# Patient Record
Sex: FEMALE | Race: WHITE | NOT HISPANIC OR LATINO | Employment: OTHER | URBAN - METROPOLITAN AREA
[De-identification: names, ages, dates, MRNs, and addresses within clinical notes are randomized per-mention and may not be internally consistent; named-entity substitution may affect disease eponyms.]

---

## 2021-02-12 ENCOUNTER — TRANSFERRED RECORDS (OUTPATIENT)
Dept: HEALTH INFORMATION MANAGEMENT | Facility: CLINIC | Age: 71
End: 2021-02-12

## 2022-02-07 ENCOUNTER — TRANSFERRED RECORDS (OUTPATIENT)
Dept: HEALTH INFORMATION MANAGEMENT | Facility: CLINIC | Age: 72
End: 2022-02-07

## 2022-02-07 LAB — EJECTION FRACTION: NORMAL %

## 2022-05-27 ENCOUNTER — OFFICE VISIT (OUTPATIENT)
Dept: INTERNAL MEDICINE | Facility: CLINIC | Age: 72
End: 2022-05-27
Payer: MEDICARE

## 2022-05-27 VITALS
RESPIRATION RATE: 16 BRPM | TEMPERATURE: 97.4 F | WEIGHT: 167.9 LBS | HEART RATE: 82 BPM | SYSTOLIC BLOOD PRESSURE: 119 MMHG | DIASTOLIC BLOOD PRESSURE: 66 MMHG | BODY MASS INDEX: 26.98 KG/M2 | HEIGHT: 66 IN | OXYGEN SATURATION: 96 %

## 2022-05-27 DIAGNOSIS — K31.84 GASTROPARESIS: ICD-10-CM

## 2022-05-27 DIAGNOSIS — K57.92 ACUTE DIVERTICULITIS: ICD-10-CM

## 2022-05-27 DIAGNOSIS — M35.00 SICCA SYNDROME (H): ICD-10-CM

## 2022-05-27 DIAGNOSIS — G89.4 PAIN SYNDROME, CHRONIC: ICD-10-CM

## 2022-05-27 DIAGNOSIS — K21.9 GASTROESOPHAGEAL REFLUX DISEASE WITHOUT ESOPHAGITIS: ICD-10-CM

## 2022-05-27 DIAGNOSIS — I10 BENIGN ESSENTIAL HYPERTENSION: Primary | ICD-10-CM

## 2022-05-27 DIAGNOSIS — Z12.31 ENCOUNTER FOR SCREENING MAMMOGRAM FOR BREAST CANCER: ICD-10-CM

## 2022-05-27 DIAGNOSIS — I47.29 NSVT (NONSUSTAINED VENTRICULAR TACHYCARDIA) (H): ICD-10-CM

## 2022-05-27 DIAGNOSIS — I71.20 THORACIC AORTIC ANEURYSM WITHOUT RUPTURE (H): ICD-10-CM

## 2022-05-27 PROCEDURE — 99204 OFFICE O/P NEW MOD 45 MIN: CPT | Performed by: INTERNAL MEDICINE

## 2022-05-27 RX ORDER — LACTOBACILLUS RHAMNOSUS GG 10B CELL
1 CAPSULE ORAL 2 TIMES DAILY
COMMUNITY
End: 2022-08-19

## 2022-05-27 RX ORDER — ROSUVASTATIN CALCIUM 10 MG/1
10 TABLET, COATED ORAL DAILY
COMMUNITY
Start: 2021-06-08 | End: 2022-11-17

## 2022-05-27 RX ORDER — HYDROCHLOROTHIAZIDE 25 MG/1
25 TABLET ORAL DAILY
COMMUNITY
Start: 2022-02-09 | End: 2022-11-17

## 2022-05-27 RX ORDER — LOSARTAN POTASSIUM 50 MG/1
50 TABLET ORAL 2 TIMES DAILY
COMMUNITY
Start: 2021-08-25 | End: 2022-11-17

## 2022-05-27 RX ORDER — GABAPENTIN 100 MG/1
200 CAPSULE ORAL EVERY MORNING
COMMUNITY
Start: 2021-08-02 | End: 2023-05-17

## 2022-05-27 RX ORDER — GABAPENTIN 300 MG/1
600 CAPSULE ORAL AT BEDTIME
COMMUNITY
Start: 2022-01-21 | End: 2023-05-17

## 2022-05-27 RX ORDER — MULTIPLE VITAMINS W/ MINERALS TAB 9MG-400MCG
1 TAB ORAL DAILY
COMMUNITY

## 2022-05-27 RX ORDER — VITAMIN B COMPLEX
1 TABLET ORAL DAILY
COMMUNITY

## 2022-05-27 NOTE — NURSING NOTE
"/66 (BP Location: Right arm, Patient Position: Sitting, Cuff Size: Adult Regular)   Pulse 82   Temp 97.4  F (36.3  C) (Oral)   Resp 16   Ht 1.676 m (5' 6\")   Wt 76.2 kg (167 lb 14.4 oz)   SpO2 96%   BMI 27.10 kg/m      "

## 2022-05-27 NOTE — PROGRESS NOTES
Assessment & Plan     Benign essential hypertension  Well-controlled, continue medication, she can notify me when any medication is due    Thoracic aortic aneurysm without rupture (H)  Stable over at least 5 years, there should not be any contraindication to doing her hip surgery, no need for any exam at this time    NSVT (nonsustained ventricular tachycardia) (H)  No episodes    Sicca syndrome (H)  Stable    Gastroparesis  Stable    Gastroesophageal reflux disease without esophagitis  Stable, continue medication    Pain syndrome, chronic  Stable, work with orthopedics, continue medication    Acute diverticulitis  Provided Augmentin for her to take as needed for episodes of acute diverticulitis since she had been told never to take Cipro due to a aneurysm.  - amoxicillin-clavulanate (AUGMENTIN) 875-125 MG tablet; Take 1 tablet by mouth 2 times daily for 10 days    Encounter for screening mammogram for breast cancer    - *MA Screening Digital Bilateral; Future          Return in about 8 weeks (around 7/21/2022) for preop.    Delfina Negrete MD  [facility]    Subjective   Alexandra is a 71 year old who presents for the following health issues     Subjective:   Alexandra Thomas is a 71 year old female who presents as a new patient to this clinic.     Previous care: [facility] in [facility]    Problems:  1.  Hypertension: This has been well controlled.  Her systolics are generally 120, diastolics 60s.  2.  Thoracic aortic aneurysm: Last echocardiogram was 2/22, showed aorta 4.2 cm, relatively stable.  No treatment was recommended at that time.  Echo prior to that had been done in 2020.  She had an MR chest angiogram in 10/2017.  3.  Gastroesophageal reflux: Controlled on her medication, had a GI bleed 2 years ago felt due to gastroenteritis, work-up was otherwise negative.  4.  Gastroparesis: This was noted on the endoscopy and confirmed with a motility study.  Her  last endoscopy did not show retained food, she has minimal symptoms related.  5.  History of paroxysmal atrial fibrillation, had an ablation in 2011 without recurrence  6.  History of nonsustained V. tach: No episodes  7.  History of sicca syndrome: Autoimmune work-up was negative  8.  History of recurrent acute diverticulitis: She had a partial colectomy but still will have some episodes, previously she had been provided antibiotics to treat when her symptoms flared.  9.  Chronic pain: She has chronic degenerative disc disease of cervical, thoracic and lumbar spine.  She has chronic osteoarthritis of the left shoulder, hip.  She has chronic foot pain.  She is on gabapentin to manage this which helps.  She is going to have a left total hip arthroplasty in July of this year.  She has had both knees replaced, right hip replaced and right shoulder replaced.    Healthcare maintenance:  She will be due for mammogram in August.  Her last colonoscopy was in 2021 and was negative.  She had a bone density test in 2018 which showed normal bone, T score at the spine was 2.8 and T score at the left hip was -0.9 on lunar machine, had lost 5% in 5 years.        Current concerns:   She has no acute concerns today.    Patient Active Problem List   Diagnosis     Benign essential hypertension     Gastroparesis     Thoracic aortic aneurysm without rupture (H)     Gastroesophageal reflux disease without esophagitis     Pain syndrome, chronic     Sicca syndrome (H)     NSVT (nonsustained ventricular tachycardia) (H)     TATI (obstructive sleep apnea)         Current Outpatient Medications   Medication Sig Dispense Refill     amoxicillin-clavulanate (AUGMENTIN) 875-125 MG tablet Take 1 tablet by mouth 2 times daily for 10 days 20 tablet 0     gabapentin (NEURONTIN) 100 MG capsule Take 100 mg by mouth every morning       gabapentin (NEURONTIN) 300 MG capsule Take 600 mg by mouth At Bedtime       hydrochlorothiazide (HYDRODIURIL) 25 MG  "tablet Take 25 mg by mouth daily       lactobacillus rhamnosus, GG, (CULTURELL) capsule Take 1 capsule by mouth 2 times daily       losartan (COZAAR) 50 MG tablet Take 50 mg by mouth 2 times daily       multivitamin w/minerals (MULTI-VITAMIN) tablet Take 1 tablet by mouth daily       rosuvastatin (CRESTOR) 10 MG tablet Take 10 mg by mouth daily       Vitamin D3 (CHOLECALCIFEROL) 25 mcg (1000 units) tablet Take by mouth daily         Past Surgical History:   Procedure Laterality Date     COLECTOMY PARTIAL      Due to recurrent diverticulitis     TOTAL HIP ARTHROPLASTY Right 06/07/2009     TOTAL KNEE ARTHROPLASTY Right 07/12/2006     TOTAL KNEE ARTHROPLASTY Left 02/18/2009     TOTAL VAGINAL HYSTERECTOMY         Family History   Problem Relation Age of Onset     Chronic Obstructive Pulmonary Disease Mother      Dementia Father      Chronic Obstructive Pulmonary Disease Father        Social History     Tobacco Use     Smoking status: Never Smoker     Smokeless tobacco: Never Used   Vaping Use     Vaping Use: Never used   Substance Use Topics     Drug use: Never               Review of Systems   No fever, chills, no dyspnea on exertion, no chest pain, palpitations, PND, orthopnea, edema, syncope, headache, abdominal pain       Objective    /66 (BP Location: Right arm, Patient Position: Sitting, Cuff Size: Adult Regular)   Pulse 82   Temp 97.4  F (36.3  C) (Oral)   Resp 16   Ht 1.676 m (5' 6\")   Wt 76.2 kg (167 lb 14.4 oz)   SpO2 96%   BMI 27.10 kg/m    Body mass index is 27.1 kg/m .  Physical Exam     Lungs: Clear  CV: normal S1, S2 without murmur, S3 or S4.   Abdomen: Soft, nondistended, nontender, no hepatosplenomegaly  Extremities: No edema              "

## 2022-05-29 ENCOUNTER — HEALTH MAINTENANCE LETTER (OUTPATIENT)
Age: 72
End: 2022-05-29

## 2022-05-29 PROBLEM — K21.9 GASTROESOPHAGEAL REFLUX DISEASE WITHOUT ESOPHAGITIS: Status: ACTIVE | Noted: 2022-05-29

## 2022-05-29 PROBLEM — M35.00 SICCA SYNDROME (H): Status: ACTIVE | Noted: 2022-05-29

## 2022-05-29 PROBLEM — G89.4 PAIN SYNDROME, CHRONIC: Status: ACTIVE | Noted: 2022-05-29

## 2022-05-29 PROBLEM — G47.33 OSA (OBSTRUCTIVE SLEEP APNEA): Status: ACTIVE | Noted: 2022-05-29

## 2022-05-29 PROBLEM — I47.29 NSVT (NONSUSTAINED VENTRICULAR TACHYCARDIA) (H): Status: ACTIVE | Noted: 2022-05-29

## 2022-05-29 PROBLEM — K31.84 GASTROPARESIS: Status: ACTIVE | Noted: 2022-05-29

## 2022-05-29 PROBLEM — I71.20 THORACIC AORTIC ANEURYSM WITHOUT RUPTURE (H): Status: ACTIVE | Noted: 2022-05-29

## 2022-05-29 PROBLEM — I10 BENIGN ESSENTIAL HYPERTENSION: Status: ACTIVE | Noted: 2022-05-29

## 2022-06-28 ENCOUNTER — OFFICE VISIT (OUTPATIENT)
Dept: INTERNAL MEDICINE | Facility: CLINIC | Age: 72
End: 2022-06-28
Payer: MEDICARE

## 2022-06-28 VITALS
HEIGHT: 66 IN | DIASTOLIC BLOOD PRESSURE: 75 MMHG | HEART RATE: 80 BPM | WEIGHT: 166.1 LBS | TEMPERATURE: 97.5 F | SYSTOLIC BLOOD PRESSURE: 110 MMHG | BODY MASS INDEX: 26.69 KG/M2 | RESPIRATION RATE: 18 BRPM | OXYGEN SATURATION: 98 %

## 2022-06-28 DIAGNOSIS — Z01.818 PRE-OP EXAM: Primary | ICD-10-CM

## 2022-06-28 DIAGNOSIS — M16.12 PRIMARY OSTEOARTHRITIS OF LEFT HIP: ICD-10-CM

## 2022-06-28 LAB
ERYTHROCYTE [DISTWIDTH] IN BLOOD BY AUTOMATED COUNT: 11.8 % (ref 10–15)
HCT VFR BLD AUTO: 40.6 % (ref 35–47)
HGB BLD-MCNC: 13.2 G/DL (ref 11.7–15.7)
MCH RBC QN AUTO: 31.9 PG (ref 26.5–33)
MCHC RBC AUTO-ENTMCNC: 32.5 G/DL (ref 31.5–36.5)
MCV RBC AUTO: 98 FL (ref 78–100)
PLATELET # BLD AUTO: 198 10E3/UL (ref 150–450)
RBC # BLD AUTO: 4.14 10E6/UL (ref 3.8–5.2)
WBC # BLD AUTO: 6.4 10E3/UL (ref 4–11)

## 2022-06-28 PROCEDURE — 87640 STAPH A DNA AMP PROBE: CPT | Mod: 59 | Performed by: INTERNAL MEDICINE

## 2022-06-28 PROCEDURE — 85027 COMPLETE CBC AUTOMATED: CPT | Performed by: INTERNAL MEDICINE

## 2022-06-28 PROCEDURE — 99214 OFFICE O/P EST MOD 30 MIN: CPT | Performed by: INTERNAL MEDICINE

## 2022-06-28 PROCEDURE — 93000 ELECTROCARDIOGRAM COMPLETE: CPT | Performed by: INTERNAL MEDICINE

## 2022-06-28 PROCEDURE — 36415 COLL VENOUS BLD VENIPUNCTURE: CPT | Performed by: INTERNAL MEDICINE

## 2022-06-28 PROCEDURE — 80048 BASIC METABOLIC PNL TOTAL CA: CPT | Performed by: INTERNAL MEDICINE

## 2022-06-28 PROCEDURE — 87641 MR-STAPH DNA AMP PROBE: CPT | Performed by: INTERNAL MEDICINE

## 2022-06-28 RX ORDER — ONDANSETRON 4 MG/1
4 TABLET, ORALLY DISINTEGRATING ORAL EVERY 8 HOURS PRN
Qty: 12 TABLET | Refills: 0 | Status: SHIPPED | OUTPATIENT
Start: 2022-06-28 | End: 2024-03-13

## 2022-06-28 NOTE — NURSING NOTE
"/75 (BP Location: Left arm, Patient Position: Sitting, Cuff Size: Adult Regular)   Pulse 80   Temp 97.5  F (36.4  C) (Oral)   Resp 18   Ht 1.676 m (5' 6\")   Wt 75.3 kg (166 lb 1.6 oz)   SpO2 98%   BMI 26.81 kg/m      "

## 2022-06-28 NOTE — PROGRESS NOTES
[place] [address] [facility] [address]  Phone: [phone]  Primary Provider: Arianne Negrete  Pre-op Performing Provider: ARIANNE NEGRETE      PREOPERATIVE EVALUATION:  Today's date: 6/28/2022    Alexandra Thomas is a 71 year old female who presents for a preoperative evaluation.    Surgical Information:  Surgery/Procedure: Left total hip arthroplasty   Surgery Location: [facility]   Surgeon: Dr. Berry Mcdonnell   Surgery Date: 7/13/22 or  07/27/2022  Time of Surgery: 10:00 AM  Where patient plans to recover: At home with family  Fax number for surgical facility: Note does not need to be faxed, will be available electronically in Epic.    Type of Anesthesia Anticipated: to be determined    Assessment & Plan     The proposed surgical procedure is considered INTERMEDIATE risk.    Pre-op exam    - EKG 12-lead complete w/read - Clinics  - Methicillin Resistant Staph Aureus PCR  - CBC with platelets  - Basic metabolic panel  (Ca, Cl, CO2, Creat, Gluc, K, Na, BUN)    Primary osteoarthritis of left hip             Risks and Recommendations:  The patient has the following additional risks and recommendations for perioperative complications:   - No identified additional risk factors other than previously addressed    Medication Instructions:  She will take her usual blood pressure medications and omeprazole the day of surgery.    RECOMMENDATION:  APPROVAL GIVEN to proceed with proposed procedure, without further diagnostic evaluation.    [id number]}    Subjective     HPI related to upcoming procedure: She has osteoarthritis of the hip, not managed by conservative treatment.    Preop Questions 6/25/2022   1. Have you ever had a heart attack or stroke? No   2. Have you ever had surgery on your heart or blood vessels, such as a stent placement, a coronary artery bypass, or surgery on an artery in your head, neck, heart, or legs? No   3. Do you have chest pain with  activity? No   4. Do you have a history of  heart failure? No   5. Do you currently have a cold, bronchitis or symptoms of other infection? No   6. Do you have a cough, shortness of breath, or wheezing? No   7. Do you or anyone in your family have previous history of blood clots? No   8. Do you or does anyone in your family have a serious bleeding problem such as prolonged bleeding following surgeries or cuts? No   9. Have you ever had problems with anemia or been told to take iron pills? No   10. Have you had any abnormal blood loss such as black, tarry or bloody stools, or abnormal vaginal bleeding? No   11. Have you ever had a blood transfusion? No   12. Are you willing to have a blood transfusion if it is medically needed before, during, or after your surgery? Yes   13. Have you or any of your relatives ever had problems with anesthesia? No   14. Do you have sleep apnea, excessive snoring or daytime drowsiness? No   15. Do you have any artifical heart valves or other implanted medical devices like a pacemaker, defibrillator, or continuous glucose monitor? No   16. Do you have artificial joints? YES - see PSH   17. Are you allergic to latex? No       Health Care Directive:  Patient does not have a Health Care Directive or Living Will:     Preoperative Review of :   reviewed - no record of controlled substances prescribed.      Status of Chronic Conditions:  HYPERTENSION - Patient has longstanding history of HTN , currently denies any symptoms referable to elevated blood pressure. Specifically denies chest pain, palpitations, dyspnea, orthopnea, PND or peripheral edema. Blood pressure readings have been in normal range. Current medication regimen is as listed below. Patient denies any side effects of medication.       Review of Systems  GENERAL: negative for, fever, chills, weight loss, weight gain  EYES: negative  ENT: negative  RESPIRATORY: No dyspnea on exertion and No cough  CARDIOVASCULAR:, positive for  occasionally PVC, Negative for palpitations, tachycardia and chest pain  GI: negative for, nausea, vomiting, abdominal pain, melena and hematochezia  : negative for, dysuria and hematuria  MUSCULOSKELETAL: hip pain  NEUROLOGIC: negative for, headaches, seizures, local weakness, numbness or tingling of hands and numbness or tingling of feet  SKIN: negative  ENDOCRINE: negative       Patient Active Problem List    Diagnosis Date Noted     Benign essential hypertension 05/29/2022     Priority: Medium     Gastroparesis 05/29/2022     Priority: Medium     Thoracic aortic aneurysm without rupture (H) 05/29/2022     Priority: Medium     Gastroesophageal reflux disease without esophagitis 05/29/2022     Priority: Medium     Pain syndrome, chronic 05/29/2022     Priority: Medium     Orthopedic pains: Hip pain, foot pain, shoulder pain, on gabapentin       Sicca syndrome (H) 05/29/2022     Priority: Medium     NSVT (nonsustained ventricular tachycardia) (H) 05/29/2022     Priority: Medium     TATI (obstructive sleep apnea) 05/29/2022     Priority: Medium      No past medical history on file.  Past Surgical History:   Procedure Laterality Date     COLECTOMY PARTIAL      Due to recurrent diverticulitis     TOTAL HIP ARTHROPLASTY Right 06/07/2009     TOTAL KNEE ARTHROPLASTY Right 07/12/2006     TOTAL KNEE ARTHROPLASTY Left 02/18/2009     TOTAL VAGINAL HYSTERECTOMY       Current Outpatient Medications   Medication Sig Dispense Refill     gabapentin (NEURONTIN) 100 MG capsule Take 100 mg by mouth every morning       gabapentin (NEURONTIN) 300 MG capsule Take 600 mg by mouth At Bedtime       hydrochlorothiazide (HYDRODIURIL) 25 MG tablet Take 25 mg by mouth daily       lactobacillus rhamnosus (GG) (CULTURELL) capsule Take 1 capsule by mouth 2 times daily       losartan (COZAAR) 50 MG tablet Take 50 mg by mouth 2 times daily       multivitamin w/minerals (THERA-VIT-M) tablet Take 1 tablet by mouth daily       omeprazole (PRILOSEC)  "20 MG DR capsule Take 2 capsules (40 mg) by mouth daily 90 capsule 3     rosuvastatin (CRESTOR) 10 MG tablet Take 10 mg by mouth daily       Vitamin D3 (CHOLECALCIFEROL) 25 mcg (1000 units) tablet Take by mouth daily         Allergies   Allergen Reactions     Venlafaxine Hcl Diarrhea, Shortness Of Breath and Nausea and Vomiting     Adhesive Tape      Other reaction(s): Other (Specify with Comments)  blisters     Ciprofloxacin Other (See Comments)     Other reaction(s): Other (Specify with Comments)  Pt has aortic aneurysm.  Increased risk of rupture or dissection with use of fluoroquinolones.  Pt has aortic aneurysm.  Increased risk of rupture or dissection with use of fluoroquinolones.          Social History     Tobacco Use     Smoking status: Never Smoker     Smokeless tobacco: Never Used   Substance Use Topics     Alcohol use: Not on file       History   Drug Use Unknown         Objective       Physical Exam    Patient is alert, oriented, cooperative in no acute distress.  /75 (BP Location: Left arm, Patient Position: Sitting, Cuff Size: Adult Regular)   Pulse 80   Temp 97.5  F (36.4  C) (Oral)   Resp 18   Ht 1.676 m (5' 6\")   Wt 75.3 kg (166 lb 1.6 oz)   SpO2 98%   BMI 26.81 kg/m      HEENT: PERRL, EOMI, TM's are normal.   NECK: No lymphadenopathy or thyromegaly. Carotid pulses full without bruits.  LUNGS: clear  CV: normal S1, S2 without murmur, S3 or S4 present. Pulses are 2/2 throughout. No JVD.  ABDOMEN: Nondistended, soft, nontender, no hepatosplenomegaly, no masses  EXTREMITIES: no edema present, unremarkable joints  NEUROLOGIC: Cranial nerves II-XII intact, reflexes 2/4 throughout, strength 5/5, sensation grossly intact, gait normal.  SKIN: without rashes or significant lesions       Diagnostics:  Recent Results (from the past 168 hour(s))   Methicillin Resistant Staph Aureus PCR    Collection Time: 06/28/22  1:10 PM    Specimen: Nose; Swab   Result Value Ref Range    MRSA Target DNA " Negative Negative    SA Target DNA Negative    CBC with platelets    Collection Time: 06/28/22  1:34 PM   Result Value Ref Range    WBC Count 6.4 4.0 - 11.0 10e3/uL    RBC Count 4.14 3.80 - 5.20 10e6/uL    Hemoglobin 13.2 11.7 - 15.7 g/dL    Hematocrit 40.6 35.0 - 47.0 %    MCV 98 78 - 100 fL    MCH 31.9 26.5 - 33.0 pg    MCHC 32.5 31.5 - 36.5 g/dL    RDW 11.8 10.0 - 15.0 %    Platelet Count 198 150 - 450 10e3/uL   Basic metabolic panel  (Ca, Cl, CO2, Creat, Gluc, K, Na, BUN)    Collection Time: 06/28/22  1:34 PM   Result Value Ref Range    Sodium 140 133 - 144 mmol/L    Potassium 4.0 3.4 - 5.3 mmol/L    Chloride 105 94 - 109 mmol/L    Carbon Dioxide (CO2) 30 20 - 32 mmol/L    Anion Gap 5 3 - 14 mmol/L    Urea Nitrogen 20 7 - 30 mg/dL    Creatinine 1.01 0.52 - 1.04 mg/dL    Calcium 9.0 8.5 - 10.1 mg/dL    Glucose 85 70 - 99 mg/dL    GFR Estimate 58 (L) >60 mL/min/1.73m2      EKG: appears normal, NSR, normal axis, normal intervals, no acute ST/T changes c/w ischemia, no LVH by voltage criteria, unchanged from previous tracings    Revised Cardiac Risk Index (RCRI):  The patient has the following serious cardiovascular risks for perioperative complications:   - No serious cardiac risks = 0 points     RCRI Interpretation: 0 points: Class I (very low risk - 0.4% complication rate)           Signed Electronically by: Delfina Negrete MD  Copy of this evaluation report is provided to requesting physician.

## 2022-06-29 LAB
ANION GAP SERPL CALCULATED.3IONS-SCNC: 5 MMOL/L (ref 3–14)
BUN SERPL-MCNC: 20 MG/DL (ref 7–30)
CALCIUM SERPL-MCNC: 9 MG/DL (ref 8.5–10.1)
CHLORIDE BLD-SCNC: 105 MMOL/L (ref 94–109)
CO2 SERPL-SCNC: 30 MMOL/L (ref 20–32)
CREAT SERPL-MCNC: 1.01 MG/DL (ref 0.52–1.04)
GFR SERPL CREATININE-BSD FRML MDRD: 58 ML/MIN/1.73M2
GLUCOSE BLD-MCNC: 85 MG/DL (ref 70–99)
MRSA DNA SPEC QL NAA+PROBE: NEGATIVE
POTASSIUM BLD-SCNC: 4 MMOL/L (ref 3.4–5.3)
SA TARGET DNA: NEGATIVE
SODIUM SERPL-SCNC: 140 MMOL/L (ref 133–144)

## 2022-07-11 ENCOUNTER — OFFICE VISIT (OUTPATIENT)
Dept: URGENT CARE | Facility: URGENT CARE | Age: 72
End: 2022-07-11
Payer: MEDICARE

## 2022-07-11 ENCOUNTER — NURSE TRIAGE (OUTPATIENT)
Dept: NURSING | Facility: CLINIC | Age: 72
End: 2022-07-11

## 2022-07-11 VITALS
DIASTOLIC BLOOD PRESSURE: 90 MMHG | BODY MASS INDEX: 26.7 KG/M2 | WEIGHT: 165.4 LBS | RESPIRATION RATE: 16 BRPM | OXYGEN SATURATION: 99 % | HEART RATE: 70 BPM | SYSTOLIC BLOOD PRESSURE: 140 MMHG

## 2022-07-11 DIAGNOSIS — K22.4 ESOPHAGEAL SPASM: Primary | ICD-10-CM

## 2022-07-11 PROCEDURE — 99214 OFFICE O/P EST MOD 30 MIN: CPT | Performed by: FAMILY MEDICINE

## 2022-07-11 RX ORDER — SUCRALFATE ORAL 1 G/10ML
1 SUSPENSION ORAL 2 TIMES DAILY PRN
Qty: 414 ML | Refills: 0 | Status: SHIPPED | OUTPATIENT
Start: 2022-07-11 | End: 2022-07-14

## 2022-07-11 NOTE — TELEPHONE ENCOUNTER
Chest pain since Friday night.  She thinks it was her esophagus spasm.    Similar happened in March and GI cocktail helped.    No pain since Saturday morning.    Has GERD. Taking tums and hoarse voice.    She is concerned this will cancel her surgery on her hip 7/27/22.    She currently has no pain x 2 days.  She is asking if this will delay her surgery. She wants to be seen.    Warm transferred to [facility]    Pema Brock RN  [facility] Nurse Advisor    Reason for Disposition    Chest pain(s) lasting a few seconds    Additional Information    Negative: Severe difficulty breathing (e.g., struggling for each breath, speaks in single words)    Negative: Passed out (i.e., fainted, collapsed and was not responding)    Negative: Difficult to awaken or acting confused (e.g., disoriented, slurred speech)    Negative: Shock suspected (e.g., cold/pale/clammy skin, too weak to stand, low BP, rapid pulse)    Negative: Chest pain lasting longer than 5 minutes and ANY of the following:* Over 45 years old* Over 30 years old and at least one cardiac risk factor (e.g., diabetes, high blood pressure, high cholesterol, smoker, or strong family history of heart disease)* History of heart disease (i.e., angina, heart attack, heart failure, bypass surgery, takes nitroglycerin)* Pain is crushing, pressure-like, or heavy    Negative: Heart beating < 50 beats per minute OR > 140 beats per minute    Negative: Visible sweat on face or sweat dripping down face    Negative: Sounds like a life-threatening emergency to the triager    Negative: Followed an injury to chest    Negative: SEVERE chest pain    Negative: Pain also in shoulder(s) or arm(s) or jaw    Negative: Difficulty breathing    Negative: Cocaine use within last 3 days    Negative: Major surgery in the past month    Negative: Hip or leg fracture (broken bone) in past month (or had cast on leg or ankle in past month)    Negative: Illness requiring prolonged bedrest in  past month (e.g., immobilization, long hospital stay)    Negative: Long-distance travel in past month (e.g., car, bus, train, plane; with trip lasting 6 or more hours)    Negative: History of prior 'blood clot' in leg or lungs (i.e., deep vein thrombosis, pulmonary embolism)    Negative: History of inherited increased risk of blood clots (e.g., Factor 5 Leiden, Anti-thrombin 3, Protein C or Protein S deficiency, Prothrombin mutation)    Negative: Heart beating irregularly or very rapidly    Negative: Chest pain lasting longer than 5 minutes and occurred in last 3 days (72 hours) (Exception: feels exactly the same as previously diagnosed heartburn and has accompanying sour taste in mouth)    Negative: Chest pain or 'angina' comes and goes and is happening more often (increasing in frequency) or getting worse (increasing in severity) (Exception: chest pains that last only a few seconds)    Negative: Dizziness or lightheadedness    Negative: Coughing up blood    Negative: Patient sounds very sick or weak to the triager    Negative: Cancer treatment in the past two months (or has cancer now)    Negative: Patient says chest pain feels exactly the same as previously diagnosed 'heartburn'  and  describes burning in chest and accompanying sour taste in mouth    Negative: Fever > 100.4 F (38.0 C)    Negative: Chest pain(s) lasting a few seconds persists > 3 days    Negative: Rash in same area as pain (may be described as 'small blisters')    Negative: All other patients with chest pain (Exception: fleeting chest pain lasting a few seconds)    Negative: Patient wants to be seen    Negative: Chest pain(s) lasting a few seconds from coughing    Protocols used: CHEST PAIN-A-OH

## 2022-07-11 NOTE — PROGRESS NOTES
SUBJECTIVE:  Patient presents with esophageal spasms some evenings.  Long hx of Gerd and had cardiac ablation in past with apparent esophageal burn.  On adequate doses of omeprazole and takes tums and occasional zofran.    No past medical history on file.  Current Outpatient Medications   Medication Sig Dispense Refill     sucralfate (CARAFATE) 1 GM/10ML suspension Take 10 mLs (1 g) by mouth 2 times daily as needed (esophageal pain) 414 mL 0     amoxicillin-clavulanate (AUGMENTIN) 875-125 MG tablet Take 1 tablet by mouth 2 times daily 20 tablet 0     gabapentin (NEURONTIN) 100 MG capsule Take 100 mg by mouth every morning       gabapentin (NEURONTIN) 300 MG capsule Take 600 mg by mouth At Bedtime       hydrochlorothiazide (HYDRODIURIL) 25 MG tablet Take 25 mg by mouth daily       lactobacillus rhamnosus (GG) (CULTURELL) capsule Take 1 capsule by mouth 2 times daily       losartan (COZAAR) 50 MG tablet Take 50 mg by mouth 2 times daily       multivitamin w/minerals (THERA-VIT-M) tablet Take 1 tablet by mouth daily       omeprazole (PRILOSEC) 20 MG DR capsule Take 2 capsules (40 mg) by mouth daily 90 capsule 3     ondansetron (ZOFRAN ODT) 4 MG ODT tab Take 1 tablet (4 mg) by mouth every 8 hours as needed for nausea 12 tablet 0     rosuvastatin (CRESTOR) 10 MG tablet Take 10 mg by mouth daily       Vitamin D3 (CHOLECALCIFEROL) 25 mcg (1000 units) tablet Take by mouth daily       History   Smoking Status     Never Smoker   Smokeless Tobacco     Never Used         OBJECITVE;  BP (!) 140/90 (BP Location: Right arm, Patient Position: Chair, Cuff Size: Adult Regular)   Pulse 70   Resp 16   Wt 75 kg (165 lb 6.4 oz)   SpO2 99%   Breastfeeding No   BMI 26.70 kg/m      Alert oriented  Heart, reg without mu  abd soft nontender  ASSESSMENT:  GERD  Esophageal spasm( has had multiple evaluations for chest pain/cardiac all negative    PLAN:  Symptomatic therapy suggested: carafate suspension after supper..    Follow up with  primary care provider if no improvement.

## 2022-07-14 RX ORDER — MAGNESIUM HYDROXIDE/ALUMINUM HYDROXICE/SIMETHICONE 120; 1200; 1200 MG/30ML; MG/30ML; MG/30ML
30 SUSPENSION ORAL EVERY 4 HOURS PRN
COMMUNITY
End: 2022-10-06

## 2022-07-25 ENCOUNTER — LAB (OUTPATIENT)
Dept: LAB | Facility: CLINIC | Age: 72
End: 2022-07-25
Payer: MEDICARE

## 2022-07-25 DIAGNOSIS — Z20.822 ENCOUNTER FOR LABORATORY TESTING FOR COVID-19 VIRUS: ICD-10-CM

## 2022-07-25 PROCEDURE — U0003 INFECTIOUS AGENT DETECTION BY NUCLEIC ACID (DNA OR RNA); SEVERE ACUTE RESPIRATORY SYNDROME CORONAVIRUS 2 (SARS-COV-2) (CORONAVIRUS DISEASE [COVID-19]), AMPLIFIED PROBE TECHNIQUE, MAKING USE OF HIGH THROUGHPUT TECHNOLOGIES AS DESCRIBED BY CMS-2020-01-R: HCPCS

## 2022-07-25 PROCEDURE — U0005 INFEC AGEN DETEC AMPLI PROBE: HCPCS

## 2022-07-26 LAB — SARS-COV-2 RNA RESP QL NAA+PROBE: NEGATIVE

## 2022-07-26 NOTE — PHARMACY-ADMISSION MEDICATION HISTORY
Pharmacy reviewed prior to admission med list from pre-admitting rn, PALLAVI Doshi.        Prior to Admission medications    Medication Sig Last Dose Taking? Auth Provider Long Term End Date   alum & mag hydroxide-simethicone (MAALOX) 200-200-20 MG/5ML SUSP suspension Take 30 mLs by mouth every 4 hours as needed for indigestion  Yes Reported, Patient     amoxicillin-clavulanate (AUGMENTIN) 875-125 MG tablet Take 1 tablet by mouth 2 times daily  Patient taking differently: Take 1 tablet by mouth 2 times daily as needed  Yes Delfina Negrete MD     gabapentin (NEURONTIN) 100 MG capsule Take 200 mg by mouth every morning  Yes Reported, Patient Yes    gabapentin (NEURONTIN) 300 MG capsule Take 600 mg by mouth At Bedtime  Yes Reported, Patient Yes    hydrochlorothiazide (HYDRODIURIL) 25 MG tablet Take 25 mg by mouth daily  Yes Reported, Patient Yes    lactobacillus rhamnosus (GG) (CULTURELL) capsule Take 1 capsule by mouth 2 times daily  Yes Reported, Patient     losartan (COZAAR) 50 MG tablet Take 50 mg by mouth 2 times daily  Yes Reported, Patient Yes    melatonin 5 MG tablet Take 5 mg by mouth nightly as needed for sleep  Yes Reported, Patient     multivitamin w/minerals (THERA-VIT-M) tablet Take 1 tablet by mouth daily  Yes Reported, Patient     omeprazole (PRILOSEC) 20 MG DR capsule Take 40 mg by mouth 2 times daily  Yes Delfina Negrete MD     ondansetron (ZOFRAN ODT) 4 MG ODT tab Take 1 tablet (4 mg) by mouth every 8 hours as needed for nausea  Yes Delfina Negrete MD     rosuvastatin (CRESTOR) 10 MG tablet Take 10 mg by mouth daily  Yes Reported, Patient Yes    Vitamin D3 (CHOLECALCIFEROL) 25 mcg (1000 units) tablet Take 1 tablet by mouth daily  Yes Reported, Patient     methylcellulose (CITRUCEL) 500 MG TABS tablet Take 1 tablet by mouth daily   Reported, Patient

## 2022-07-27 ENCOUNTER — ANESTHESIA (OUTPATIENT)
Dept: SURGERY | Facility: CLINIC | Age: 72
End: 2022-07-27
Payer: MEDICARE

## 2022-07-27 ENCOUNTER — APPOINTMENT (OUTPATIENT)
Dept: PHYSICAL THERAPY | Facility: CLINIC | Age: 72
End: 2022-07-27
Attending: PHYSICIAN ASSISTANT
Payer: MEDICARE

## 2022-07-27 ENCOUNTER — HOSPITAL ENCOUNTER (OUTPATIENT)
Facility: CLINIC | Age: 72
Discharge: HOME | End: 2022-07-28
Attending: ORTHOPAEDIC SURGERY | Admitting: ORTHOPAEDIC SURGERY
Payer: MEDICARE

## 2022-07-27 ENCOUNTER — ANESTHESIA EVENT (OUTPATIENT)
Dept: SURGERY | Facility: CLINIC | Age: 72
End: 2022-07-27
Payer: MEDICARE

## 2022-07-27 ENCOUNTER — APPOINTMENT (OUTPATIENT)
Dept: GENERAL RADIOLOGY | Facility: CLINIC | Age: 72
End: 2022-07-27
Attending: PHYSICIAN ASSISTANT
Payer: MEDICARE

## 2022-07-27 DIAGNOSIS — Z96.642 STATUS POST TOTAL REPLACEMENT OF LEFT HIP: Primary | ICD-10-CM

## 2022-07-27 PROBLEM — Z96.649 S/P TOTAL HIP ARTHROPLASTY: Status: ACTIVE | Noted: 2022-07-27

## 2022-07-27 PROCEDURE — 97161 PT EVAL LOW COMPLEX 20 MIN: CPT | Mod: GP | Performed by: PHYSICAL THERAPIST

## 2022-07-27 PROCEDURE — 97530 THERAPEUTIC ACTIVITIES: CPT | Mod: GP | Performed by: PHYSICAL THERAPIST

## 2022-07-27 PROCEDURE — 258N000003 HC RX IP 258 OP 636: Performed by: PHYSICIAN ASSISTANT

## 2022-07-27 PROCEDURE — 250N000009 HC RX 250: Performed by: NURSE ANESTHETIST, CERTIFIED REGISTERED

## 2022-07-27 PROCEDURE — 258N000003 HC RX IP 258 OP 636: Performed by: NURSE ANESTHETIST, CERTIFIED REGISTERED

## 2022-07-27 PROCEDURE — 999N000141 HC STATISTIC PRE-PROCEDURE NURSING ASSESSMENT: Performed by: ORTHOPAEDIC SURGERY

## 2022-07-27 PROCEDURE — 272N000001 HC OR GENERAL SUPPLY STERILE: Performed by: ORTHOPAEDIC SURGERY

## 2022-07-27 PROCEDURE — 710N000009 HC RECOVERY PHASE 1, LEVEL 1, PER MIN: Performed by: ORTHOPAEDIC SURGERY

## 2022-07-27 PROCEDURE — 250N000009 HC RX 250: Performed by: ORTHOPAEDIC SURGERY

## 2022-07-27 PROCEDURE — 258N000003 HC RX IP 258 OP 636: Performed by: ANESTHESIOLOGY

## 2022-07-27 PROCEDURE — 250N000011 HC RX IP 250 OP 636: Performed by: ANESTHESIOLOGY

## 2022-07-27 PROCEDURE — C1776 JOINT DEVICE (IMPLANTABLE): HCPCS | Performed by: ORTHOPAEDIC SURGERY

## 2022-07-27 PROCEDURE — 250N000013 HC RX MED GY IP 250 OP 250 PS 637: Performed by: PHYSICIAN ASSISTANT

## 2022-07-27 PROCEDURE — 258N000001 HC RX 258: Performed by: ORTHOPAEDIC SURGERY

## 2022-07-27 PROCEDURE — 360N000077 HC SURGERY LEVEL 4, PER MIN: Performed by: ORTHOPAEDIC SURGERY

## 2022-07-27 PROCEDURE — 370N000017 HC ANESTHESIA TECHNICAL FEE, PER MIN: Performed by: ORTHOPAEDIC SURGERY

## 2022-07-27 PROCEDURE — 250N000011 HC RX IP 250 OP 636: Performed by: PHYSICIAN ASSISTANT

## 2022-07-27 PROCEDURE — 97116 GAIT TRAINING THERAPY: CPT | Mod: GP | Performed by: PHYSICAL THERAPIST

## 2022-07-27 PROCEDURE — 999N000065 XR PELVIS AND HIP PORTABLE LEFT 1 VIEW

## 2022-07-27 PROCEDURE — 250N000011 HC RX IP 250 OP 636: Performed by: NURSE ANESTHETIST, CERTIFIED REGISTERED

## 2022-07-27 PROCEDURE — 250N000013 HC RX MED GY IP 250 OP 250 PS 637: Performed by: ANESTHESIOLOGY

## 2022-07-27 DEVICE — TRIDENT X3 0 DEGREE POLYETHYLENE INSERT
Type: IMPLANTABLE DEVICE | Site: HIP | Status: FUNCTIONAL
Brand: TRIDENT X3 INSERT

## 2022-07-27 DEVICE — 132 DEGREE NECK ANGLE HIP STEM
Type: IMPLANTABLE DEVICE | Site: HIP | Status: FUNCTIONAL
Brand: ACCOLADE

## 2022-07-27 DEVICE — TRIDENT II TRITANIUM CLUSTER 54E
Type: IMPLANTABLE DEVICE | Site: HIP | Status: FUNCTIONAL
Brand: TRIDENT II

## 2022-07-27 DEVICE — CERAMIC V40 FEMORAL HEAD
Type: IMPLANTABLE DEVICE | Site: HIP | Status: FUNCTIONAL
Brand: BIOLOX

## 2022-07-27 RX ORDER — ACETAMINOPHEN 325 MG/1
650 TABLET ORAL EVERY 4 HOURS PRN
Qty: 100 TABLET | Refills: 0 | Status: ON HOLD | OUTPATIENT
Start: 2022-07-27 | End: 2024-04-03

## 2022-07-27 RX ORDER — POLYETHYLENE GLYCOL 3350 17 G/17G
17 POWDER, FOR SOLUTION ORAL DAILY
Status: DISCONTINUED | OUTPATIENT
Start: 2022-07-28 | End: 2022-07-28 | Stop reason: HOSPADM

## 2022-07-27 RX ORDER — OXYCODONE HYDROCHLORIDE 5 MG/1
5 TABLET ORAL EVERY 4 HOURS PRN
Status: DISCONTINUED | OUTPATIENT
Start: 2022-07-27 | End: 2022-07-27 | Stop reason: HOSPADM

## 2022-07-27 RX ORDER — AMOXICILLIN 250 MG
1 CAPSULE ORAL 2 TIMES DAILY
Status: DISCONTINUED | OUTPATIENT
Start: 2022-07-27 | End: 2022-07-28 | Stop reason: HOSPADM

## 2022-07-27 RX ORDER — ONDANSETRON 4 MG/1
4 TABLET, ORALLY DISINTEGRATING ORAL EVERY 30 MIN PRN
Status: DISCONTINUED | OUTPATIENT
Start: 2022-07-27 | End: 2022-07-27 | Stop reason: HOSPADM

## 2022-07-27 RX ORDER — FENTANYL CITRATE 50 UG/ML
INJECTION, SOLUTION INTRAMUSCULAR; INTRAVENOUS PRN
Status: DISCONTINUED | OUTPATIENT
Start: 2022-07-27 | End: 2022-07-27

## 2022-07-27 RX ORDER — MAGNESIUM HYDROXIDE/ALUMINUM HYDROXICE/SIMETHICONE 120; 1200; 1200 MG/30ML; MG/30ML; MG/30ML
30 SUSPENSION ORAL EVERY 4 HOURS PRN
Status: DISCONTINUED | OUTPATIENT
Start: 2022-07-27 | End: 2022-07-28 | Stop reason: HOSPADM

## 2022-07-27 RX ORDER — ONDANSETRON 2 MG/ML
4 INJECTION INTRAMUSCULAR; INTRAVENOUS EVERY 6 HOURS PRN
Status: DISCONTINUED | OUTPATIENT
Start: 2022-07-27 | End: 2022-07-28 | Stop reason: HOSPADM

## 2022-07-27 RX ORDER — HYDROMORPHONE HCL IN WATER/PF 6 MG/30 ML
0.4 PATIENT CONTROLLED ANALGESIA SYRINGE INTRAVENOUS
Status: DISCONTINUED | OUTPATIENT
Start: 2022-07-27 | End: 2022-07-28 | Stop reason: HOSPADM

## 2022-07-27 RX ORDER — OXYCODONE HYDROCHLORIDE 5 MG/1
5 TABLET ORAL EVERY 4 HOURS PRN
Status: DISCONTINUED | OUTPATIENT
Start: 2022-07-27 | End: 2022-07-28 | Stop reason: HOSPADM

## 2022-07-27 RX ORDER — LIDOCAINE 40 MG/G
CREAM TOPICAL
Status: DISCONTINUED | OUTPATIENT
Start: 2022-07-27 | End: 2022-07-28 | Stop reason: HOSPADM

## 2022-07-27 RX ORDER — NALOXONE HYDROCHLORIDE 0.4 MG/ML
0.2 INJECTION, SOLUTION INTRAMUSCULAR; INTRAVENOUS; SUBCUTANEOUS
Status: DISCONTINUED | OUTPATIENT
Start: 2022-07-27 | End: 2022-07-28 | Stop reason: HOSPADM

## 2022-07-27 RX ORDER — AMOXICILLIN 250 MG
1-2 CAPSULE ORAL 2 TIMES DAILY
Qty: 30 TABLET | Refills: 0 | Status: SHIPPED | OUTPATIENT
Start: 2022-07-27 | End: 2022-08-19

## 2022-07-27 RX ORDER — ONDANSETRON 2 MG/ML
INJECTION INTRAMUSCULAR; INTRAVENOUS PRN
Status: DISCONTINUED | OUTPATIENT
Start: 2022-07-27 | End: 2022-07-27

## 2022-07-27 RX ORDER — FENTANYL CITRATE 50 UG/ML
25 INJECTION, SOLUTION INTRAMUSCULAR; INTRAVENOUS EVERY 5 MIN PRN
Status: DISCONTINUED | OUTPATIENT
Start: 2022-07-27 | End: 2022-07-27 | Stop reason: HOSPADM

## 2022-07-27 RX ORDER — SODIUM CHLORIDE, SODIUM LACTATE, POTASSIUM CHLORIDE, CALCIUM CHLORIDE 600; 310; 30; 20 MG/100ML; MG/100ML; MG/100ML; MG/100ML
INJECTION, SOLUTION INTRAVENOUS CONTINUOUS
Status: DISCONTINUED | OUTPATIENT
Start: 2022-07-27 | End: 2022-07-27 | Stop reason: HOSPADM

## 2022-07-27 RX ORDER — ONDANSETRON 2 MG/ML
4 INJECTION INTRAMUSCULAR; INTRAVENOUS EVERY 30 MIN PRN
Status: DISCONTINUED | OUTPATIENT
Start: 2022-07-27 | End: 2022-07-27 | Stop reason: HOSPADM

## 2022-07-27 RX ORDER — NALOXONE HYDROCHLORIDE 0.4 MG/ML
0.4 INJECTION, SOLUTION INTRAMUSCULAR; INTRAVENOUS; SUBCUTANEOUS
Status: DISCONTINUED | OUTPATIENT
Start: 2022-07-27 | End: 2022-07-28 | Stop reason: HOSPADM

## 2022-07-27 RX ORDER — PHENYLEPHRINE HYDROCHLORIDE 10 MG/ML
INJECTION INTRAVENOUS PRN
Status: DISCONTINUED | OUTPATIENT
Start: 2022-07-27 | End: 2022-07-27

## 2022-07-27 RX ORDER — PROCHLORPERAZINE MALEATE 5 MG
5 TABLET ORAL EVERY 6 HOURS PRN
Status: DISCONTINUED | OUTPATIENT
Start: 2022-07-27 | End: 2022-07-28 | Stop reason: HOSPADM

## 2022-07-27 RX ORDER — NEOSTIGMINE METHYLSULFATE 1 MG/ML
VIAL (ML) INJECTION PRN
Status: DISCONTINUED | OUTPATIENT
Start: 2022-07-27 | End: 2022-07-27

## 2022-07-27 RX ORDER — CEFAZOLIN SODIUM 1 G/3ML
1 INJECTION, POWDER, FOR SOLUTION INTRAMUSCULAR; INTRAVENOUS EVERY 8 HOURS
Status: COMPLETED | OUTPATIENT
Start: 2022-07-27 | End: 2022-07-28

## 2022-07-27 RX ORDER — ACETAMINOPHEN 325 MG/1
650 TABLET ORAL EVERY 4 HOURS PRN
Status: DISCONTINUED | OUTPATIENT
Start: 2022-07-30 | End: 2022-07-28 | Stop reason: HOSPADM

## 2022-07-27 RX ORDER — ACETAMINOPHEN 325 MG/1
975 TABLET ORAL EVERY 8 HOURS
Status: DISCONTINUED | OUTPATIENT
Start: 2022-07-27 | End: 2022-07-28 | Stop reason: HOSPADM

## 2022-07-27 RX ORDER — SODIUM CHLORIDE, SODIUM LACTATE, POTASSIUM CHLORIDE, CALCIUM CHLORIDE 600; 310; 30; 20 MG/100ML; MG/100ML; MG/100ML; MG/100ML
INJECTION, SOLUTION INTRAVENOUS CONTINUOUS
Status: DISCONTINUED | OUTPATIENT
Start: 2022-07-27 | End: 2022-07-28 | Stop reason: HOSPADM

## 2022-07-27 RX ORDER — LIDOCAINE 40 MG/G
CREAM TOPICAL
Status: DISCONTINUED | OUTPATIENT
Start: 2022-07-27 | End: 2022-07-27 | Stop reason: HOSPADM

## 2022-07-27 RX ORDER — OXYCODONE HYDROCHLORIDE 10 MG/1
10 TABLET ORAL EVERY 4 HOURS PRN
Status: DISCONTINUED | OUTPATIENT
Start: 2022-07-27 | End: 2022-07-28 | Stop reason: HOSPADM

## 2022-07-27 RX ORDER — ASPIRIN 81 MG/1
81 TABLET ORAL 2 TIMES DAILY
Status: DISCONTINUED | OUTPATIENT
Start: 2022-07-27 | End: 2022-07-28 | Stop reason: HOSPADM

## 2022-07-27 RX ORDER — LACTOBACILLUS RHAMNOSUS GG 10B CELL
1 CAPSULE ORAL 2 TIMES DAILY
Status: DISCONTINUED | OUTPATIENT
Start: 2022-07-27 | End: 2022-07-28 | Stop reason: HOSPADM

## 2022-07-27 RX ORDER — DEXAMETHASONE SODIUM PHOSPHATE 4 MG/ML
INJECTION, SOLUTION INTRA-ARTICULAR; INTRALESIONAL; INTRAMUSCULAR; INTRAVENOUS; SOFT TISSUE PRN
Status: DISCONTINUED | OUTPATIENT
Start: 2022-07-27 | End: 2022-07-27

## 2022-07-27 RX ORDER — GABAPENTIN 300 MG/1
600 CAPSULE ORAL AT BEDTIME
Status: DISCONTINUED | OUTPATIENT
Start: 2022-07-27 | End: 2022-07-28 | Stop reason: HOSPADM

## 2022-07-27 RX ORDER — ASPIRIN 81 MG/1
81 TABLET ORAL 2 TIMES DAILY
Qty: 60 TABLET | Refills: 0 | Status: SHIPPED | OUTPATIENT
Start: 2022-07-27 | End: 2022-08-19

## 2022-07-27 RX ORDER — GABAPENTIN 100 MG/1
200 CAPSULE ORAL EVERY MORNING
Status: DISCONTINUED | OUTPATIENT
Start: 2022-07-28 | End: 2022-07-28 | Stop reason: HOSPADM

## 2022-07-27 RX ORDER — CEFAZOLIN SODIUM/WATER 2 G/20 ML
2 SYRINGE (ML) INTRAVENOUS
Status: COMPLETED | OUTPATIENT
Start: 2022-07-27 | End: 2022-07-27

## 2022-07-27 RX ORDER — GLYCOPYRROLATE 0.2 MG/ML
INJECTION, SOLUTION INTRAMUSCULAR; INTRAVENOUS PRN
Status: DISCONTINUED | OUTPATIENT
Start: 2022-07-27 | End: 2022-07-27

## 2022-07-27 RX ORDER — ONDANSETRON 4 MG/1
4 TABLET, ORALLY DISINTEGRATING ORAL EVERY 6 HOURS PRN
Status: DISCONTINUED | OUTPATIENT
Start: 2022-07-27 | End: 2022-07-28 | Stop reason: HOSPADM

## 2022-07-27 RX ORDER — HYDROMORPHONE HCL IN WATER/PF 6 MG/30 ML
0.2 PATIENT CONTROLLED ANALGESIA SYRINGE INTRAVENOUS EVERY 5 MIN PRN
Status: DISCONTINUED | OUTPATIENT
Start: 2022-07-27 | End: 2022-07-27 | Stop reason: HOSPADM

## 2022-07-27 RX ORDER — HYDROCHLOROTHIAZIDE 25 MG/1
25 TABLET ORAL DAILY
Status: DISCONTINUED | OUTPATIENT
Start: 2022-07-28 | End: 2022-07-28 | Stop reason: HOSPADM

## 2022-07-27 RX ORDER — BISACODYL 10 MG
10 SUPPOSITORY, RECTAL RECTAL DAILY PRN
Status: DISCONTINUED | OUTPATIENT
Start: 2022-07-27 | End: 2022-07-28 | Stop reason: HOSPADM

## 2022-07-27 RX ORDER — LIDOCAINE HYDROCHLORIDE 10 MG/ML
INJECTION, SOLUTION INFILTRATION; PERINEURAL PRN
Status: DISCONTINUED | OUTPATIENT
Start: 2022-07-27 | End: 2022-07-27

## 2022-07-27 RX ORDER — LOSARTAN POTASSIUM 50 MG/1
50 TABLET ORAL 2 TIMES DAILY
Status: DISCONTINUED | OUTPATIENT
Start: 2022-07-28 | End: 2022-07-28 | Stop reason: HOSPADM

## 2022-07-27 RX ORDER — ROSUVASTATIN CALCIUM 5 MG/1
10 TABLET, COATED ORAL EVERY EVENING
Status: DISCONTINUED | OUTPATIENT
Start: 2022-07-27 | End: 2022-07-28 | Stop reason: HOSPADM

## 2022-07-27 RX ORDER — OXYCODONE HYDROCHLORIDE 5 MG/1
5-10 TABLET ORAL EVERY 4 HOURS PRN
Qty: 26 TABLET | Refills: 0 | Status: SHIPPED | OUTPATIENT
Start: 2022-07-27 | End: 2022-10-06

## 2022-07-27 RX ORDER — HYDROMORPHONE HCL IN WATER/PF 6 MG/30 ML
0.2 PATIENT CONTROLLED ANALGESIA SYRINGE INTRAVENOUS
Status: DISCONTINUED | OUTPATIENT
Start: 2022-07-27 | End: 2022-07-28 | Stop reason: HOSPADM

## 2022-07-27 RX ORDER — TRANEXAMIC ACID 650 MG/1
1950 TABLET ORAL ONCE
Status: COMPLETED | OUTPATIENT
Start: 2022-07-27 | End: 2022-07-27

## 2022-07-27 RX ORDER — SODIUM CHLORIDE, SODIUM LACTATE, POTASSIUM CHLORIDE, CALCIUM CHLORIDE 600; 310; 30; 20 MG/100ML; MG/100ML; MG/100ML; MG/100ML
INJECTION, SOLUTION INTRAVENOUS CONTINUOUS PRN
Status: DISCONTINUED | OUTPATIENT
Start: 2022-07-27 | End: 2022-07-27

## 2022-07-27 RX ORDER — PANTOPRAZOLE SODIUM 40 MG/1
40 TABLET, DELAYED RELEASE ORAL 2 TIMES DAILY
Status: DISCONTINUED | OUTPATIENT
Start: 2022-07-27 | End: 2022-07-28 | Stop reason: HOSPADM

## 2022-07-27 RX ORDER — CEFAZOLIN SODIUM/WATER 2 G/20 ML
2 SYRINGE (ML) INTRAVENOUS SEE ADMIN INSTRUCTIONS
Status: DISCONTINUED | OUTPATIENT
Start: 2022-07-27 | End: 2022-07-27 | Stop reason: HOSPADM

## 2022-07-27 RX ORDER — PROPOFOL 10 MG/ML
INJECTION, EMULSION INTRAVENOUS PRN
Status: DISCONTINUED | OUTPATIENT
Start: 2022-07-27 | End: 2022-07-27

## 2022-07-27 RX ADMIN — OXYCODONE HYDROCHLORIDE 5 MG: 5 TABLET ORAL at 11:33

## 2022-07-27 RX ADMIN — FENTANYL CITRATE 25 MCG: 0.05 INJECTION, SOLUTION INTRAMUSCULAR; INTRAVENOUS at 11:22

## 2022-07-27 RX ADMIN — FENTANYL CITRATE 25 MCG: 0.05 INJECTION, SOLUTION INTRAMUSCULAR; INTRAVENOUS at 11:02

## 2022-07-27 RX ADMIN — HYDROMORPHONE HYDROCHLORIDE 0.5 MG: 1 INJECTION, SOLUTION INTRAMUSCULAR; INTRAVENOUS; SUBCUTANEOUS at 09:45

## 2022-07-27 RX ADMIN — PANTOPRAZOLE SODIUM 40 MG: 40 TABLET, DELAYED RELEASE ORAL at 20:28

## 2022-07-27 RX ADMIN — NEOSTIGMINE METHYLSULFATE 3.5 MG: 1 INJECTION, SOLUTION INTRAVENOUS at 10:30

## 2022-07-27 RX ADMIN — GLYCOPYRROLATE 0.4 MG: 0.2 INJECTION, SOLUTION INTRAMUSCULAR; INTRAVENOUS at 10:29

## 2022-07-27 RX ADMIN — HYDROMORPHONE HYDROCHLORIDE 0.5 MG: 1 INJECTION, SOLUTION INTRAMUSCULAR; INTRAVENOUS; SUBCUTANEOUS at 09:35

## 2022-07-27 RX ADMIN — Medication 2 G: at 09:03

## 2022-07-27 RX ADMIN — PHENYLEPHRINE HYDROCHLORIDE 50 MCG: 10 INJECTION INTRAVENOUS at 10:02

## 2022-07-27 RX ADMIN — ASPIRIN 81 MG: 81 TABLET, COATED ORAL at 20:28

## 2022-07-27 RX ADMIN — GABAPENTIN 600 MG: 300 CAPSULE ORAL at 22:09

## 2022-07-27 RX ADMIN — SODIUM CHLORIDE, POTASSIUM CHLORIDE, SODIUM LACTATE AND CALCIUM CHLORIDE: 600; 310; 30; 20 INJECTION, SOLUTION INTRAVENOUS at 23:26

## 2022-07-27 RX ADMIN — PHENYLEPHRINE HYDROCHLORIDE 50 MCG: 10 INJECTION INTRAVENOUS at 09:35

## 2022-07-27 RX ADMIN — PROPOFOL 50 MCG/KG/MIN: 10 INJECTION, EMULSION INTRAVENOUS at 09:19

## 2022-07-27 RX ADMIN — ACETAMINOPHEN 975 MG: 325 TABLET, FILM COATED ORAL at 11:34

## 2022-07-27 RX ADMIN — PHENYLEPHRINE HYDROCHLORIDE 150 MCG: 10 INJECTION INTRAVENOUS at 10:14

## 2022-07-27 RX ADMIN — OXYCODONE HYDROCHLORIDE 5 MG: 5 TABLET ORAL at 16:38

## 2022-07-27 RX ADMIN — SENNOSIDES AND DOCUSATE SODIUM 1 TABLET: 50; 8.6 TABLET ORAL at 20:28

## 2022-07-27 RX ADMIN — SODIUM CHLORIDE, POTASSIUM CHLORIDE, SODIUM LACTATE AND CALCIUM CHLORIDE: 600; 310; 30; 20 INJECTION, SOLUTION INTRAVENOUS at 07:56

## 2022-07-27 RX ADMIN — LIDOCAINE HYDROCHLORIDE 20 MG: 10 INJECTION, SOLUTION INFILTRATION; PERINEURAL at 09:16

## 2022-07-27 RX ADMIN — Medication 1 CAPSULE: at 20:28

## 2022-07-27 RX ADMIN — ONDANSETRON 4 MG: 2 INJECTION INTRAMUSCULAR; INTRAVENOUS at 16:33

## 2022-07-27 RX ADMIN — FENTANYL CITRATE 25 MCG: 0.05 INJECTION, SOLUTION INTRAMUSCULAR; INTRAVENOUS at 11:09

## 2022-07-27 RX ADMIN — ACETAMINOPHEN 975 MG: 325 TABLET, FILM COATED ORAL at 19:01

## 2022-07-27 RX ADMIN — OXYCODONE HYDROCHLORIDE 5 MG: 5 TABLET ORAL at 23:42

## 2022-07-27 RX ADMIN — CEFAZOLIN 1 G: 1 INJECTION, POWDER, FOR SOLUTION INTRAMUSCULAR; INTRAVENOUS at 16:35

## 2022-07-27 RX ADMIN — SODIUM CHLORIDE, POTASSIUM CHLORIDE, SODIUM LACTATE AND CALCIUM CHLORIDE: 600; 310; 30; 20 INJECTION, SOLUTION INTRAVENOUS at 08:30

## 2022-07-27 RX ADMIN — ROCURONIUM BROMIDE 50 MG: 50 INJECTION, SOLUTION INTRAVENOUS at 09:16

## 2022-07-27 RX ADMIN — TRANEXAMIC ACID 1950 MG: 650 TABLET ORAL at 07:38

## 2022-07-27 RX ADMIN — FENTANYL CITRATE 50 MCG: 50 INJECTION, SOLUTION INTRAMUSCULAR; INTRAVENOUS at 09:12

## 2022-07-27 RX ADMIN — SODIUM CHLORIDE, POTASSIUM CHLORIDE, SODIUM LACTATE AND CALCIUM CHLORIDE: 600; 310; 30; 20 INJECTION, SOLUTION INTRAVENOUS at 09:58

## 2022-07-27 RX ADMIN — PHENYLEPHRINE HYDROCHLORIDE 50 MCG: 10 INJECTION INTRAVENOUS at 09:24

## 2022-07-27 RX ADMIN — GLYCOPYRROLATE 0.2 MG: 0.2 INJECTION, SOLUTION INTRAMUSCULAR; INTRAVENOUS at 09:16

## 2022-07-27 RX ADMIN — FENTANYL CITRATE 50 MCG: 50 INJECTION, SOLUTION INTRAMUSCULAR; INTRAVENOUS at 09:06

## 2022-07-27 RX ADMIN — FENTANYL CITRATE 25 MCG: 0.05 INJECTION, SOLUTION INTRAMUSCULAR; INTRAVENOUS at 11:15

## 2022-07-27 RX ADMIN — DEXAMETHASONE SODIUM PHOSPHATE 8 MG: 4 INJECTION, SOLUTION INTRA-ARTICULAR; INTRALESIONAL; INTRAMUSCULAR; INTRAVENOUS; SOFT TISSUE at 09:16

## 2022-07-27 RX ADMIN — PHENYLEPHRINE HYDROCHLORIDE 75 MCG: 10 INJECTION INTRAVENOUS at 10:26

## 2022-07-27 RX ADMIN — ONDANSETRON HYDROCHLORIDE 4 MG: 2 INJECTION, SOLUTION INTRAVENOUS at 10:16

## 2022-07-27 RX ADMIN — ROSUVASTATIN CALCIUM 10 MG: 5 TABLET, FILM COATED ORAL at 20:28

## 2022-07-27 RX ADMIN — PROPOFOL 150 MG: 10 INJECTION, EMULSION INTRAVENOUS at 09:16

## 2022-07-27 NOTE — PLAN OF CARE
Goal Outcome Evaluation:    Plan of Care Reviewed With: patient     Overall Patient Progress: improving     Patient vital signs are at baseline: No,  Reason:  capnography on. Desat when sleeping. On oxygen 2 L per NC.  Patient able to ambulate as they were prior to admission or with assist devices provided by therapies during their stay:  No,  Reason:  1 assist, GB, walker to bedside commode.   Patient MUST void prior to discharge:  Yes  Patient able to tolerate oral intake:  Yes  Pain has adequate pain control using Oral analgesics:  Yes  Does patient have an identified :  Yes  Has goal D/C date and time been discussed with patient:  Yes. Daughter Blanca.   Oriented x4. Pale in color. Dressing clean and dry. Unable to lift left leg off bed at this time per self. Pain controlled. Voided. Pleasant. Plan is home tomorrow with daughter pending passing therapies and discharge criteria.

## 2022-07-27 NOTE — ANESTHESIA PROCEDURE NOTES
Airway       Patient location during procedure: OR       Procedure Start/Stop Times: 7/27/2022 9:18 AM  Staff -        CRNA: Masha Chavez APRN CRNA       Performed By: CRNA  Consent for Airway        Urgency: elective  Indications and Patient Condition       Indications for airway management: soo-procedural       Induction type:intravenous       Mask difficulty assessment: 1 - vent by mask    Final Airway Details       Final airway type: endotracheal airway       Successful airway: ETT - single and Oral  Endotracheal Airway Details        ETT size (mm): 7.0       Cuffed: yes       Successful intubation technique: direct laryngoscopy and video laryngoscopy       DL Blade Type: Cortez 2       VL Blade Size: Glidescope 3       Grade View of Cords: 1 (DL G3V TMD<2 anterior)       Adjucts: stylet       Position: Right       Measured from: gums/teeth       Secured at (cm): 22       Bite block used: Soft    Post intubation assessment        Placement verified by: capnometry, equal breath sounds and chest rise        Number of attempts at approach: 2       Number of other approaches attempted: 0       Secured with: plastic tape       Ease of procedure: easy       Dentition: Intact and Unchanged    Medication(s) Administered   Medication Administration Time: 7/27/2022 9:18 AM

## 2022-07-27 NOTE — ANESTHESIA CARE TRANSFER NOTE
Patient: Alexandra Thomas    Procedure: Procedure(s):  Left total hip arthroplasty       Diagnosis: Osteoarthritis [M19.90]  Diagnosis Additional Information: No value filed.    Anesthesia Type:   General     Note:    Oropharynx: oropharynx clear of all foreign objects  Level of Consciousness: drowsy  Oxygen Supplementation: face mask  Level of Supplemental Oxygen (L/min / FiO2): 5  Independent Airway: airway patency satisfactory and stable  Dentition: dentition unchanged  Vital Signs Stable: post-procedure vital signs reviewed and stable  Report to RN Given: handoff report given  Patient transferred to: PACU    Handoff Report: Identifed the Patient, Identified the Reponsible Provider, Reviewed the pertinent medical history, Discussed the surgical course, Reviewed Intra-OP anesthesia mangement and issues during anesthesia, Set expectations for post-procedure period and Allowed opportunity for questions and acknowledgement of understanding      Vitals:  Vitals Value Taken Time   /59 07/27/22 1041   Temp     Pulse 72 07/27/22 1044   Resp 16 07/27/22 1044   SpO2 100 % 07/27/22 1044   Vitals shown include unvalidated device data.    Electronically Signed By: JESUS Colmenares CRNA  July 27, 2022  10:45 AM

## 2022-07-27 NOTE — ANESTHESIA POSTPROCEDURE EVALUATION
Patient: Alexandra Thomas    Procedure: Procedure(s):  Left total hip arthroplasty       Anesthesia Type:  General    Note:  Disposition: Admission   Postop Pain Control: Uneventful            Sign Out: Well controlled pain   PONV: No   Neuro/Psych: Uneventful            Sign Out: Acceptable/Baseline neuro status   Airway/Respiratory: Uneventful            Sign Out: Acceptable/Baseline resp. status   CV/Hemodynamics: Uneventful            Sign Out: Acceptable CV status; No obvious hypovolemia; No obvious fluid overload   Other NRE: NONE   DID A NON-ROUTINE EVENT OCCUR? No           Last vitals:  Vitals Value Taken Time   /61 07/27/22 1145   Temp 98  F (36.7  C) 07/27/22 1045   Pulse 59 07/27/22 1149   Resp 16 07/27/22 1149   SpO2 96 % 07/27/22 1149   Vitals shown include unvalidated device data.    Electronically Signed By: John Groves MD  July 27, 2022  1:01 PM

## 2022-07-27 NOTE — OP NOTE
CO in progress. Ambulated to bathroom with staff assist. Patient urinated on floor. Very anxious. Awaiting am neuro eval. returned to bed. Pt resting comfortably at this time. No s/s of pain noted. Procedure Date: 7/27/2022     PREOPERATIVE DIAGNOSIS:  Left hip degenerative joint disease.     POSTOPERATIVE DIAGNOSIS:  Left hip degenerative joint disease.     PROCEDURE:  Left total hip arthroplasty.     SURGEON:  Berry Mcdonnell MD     FIRST ASSISTANT:  AYESHA Cosme     A skilled first assistant was necessary for patient positioning, prepping and draping, help with soft tissue retraction, help with leg positioning, reduction maneuvers, closing, and patient transfer.     ANESTHESIA:  General and local     COMPLICATIONS:  None apparent.     ESTIMATED BLOOD LOSS:  150    IMPLANTS:  Implant Name Type Inv. Item Serial No.  Lot No. LRB No. Used Action   TRIDENT II TRITANIUM CLUSTERHOLE 54E - [id number] Total Joint Component/Insert TRIDENT II TRITANIUM CLUSTERHOLE 54E  [employer] [id number] Left 1 Implanted   INSERT ACE 36MM 0DEG X3 723-00-36E - [id number] Total Joint Component/Insert INSERT ACE 36MM 0DEG X3 723-00-36E  [employer] [id number] Left 1 Implanted   IMP STEM FEMORAL HIP STRK ACCOLADE II 132DEG SZ 5 4897-8940 - [id number] Total Joint Component/Insert IMP STEM FEMORAL HIP STRK ACCOLADE II 132DEG SZ 5 9542-6473  [employer] [id number] Left 1 Implanted   IMP HEAD FEMORAL STRK BIOLOX DELTA CERAMIC 36MM +2.5MM - [id number] Total Joint Component/Insert IMP HEAD FEMORAL STRK BIOLOX DELTA CERAMIC 36MM +2.5MM  [employer] [id number] Left 1 Implanted        INDICATIONS FOR PROCEDURE:  The patient is a 71 year old female who has been complaining of progressively worsening left hip pain over multiple years and months.  Patient has failed conservative measures and after discussion of treatment options with the patient, we decided the best way to move forward would be a left total hip arthroplasty.  Patient agreed to proceed.     DESCRIPTION OF PROCEDURE:  On the day of the procedure, the patient was met in the preoperative area by the Surgery and Anesthesia team.  The left hip  was marked by the operative surgeon.  Informed consent was obtained.  Risks and benefits of the surgery were discussed with the patient, including risk of bleeding, infection, damage to neurovascular structures, stiffness, continued pain, leg length discrepancy, dislocation, blood clots, fracture and fracture propagation, and need for future revision surgery, as well as risk of anesthesia.  Patient understood and agreed to proceed.     Our Anesthesia colleagues then brought the patient to the operating room and a general anesthetic was administered.  Patient  was then placed in the lateral decubitus position and the left hip was prepped and draped in the usual sterile fashion.  Preoperative antibiotics given.  A preoperative timeout was performed.  We began the procedure by making a standard incision centered over the greater trochanter.  Sharp dissection was carried down through the skin and subcutaneous tissue.  The IT band and tensor fascia dylan were split along its decussating fibers and a Charnley retractor was placed.  The bursa over the greater trochanter was excised.  The gluteus medius was visualized and protected, and we then performed a piriformis-sparing posterolateral approach.  The piriformis was mobilized inferiorly and superiorly.  The gluteus minimus was mobilized as well, and the capsule was well visualized.  A T-shaped capsulotomy was then made, taking the short external rotators along with the capsule in one fell swoop.  The capsular leaflets were tagged using Ethibond.  A leg length stitch was then placed for a double check and the hip was then dislocated.  A femoral neck cut was made about 1 cm proximal to the lesser trochanter based on preoperative planning, and the femoral head was removed and sized.  Next, we turned our attention to the acetabulum.  Appropriate retractors were placed and the pulvinar was removed.  The labrum was circumferentially removed.  We started reaming progressively up  to the appropriate size, which is a 54  in this case.  We then placed a trial implant in, which had excellent stability and fit.  Therefore, the true implant was opened and impacted into position in 40 degrees of abduction and 20 degrees of version.   It was excellent in terms of stability, therefore no screws were needed.  We then opened up the true liner and impacted that into position.  We removed osteophytes as necessary anteriorly and superiorly.  We then turned our attention to the proximal femur.  We used a box osteotome in order to open up the proximal femur and then used a canal finder followed by flor and worked our way up to a size 5  stem in the patient's native version.  We trialed a low-offset neck with a +2.5 head, which had great leg length, as well as stability, and we were quite pleased with it.  We were able to flex to 90 degrees and internally rotate to 80 prior to any subluxation.  The trial implants were then removed and a true  stem was impacted into position.  We trialed for leg length once again and noted that the hip had great stability and leg length.  The true head was then impacted into position and the hip was reduced, taken through a full range of motion.  There were no signs of impingement.  The leg length was appropriate.  The wound was then copiously irrigated.  We used Brown wash irrigation as well.  The capsule was repaired with side-to-side sutures using Ethibond.  A local anesthetic was injected into the periarticular soft tissues.  The IT band and the tensor fascia dylan were closed in the usual fashion and skin was closed in a layered fashion.  Sterile dressings were applied.  The patient was then awakened from anesthesia and brought to the PACU for recovery.      Postoperatively, patient will be weightbearing as tolerated, and will have posterior hip precautions for 6 weeks.  Plan is to discharge on postop day #1.     Berry Mcdonnell MD

## 2022-07-27 NOTE — ANESTHESIA PREPROCEDURE EVALUATION
Anesthesia Pre-Procedure Evaluation    Patient: Alexandra Thomas   MRN: [id number] : 1950        Procedure : Procedure(s):  Left total hip arthroplasty          Past Medical History:   Diagnosis Date     Abdominal aortic aneurysm (AAA) without rupture (H)     4.2 cm   Ascending     Arthritis     hips     Gastroesophageal reflux disease     Esophageal spasms.     Gastroparesis      Heart murmur      High cholesterol      Hypertension       Past Surgical History:   Procedure Laterality Date     CHOLECYSTECTOMY       COLECTOMY PARTIAL      Due to recurrent diverticulitis     ENT SURGERY      T & A     HYSTERECTOMY TOTAL ABDOMINAL       left midfoot fusion Left     2nd toe osteotomy     shouder replacement Right      TOTAL HIP ARTHROPLASTY Right 2009     TOTAL KNEE ARTHROPLASTY Right 2006     TOTAL KNEE ARTHROPLASTY Left 2009      Allergies   Allergen Reactions     Food Anaphylaxis     EGGPLANT-->anaphylaxis  Walnuts - lip swelling     Venlafaxine Hcl Diarrhea, Shortness Of Breath and Nausea and Vomiting     Meperidine Other (See Comments)     Other reaction(s): Rash, non-urticarial  itchiness  Other reaction(s): rash       Adhesive Tape      Other reaction(s): Other (Specify with Comments)  blisters     Ciprofloxacin Other (See Comments)     Other reaction(s): Other (Specify with Comments)  Pt has aortic aneurysm.  Increased risk of rupture or dissection with use of fluoroquinolones.  Pt has aortic aneurysm.  Increased risk of rupture or dissection with use of fluoroquinolones.       Morphine Hives and Itching     Histamine reaction      Social History     Tobacco Use     Smoking status: Never Smoker     Smokeless tobacco: Never Used   Substance Use Topics     Alcohol use: Never      Wt Readings from Last 1 Encounters:   22 76.1 kg (167 lb 12.8 oz)        Anesthesia Evaluation   Pt has had prior anesthetic. Type: General.    No history of anesthetic complications       ROS/MED  HX  ENT/Pulmonary:     (+) sleep apnea, uses CPAP,     Neurologic:  - neg neurologic ROS     Cardiovascular: Comment: Stable 4.2 cm aortic aneurysm    (+) hypertension-----valvular problems/murmurs     METS/Exercise Tolerance:     Hematologic:  - neg hematologic  ROS     Musculoskeletal:   (+) arthritis,     GI/Hepatic:     (+) GERD, Asymptomatic on medication, esophageal disease,     Renal/Genitourinary:  - neg Renal ROS     Endo:  - neg endo ROS     Psychiatric/Substance Use:  - neg psychiatric ROS     Infectious Disease:  - neg infectious disease ROS     Malignancy:       Other:            Physical Exam    Airway        Mallampati: II   TM distance: > 3 FB   Neck ROM: full   Mouth opening: > 3 cm    Respiratory Devices and Support         Dental           Cardiovascular   cardiovascular exam normal          Pulmonary   pulmonary exam normal                OUTSIDE LABS:  CBC:   Lab Results   Component Value Date    WBC 6.4 06/28/2022    HGB 13.2 06/28/2022    HCT 40.6 06/28/2022     06/28/2022     BMP:   Lab Results   Component Value Date     06/28/2022    POTASSIUM 4.0 06/28/2022    CHLORIDE 105 06/28/2022    CO2 30 06/28/2022    BUN 20 06/28/2022    CR 1.01 06/28/2022    GLC 85 06/28/2022     COAGS: No results found for: PTT, INR, FIBR  POC: No results found for: BGM, HCG, HCGS  HEPATIC: No results found for: ALBUMIN, PROTTOTAL, ALT, AST, GGT, ALKPHOS, BILITOTAL, BILIDIRECT, REY  OTHER:   Lab Results   Component Value Date    CHESTER 9.0 06/28/2022       Anesthesia Plan    ASA Status:  3      Anesthesia Type: General.     - Airway: ETT   Induction: Intravenous.   Maintenance: Balanced.        Consents    Anesthesia Plan(s) and associated risks, benefits, and realistic alternatives discussed. Questions answered and patient/representative(s) expressed understanding.    - Discussed:     - Discussed with:  Patient      - Extended Intubation/Ventilatory Support Discussed: No.      - Patient is DNR/DNI  Status: No    Use of blood products discussed: No .     Postoperative Care    Pain management: IV analgesics, Oral pain medications, Multi-modal analgesia.   PONV prophylaxis: Ondansetron (or other 5HT-3), Dexamethasone or Solumedrol     Comments:                John Groves MD

## 2022-07-27 NOTE — PROGRESS NOTES
07/27/22 1510   Quick Adds   Type of Visit Initial PT Evaluation   Living Environment   Living Environment Comments Pt lives at home with adult son who will be able to assist with set up, but not provide physical assistance. Dgt will be stopping by to assist at times. Pt with no stairs to manage, use of SEC at time prior to surgery.   Self-Care   Usual Activity Tolerance good   Current Activity Tolerance moderate   Equipment Currently Used at Home cane, straight;raised toilet seat;shower chair;grab bar, tub/shower;grab bar, toilet;walker, standard   Fall history within last six months no   General Information   Onset of Illness/Injury or Date of Surgery 07/27/22   Referring Physician Tiffany Lundy PA-C   Patient/Family Therapy Goals Statement (PT) To improve pain, mobility to return home   Pertinent History of Current Problem (include personal factors and/or comorbidities that impact the POC) Pt is a 71 year old female POD0 s/o L CHANDU.   Existing Precautions/Restrictions no hip IR;no hip ADD past midline;90 degree hip flexion;weight bearing   Weight-Bearing Status - LLE weight-bearing as tolerated   Cognition   Affect/Mental Status (Cognition) WFL   Orientation Status (Cognition) oriented x 4   Follows Commands (Cognition) WFL   Pain Assessment   Patient Currently in Pain Yes, see Vital Sign flowsheet  (4/10 L hip)   Range of Motion (ROM)   Range of Motion ROM deficits secondary to surgical procedure   ROM Comment L hip decreased, otherwise B LE WFL   Strength (Manual Muscle Testing)   Strength Comments able to complete B SLR   Bed Mobility   Comment, (Bed Mobility) SBA supine <> sit with handrail   Transfers   Comment, (Transfers) CGA sit <> stand with FWW   Gait/Stairs (Locomotion)   Pattern (Gait) step-to   Deviations/Abnormal Patterns (Gait) antalgic;gait speed decreased;weight shifting decreased   Comment, (Gait/Stairs) CGA with FWW 6ft   Balance   Balance Comments good sitting; fair+ standing with  FWW   Clinical Impression   Criteria for Skilled Therapeutic Intervention Yes, treatment indicated   PT Diagnosis (PT) impaired functional mobility   Influenced by the following impairments impaired L hip ROM, decreased balance   Functional limitations due to impairments impaired bed mobility, transfers, ambulation   Clinical Presentation (PT Evaluation Complexity) Stable/Uncomplicated   Clinical Presentation Rationale Functional status, living environment   Clinical Decision Making (Complexity) low complexity   Planned Therapy Interventions (PT) balance training;bed mobility training;gait training;home exercise program;patient/family education;ROM (range of motion);strengthening;stair training;transfer training   Anticipated Equipment Needs at Discharge (PT) walker, rolling   Risk & Benefits of therapy have been explained evaluation/treatment results reviewed;care plan/treatment goals reviewed;risks/benefits reviewed;current/potential barriers reviewed;participants voiced agreement with care plan;participants included;patient   PT Discharge Planning   PT Rationale for DC Rec Defer to ortho- anticipate that patient will progress mobility to SBA/mod ind with continued IP PT   Plan of Care Review   Plan of Care Reviewed With patient   Total Evaluation Time   Total Evaluation Time (Minutes) 7   Physical Therapy Goals   PT Frequency Daily   PT Goals Bed Mobility;Transfers;Gait   PT: Bed Mobility Modified independent;Supine to/from sit;Within precautions   PT: Transfers Modified independent;Sit to/from stand;Assistive device;Within precautions   PT: Gait Modified independent;100 feet;Assistive device

## 2022-07-27 NOTE — BRIEF OP NOTE
[facility]    Brief Operative Note    Pre-operative diagnosis: Osteoarthritis [M19.90]  Post-operative diagnosis Same as pre-operative diagnosis    Procedure: Procedure(s):  Left total hip arthroplasty  Surgeon: Surgeon(s) and Role:     * Berry Mcdonnell MD - Primary     * Tiffany Lundy PA-C - Assisting  Anesthesia: General   Drains: None  Specimens: * No specimens in log *  Findings:   None.  Complications: None.  Implants:   Implant Name Type Inv. Item Serial No.  Lot No. LRB No. Used Action   TRIDENT II TRITANIUM CLUSTERHOLE 54E - [id number] Total Joint Component/Insert TRIDENT II TRITANIUM CLUSTERHOLE 54E  [employer] [id number] Left 1 Implanted   INSERT ACE 36MM 0DEG X3 723-00-36E - [id number] Total Joint Component/Insert INSERT ACE 36MM 0DEG X3 723-00-36E  [employer] [id number] Left 1 Implanted   IMP STEM FEMORAL HIP STRK ACCOLADE II 132DEG SZ 5 6025-5648 - [id number] Total Joint Component/Insert IMP STEM FEMORAL HIP STRK ACCOLADE II 132DEG SZ 5 3178-1457  [employer] [id number] Left 1 Implanted   IMP HEAD FEMORAL STRK BIOLOX DELTA CERAMIC 36MM +2.5MM - [id number] Total Joint Component/Insert IMP HEAD FEMORAL STRK BIOLOX DELTA CERAMIC 36MM +2.5MM  [employer] [id number] Left 1 Implanted     Plan:  WBAT  Posterior hip precautions  DVT prophylaxis - SCDs &  ASA EC 81 mg PO BID x 4 weeks   Ancef x 24 hours  PACU XRs  PT  Keep dressing C/D/I for 1 week; okay to shower    Follow up with Kelli Lundy PA-C in clinic in 2 weeks.

## 2022-07-28 ENCOUNTER — APPOINTMENT (OUTPATIENT)
Dept: OCCUPATIONAL THERAPY | Facility: CLINIC | Age: 72
End: 2022-07-28
Attending: ORTHOPAEDIC SURGERY
Payer: MEDICARE

## 2022-07-28 ENCOUNTER — APPOINTMENT (OUTPATIENT)
Dept: PHYSICAL THERAPY | Facility: CLINIC | Age: 72
End: 2022-07-28
Attending: ORTHOPAEDIC SURGERY
Payer: MEDICARE

## 2022-07-28 VITALS
RESPIRATION RATE: 14 BRPM | DIASTOLIC BLOOD PRESSURE: 61 MMHG | TEMPERATURE: 99.3 F | BODY MASS INDEX: 26.97 KG/M2 | HEART RATE: 71 BPM | SYSTOLIC BLOOD PRESSURE: 109 MMHG | HEIGHT: 66 IN | WEIGHT: 167.8 LBS | OXYGEN SATURATION: 100 %

## 2022-07-28 LAB
FASTING STATUS PATIENT QL REPORTED: YES
GLUCOSE SERPL-MCNC: 105 MG/DL (ref 70–99)
HGB BLD-MCNC: 11.5 G/DL (ref 11.7–15.7)

## 2022-07-28 PROCEDURE — 250N000011 HC RX IP 250 OP 636: Performed by: PHYSICIAN ASSISTANT

## 2022-07-28 PROCEDURE — 97165 OT EVAL LOW COMPLEX 30 MIN: CPT | Mod: GO | Performed by: OCCUPATIONAL THERAPIST

## 2022-07-28 PROCEDURE — 85018 HEMOGLOBIN: CPT | Performed by: PHYSICIAN ASSISTANT

## 2022-07-28 PROCEDURE — 82947 ASSAY GLUCOSE BLOOD QUANT: CPT | Performed by: ORTHOPAEDIC SURGERY

## 2022-07-28 PROCEDURE — 97116 GAIT TRAINING THERAPY: CPT | Mod: GP | Performed by: PHYSICAL THERAPIST

## 2022-07-28 PROCEDURE — 250N000013 HC RX MED GY IP 250 OP 250 PS 637: Performed by: PHYSICIAN ASSISTANT

## 2022-07-28 PROCEDURE — 97535 SELF CARE MNGMENT TRAINING: CPT | Mod: GO | Performed by: OCCUPATIONAL THERAPIST

## 2022-07-28 PROCEDURE — 250N000013 HC RX MED GY IP 250 OP 250 PS 637: Performed by: INTERNAL MEDICINE

## 2022-07-28 PROCEDURE — 36415 COLL VENOUS BLD VENIPUNCTURE: CPT | Performed by: PHYSICIAN ASSISTANT

## 2022-07-28 PROCEDURE — 97530 THERAPEUTIC ACTIVITIES: CPT | Mod: GP | Performed by: PHYSICAL THERAPIST

## 2022-07-28 RX ADMIN — OXYCODONE HYDROCHLORIDE 5 MG: 5 TABLET ORAL at 07:32

## 2022-07-28 RX ADMIN — PANTOPRAZOLE SODIUM 40 MG: 40 TABLET, DELAYED RELEASE ORAL at 07:32

## 2022-07-28 RX ADMIN — POLYETHYLENE GLYCOL 3350 17 G: 17 POWDER, FOR SOLUTION ORAL at 07:36

## 2022-07-28 RX ADMIN — SENNOSIDES AND DOCUSATE SODIUM 1 TABLET: 50; 8.6 TABLET ORAL at 07:36

## 2022-07-28 RX ADMIN — ONDANSETRON 4 MG: 2 INJECTION INTRAMUSCULAR; INTRAVENOUS at 00:25

## 2022-07-28 RX ADMIN — CEFAZOLIN 1 G: 1 INJECTION, POWDER, FOR SOLUTION INTRAMUSCULAR; INTRAVENOUS at 00:28

## 2022-07-28 RX ADMIN — BENZOCAINE 6 MG-MENTHOL 10 MG LOZENGES 1 LOZENGE: at 03:53

## 2022-07-28 RX ADMIN — Medication 1 CAPSULE: at 07:32

## 2022-07-28 RX ADMIN — ACETAMINOPHEN 975 MG: 325 TABLET, FILM COATED ORAL at 03:07

## 2022-07-28 RX ADMIN — ASPIRIN 81 MG: 81 TABLET, COATED ORAL at 07:31

## 2022-07-28 RX ADMIN — GABAPENTIN 200 MG: 100 CAPSULE ORAL at 07:31

## 2022-07-28 NOTE — PLAN OF CARE

## 2022-07-28 NOTE — PLAN OF CARE
Physical Therapy Discharge Summary    Reason for therapy discharge:    Discharged to home.    Progress towards therapy goal(s). See goals on Care Plan in [facility] electronic health record for goal details.  Goals partially met.  Barriers to achieving goals:   discharge from facility.    Therapy recommendation(s):    Continue home exercise program.  Assist from family as needed

## 2022-07-28 NOTE — PROGRESS NOTES
07/28/22 0911   Quick Adds   Type of Visit Initial Occupational Therapy Evaluation   Living Environment   People in Home child(margaret), dependent   Current Living Arrangements house   Home Accessibility no concerns   Number of Stairs, Within Home, Primary none   Stair Railings, Within Home, Primary none   Transportation Anticipated family or friend will provide   Living Environment Comments pt lives with adult son who can provide some help but limited in lifting.  Pt has daughter who is also available and can help.   Self-Care   Usual Activity Tolerance good   Current Activity Tolerance moderate   Regular Exercise No  (pt is active otherwise)   Equipment Currently Used at Home cane, straight;grab bar, tub/shower;raised toilet seat;shower chair;walker, standard;dressing device   Fall history within last six months no   Activity/Exercise/Self-Care Comment pt has had much experience with joint replaceent surgery, has AE from previous surgeries   General Information   Onset of Illness/Injury or Date of Surgery 07/27/22   Referring Physician Tiffany Lundy PA-C   Patient/Family Therapy Goal Statement (OT) return home and to her usual routine   Additional Occupational Profile Info/Pertinent History of Current Problem Pt is a 71 year old female admitted for a left CHANDU, posterior approach.  Has had both knees replaced, her right hip, and her right shoulder.  States she needs to have the left shoulder done as well.  Daughter present during session   Performance Patterns (Routines, Roles, Habits) Pt has been independent in basic cares.  Has been driving herselt   Existing Precautions/Restrictions fall;no active hip ABD;90 degree hip flexion;no hip ADD past midline;no pivoting or twisting   General Observations and Info pt up in a cheir, daugther present   Cognitive Status Examination   Orientation Status orientation to person, place and time   Affect/Mental Status (Cognitive) WNL   Visual Perception   Visual  Impairment/Limitations WNL   Pain Assessment   Patient Currently in Pain Yes, see Vital Sign flowsheet   Range of Motion Comprehensive   General Range of Motion no range of motion deficits identified   Comment, General Range of Motion B UEs   Strength Comprehensive (MMT)   General Manual Muscle Testing (MMT) Assessment no strength deficits identified   Comment, General Manual Muscle Testing (MMT) Assessment B UEs   Coordination   Upper Extremity Coordination No deficits were identified   Transfers   Transfers sit-stand transfer;toilet transfer   Sit-Stand Transfer   Sit-Stand [place] (Transfers) supervision;verbal cues;contact guard   Assistive Device (Sit-Stand Transfers) walker, front-wheeled   Toilet Transfer   Type (Toilet Transfer) sit-stand;stand-sit   [place] Level (Toilet Transfer) modified independence   Assistive Device (Toilet Transfer) raised toilet seat;grab bars/safety frame   Toilet Transfer Comments pt has RTS with handles, says she has a vanity on one side of ther toilet at home   Clinical Impression   Criteria for Skilled Therapeutic Interventions Met (OT) Yes, treatment indicated   OT Diagnosis decreased independence in ADLS   OT Problem List-Impairments impacting ADL activity tolerance impaired;range of motion (ROM);post-surgical precautions;pain   Assessment of Occupational Performance 1-3 Performance Deficits   Identified Performance Deficits decreased independence in dresssing, bathing, functional mobility   Planned Therapy Interventions (OT) ADL retraining;home program guidelines;progressive activity/exercise   Clinical Decision Making Complexity (OT) low complexity   Anticipated Equipment Needs Upon Discharge (OT) other (see comments)  (pt may need a toileting aide)   Risk & Benefits of therapy have been explained evaluation/treatment results reviewed;care plan/treatment goals reviewed;patient;daughter   Clinical Impression Comments pt doing well overall, has good support   OT  Discharge Planning   OT Discharge Recommendation (DC Rec) home with assist   OT Rationale for DC Rec Pt has good support at home and is experienced with this kind of surgery and recovery.  Also has much AE at home and is experienced in using it.  Moving with SBA/Mod I for basic ADLS.  Anticipate pt will continue to improve and be able to return home with support of family and AE   OT Brief overview of current status Mod I for toilet tranfer, LE dressing.  Some additional cues at times   Therapy Certification   Start of Care Date 07/28/22   Certification date from 07/28/22   Certification date to 07/28/22   Medical Diagnosis Left CHANDU   Total Evaluation Time (Minutes)   Total Evaluation Time (Minutes) 15   OT Goals   Therapy Frequency (OT) One time eval and treatment   OT Predicted Duration/Target Date for Goal Attainment 07/28/22   OT Goals Toilet Transfer/Toileting;OT Goal 1;Lower Body Dressing   OT: Lower Body Dressing Modified independent;using adaptive equipment;within precautions;including set-up/clothing retrieval   OT: Toilet Transfer/Toileting Modified independent;toilet transfer;cleaning and garment management;using adaptive equipment;within precautions   OT: Goal 1 pt will verbalize the appropriate method for getting in a car with her precautions

## 2022-07-28 NOTE — PROGRESS NOTES
Orthopedic Surgery  Alexandra Thomas  2022  Admit Date:  2022  POD: 1 Day Post-Op  Procedure(s):  Left total hip arthroplasty    Patient resting comfortably in bed.    Pain controlled this morning. Patient reports having pain throughout the night in her thigh and groin.   She also reports a headache but states this resolved with the Tylenol.  Tolerating oral intake.    Denies nausea or vomiting.  Denies chest pain or shortness of breath.  No events overnight.      Alert and orient to person, place, and time.  Vital Sign Ranges  Temperature Temp  Av.7  F (36.5  C)  Min: 97  F (36.1  C)  Max: 98.3  F (36.8  C)   Blood pressure Systolic (24hrs), Av , Min:107 , Max:150        Diastolic (24hrs), Av, Min:43, Max:78      Pulse Pulse  Av.2  Min: 60  Max: 79   Respirations Resp  Avg: 15.4  Min: 9  Max: 24   Pulse oximetry SpO2  Av.9 %  Min: 93 %  Max: 100 %     Dressing is clean, dry, and intact.   Minimal erythema of the surrounding skin.   Bilateral calves are soft, non-tender.   Bilateral lower extremity is NVI.  Sensation intact bilateral lower extremities.  5/5 motor with resisted dorsi and plantar flexion bilaterally.  +DP pulse.     Labs:  Recent Labs   Lab Test 22  1334   POTASSIUM 4.0     Recent Labs   Lab Test 22  1334   HGB 13.2     No results for input(s): INR in the last [id number] hours.  Recent Labs   Lab Test 22  1334          A/P  1. Plan   Continue SCDs & ASA for DVT prophylaxis.     Mobilize with PT/OT   WBAT w/ posterior hip precautions.     Continue current pain regiment.   Leave dressing intact   Follow up with Kelli Lundy PA-C in clinic in 2 weeks.      2. Disposition   Anticipate d/c to home today.      Tiffany Lundy PA-C

## 2022-07-28 NOTE — PLAN OF CARE
Occupational Therapy Discharge Summary    Reason for therapy discharge:    All goals and outcomes met, no further needs identified.    Progress towards therapy goal(s). See goals on Care Plan in Epic electronic health record for goal details.  Goals met    Therapy recommendation(s):    No further OT needed at this time.  Pt has good support from family and her AE.    Goal Outcome Evaluation:

## 2022-07-28 NOTE — PLAN OF CARE
Patient vital signs are at baseline: Yes  Patient able to ambulate as they were prior to admission or with assist devices provided by therapies during their stay:  Yes  Patient MUST void prior to discharge:  Yes  Patient able to tolerate oral intake:  Yes  Pain has adequate pain control using Oral analgesics:  Yes  Does patient have an identified :  Yes, Ivelisse Rios.   Has goal D/C date and time been discussed with patient:  Yes     Pt is A/O, up with assist of one walker and gait.tolerating regular diet, passing gas.Pain controled with porn Oxy/sche. Tylenol. Voiding. Dressing is clean dry and intact. Plan to discharge to home today with daughter.

## 2022-07-28 NOTE — PROGRESS NOTES
Patient vital signs are at baseline: Yes  Patient able to ambulate as they were prior to admission or with assist devices provided by therapies during their stay:  No,  Reason:  1 assist, walker, gb    Patient MUST void prior to discharge:  Yes  Patient able to tolerate oral intake:  Yes  Pain has adequate pain control using Oral analgesics:  Yes  Does patient have an identified :  Yes  Has goal D/C date and time been discussed with patient:  Yes  Following plan of care. Passed therapies  Ready for discharge home.   Patient and daughter verbalized understanding to all discharge instructions. They have a walker at home. Pleasant. Moving well. Pale in color. Vitals stable. Patient declined bp meds this AM and plans to take bp at home prior to taking.

## 2022-08-01 ENCOUNTER — DOCUMENTATION ONLY (OUTPATIENT)
Dept: OTHER | Facility: CLINIC | Age: 72
End: 2022-08-01

## 2022-08-08 ENCOUNTER — E-VISIT (OUTPATIENT)
Dept: URGENT CARE | Facility: URGENT CARE | Age: 72
End: 2022-08-08
Payer: MEDICARE

## 2022-08-08 DIAGNOSIS — K21.00 GASTROESOPHAGEAL REFLUX DISEASE WITH ESOPHAGITIS, UNSPECIFIED WHETHER HEMORRHAGE: Primary | ICD-10-CM

## 2022-08-09 NOTE — PATIENT INSTRUCTIONS
Thank you for choosing us for your care. Based on the information provided, I believe you need to be seen immediately.  Please go a clinic as soon as possible.     You will not be charged for this eVisit.

## 2022-08-19 ENCOUNTER — OFFICE VISIT (OUTPATIENT)
Dept: INTERNAL MEDICINE | Facility: CLINIC | Age: 72
End: 2022-08-19
Payer: MEDICARE

## 2022-08-19 VITALS
BODY MASS INDEX: 26.34 KG/M2 | WEIGHT: 163.9 LBS | OXYGEN SATURATION: 98 % | HEART RATE: 81 BPM | HEIGHT: 66 IN | RESPIRATION RATE: 12 BRPM | DIASTOLIC BLOOD PRESSURE: 64 MMHG | TEMPERATURE: 97.7 F | SYSTOLIC BLOOD PRESSURE: 106 MMHG

## 2022-08-19 DIAGNOSIS — I71.20 THORACIC AORTIC ANEURYSM WITHOUT RUPTURE (H): ICD-10-CM

## 2022-08-19 DIAGNOSIS — K57.92 ACUTE DIVERTICULITIS: ICD-10-CM

## 2022-08-19 DIAGNOSIS — K21.9 GASTROESOPHAGEAL REFLUX DISEASE WITHOUT ESOPHAGITIS: ICD-10-CM

## 2022-08-19 DIAGNOSIS — K31.84 GASTROPARESIS: ICD-10-CM

## 2022-08-19 DIAGNOSIS — Z11.59 NEED FOR HEPATITIS C SCREENING TEST: ICD-10-CM

## 2022-08-19 DIAGNOSIS — K22.4 ESOPHAGEAL SPASM: Primary | ICD-10-CM

## 2022-08-19 DIAGNOSIS — R13.10 SWALLOWING PROBLEM: ICD-10-CM

## 2022-08-19 DIAGNOSIS — H57.89 EYE IRRITATION: ICD-10-CM

## 2022-08-19 LAB
ERYTHROCYTE [DISTWIDTH] IN BLOOD BY AUTOMATED COUNT: 11.7 % (ref 10–15)
HCT VFR BLD AUTO: 37.4 % (ref 35–47)
HGB BLD-MCNC: 12.2 G/DL (ref 11.7–15.7)
MCH RBC QN AUTO: 31.8 PG (ref 26.5–33)
MCHC RBC AUTO-ENTMCNC: 32.6 G/DL (ref 31.5–36.5)
MCV RBC AUTO: 97 FL (ref 78–100)
PLATELET # BLD AUTO: 308 10E3/UL (ref 150–450)
RBC # BLD AUTO: 3.84 10E6/UL (ref 3.8–5.2)
WBC # BLD AUTO: 4.8 10E3/UL (ref 4–11)

## 2022-08-19 PROCEDURE — 80053 COMPREHEN METABOLIC PANEL: CPT

## 2022-08-19 PROCEDURE — 86803 HEPATITIS C AB TEST: CPT

## 2022-08-19 PROCEDURE — 36415 COLL VENOUS BLD VENIPUNCTURE: CPT

## 2022-08-19 PROCEDURE — 99214 OFFICE O/P EST MOD 30 MIN: CPT

## 2022-08-19 PROCEDURE — 85027 COMPLETE CBC AUTOMATED: CPT

## 2022-08-19 ASSESSMENT — ENCOUNTER SYMPTOMS: EYE PAIN: 1

## 2022-08-19 NOTE — PROGRESS NOTES
"  Assessment & Plan     Esophageal spasm  Patient has had 3 times since March. Was seen in ED and they diagnosed and has two subsequent episodes with the same feeling. Will recommend EGD and GI referral    Thoracic aortic aneurysm without rupture (H)  Known, last echo was at 4.2cm. Need someone in [place] to follow  - Adult Cardiology Eval  Referral; Future    Gastroesophageal reflux disease without esophagitis  Hx of  - Adult GI  Referral - Procedure Only; Future    Eye irritation  Recommend sarah ramos of Sjogren syndrome that she has been taking refresh eye drops. Patient needs eye doctor in [place]   - Adult Eye  Referral; Future    Gastroparesis  Noted, constipation today. Has done stool softener, enema and suppositories. Recommend mag citrate.    Acute diverticulitis  Patient would like to see GI specialist in [place] for ongoing GI issues including recurrent diverticulitis. She is improving with augmentin and is about correction through course. Continue taking and return to clinic if symptoms get worse, or do not improve.  - Adult GI  Referral - Consult Only; Future  - Comprehensive metabolic panel (BMP + Alb, Alk Phos, ALT, AST, Total. Bili, TP); Future  - CBC with platelets; Future  - Comprehensive metabolic panel (BMP + Alb, Alk Phos, ALT, AST, Total. Bili, TP)  - CBC with platelets    Swallowing problem  Will refer for EGD    Need for hepatitis C screening test  Once in a lifetime testing   - Hepatitis C Screen Reflex to HCV RNA Quant and Genotype; Future  - Hepatitis C Screen Reflex to HCV RNA Quant and Genotype     BMI:   Estimated body mass index is 26.45 kg/m  as calculated from the following:    Height as of this encounter: 1.676 m (5' 6\").    Weight as of this encounter: 74.3 kg (163 lb 14.4 oz).       Return if symptoms get worse.    Emeka Richardson NP  [facility] NEIL Mcnair is a 71 year old accompanied by her self, presenting for the " following health issues:  Gastrointestinal Problem (Want to discuss about her GI issue.) and Eye Problem (Itching eyes and right eye would get all red and flare up. She would like a referral to see a specialist for her eyes.)    Eye Problem     History of Present Illness       Reason for visit:  GI Issues.  Esophageal Spasms and probable Diviticitulitis    She eats 2-3 servings of fruits and vegetables daily.She consumes 0 sweetened beverage(s) daily.She exercises with enough effort to increase her heart rate 9 or less minutes per day.  She exercises with enough effort to increase her heart rate 3 or less days per week.   She is taking medications regularly.     Standing order for antibiotic (augmentin) related to diverticulitis flare. Taking antibiotics for 5 days. Was having extreme pain and now it is only tender to touch. Sticking mostly liquid diet, cream of wheat, rice, and egg as well.    Esophageal spasm  Onset: this past march was seen in ED- twice since then  Location: in center of chest  Duration: 3-4 minutes  Characteristics: quick, sharp, stabbing, squeezing   Aggravating/relieving: better with different position  Radiation: up to neck  Timing: unsure of any cause or timing  Severity: 10/10  Terrible pain that goes into neck. Has had 3 times total most recent two weeks ago.     Constipation- taking Citracal, and metamucil. Has also used suppository and enema but did not provide relief. Is passing gas and can had small BM but nothing significant for a few days. No nausea currently.    Liquid comes back up into throat after swallowing. This happens almost everyday. Had not had EGD since this problem has started.    Review of Systems   Eyes: Positive for pain.      Constitutional, HEENT, cardiovascular, pulmonary, GI, , musculoskeletal, neuro, skin, endocrine and psych systems are negative, except as otherwise noted.      Objective    /64 (BP Location: Right arm, Patient Position: Sitting, Cuff  "Size: Adult Regular)   Pulse 81   Temp 97.7  F (36.5  C) (Tympanic)   Resp 12   Ht 1.676 m (5' 6\")   Wt 74.3 kg (163 lb 14.4 oz)   SpO2 98%   BMI 26.45 kg/m    Body mass index is 26.45 kg/m .  Physical Exam   GENERAL: alert and no distress  EYES: Eyes grossly normal to inspection, PERRL and conjunctivae and sclerae normal  HENT: ear canals and TM's normal, nose and mouth without ulcers or lesions  NECK: no adenopathy, no asymmetry, masses, or scars and thyroid normal to palpation  RESP: lungs clear to auscultation - no rales, rhonchi or wheezes  BREAST: normal without masses, tenderness or nipple discharge and no palpable axillary masses or adenopathy  CV: regular rate and rhythm, normal S1 S2, no S3 or S4, no murmur, click or rub, no peripheral edema and peripheral pulses strong  ABDOMEN: soft, nontender, no hepatosplenomegaly, no masses and bowel sounds normal, LLQ tenderness, no rebound tenderness  MS: no gross musculoskeletal defects noted, no edema  SKIN: no suspicious lesions or rashes  NEURO: Normal strength and tone, mentation intact and speech normal  PSYCH: mentation appears normal, affect normal/bright    "

## 2022-08-19 NOTE — PATIENT INSTRUCTIONS
Try Pataday eye drops    GI referral in and endoscopy referral in     Mag citrate to help    Cardiology consult

## 2022-08-20 LAB
ALBUMIN SERPL-MCNC: 3.3 G/DL (ref 3.4–5)
ALP SERPL-CCNC: 118 U/L (ref 40–150)
ALT SERPL W P-5'-P-CCNC: 14 U/L (ref 0–50)
ANION GAP SERPL CALCULATED.3IONS-SCNC: 5 MMOL/L (ref 3–14)
AST SERPL W P-5'-P-CCNC: 15 U/L (ref 0–45)
BILIRUB SERPL-MCNC: 0.3 MG/DL (ref 0.2–1.3)
BUN SERPL-MCNC: 19 MG/DL (ref 7–30)
CALCIUM SERPL-MCNC: 9.2 MG/DL (ref 8.5–10.1)
CHLORIDE BLD-SCNC: 104 MMOL/L (ref 94–109)
CO2 SERPL-SCNC: 30 MMOL/L (ref 20–32)
CREAT SERPL-MCNC: 0.8 MG/DL (ref 0.52–1.04)
GFR SERPL CREATININE-BSD FRML MDRD: 77 ML/MIN/1.73M2
GLUCOSE BLD-MCNC: 88 MG/DL (ref 70–99)
HCV AB SERPL QL IA: NONREACTIVE
POTASSIUM BLD-SCNC: 4 MMOL/L (ref 3.4–5.3)
PROT SERPL-MCNC: 6.5 G/DL (ref 6.8–8.8)
SODIUM SERPL-SCNC: 139 MMOL/L (ref 133–144)

## 2022-08-23 ENCOUNTER — OFFICE VISIT (OUTPATIENT)
Dept: OPTOMETRY | Facility: CLINIC | Age: 72
End: 2022-08-23
Payer: MEDICARE

## 2022-08-23 DIAGNOSIS — H01.003 BLEPHARITIS OF BOTH EYES, UNSPECIFIED EYELID, UNSPECIFIED TYPE: ICD-10-CM

## 2022-08-23 DIAGNOSIS — H35.411 LATTICE DEGENERATION OF RIGHT RETINA: ICD-10-CM

## 2022-08-23 DIAGNOSIS — L30.9 PERIORBITAL DERMATITIS: ICD-10-CM

## 2022-08-23 DIAGNOSIS — H01.006 BLEPHARITIS OF BOTH EYES, UNSPECIFIED EYELID, UNSPECIFIED TYPE: ICD-10-CM

## 2022-08-23 DIAGNOSIS — H57.89 EYE IRRITATION: ICD-10-CM

## 2022-08-23 DIAGNOSIS — H52.4 PRESBYOPIA: Primary | ICD-10-CM

## 2022-08-23 PROCEDURE — 92015 DETERMINE REFRACTIVE STATE: CPT | Mod: GY | Performed by: OPTOMETRIST

## 2022-08-23 PROCEDURE — 92004 COMPRE OPH EXAM NEW PT 1/>: CPT | Performed by: OPTOMETRIST

## 2022-08-23 RX ORDER — NEOMYCIN/POLYMYXIN B/DEXAMETHA 3.5-10K-.1
OINTMENT (GRAM) OPHTHALMIC (EYE)
Qty: 3.5 G | Refills: 1 | Status: SHIPPED | OUTPATIENT
Start: 2022-08-23 | End: 2022-10-06

## 2022-08-23 ASSESSMENT — TONOMETRY
OS_IOP_MMHG: 14
OD_IOP_MMHG: 14
IOP_METHOD: APPLANATION

## 2022-08-23 ASSESSMENT — REFRACTION_MANIFEST
OS_ADD: +2.50
OD_AXIS: 023
OD_CYLINDER: +0.50
OD_CYLINDER: SPHERE
OS_SPHERE: -0.50
METHOD_AUTOREFRACTION: 1
OD_SPHERE: +0.75
OD_ADD: +2.50
OD_SPHERE: +0.50
OS_CYLINDER: +0.25
OS_AXIS: 028
OS_CYLINDER: +0.50
OS_AXIS: 010
OS_SPHERE: -0.25

## 2022-08-23 ASSESSMENT — SLIT LAMP EXAM - LIDS: COMMENTS: MEIBOMIAN GLAND DYSFUNCTION

## 2022-08-23 ASSESSMENT — VISUAL ACUITY
OS_SC: 20/30
OS_SC: 20/60
OD_SC: 20/25
METHOD: SNELLEN - LINEAR
OD_SC: 20/60

## 2022-08-23 ASSESSMENT — CONF VISUAL FIELD
METHOD: COUNTING FINGERS
OS_NORMAL: 1
OD_NORMAL: 1

## 2022-08-23 ASSESSMENT — CUP TO DISC RATIO
OS_RATIO: 0.35
OD_RATIO: 0.3

## 2022-08-23 ASSESSMENT — EXTERNAL EXAM - LEFT EYE: OS_EXAM: NORMAL

## 2022-08-23 ASSESSMENT — EXTERNAL EXAM - RIGHT EYE: OD_EXAM: NORMAL

## 2022-08-23 NOTE — PATIENT INSTRUCTIONS
DRY EYE TREATMENT    I recommend using artificial tears for your dry eye. There are over the counter drops that work well and may be used up to 4x daily. ( systane balance or ultra, blink, refresh optive, soothe xp) stay away from any red eye relief products such as visine and clear eyes.     If you need more than 4 drops daily, use a preservative free product which come in individual vials and may be used for 24 hours and discarded.   The more severe the dry eye, the more frequent instillation of artificial tears  that are needed to reduce irritation/ inflammation. (Sometimes every 1-2 hours for a couple of days).  You can also add a lubricating ointment in the lower lid at bedtime. ( over the counter refresh pm, genteal gel, lacrilube etc.)    Artificial tears work best as a preventative and not as well after your eyes are starting to bother you and/ or are red.  It may take 4- 6 weeks of using the drops before you notice improvement.  If after that time you are still having problems schedule an appointment for an evaluation to discuss different treatments.    Dry eyes are a chronic condition and you may have more symptoms at certain times of the year.      Additional recommended treatment:  Warm compresses once to twice daily for 5-10 minutes    Directions for warm soaks  There are few methods for hot compresses. Moisten a washcloth with hot water, or microwave for 10 seconds, being careful to not get the cloth too hot.   Then put the washcloth onto your eyelids for 5 minutes. It will cool quickly so a rice pack or eyemask that can be heated and laid on top of the washcloth will help retain the heat.      Overabundance of bacterial microorganisms along the eyelashes and lid margins induce stress on the tear film and promote inflammation.  Regular lid hygiene helps diminish the bacterial population to prevent inflammation and infection.  Use a warm compress to loosen crusts   Cleanse lids once daily with a lid  cleansing product as directed such as Ocusoft or Sterilid which can be purchased at most pharmacies. Diluted baby shampoo will also work, but not as well as it dries the skin and can irritate eyelids.  Hypo chlor spray may also be used on closed lids.      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  This puts you at a slighter risk of a tear in the retina.  The signs of a retinal detachment are flashes of light or a curtain covering the vision.  If you should notice any of these changes let me know right away.  If I am not available you should be seen immediately for an eye evaluation.

## 2022-08-23 NOTE — PROGRESS NOTES
Chief Complaint   Patient presents with     Annual Eye Exam    Last Eye Exam: 1 year ago  Dilated Previously: Yes, side effects of dilation explained today    What are you currently using to see?  readers       Distance Vision Acuity: Satisfied with vision    Near Vision Acuity: Satisfied with vision while reading and using computer with readers    Eye Comfort: dry and itchy  Do you use eye drops? : Yes: Pataday when needed - not helping and Refresh tears - works better 2-3x daily       Gricelda Will - Optometric Assistant           Medical, surgical and family histories reviewed and updated 8/23/2022.     eye redness, itching , dry  Has a photo of periorbital rubor without edema , seems to be resolving    Denies rhinitis,  Has gnats in her home and is mediating that which seems to be helping   Has been diagnosed with sjogrens per patient elsewhere [place]  Thyroid wnl panel in December , TSH in February   TATI does not use a cpap    OBJECTIVE: See Ophthalmology exam    ASSESSMENT:    ICD-10-CM    1. Presbyopia  H52.4 EYE EXAM (SIMPLE-NONBILLABLE)     REFRACTION   2. Eye irritation  H57.89 Adult Eye  Referral     EYE EXAM (SIMPLE-NONBILLABLE)   3. Blepharitis of both eyes, unspecified eyelid, unspecified type  H01.003 neomycin-polymixin-dexamethasone (MAXITROL) ophthalmic ointment    H01.006    4. 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

## 2022-08-23 NOTE — LETTER
8/23/2022         RE: Alexandra Thomas  [address] [address]        Dear Colleague,    Thank you for referring your patient, Alexandra Thomas, to the [facility]. Please see a copy of my visit note below.    Chief Complaint   Patient presents with     Annual Eye Exam    Last Eye Exam: 1 year ago  Dilated Previously: Yes, side effects of dilation explained today    What are you currently using to see?  readers       Distance Vision Acuity: Satisfied with vision    Near Vision Acuity: Satisfied with vision while reading and using computer with readers    Eye Comfort: dry and itchy  Do you use eye drops? : Yes: Pataday when needed - not helping and Refresh tears - works better 2-3x daily       Gricelda Will - Optometric Assistant           Medical, surgical and family histories reviewed and updated 8/23/2022.     eye redness, itching , dry  Has a photo of periorbital rubor without edema , seems to be resolving    Denies rhinitis,  Has gnats in her home and is mediating that which seems to be helping   Has been diagnosed with sjogrens per patient elsewhere [place]  Thyroid wnl panel in December , TSH in February   TATI does not use a cpap    OBJECTIVE: See Ophthalmology exam    ASSESSMENT:    ICD-10-CM    1. Presbyopia  H52.4 EYE EXAM (SIMPLE-NONBILLABLE)     REFRACTION   2. Eye irritation  H57.89 Adult Eye  Referral     EYE EXAM (SIMPLE-NONBILLABLE)   3. Blepharitis of both eyes, unspecified eyelid, unspecified type  H01.003 neomycin-polymixin-dexamethasone (MAXITROL) ophthalmic ointment    H01.006    4. Periorbital dermatitis  L30.9    may have a hypersensitivity to gnats, if resolved since she has been managing this, likely source  could have skin testing  No sicca evident     PLAN:   Wants to stay with readers   Increase artificial tears use maxitrol ointment to lid area sparingly for up to a week   Discussed side effects of prolonged use  S/s retinal  detachment      Genet Dahl OD         Again, thank you for allowing me to participate in the care of your patient.        Sincerely,        Genet Dahl OD

## 2022-08-29 ENCOUNTER — OFFICE VISIT (OUTPATIENT)
Dept: INTERNAL MEDICINE | Facility: CLINIC | Age: 72
End: 2022-08-29
Payer: MEDICARE

## 2022-08-29 VITALS
OXYGEN SATURATION: 99 % | BODY MASS INDEX: 26.63 KG/M2 | HEART RATE: 73 BPM | SYSTOLIC BLOOD PRESSURE: 108 MMHG | TEMPERATURE: 98 F | RESPIRATION RATE: 10 BRPM | WEIGHT: 165.7 LBS | HEIGHT: 66 IN | DIASTOLIC BLOOD PRESSURE: 70 MMHG

## 2022-08-29 DIAGNOSIS — Z13.220 LIPID SCREENING: ICD-10-CM

## 2022-08-29 DIAGNOSIS — Z78.0 POST-MENOPAUSAL: ICD-10-CM

## 2022-08-29 DIAGNOSIS — L30.9 DERMATITIS: ICD-10-CM

## 2022-08-29 DIAGNOSIS — Z00.00 ENCOUNTER FOR MEDICARE ANNUAL WELLNESS EXAM: Primary | ICD-10-CM

## 2022-08-29 DIAGNOSIS — Z13.220 SCREENING FOR LIPID DISORDERS: ICD-10-CM

## 2022-08-29 PROCEDURE — G0439 PPPS, SUBSEQ VISIT: HCPCS

## 2022-08-29 PROCEDURE — 99213 OFFICE O/P EST LOW 20 MIN: CPT | Mod: 25

## 2022-08-29 ASSESSMENT — ENCOUNTER SYMPTOMS: EYE PAIN: 1

## 2022-08-29 ASSESSMENT — ACTIVITIES OF DAILY LIVING (ADL): CURRENT_FUNCTION: NO ASSISTANCE NEEDED

## 2022-08-29 NOTE — PROGRESS NOTES
"SUBJECTIVE:   Alexandra Thomas is a 71 year old female who presents for Preventive Visit.    Are you in the first 12 months of your Medicare coverage?  No      Eye Problem     Healthy Habits:     In general, how would you rate your overall health?  Very good    Frequency of exercise:  None    Duration of exercise:  Less than 15 minutes    Do you usually eat at least 4 servings of fruit and vegetables a day, include whole grains    & fiber and avoid regularly eating high fat or \"junk\" foods?  Yes    Taking medications regularly:  0    Barriers to taking medications:  None    Medication side effects:  None    Ability to successfully perform activities of daily living:  No assistance needed    Home Safety:  No safety concerns identified    Hearing Impairment:  No hearing concerns    In the past 6 months, have you been bothered by leaking of urine?  No    In general, how would you rate your overall mental or emotional health?  Excellent      PHQ-2 Total Score: 0    Additional concerns today:  No  History of Present Illness       Reason for visit:  Wellness visit and eye problem  Symptom onset:  1-3 days ago    She eats 2-3 servings of fruits and vegetables daily.She consumes 0 sweetened beverage(s) daily.She exercises with enough effort to increase her heart rate 9 or less minutes per day.  She exercises with enough effort to increase her heart rate 3 or less days per week.   She is taking medications regularly.  She is not taking prescribed medications regularly due to None.      Once recovery from hip and foot are complete patient will begin to exercise more, she is eating healthy    Do you feel safe in your environment? Yes    Have you ever done Advance Care Planning? (For example, a Health Directive, POLST, or a discussion with a medical provider or your loved ones about your wishes): Yes, advance care planning is on file.    Fall risk  Fallen 2 or more times in the past year?: No  Any fall with injury in the " past year?: No    Cognitive Screening   1) Repeat 3 items (Leader, Season, Table)    2) Clock draw: NORMAL  3) 3 item recall: Recalls 3 objects  Results: 3 items recalled: COGNITIVE IMPAIRMENT LESS LIKELY    Mini-CogTM Copyright S Angelina. Licensed by the author for use in [facility]; reprinted with permission (rakesh@[facility]). All rights reserved.      Do you have sleep apnea, excessive snoring or daytime drowsiness?: no    Reviewed and updated as needed this visit by clinical staff   Tobacco  Allergies  Meds  Problems  Med Hx  Surg Hx  Fam Hx  Soc   Hx          Reviewed and updated as needed this visit by Provider    Allergies  Meds  Problems              Social History     Tobacco Use     Smoking status: Never Smoker     Smokeless tobacco: Never Used   Substance Use Topics     Alcohol use: Never     If you drink alcohol do you typically have >3 drinks per day or >7 drinks per week? No    No flowsheet data found.No flowsheet data found.      Current providers sharing in care for this patient include:   Patient Care Team:  Delfina Negrete MD as PCP - General (Internal Medicine)  Delfina Negrete MD as Assigned PCP  Genet Dahl OD as MD (Ophthalmology)  Emeka Richardson NP as Nurse Practitioner (Nurse Practitioner Primary Care)  Genet Dahl OD as Assigned Surgical Provider    The following health maintenance items are reviewed in Epic and correct as of today:  Health Maintenance Due   Topic Date Due     URINE DRUG SCREEN  Never done     MAMMO SCREENING  Never done     LIPID  Never done     MEDICARE ANNUAL WELLNESS VISIT  Never done     INFLUENZA VACCINE (1) 09/01/2022     Lab work is in process    Review of Systems   Eyes: Positive for discharge and itching. Negative for photophobia, pain, redness and visual disturbance.      Eye lids are itchy, and have some discharge. Does not know what causes flair. Happens about every 3 weeks or so.   Nothing new has came into contact  "with eye that she is aware. She did recently move from [place]. Eye doctor said there is nothing wrong with the eye itself and is it a problem with the eyelid.     Constitutional, HEENT, cardiovascular, pulmonary, GI, , musculoskeletal, neuro, skin, endocrine and psych systems are negative, except as otherwise noted.    OBJECTIVE:   /70 (BP Location: Right arm, Patient Position: Sitting, Cuff Size: Adult Regular)   Pulse 73   Temp 98  F (36.7  C) (Oral)   Resp 10   Ht 1.676 m (5' 6\")   Wt 75.2 kg (165 lb 11.2 oz)   SpO2 99%   BMI 26.74 kg/m   Estimated body mass index is 26.74 kg/m  as calculated from the following:    Height as of this encounter: 1.676 m (5' 6\").    Weight as of this encounter: 75.2 kg (165 lb 11.2 oz).  Physical Exam  GENERAL: alert and no distress  EYES: Eyes grossly normal to inspection, PERRL and conjunctivae and sclerae normal  HENT: ear canals and TM's normal, nose and mouth without ulcers or lesions  NECK: no adenopathy, no asymmetry, masses, or scars and thyroid normal to palpation  RESP: lungs clear to auscultation - no rales, rhonchi or wheezes  BREAST: normal without masses, tenderness or nipple discharge and no palpable axillary masses or adenopathy  CV: regular rate and rhythm, normal S1 S2, no S3 or S4, no murmur, click or rub, no peripheral edema and peripheral pulses strong  ABDOMEN: soft, nontender, no hepatosplenomegaly, no masses and bowel sounds normal  MS: no gross musculoskeletal defects noted, no edema  SKIN: no suspicious lesions or rashes  NEURO: Normal strength and tone, mentation intact and speech normal  PSYCH: mentation appears normal, affect normal/bright    Diagnostic Test Results:  Labs reviewed in Epic      ASSESSMENT / PLAN:   (Z00.00) Encounter for Medicare annual wellness exam  (primary encounter diagnosis)  Comment: Patient had recent CMP and CBC for acute visit. Here for wellness visit, overall feels well    (L30.9) Dermatitis  Comment: " "Surrounding eyelids. Has been using steroid cream sparingly. Cannot identify what could be causing this but will try switching bedding to see if that's a cause.  Plan: Adult Allergy/Asthma Referral            (Z13.220) Screening for lipid disorders  Comment: on Crestor  Plan: Lipid panel reflex to direct LDL Fasting          (Z78.0) Post-menopausal  Comment: Screening  Plan: DX Hip/Pelvis/Spine          COUNSELING:  Reviewed preventive health counseling, as reflected in patient instructions       Regular exercise       Healthy diet/nutrition       Osteoporosis prevention/bone health    Estimated body mass index is 26.74 kg/m  as calculated from the following:    Height as of this encounter: 1.676 m (5' 6\").    Weight as of this encounter: 75.2 kg (165 lb 11.2 oz).        She reports that she has never smoked. She has never used smokeless tobacco.      Appropriate preventive services were discussed with this patient, including applicable screening as appropriate for cardiovascular disease, diabetes, osteopenia/osteoporosis, and glaucoma.  As appropriate for age/gender, discussed screening for colorectal cancer, prostate cancer, breast cancer, and cervical cancer. Checklist reviewing preventive services available has been given to the patient.    Reviewed patients plan of care and provided an AVS. The Basic Care Plan (routine screening as documented in Health Maintenance) for Alexandra meets the Care Plan requirement. This Care Plan has been established and reviewed with the Patient.    Counseling Resources:  ATP IV Guidelines  Pooled Cohorts Equation Calculator  Breast Cancer Risk Calculator  Breast Cancer: Medication to Reduce Risk  FRAX Risk Assessment  ICSI Preventive Guidelines  Dietary Guidelines for Americans, 2010  USDA's MyPlate  ASA Prophylaxis  Lung CA Screening    Emeka Richardson NP  [facility]    Identified Health Risks:  "

## 2022-08-29 NOTE — PATIENT INSTRUCTIONS
Patient Education   Personalized Prevention Plan  You are due for the preventive services outlined below.  Your care team is available to assist you in scheduling these services.  If you have already completed any of these items, please share that information with your care team to update in your medical record.  Health Maintenance Due   Topic Date Due     URINE DRUG SCREEN  Never done     Mammogram  Never done     Cholesterol Lab  Never done     Annual Wellness Visit  Never done     Flu Vaccine (1) 09/01/2022

## 2022-09-01 ENCOUNTER — TELEPHONE (OUTPATIENT)
Dept: GASTROENTEROLOGY | Facility: CLINIC | Age: 72
End: 2022-09-01

## 2022-09-01 NOTE — TELEPHONE ENCOUNTER
[facility] Call Center    Phone Message    May a detailed message be left on voicemail: yes     Reason for Call: Other: Patient was calling in again to schedule appointment, but their referral from 08/19/2022 has still not been reviewed/has no scheduling instructions. Please review per protocols and assist patient with scheduling, thank you!     Action Taken: Message routed to:  Other: GI Referral Triage    Travel Screening: Not Applicable

## 2022-09-02 ENCOUNTER — OFFICE VISIT (OUTPATIENT)
Dept: URGENT CARE | Facility: URGENT CARE | Age: 72
End: 2022-09-02
Payer: MEDICARE

## 2022-09-02 VITALS
TEMPERATURE: 97.7 F | SYSTOLIC BLOOD PRESSURE: 114 MMHG | OXYGEN SATURATION: 99 % | DIASTOLIC BLOOD PRESSURE: 69 MMHG | HEART RATE: 73 BPM

## 2022-09-02 DIAGNOSIS — N30.00 ACUTE CYSTITIS WITHOUT HEMATURIA: Primary | ICD-10-CM

## 2022-09-02 LAB
ALBUMIN UR-MCNC: NEGATIVE MG/DL
APPEARANCE UR: CLEAR
BACTERIA #/AREA URNS HPF: ABNORMAL /HPF
BILIRUB UR QL STRIP: NEGATIVE
COLOR UR AUTO: YELLOW
GLUCOSE UR STRIP-MCNC: NEGATIVE MG/DL
HGB UR QL STRIP: ABNORMAL
KETONES UR STRIP-MCNC: NEGATIVE MG/DL
LEUKOCYTE ESTERASE UR QL STRIP: ABNORMAL
NITRATE UR QL: NEGATIVE
PH UR STRIP: 6 [PH] (ref 5–7)
RBC #/AREA URNS AUTO: ABNORMAL /HPF
SP GR UR STRIP: 1.01 (ref 1–1.03)
SQUAMOUS #/AREA URNS AUTO: ABNORMAL /LPF
UROBILINOGEN UR STRIP-ACNC: 0.2 E.U./DL
WBC #/AREA URNS AUTO: ABNORMAL /HPF

## 2022-09-02 PROCEDURE — 81001 URINALYSIS AUTO W/SCOPE: CPT | Performed by: PHYSICIAN ASSISTANT

## 2022-09-02 PROCEDURE — 87086 URINE CULTURE/COLONY COUNT: CPT | Performed by: PHYSICIAN ASSISTANT

## 2022-09-02 PROCEDURE — 99213 OFFICE O/P EST LOW 20 MIN: CPT | Performed by: PHYSICIAN ASSISTANT

## 2022-09-02 PROCEDURE — 87186 SC STD MICRODIL/AGAR DIL: CPT | Performed by: PHYSICIAN ASSISTANT

## 2022-09-02 RX ORDER — LOSARTAN POTASSIUM 50 MG/1
1 TABLET ORAL 2 TIMES DAILY
COMMUNITY
Start: 2022-08-27 | End: 2022-09-02

## 2022-09-02 RX ORDER — OMEPRAZOLE 40 MG/1
40 CAPSULE, DELAYED RELEASE ORAL 2 TIMES DAILY
COMMUNITY
Start: 2022-08-14 | End: 2024-01-12

## 2022-09-02 RX ORDER — TRAMADOL HYDROCHLORIDE 50 MG/1
TABLET ORAL
COMMUNITY
Start: 2022-08-09 | End: 2022-10-06

## 2022-09-02 RX ORDER — CEPHALEXIN 500 MG/1
500 CAPSULE ORAL 2 TIMES DAILY
Qty: 14 CAPSULE | Refills: 0 | Status: SHIPPED | OUTPATIENT
Start: 2022-09-02 | End: 2022-09-09

## 2022-09-02 NOTE — PROGRESS NOTES
Assessment & Plan     1. Acute cystitis without hematuria  UA is grossly +, no evidence of urosepsis or pyelonephritis. Urine culture pending.   Treatment with Keflex  Encouraged fluids  Discussed signs and symptoms of pyelonephritis and to follow-up urgently if any occur.  - UA macro with reflex to Microscopic and Culture - Clinc Collect  - Urine Microscopic Exam  - Urine Culture  - cephALEXin (KEFLEX) 500 MG capsule; Take 1 capsule (500 mg) by mouth 2 times daily for 7 days  Dispense: 14 capsule; Refill: 0        Return in about 2 days (around 9/4/2022), or if symptoms worsen or fail to improve.    Diagnosis and treatment plan was reviewed with patient and/or family.   We went over any labs or imaging. Discussed worsening symptoms or little to no relief despite treatment plan to follow-up with PCP or return to clinic.  Patient verbalizes understanding. All questions were addressed and answered.     Selena Buitrago PA-C  [facility] [place]    CHIEF COMPLAINT:   Chief Complaint   Patient presents with     Urinary Pain     Burning and pressure when using the bathroom X1 DAY     Julia Mcnair is a 71 year old female who presents to clinic today for evaluation of dysuria and suprapubic tenderness. Symptoms started today. Denies having fever, chills, flank pain, nausea, vomiting, hematuria or weakness.  Vaginal discharge, itching or pain are not present.     Past Medical History:   Diagnosis Date     Abdominal aortic aneurysm (AAA) without rupture (H)     4.2 cm   Ascending     Arthritis     hips     Diverticulitis of colon      Gastroesophageal reflux disease     Esophageal spasms.     Gastroparesis      Heart murmur      High cholesterol      History of GI bleed      Hypertension      Sjogren's syndrome (H)      Past Surgical History:   Procedure Laterality Date     ARTHROPLASTY HIP Left 07/27/2022    Procedure: Left total hip arthroplasty;  Surgeon: Berry Mcdonnell MD;  Location:  OR      CHOLECYSTECTOMY       COLECTOMY PARTIAL      Due to recurrent diverticulitis     ENT SURGERY      T & A     HYSTERECTOMY TOTAL ABDOMINAL       LASIK       left midfoot fusion Left     2nd toe osteotomy     shouder replacement Right      TOTAL HIP ARTHROPLASTY Right 06/07/2009     TOTAL KNEE ARTHROPLASTY Right 07/12/2006     TOTAL KNEE ARTHROPLASTY Left 02/18/2009     Social History     Tobacco Use     Smoking status: Never Smoker     Smokeless tobacco: Never Used   Substance Use Topics     Alcohol use: Never     Current Outpatient Medications   Medication     acetaminophen (TYLENOL) 325 MG tablet     cephALEXin (KEFLEX) 500 MG capsule     gabapentin (NEURONTIN) 100 MG capsule     gabapentin (NEURONTIN) 300 MG capsule     hydrochlorothiazide (HYDRODIURIL) 25 MG tablet     losartan (COZAAR) 50 MG tablet     omeprazole (PRILOSEC) 40 MG DR capsule     rosuvastatin (CRESTOR) 10 MG tablet     alum & mag hydroxide-simethicone (MAALOX) 200-200-20 MG/5ML SUSP suspension     melatonin 5 MG tablet     methylcellulose (CITRUCEL) 500 MG TABS tablet     multivitamin w/minerals (THERA-VIT-M) tablet     neomycin-polymixin-dexamethasone (MAXITROL) ophthalmic ointment     ondansetron (ZOFRAN ODT) 4 MG ODT tab     oxyCODONE (ROXICODONE) 5 MG tablet     traMADol (ULTRAM) 50 MG tablet     Vitamin D3 (CHOLECALCIFEROL) 25 mcg (1000 units) tablet     No current facility-administered medications for this visit.     Allergies   Allergen Reactions     Food Anaphylaxis     EGGPLANT-->anaphylaxis       Venlafaxine Hcl Diarrhea, Shortness Of Breath and Nausea and Vomiting     Meperidine Other (See Comments)     Other reaction(s): Rash, non-urticarial  itchiness  Other reaction(s): rash       Adhesive Tape      Other reaction(s): Other (Specify with Comments)  blisters     Ciprofloxacin Other (See Comments)     Other reaction(s): Other (Specify with Comments)  Pt has aortic aneurysm.  Increased risk of rupture or dissection with use of  fluoroquinolones.  Pt has aortic aneurysm.  Increased risk of rupture or dissection with use of fluoroquinolones.       Morphine Hives and Itching     Histamine reaction       10 point ROS of systems were all negative except for pertinent positives noted in my HPI.      Exam:   /69   Pulse 73   Temp 97.7  F (36.5  C)   SpO2 99%   Constitutional: healthy, alert and no distress  ENT: MMM  Cardiovascular: RRR  Respiratory: CTA bilaterally, no rhonchi or rales  Gastrointestinal: soft and nontender  Back: No CVA tenderness B/L  Skin: no rashes    Results for orders placed or performed in visit on 09/02/22   UA macro with reflex to Microscopic and Culture - Clinc Collect     Status: Abnormal    Specimen: Urine, Clean Catch   Result Value Ref Range    Color Urine Yellow Colorless, Straw, Light Yellow, Yellow    Appearance Urine Clear Clear    Glucose Urine Negative Negative mg/dL    Bilirubin Urine Negative Negative    Ketones Urine Negative Negative mg/dL    Specific Gravity Urine 1.015 1.003 - 1.035    Blood Urine Trace (A) Negative    pH Urine 6.0 5.0 - 7.0    Protein Albumin Urine Negative Negative mg/dL    Urobilinogen Urine 0.2 0.2, 1.0 E.U./dL    Nitrite Urine Negative Negative    Leukocyte Esterase Urine Moderate (A) Negative   Urine Microscopic Exam     Status: Abnormal   Result Value Ref Range    Bacteria Urine Moderate (A) None Seen /HPF    RBC Urine 5-10 (A) 0-2 /HPF /HPF    WBC Urine  (A) 0-5 /HPF /HPF    Squamous Epithelials Urine Few (A) None Seen /LPF

## 2022-09-05 LAB — BACTERIA UR CULT: ABNORMAL

## 2022-09-08 ENCOUNTER — HOSPITAL ENCOUNTER (OUTPATIENT)
Dept: MAMMOGRAPHY | Facility: CLINIC | Age: 72
Discharge: HOME | End: 2022-09-08
Attending: INTERNAL MEDICINE | Admitting: INTERNAL MEDICINE
Payer: MEDICARE

## 2022-09-08 DIAGNOSIS — Z12.31 ENCOUNTER FOR SCREENING MAMMOGRAM FOR BREAST CANCER: ICD-10-CM

## 2022-09-08 PROCEDURE — 77067 SCR MAMMO BI INCL CAD: CPT

## 2022-09-13 ENCOUNTER — HOSPITAL ENCOUNTER (OUTPATIENT)
Facility: CLINIC | Age: 72
Discharge: HOME | End: 2022-09-13
Attending: INTERNAL MEDICINE | Admitting: INTERNAL MEDICINE
Payer: MEDICARE

## 2022-09-13 VITALS
DIASTOLIC BLOOD PRESSURE: 79 MMHG | OXYGEN SATURATION: 97 % | RESPIRATION RATE: 16 BRPM | BODY MASS INDEX: 26.52 KG/M2 | TEMPERATURE: 98 F | HEIGHT: 66 IN | SYSTOLIC BLOOD PRESSURE: 110 MMHG | WEIGHT: 165 LBS | HEART RATE: 56 BPM

## 2022-09-13 LAB — UPPER GI ENDOSCOPY: NORMAL

## 2022-09-13 PROCEDURE — 250N000009 HC RX 250: Performed by: INTERNAL MEDICINE

## 2022-09-13 PROCEDURE — 43235 EGD DIAGNOSTIC BRUSH WASH: CPT | Performed by: INTERNAL MEDICINE

## 2022-09-13 PROCEDURE — 999N000099 HC STATISTIC MODERATE SEDATION < 10 MIN: Performed by: INTERNAL MEDICINE

## 2022-09-13 PROCEDURE — 250N000011 HC RX IP 250 OP 636: Performed by: INTERNAL MEDICINE

## 2022-09-13 RX ORDER — PROCHLORPERAZINE MALEATE 5 MG
5 TABLET ORAL EVERY 6 HOURS PRN
Status: DISCONTINUED | OUTPATIENT
Start: 2022-09-13 | End: 2022-09-13 | Stop reason: HOSPADM

## 2022-09-13 RX ORDER — NALOXONE HYDROCHLORIDE 0.4 MG/ML
0.2 INJECTION, SOLUTION INTRAMUSCULAR; INTRAVENOUS; SUBCUTANEOUS
Status: DISCONTINUED | OUTPATIENT
Start: 2022-09-13 | End: 2022-09-13 | Stop reason: HOSPADM

## 2022-09-13 RX ORDER — ATROPINE SULFATE 0.1 MG/ML
1 INJECTION INTRAVENOUS
Status: DISCONTINUED | OUTPATIENT
Start: 2022-09-13 | End: 2022-09-13 | Stop reason: HOSPADM

## 2022-09-13 RX ORDER — FLUMAZENIL 0.1 MG/ML
0.2 INJECTION, SOLUTION INTRAVENOUS
Status: DISCONTINUED | OUTPATIENT
Start: 2022-09-13 | End: 2022-09-13 | Stop reason: HOSPADM

## 2022-09-13 RX ORDER — SIMETHICONE 40MG/0.6ML
133 SUSPENSION, DROPS(FINAL DOSAGE FORM)(ML) ORAL
Status: DISCONTINUED | OUTPATIENT
Start: 2022-09-13 | End: 2022-09-13 | Stop reason: HOSPADM

## 2022-09-13 RX ORDER — ONDANSETRON 2 MG/ML
4 INJECTION INTRAMUSCULAR; INTRAVENOUS EVERY 6 HOURS PRN
Status: DISCONTINUED | OUTPATIENT
Start: 2022-09-13 | End: 2022-09-13 | Stop reason: HOSPADM

## 2022-09-13 RX ORDER — FENTANYL CITRATE 0.05 MG/ML
50-100 INJECTION, SOLUTION INTRAMUSCULAR; INTRAVENOUS EVERY 5 MIN PRN
Status: DISCONTINUED | OUTPATIENT
Start: 2022-09-13 | End: 2022-09-13 | Stop reason: HOSPADM

## 2022-09-13 RX ORDER — ONDANSETRON 2 MG/ML
4 INJECTION INTRAMUSCULAR; INTRAVENOUS
Status: DISCONTINUED | OUTPATIENT
Start: 2022-09-13 | End: 2022-09-13 | Stop reason: HOSPADM

## 2022-09-13 RX ORDER — DIPHENHYDRAMINE HYDROCHLORIDE 50 MG/ML
25-50 INJECTION INTRAMUSCULAR; INTRAVENOUS
Status: DISCONTINUED | OUTPATIENT
Start: 2022-09-13 | End: 2022-09-13 | Stop reason: HOSPADM

## 2022-09-13 RX ORDER — NALOXONE HYDROCHLORIDE 0.4 MG/ML
0.4 INJECTION, SOLUTION INTRAMUSCULAR; INTRAVENOUS; SUBCUTANEOUS
Status: DISCONTINUED | OUTPATIENT
Start: 2022-09-13 | End: 2022-09-13 | Stop reason: HOSPADM

## 2022-09-13 RX ORDER — LIDOCAINE 40 MG/G
CREAM TOPICAL
Status: DISCONTINUED | OUTPATIENT
Start: 2022-09-13 | End: 2022-09-13 | Stop reason: HOSPADM

## 2022-09-13 RX ORDER — EPINEPHRINE 1 MG/ML
0.1 INJECTION, SOLUTION INTRAMUSCULAR; SUBCUTANEOUS
Status: DISCONTINUED | OUTPATIENT
Start: 2022-09-13 | End: 2022-09-13 | Stop reason: HOSPADM

## 2022-09-13 RX ORDER — ONDANSETRON 4 MG/1
4 TABLET, ORALLY DISINTEGRATING ORAL EVERY 6 HOURS PRN
Status: DISCONTINUED | OUTPATIENT
Start: 2022-09-13 | End: 2022-09-13 | Stop reason: HOSPADM

## 2022-09-13 RX ADMIN — FENTANYL CITRATE 100 MCG: 50 INJECTION INTRAMUSCULAR; INTRAVENOUS at 13:04

## 2022-09-13 RX ADMIN — MIDAZOLAM HYDROCHLORIDE 2 MG: 1 INJECTION, SOLUTION INTRAMUSCULAR; INTRAVENOUS at 13:04

## 2022-09-13 RX ADMIN — TOPICAL ANESTHETIC 0.5 ML: 200 SPRAY DENTAL; PERIODONTAL at 13:06

## 2022-09-13 ASSESSMENT — ACTIVITIES OF DAILY LIVING (ADL): ADLS_ACUITY_SCORE: 35

## 2022-09-13 NOTE — LETTER
[facility] ENDOSCOPY [place]    [address] [place] [address]  Phone: [phone]  Fax: [phone]           August 29, 2022                      Alexandra Thomas  [address]  [facility] [address]          Dear Alexandra Thomas,        Thank you for choosing [facility]. You are scheduled for the following service(s).   Please be aware that coverage of these services is subject to the terms and limitations of your health insurance plan.  Call member services at your health plan with any benefit or coverage questions.    Date:   9/13/2022 Tuesday      Procedure: UPPER ENDOSCOPY-EGD  Doctor: Dr. Chatterjee           Arrival Time:  12:15 PM   *Enter and check in at the Main Hospital Entrance  Procedure Time: 1:00 PM       Location:   [facility]        Endoscopy Department, First Floor *         [address] [place], [place] [address] [phone] or [phone] ([facility]) to reschedule          PRE-PROCEDURE CHECKLIST    If you have diabetes, ask your regular doctor for diet and medication restrictions.  If you take any antiplatelet or anticoagulant medications (such as Coumadin, Lovenox, Plavix, etc.) and have not already discussed this, please call your primary physician for advice on holding this medication.  If you take Aspirin, you may continue to do so.  If you are or may be pregnant, please discuss the risks and benefits of this procedure with your doctor.  You must arrange for a ride for the day of your exam. If you fail to arrange transportation with a responsible adult, your procedure will need to be cancelled and rescheduled. Taxi, bus and medical transport are not acceptable unless you have a responsible adult that you know & trust with you. Please arrange for this  to be able to pick you up in our department, approximately one hour after your scheduled procedure, if they are not able to stay with  you.      Canceling or rescheduling   If you must cancel or reschedule your appointment, please call [phone] as soon as possible.      Upper Endoscopy or Esophagogastroduodenoscopy (EGD) is a test performed to evaluate symptoms of persistent abdominal pain, nausea, vomiting and difficulty swallowing. It may also be used to treat various conditions of the upper gastrointestinal tract, such as bleeding, narrowing or abnormal growths.     What happens during an upper endoscopy?  On the day of your procedure, plan to spend up to one and a half hour after your arrival at the endoscopy center. The exam itself takes about 5 to 10 minutes.    Before the exam:  - You will change into a gown.   - Your medical history and medication list will be reviewed with you, unless it has already been done over the phone.   - A nurse will insert an intravenous (IV) line into your hand or arm.  - The doctor will talk to you and give you a consent form to sign.    During the exam:  - Medicine will be given through the IV line to help you relax and feel comfortable.   - Your heart rate and oxygen levels will be monitored. If your blood pressure is low, you may be given fluids through the IV line.   - The doctor will insert a flexible, hollow tube, called an endoscope, into your mouth and will advance it slowly through the esophagus, stomach and duodenum (the first part of your small intestine).   - You may have a feeling of pressure or fullness.   - If you have difficulty swallowing, and the doctor finds a narrowing in your esophagus, it may be possible for the area to be expanded-dilated during the exam.   - If abnormal tissue is found, the doctor may remove it through the endoscope (biopsy it) for closer examination. The tissue removal is painless.    After the exam:  - Any tissue samples removed during the exam will be sent to a lab for evaluation. It may take 5 to 7 working days for you to be notified of the results  - The doctor  will prepare a full report for the physician who referred you for the upper endoscopy.   - The doctor will talk with you about the initial results of your exam.   - You may feel bloated after the procedure. That is normal and should not last long.   - Your throat may feel sore for a short time.   - Following the exam, you may resume your normal diet. Avoid alcohol until the next day.   - You may resume your regular activities the day after the procedure.   - Medication given during the exam will prohibit you from driving for the rest of the day.  - A nurse will provide you with complete discharge instructions before you leave the endoscopy center. Be sure to ask the nurse for specific instructions if you take blood thinners such as Aspirin , Coumadin , Lovenox , Plavix , etc.       PREPARATION    To ensure a successful exam, please follow all instructions carefully.      The night before your exam:    STOP eating solid foods at 11:45 pm.     Clear liquids are okay to drink (examples: Gatorade , apple juice, clear broth,coffee or tea without milk or cream, etc.).     DO NOT drink red liquids or alcoholic beverages.    The day of your exam:    STOP drinking clear liquids 4 hours before your exam.     You may take your usual medications with 4 oz. of water, but it needs to be at least 4 hours prior to your procedure.    When you leave for the procedure:    Bring a list of all of your current medications, including any allergy or over-the-counter medications, unless you have already reviewed that with an Endoscopy RN over the phone.     Bring a photo ID as well as up-to-date insurance information, such as your insurance card and any referral forms that might be required by your payer.       DIRECTIONS TO THE ENDOSCOPY DEPARTMENT    From the [place] ([facility])  Take [address] South, exit on [address] . Get into the left hand hunter, turn left (east), go one-half mile to [address] and turn left.  Go north to the second stoplight, take a right on [address] and follow it to the Main Hospital entrance.  From the south ([facility])  Take 35N to the 35E split and exit on [address] . On [address] , turn left (west) to [address]. Turn right (north) on [address]. Go north to the second stoplight, take a right on [address] and follow it to the Main Hospital entrance.  From the east via [address] ([facility])  Take [address] south to [address]  exit. Turn right on [address]. Go west to [address]. Turn right (north) on [address]. Go to the second stoplight, take a right on [address] to the Main Hospital entrance.  From the east via [address] ([facility])  Take [address] to [address]. Turn left (south) on [address] to [address], turn left (east) on [address] and follow it to the Main Hospital entrance.    From the west via [address] ([place])  Take [address] east to [address]. Turn right (south) on [address] to [address], turn left (east) on [address] and follow it to the Main Hospital entrance.       No

## 2022-09-23 ENCOUNTER — OFFICE VISIT (OUTPATIENT)
Dept: CARDIOLOGY | Facility: CLINIC | Age: 72
End: 2022-09-23
Payer: MEDICARE

## 2022-09-23 VITALS
DIASTOLIC BLOOD PRESSURE: 64 MMHG | HEIGHT: 66 IN | WEIGHT: 163 LBS | BODY MASS INDEX: 26.2 KG/M2 | HEART RATE: 73 BPM | SYSTOLIC BLOOD PRESSURE: 110 MMHG

## 2022-09-23 DIAGNOSIS — I10 ESSENTIAL HYPERTENSION: ICD-10-CM

## 2022-09-23 DIAGNOSIS — G47.33 OSA (OBSTRUCTIVE SLEEP APNEA): ICD-10-CM

## 2022-09-23 DIAGNOSIS — I71.20 THORACIC AORTIC ANEURYSM WITHOUT RUPTURE (H): Primary | ICD-10-CM

## 2022-09-23 DIAGNOSIS — E78.2 MIXED HYPERLIPIDEMIA: ICD-10-CM

## 2022-09-23 DIAGNOSIS — I48.0 PAF (PAROXYSMAL ATRIAL FIBRILLATION) (H): ICD-10-CM

## 2022-09-23 PROCEDURE — 99204 OFFICE O/P NEW MOD 45 MIN: CPT | Performed by: INTERNAL MEDICINE

## 2022-09-23 NOTE — LETTER
9/23/2022    Delfina Negrete MD  [address] [facility] [address]  [facility] [address]    RE: Alexandra Thomas       Dear Colleague,     I had the pleasure of seeing Alexandra Thomas in the [facility].  CARDIOLOGY CLINIC CONSULTATION      REASON FOR CONSULT:   Establish care, thoracic aortic aneurysm    PRIMARY CARE PHYSICIAN:  Delfina Negrete        History of Present Illness   Alexandra Thomas is an extremely pleasant 71 year old female here to establish care after moving to [place] from [place] to be with her family.  She has a past medical history significant for paroxysmal atrial fibrillation which developed after a chemical exposure on a cruise ship s/p successful ablation in 2011 without documented recurrence, ascending thoracic aortic aneurysm, PVC/NSVT for which she is intolerant to beta-blocker, CMR in 2010 showing fatty infiltration of the RV but no other evidence of RV dysplasia, subclinical CAD with negative BECKY in 2016, hypertension, dyslipidemia, esophageal spasm, and mild TATI not currently on CPAP.  She is a never smoker.  She has no family history of heart disease that she is aware of.    Currently she is overall feeling well from a cardiac standpoint.  Since her move in March 2022, she has had a total of 3 episodes of severe chest discomfort which have all come on after lying down following eating, and never with exertion.  She went in for evaluation after the first episode which occurred while she was traveling to [place] (she believes this was in [place] although the care everywhere records are not currently available), and apparently the work-up was entirely unrevealing and she was given a GI cocktail which relieved her symptoms.  She then saw GI and was diagnosed with esophageal spasm and prescribed nitroglycerin to take with this, but she wanted to discuss this with us first prior to actually taking it.  Otherwise, she has been feeling well.  She does have pain in  her left foot from some hardware that needs to be removed and will be done in November 2022 which has limited her mobility, as well as her hip pain which she had successful replacement of recently, but with her limited amount of exertion she has no chest pains, shortness of breath, or other complaints.  No significant palpitations or syncope.  No lower extremity swelling.    Her most recent CBC, TSH, and CMP in Care Everywhere from 2/24/2022 are entirely normal other than an AST that was elevated at 52.  Her most recent lipids are from 1/11/2022, showing a total cholesterol 164, HDL 68, LDL 77, and triglycerides 94.  Her most recent echo was from 2/7/2022, showing normal biventricular size and function, EF 60 to 65%, normal biatrial size with a small ASD with left-to-right shunting, a trileaflet aortic valve with mild aortic insufficiency, and the ascending aorta was dilated at 4.2 cm, up from 4.1 cm in June 2020.  Her most recent EKG from June 2022 showed normal sinus rhythm.  Her 14-day monitor from March 2021 showed no evidence of recurrent atrial fibrillation.  Her most recent ischemic evaluation was the negative exercise stress echo from 2016.      Assessment & Plan     1. Three episodes of severe chest pain at rest, apparently diagnosed by GI as esophageal spasm   2. Paroxysmal atrial fibrillation which developed after a chemical exposure on a cruise ship s/p successful ablation in 2011 without documented recurrence  3. Ascending thoracic aortic aneurysm  4. PVC/NSVT for which she is intolerant to beta-blocker  5. CMR in 2010 showing fatty infiltration of the RV but no other evidence of RV dysplasia  6. Subclinical CAD with negative BECKY in 2016  7. Hypertension  8. Dyslipidemia  9. Mild TATI not currently on CPAP  10. Never smoker      It was a pleasure to meet with Tabby in clinic today.  We discussed multiple issues related to her chest discomfort, atrial fibrillation, thoracic aortic aneurysm, and mild  sleep apnea.  In regards to her chest discomfort issues, I think that her description is very atypical for obstructive coronary disease, though we cannot entirely rule this out.  The diagnosis of esophageal spasm makes significant sense, and I think that it is reasonable for her to try a dose of nitroglycerin when these episodes come on.  However, if this does not resolve with nitroglycerin, or resolves temporarily and then comes back, she needs to call 911 or go to the emergency department.  Similarly, we talked about warning signs that may be more consistent with obstructive coronary disease, including chest discomfort that is exertional in nature, and she understands this.  For now, we will hold off on a repeat ischemic evaluation, though will have a low threshold to repeat this if her symptoms continue.    Outside of this, I think that she is doing very well from a cardiac standpoint.  We will continue to monitor her thoracic aortic aneurysm, roughly every 2 years for now, and adjust the time interval as needed.  Her blood pressure is very well controlled and she knows to avoid heavy lifting with Valsalva.  Finally, she previously had been diagnosed with some mild sleep apnea but had not worn the CPAP.  She thinks that perhaps her symptoms have worsened recently, and would like to establish with the sleep clinic here, which I think is a great idea.        -Hold off on further ischemic work-up for now, though low threshold to repeat as above  -As needed nitroglycerin for esophageal spasm  -Referral placed to sleep clinic  -Continue losartan 50 mg twice daily and HCTZ 25 mg daily  -Continue Crestor 10 mg daily  -Plan for next TTE to monitor aortic dimensions in 2/2024  -Okay to continue to hold off on anticoagulation, though discussed potential benefit of wearable monitoring devices such as apple watch/Fitbit, etc.  She will look into this      Follow-up: 6 months, with lipid panel beforehand, or sooner as  needed          Antoine Ballesteros MD  Interventional Cardiology  September 23, 2022        Medications   Current Outpatient Medications   Medication     acetaminophen (TYLENOL) 325 MG tablet     gabapentin (NEURONTIN) 100 MG capsule     gabapentin (NEURONTIN) 300 MG capsule     hydrochlorothiazide (HYDRODIURIL) 25 MG tablet     losartan (COZAAR) 50 MG tablet     melatonin 5 MG tablet     multivitamin w/minerals (THERA-VIT-M) tablet     omeprazole (PRILOSEC) 40 MG DR capsule     ondansetron (ZOFRAN ODT) 4 MG ODT tab     rosuvastatin (CRESTOR) 10 MG tablet     Vitamin D3 (CHOLECALCIFEROL) 25 mcg (1000 units) tablet     alum & mag hydroxide-simethicone (MAALOX) 200-200-20 MG/5ML SUSP suspension     methylcellulose (CITRUCEL) 500 MG TABS tablet     neomycin-polymixin-dexamethasone (MAXITROL) ophthalmic ointment     oxyCODONE (ROXICODONE) 5 MG tablet     traMADol (ULTRAM) 50 MG tablet     No current facility-administered medications for this visit.     Allergies   Allergies   Allergen Reactions     Food Anaphylaxis     EGGPLANT-->anaphylaxis       Venlafaxine Hcl Diarrhea, Shortness Of Breath and Nausea and Vomiting     Meperidine Other (See Comments)     Other reaction(s): Rash, non-urticarial  itchiness  Other reaction(s): rash       Adhesive Tape      Other reaction(s): Other (Specify with Comments)  blisters     Ciprofloxacin Other (See Comments)     Other reaction(s): Other (Specify with Comments)  Pt has aortic aneurysm.  Increased risk of rupture or dissection with use of fluoroquinolones.  Pt has aortic aneurysm.  Increased risk of rupture or dissection with use of fluoroquinolones.       Morphine Hives and Itching     Histamine reaction         Physical Exam       BP: 110/64 Pulse: 73            Vital Signs with Ranges  Pulse:  [73] 73  BP: (110)/(64) 110/64  163 lbs 0 oz    Constitutional: Well-appearing, no acute distress  Respiratory: Normal respiratory effort, CTAB  Cardiovascular: RRR, 2/6 systolic murmur  at the RUSB, no diastolic murmur appreciated.  JVP < 7 cm H2O.  There is no LE edema.  Normal carotid upstrokes, no carotid bruits.    Thank you for allowing me to participate in the care of your patient.      Sincerely,     Antoine Ballesteros MD     [facility]  cc:   Emeka Richardson NP  [facility] [place] [address]

## 2022-09-23 NOTE — PROGRESS NOTES
CARDIOLOGY CLINIC CONSULTATION      REASON FOR CONSULT:   Establish care, thoracic aortic aneurysm    PRIMARY CARE PHYSICIAN:  Delfina Negrete        History of Present Illness   Alexandra Thomas is an extremely pleasant 71 year old female here to establish care after moving to [place] from [place] to be with her family.  She has a past medical history significant for paroxysmal atrial fibrillation which developed after a chemical exposure on a cruise ship s/p successful ablation in 2011 without documented recurrence, ascending thoracic aortic aneurysm, PVC/NSVT for which she is intolerant to beta-blocker, CMR in 2010 showing fatty infiltration of the RV but no other evidence of RV dysplasia, subclinical CAD with negative BECKY in 2016, hypertension, dyslipidemia, esophageal spasm, and mild TATI not currently on CPAP.  She is a never smoker.  She has no family history of heart disease that she is aware of.    Currently she is overall feeling well from a cardiac standpoint.  Since her move in March 2022, she has had a total of 3 episodes of severe chest discomfort which have all come on after lying down following eating, and never with exertion.  She went in for evaluation after the first episode which occurred while she was traveling to [place] (she believes this was in [place] although the care everywhere records are not currently available), and apparently the work-up was entirely unrevealing and she was given a GI cocktail which relieved her symptoms.  She then saw GI and was diagnosed with esophageal spasm and prescribed nitroglycerin to take with this, but she wanted to discuss this with us first prior to actually taking it.  Otherwise, she has been feeling well.  She does have pain in her left foot from some hardware that needs to be removed and will be done in November 2022 which has limited her mobility, as well as her hip pain which she had successful replacement of recently, but with her limited  amount of exertion she has no chest pains, shortness of breath, or other complaints.  No significant palpitations or syncope.  No lower extremity swelling.    Her most recent CBC, TSH, and CMP in Care Everywhere from 2/24/2022 are entirely normal other than an AST that was elevated at 52.  Her most recent lipids are from 1/11/2022, showing a total cholesterol 164, HDL 68, LDL 77, and triglycerides 94.  Her most recent echo was from 2/7/2022, showing normal biventricular size and function, EF 60 to 65%, normal biatrial size with a small ASD with left-to-right shunting, a trileaflet aortic valve with mild aortic insufficiency, and the ascending aorta was dilated at 4.2 cm, up from 4.1 cm in June 2020.  Her most recent EKG from June 2022 showed normal sinus rhythm.  Her 14-day monitor from March 2021 showed no evidence of recurrent atrial fibrillation.  Her most recent ischemic evaluation was the negative exercise stress echo from 2016.      Assessment & Plan     1. Three episodes of severe chest pain at rest, apparently diagnosed by GI as esophageal spasm   2. Paroxysmal atrial fibrillation which developed after a chemical exposure on a cruise ship s/p successful ablation in 2011 without documented recurrence  3. Ascending thoracic aortic aneurysm  4. PVC/NSVT for which she is intolerant to beta-blocker  5. CMR in 2010 showing fatty infiltration of the RV but no other evidence of RV dysplasia  6. Subclinical CAD with negative BECKY in 2016  7. Hypertension  8. Dyslipidemia  9. Mild TATI not currently on CPAP  10. Never smoker      It was a pleasure to meet with Tabby in clinic today.  We discussed multiple issues related to her chest discomfort, atrial fibrillation, thoracic aortic aneurysm, and mild sleep apnea.  In regards to her chest discomfort issues, I think that her description is very atypical for obstructive coronary disease, though we cannot entirely rule this out.  The diagnosis of esophageal spasm makes  significant sense, and I think that it is reasonable for her to try a dose of nitroglycerin when these episodes come on.  However, if this does not resolve with nitroglycerin, or resolves temporarily and then comes back, she needs to call 911 or go to the emergency department.  Similarly, we talked about warning signs that may be more consistent with obstructive coronary disease, including chest discomfort that is exertional in nature, and she understands this.  For now, we will hold off on a repeat ischemic evaluation, though will have a low threshold to repeat this if her symptoms continue.    Outside of this, I think that she is doing very well from a cardiac standpoint.  We will continue to monitor her thoracic aortic aneurysm, roughly every 2 years for now, and adjust the time interval as needed.  Her blood pressure is very well controlled and she knows to avoid heavy lifting with Valsalva.  Finally, she previously had been diagnosed with some mild sleep apnea but had not worn the CPAP.  She thinks that perhaps her symptoms have worsened recently, and would like to establish with the sleep clinic here, which I think is a great idea.        -Hold off on further ischemic work-up for now, though low threshold to repeat as above  -As needed nitroglycerin for esophageal spasm  -Referral placed to sleep clinic  -Continue losartan 50 mg twice daily and HCTZ 25 mg daily  -Continue Crestor 10 mg daily  -Plan for next TTE to monitor aortic dimensions in 2/2024  -Okay to continue to hold off on anticoagulation, though discussed potential benefit of wearable monitoring devices such as apple watch/Fitbit, etc.  She will look into this      Follow-up: 6 months, with lipid panel beforehand, or sooner as needed          Antoine Ballesteros MD  Interventional Cardiology  September 23, 2022        Medications   Current Outpatient Medications   Medication     acetaminophen (TYLENOL) 325 MG tablet     gabapentin (NEURONTIN) 100 MG  capsule     gabapentin (NEURONTIN) 300 MG capsule     hydrochlorothiazide (HYDRODIURIL) 25 MG tablet     losartan (COZAAR) 50 MG tablet     melatonin 5 MG tablet     multivitamin w/minerals (THERA-VIT-M) tablet     omeprazole (PRILOSEC) 40 MG DR capsule     ondansetron (ZOFRAN ODT) 4 MG ODT tab     rosuvastatin (CRESTOR) 10 MG tablet     Vitamin D3 (CHOLECALCIFEROL) 25 mcg (1000 units) tablet     alum & mag hydroxide-simethicone (MAALOX) 200-200-20 MG/5ML SUSP suspension     methylcellulose (CITRUCEL) 500 MG TABS tablet     neomycin-polymixin-dexamethasone (MAXITROL) ophthalmic ointment     oxyCODONE (ROXICODONE) 5 MG tablet     traMADol (ULTRAM) 50 MG tablet     No current facility-administered medications for this visit.     Allergies   Allergies   Allergen Reactions     Food Anaphylaxis     EGGPLANT-->anaphylaxis       Venlafaxine Hcl Diarrhea, Shortness Of Breath and Nausea and Vomiting     Meperidine Other (See Comments)     Other reaction(s): Rash, non-urticarial  itchiness  Other reaction(s): rash       Adhesive Tape      Other reaction(s): Other (Specify with Comments)  blisters     Ciprofloxacin Other (See Comments)     Other reaction(s): Other (Specify with Comments)  Pt has aortic aneurysm.  Increased risk of rupture or dissection with use of fluoroquinolones.  Pt has aortic aneurysm.  Increased risk of rupture or dissection with use of fluoroquinolones.       Morphine Hives and Itching     Histamine reaction         Physical Exam       BP: 110/64 Pulse: 73            Vital Signs with Ranges  Pulse:  [73] 73  BP: (110)/(64) 110/64  163 lbs 0 oz    Constitutional: Well-appearing, no acute distress  Respiratory: Normal respiratory effort, CTAB  Cardiovascular: RRR, 2/6 systolic murmur at the RUSB, no diastolic murmur appreciated.  JVP < 7 cm H2O.  There is no LE edema.  Normal carotid upstrokes, no carotid bruits.

## 2022-10-03 ENCOUNTER — HEALTH MAINTENANCE LETTER (OUTPATIENT)
Age: 72
End: 2022-10-03

## 2022-10-04 ASSESSMENT — SLEEP AND FATIGUE QUESTIONNAIRES
HOW LIKELY ARE YOU TO NOD OFF OR FALL ASLEEP WHILE SITTING QUIETLY AFTER LUNCH WITHOUT ALCOHOL: SLIGHT CHANCE OF DOZING
HOW LIKELY ARE YOU TO NOD OFF OR FALL ASLEEP WHILE WATCHING TV: SLIGHT CHANCE OF DOZING
HOW LIKELY ARE YOU TO NOD OFF OR FALL ASLEEP WHILE SITTING INACTIVE IN A PUBLIC PLACE: SLIGHT CHANCE OF DOZING
HOW LIKELY ARE YOU TO NOD OFF OR FALL ASLEEP WHILE SITTING AND TALKING TO SOMEONE: WOULD NEVER DOZE
HOW LIKELY ARE YOU TO NOD OFF OR FALL ASLEEP WHILE LYING DOWN TO REST IN THE AFTERNOON WHEN CIRCUMSTANCES PERMIT: HIGH CHANCE OF DOZING
HOW LIKELY ARE YOU TO NOD OFF OR FALL ASLEEP IN A CAR, WHILE STOPPED FOR A FEW MINUTES IN TRAFFIC: WOULD NEVER DOZE
HOW LIKELY ARE YOU TO NOD OFF OR FALL ASLEEP WHEN YOU ARE A PASSENGER IN A CAR FOR AN HOUR WITHOUT A BREAK: SLIGHT CHANCE OF DOZING
HOW LIKELY ARE YOU TO NOD OFF OR FALL ASLEEP WHILE SITTING AND READING: MODERATE CHANCE OF DOZING

## 2022-10-06 ENCOUNTER — OFFICE VISIT (OUTPATIENT)
Dept: SLEEP MEDICINE | Facility: CLINIC | Age: 72
End: 2022-10-06
Attending: INTERNAL MEDICINE
Payer: MEDICARE

## 2022-10-06 VITALS
DIASTOLIC BLOOD PRESSURE: 73 MMHG | SYSTOLIC BLOOD PRESSURE: 120 MMHG | OXYGEN SATURATION: 98 % | HEART RATE: 61 BPM | BODY MASS INDEX: 26.2 KG/M2 | WEIGHT: 163 LBS | HEIGHT: 66 IN

## 2022-10-06 DIAGNOSIS — F51.04 CHRONIC INSOMNIA: Primary | ICD-10-CM

## 2022-10-06 DIAGNOSIS — G47.33 OSA (OBSTRUCTIVE SLEEP APNEA): ICD-10-CM

## 2022-10-06 PROCEDURE — 99205 OFFICE O/P NEW HI 60 MIN: CPT | Performed by: PHYSICIAN ASSISTANT

## 2022-10-06 RX ORDER — NITROGLYCERIN 0.4 MG/1
TABLET SUBLINGUAL PRN
COMMUNITY
Start: 2022-09-13 | End: 2023-07-31

## 2022-10-06 NOTE — NURSING NOTE
"Chief Complaint   Patient presents with     Consult     Referral cardiology, sleep issues, trouble going to sleep, excessive daytime sleepiness       Initial /73   Pulse 61   Ht 1.676 m (5' 5.98\")   Wt 73.9 kg (163 lb)   SpO2 98%   BMI 26.32 kg/m   Estimated body mass index is 26.32 kg/m  as calculated from the following:    Height as of this encounter: 1.676 m (5' 5.98\").    Weight as of this encounter: 73.9 kg (163 lb).    Medication Reconciliation: complete    Neck circumference: 13.25 inches / 34 centimeters.      "

## 2022-10-06 NOTE — PROGRESS NOTES
Outpatient Sleep Medicine Consultation:      Name: Alexandra Thomas MRN# [id number]   Age: 71 year old YOB: 1950     Date of Consultation: October 5, 2022  Consultation is requested by: Antoine Ballesteros MD  [id number] [id number] Antoine Ballesteros  Primary care provider: Delfina Negrete       Reason for Sleep Consult:     Alexandra Thomas is sent by Antoine Ballesteros for a sleep consultation regarding TATI.    Patient s Reason for visit  Alexandra Thomas main reason for visit: Interrupted sleep, daytime fatigue, surliness  Patient states problem(s) started: Different degrees last two years maybe three years  Alexandra Thomas's goals for this visit: Schedule a sleep study           Assessment and Plan:     Summary Sleep Diagnoses and Recommendations:  (F51.04) Chronic insomnia  (primary encounter diagnosis)  Comment: It can take Tabby 4 hours or more to fall asleep about 4 times per week. She goes to bed between 8:30 and 10 PM and may get out of bed between 5 and 8 AM. She naps most days in the afternoon for about  minutes. She spends a lot of time in bed on electronics.  Plan: Try to limit naps to 30 minutes and reduce time in bed at night to 7.5 hours (11:30 PM to 7 AM). If keeping this schedule for 2-3 weeks but still struggling with sleep, either try to avoid the naps or push the bedtime to midnight.   Avoid activities in bed other than sleep for the most part. You may read for 15-30 minutes to help you fall asleep, but hopefully you will fall asleep faster than that if you avoid going to bed until you feel ready to fall asleep. Set up a recliner in your room so you can do most of your wind-down activities out of bed. Keep the lights dim in the last hour before bed.     (G47.33) TATI (obstructive sleep apnea)  Comment: Tabby was previously diagnosed with mild TATI/UARS and was started on CPAP. She started treatment primarily because she was  having PVCs. She stopped using it after a shoulder surgery and did not notice a clear difference so has not used CPAP since 2017. Today, she has concerns of difficulty sleeping at night and daytime sleepiness. Her weight is essentially unchanged since her initial diagnosis. Her CPAP is set to pressures 5-13 cm.  Plan: I would like to see if she notes any benefit to treating her mild apnea. Her machine appears to be in good working order, she just needs a new mask. I wrote a prescription for new supplies.  We will have to get a copy of the sleep studies before she can establish care with [facility].    Addendum: A copy of her diagnostic and titration sleep studies were obtained and will be sent to [facility]. This should allow her to get coverage for new CPAP supplies. 10/27/22.  Bennett Goltz, PA-C        Comorbid Diagnoses:  Chronic pain, HTN, GERD, Sicca syndrome, NSVT, paroxysmal A-Fib (2011, no known recurrence since ablation)    Summary Counseling:    Sleep Testing Reviewed  Obstructive Sleep Apnea Reviewed  Complications of Untreated Sleep Apnea Reviewed      Patient will follow up 2-3 months.  Bennett Goltz, PA-C      Total time spent reviewing medical records, history and physical examination, review of previous testing and interpretation as well as documentation on this date: 73 min    CC: MD Delfina Landry MD           History of Present Illness:       Tabby has a really hard time falling asleep. She wakes after a few hours and then she gets really sleepy in the afternoon. If she does not get enough sleep, she is very surly. This has been an issue off and on for about a year, worse in the last 6 months. She is not sure what exactly is driving this. She has had a number of major life events and does feel stressed. She moved from [place] in March. That was a difficult transition. She had a couple of surgeries (hip replacement), and needs her shoulder fixed.     She was previously diagnosed  with mild TATI. She is on a ResMed auto CPAP 5-13 cm. She has not used the CPAP since March 2017. She stopped using it because she was having trouble cleaning it after a shoulder surgery. She had mild apnea and was primarily using it due to having PVCs. At that time, she was not having as many PVCs, so she felt she did not really need it. Today, she says she thinks she was sleeping better with it, but it may not have been just due to the CPAP.    The compliance data shows that in December to March/2017 the patient used the CPAP for 72/90 nights, 59% of nights for >4 hours.  The 95th% pressure is 7.5 cm.  The 95th% leak is 5 lpm.  The average nightly usage is 6:08, median 7:06.  The average AHI is 1.2/hr.      Past Sleep Evaluations: She had a Diagnostic PSG in [place] on 1/28/2016. Her 4% AHI was 3/hr. Her 3% AHI was 6/hr. 3% REM AHI was 10.2, 3% supine AHI 11/hr. RDI was 14.4/hr. O2 chelly 90%. PLM index was 26/hr, 0.2/hr were associated with arousals. Wt: 165#.  She had a titration study on 3/2/2016. CPAP was titrated to 11 cm with improved sleep architecture. (However, looking at the average respiratory indices, her numbers were not improved, RDI 16/hr, O2 chelly 85%. This does not reflect how she did on the final pressure, but I do not have that data). At this time, only the interpretations are available for review.    SLEEP-WAKE SCHEDULE:     Work/School Days: Patient goes to school/work: No   Usually gets into bed at 10:00 pm. That is inconsistent. She may go to bed at 8:30 PM if she does not nap.  Takes patient sometimes 4 hours  to fall asleep about 4 nights out of the week. She watches TV in bed. She does not feel tired.  Has trouble falling asleep 7 nights per week  Wakes up in the middle of the night early 4-5   Later 2-3 times/week.   Wakes up due to Snorting self awake, Use the bathroom, Uncertain, cat  She has trouble falling back asleep almost every time I wake.  3-5 times a week. If she wakes at 4:30  or 5 AM, she will get up for the day.  She sometimes has to put eye drops in which is like splashing water on her face.  It usually takes 30 min to 1 hour.  Sometimes I just get up   Patient is usually up at varies - 5:00 am to 8:00 am   mostly around 7:30  Uses alarm: No  Her energy is usually fine when she wakes, until the early afternoon.    Weekends/Non-work Days/All Other Days:  Usually gets into bed at 10:00 pm   Takes patient about 30 min to 1 hour to fall asleep  Patient is usually up at 7:30  Uses alarm: No    Sleep Need  Patient gets  6 hours - interrupted sleep on average   Patient thinks she needs about 8 hours sleep    Alexandra Thomas prefers to sleep in this position(s): Back, Side   Patient states they do the following activities in bed: Read, Eat, Watch TV, Use phone, computer, or tablet (can be hours). When asked if she has a hard time shutting her mind off, she said that is hard to answer. She denies fretting about things but may think about things.     Naps  Patient takes a purposeful nap 6-7 times a week and naps are usually 30 min to 2 hours in duration. She naps around 2-3 PM.  She feels better after a nap: Yes  She dozes off unintentionally 5 days per week. This is not separate from her nap, once she starts to get drowsy, she goes and lays down and falls right to sleep.   Patient has had a driving accident or near-miss due to sleepiness/drowsiness: No      SLEEP DISRUPTIONS:    Breathing/Snoring  Patient snores: She does not know if she snores now. She does not wake from snoring, but will sometimes wake gasping or taking a big breath in. That has not happened in about a month.  Other people complain about her snoring:  N/A  Patient has been told she stops breathing in her sleep:    She has issues with the following: Morning mouth dryness, Heartburn or reflux at night, Getting up to urinate more than once. No morning headaches generally.    Movement:  Patient gets pain, discomfort, with  an urge to move:  Yes, she will get restlessness only if she takes 50 mg diphenhydramine.   It happens when she is resting:  Yes  It happens more at night:  No  Patient has been told she kicks her legs at night:  Yes  She is on 600 mg gabapentin at night and 200 mg in the morning. She has pain in her foot related to some hardware, which is going to be removed in a month. She would like to get off of the gabapentin. She denies that pain is interfering with sleep. She was taking more gabapentin in the morning and was having stumbling/sleepiness. She face planted into her shrubs when on about 600 mg (she guesses). She has been on gabapentin since about 2014.     Behaviors in Sleep:  Alexandra Thomas has experienced the following behaviors while sleeping: Recurring Nightmares (only if she takes too much melatonin).  Pt denies bruxism, sleep talking, sleep walking, and dream enactment behavior. Pt denies sleep paralysis, hypnagogue and cataplexy.     Is there anything else you would like your sleep provider to know: Previous Sleep Study - Mild Sleep Apnea due to multiple PVCs      CAFFEINE AND OTHER SUBSTANCES:    Patient consumes caffeinated beverages per day:  1  Last caffeine use is usually: 8:00 am  List of any prescribed or over the counter stimulants that patient takes:    List of any prescribed or over the counter sleep medication patient takes: melatonin 5 mg  (unsure if it helps)  List of previous sleep medications that patient has tried:   Patient drinks alcohol to help them sleep: No  Patient drinks alcohol near bedtime: No    Family History:  Patient has a family member been diagnosed with a sleep disorder: No      Social History  Tabby moved here from [place] this Spring.  She lives with her son.       SCALES:    EPWORTH SLEEPINESS SCALE      [place] Sleepiness Scale ( HEMA Au  1990-1997[employer] [place]/English - Final version - 21 Nov 07 - [facility] Research [place].) 10/4/2022   Sitting and reading  Moderate chance of dozing   Watching TV Slight chance of dozing   Sitting, inactive in a public place (e.g. a theatre or a meeting) Slight chance of dozing   As a passenger in a car for an hour without a break Slight chance of dozing   Lying down to rest in the afternoon when circumstances permit High chance of dozing   Sitting and talking to someone Would never doze   Sitting quietly after a lunch without alcohol Slight chance of dozing   In a car, while stopped for a few minutes in traffic Would never doze   Turpin Score (MC) 9   Turpin Score (Sleep) 9         INSOMNIA SEVERITY INDEX (BENNY)      Insomnia Severity Index (BENNY) 10/4/2022   Difficulty falling asleep 3   Difficulty staying asleep 2   Problems waking up too early 1   How SATISFIED/DISSATISFIED are you with your CURRENT sleep pattern? 4   How NOTICEABLE to others do you think your sleep problem is in terms of impairing the quality of your life? 3   How WORRIED/DISTRESSED are you about your current sleep problem? 3   To what extent do you consider your sleep problem to INTERFERE with your daily functioning (e.g. daytime fatigue, mood, ability to function at work/daily chores, concentration, memory, mood, etc.) CURRENTLY? 4   BENNY Total Score 20       Guidelines for Scoring/Interpretation:  Total score categories:  0-7 = No clinically significant insomnia   8-14 = Subthreshold insomnia   15-21 = Clinical insomnia (moderate severity)  22-28 = Clinical insomnia (severe)  Used via courtesy of www.[employer].va.gov with permission from Edson Walden PhD., [facility]      STOP BANG     STOP BANG Questionnaire (  2008, the American Society of Anesthesiologists, Inc. [employer] 10/4/2022   1. Snoring - Do you snore loudly (louder than talking or loud enough to be heard through closed doors)? No   2. Tired - Do you often feel tired, fatigued, or sleepy during daytime? Yes   3. Observed - Has anyone observed you stop breathing  during your sleep? No   4. Blood pressure - Do you have or are you being treated for high blood pressure? Yes   5. BMI - BMI more than 35 kg/m2? No   6. Age - Age over 50 yr old? Yes   7. Neck circumference - Neck circumference greater than 40 cm? No   8. Gender - Gender male? No   STOP BANG Score (MC): 4 (High risk of TATI)   B/P Clinic: -   BMI Clinic: -           Allergies:    Allergies   Allergen Reactions     Food Anaphylaxis     EGGPLANT-->anaphylaxis       Venlafaxine Hcl Diarrhea, Shortness Of Breath and Nausea and Vomiting     Meperidine Other (See Comments)     Other reaction(s): Rash, non-urticarial  itchiness  Other reaction(s): rash       Adhesive Tape      Other reaction(s): Other (Specify with Comments)  blisters     Ciprofloxacin Other (See Comments)     Other reaction(s): Other (Specify with Comments)  Pt has aortic aneurysm.  Increased risk of rupture or dissection with use of fluoroquinolones.  Pt has aortic aneurysm.  Increased risk of rupture or dissection with use of fluoroquinolones.       Morphine Hives and Itching     Histamine reaction       Medications:    Current Outpatient Medications   Medication Sig Dispense Refill     acetaminophen (TYLENOL) 325 MG tablet Take 2 tablets (650 mg) by mouth every 4 hours as needed for other (mild pain) 100 tablet 0     gabapentin (NEURONTIN) 100 MG capsule Take 200 mg by mouth every morning       gabapentin (NEURONTIN) 300 MG capsule Take 600 mg by mouth At Bedtime       hydrochlorothiazide (HYDRODIURIL) 25 MG tablet Take 25 mg by mouth daily       losartan (COZAAR) 50 MG tablet Take 50 mg by mouth 2 times daily       melatonin 5 MG tablet Take 5 mg by mouth nightly as needed for sleep       multivitamin w/minerals (THERA-VIT-M) tablet Take 1 tablet by mouth daily       omeprazole (PRILOSEC) 40 MG DR capsule        ondansetron (ZOFRAN ODT) 4 MG ODT tab Take 1 tablet (4 mg) by mouth every 8 hours as needed for nausea 12 tablet 0     rosuvastatin (CRESTOR)  10 MG tablet Take 10 mg by mouth daily       Vitamin D3 (CHOLECALCIFEROL) 25 mcg (1000 units) tablet Take 1 tablet by mouth daily       methylcellulose (CITRUCEL) 500 MG TABS tablet Take 1 tablet by mouth daily       nitroGLYcerin (NITROSTAT) 0.4 MG sublingual tablet as needed for other Esophageal spasm         Problem List:  Patient Active Problem List    Diagnosis Date Noted     Lattice degeneration of right retina 08/23/2022     Priority: Medium     S/P total hip arthroplasty 07/27/2022     Priority: Medium     Benign essential hypertension 05/29/2022     Priority: Medium     Gastroparesis 05/29/2022     Priority: Medium     Thoracic aortic aneurysm without rupture 05/29/2022     Priority: Medium     Gastroesophageal reflux disease without esophagitis 05/29/2022     Priority: Medium     Pain syndrome, chronic 05/29/2022     Priority: Medium     Orthopedic pains: Hip pain, foot pain, shoulder pain, on gabapentin       Sicca syndrome (H) 05/29/2022     Priority: Medium     NSVT (nonsustained ventricular tachycardia) 05/29/2022     Priority: Medium     TATI (obstructive sleep apnea) 05/29/2022     Priority: Medium        Past Medical/Surgical History:  Past Medical History:   Diagnosis Date     Abdominal aortic aneurysm (AAA) without rupture (H)     4.2 cm   Ascending     Arthritis     hips     Diverticulitis of colon      Gastroesophageal reflux disease     Esophageal spasms.     Gastroparesis      Heart murmur      High cholesterol      History of GI bleed      Hypertension      Sjogren's syndrome (H)      Past Surgical History:   Procedure Laterality Date     ARTHROPLASTY HIP Left 07/27/2022    Procedure: Left total hip arthroplasty;  Surgeon: Berry Mcdonnell MD;  Location: [facility] OR     CHOLECYSTECTOMY       COLECTOMY PARTIAL      Due to recurrent diverticulitis     ENT SURGERY      T & A     ESOPHAGOSCOPY, GASTROSCOPY, DUODENOSCOPY (EGD), COMBINED N/A 9/13/2022    Procedure: ESOPHAGOGASTRODUODENOSCOPY (EGD) (fv);   Surgeon: Seymour Chatterjee MD;  Location: [facility] GI     HYSTERECTOMY TOTAL ABDOMINAL       LASIK       left midfoot fusion Left     2nd toe osteotomy     shouder replacement Right      TOTAL HIP ARTHROPLASTY Right 06/07/2009     TOTAL KNEE ARTHROPLASTY Right 07/12/2006     TOTAL KNEE ARTHROPLASTY Left 02/18/2009       Social History:  Social History     Socioeconomic History     Marital status:      Spouse name: Not on file     Number of children: Not on file     Years of education: Not on file     Highest education level: Not on file   Occupational History     Not on file   Tobacco Use     Smoking status: Never Smoker     Smokeless tobacco: Never Used   Vaping Use     Vaping Use: Never used   Substance and Sexual Activity     Alcohol use: Never     Drug use: Never     Sexual activity: Not Currently   Other Topics Concern     Not on file   Social History Narrative     Not on file     Social Determinants of Health     Financial Resource Strain: Not on file   Food Insecurity: Not on file   Transportation Needs: Not on file   Physical Activity: Not on file   Stress: Not on file   Social Connections: Not on file   Intimate Partner Violence: Not on file   Housing Stability: Not on file       Family History:  Family History   Problem Relation Age of Onset     Chronic Obstructive Pulmonary Disease Mother      Dementia Father      Chronic Obstructive Pulmonary Disease Father      Macular Degeneration Maternal Grandmother      Breast Cancer Paternal Cousin         before 50     Glaucoma No family hx of        Review of Systems:  A complete review of systems reviewed by me is negative with the exeption of what has been mentioned in the history of present illness.  In the last TWO WEEKS have you experienced any of the following symptoms?  Fevers: No  Night Sweats: No  Weight Gain: No  Pain at Night: Yes  Double Vision: No  Changes in Vision: No  Difficulty Breathing through Nose: No  Sore Throat in Morning: No  Dry Mouth  "in the Morning: Yes  Shortness of Breath Lying Flat: No  Shortness of Breath With Activity: No  Heart Racing at Night: No  Swelling in Feet or Legs: No  Diarrhea at Night: No  Heartburn at Night: Yes  Urinating More than Once at Night: Yes  Losing Control of Urine at Night: No  Joint Pains at Night: Yes  Headaches in Morning: No  Weakness in Arms or Legs: No  Depressed Mood: No  Anxiety: No     Physical Examination:  Vitals: /73   Pulse 61   Ht 1.676 m (5' 5.98\")   Wt 73.9 kg (163 lb)   SpO2 98%   BMI 26.32 kg/m      Neck Cir (cm): 34 cm      GENERAL APPEARANCE: healthy, alert, no distress and cooperative  EYES: Eyes grossly normal to inspection, PERRL, conjunctivae and sclerae normal and lids and lashes normal  HENT: oral mucous membranes moist and oropharynx clear  NECK: no adenopathy, no asymmetry, masses, or scars, thyroid normal to palpation and trachea midline and normal to palpation  RESP: lungs clear to auscultation - no rales, rhonchi or wheezes  CV: regular rates and rhythm, no murmur, click or rub and no irregular beats  MS: extremities normal- no gross deformities noted  NEURO: Normal strength and tone, mentation intact, speech normal and cranial nerves 2-12 intact  Mallampati Class: I.  Tonsillar Stage: 1  hidden by pillars.         Data: All pertinent previous laboratory data reviewed     Recent Labs   Lab Test 08/19/22  0945 07/28/22  0708 06/28/22  1334     --  140   POTASSIUM 4.0  --  4.0   CHLORIDE 104  --  105   CO2 30  --  30   ANIONGAP 5  --  5   GLC 88 105* 85   BUN 19  --  20   CR 0.80  --  1.01   CHESTER 9.2  --  9.0       Recent Labs   Lab Test 08/19/22  0945   WBC 4.8   RBC 3.84   HGB 12.2   HCT 37.4   MCV 97   MCH 31.8   MCHC 32.6   RDW 11.7          Recent Labs   Lab Test 08/19/22  0945   PROTTOTAL 6.5*   ALBUMIN 3.3*   BILITOTAL 0.3   ALKPHOS 118   AST 15   ALT 14       No results found for: TSH    No results found for: UAMP, UBARB, BENZODIAZEUR, UCANN, UCOC, OPIT, " UPCP    No results found for: IRONSAT, OR06816, FRANCA    No results found for: PH, PHARTERIAL, PO2, [id number], SAT, PCO2, HCO3, BASEEXCESS, OLU, BEB    @LABRCNTIPR(phv:4,pco2v:4,po2v:4,hco3v:4,hubert:4,o2per:4)@    Echocardiology: No results found for this or any previous visit (from the past 4320 hour(s)).    Chest x-ray: No results found for this or any previous visit from the past 365 days.      Chest CT: No results found for this or any previous visit from the past 365 days.      PFT: Most Recent [facility] Pulmonary Function Testing    No results found for: 20001      Bennett Ezra Goltz, PA-C, [facility] 10/5/2022

## 2022-10-06 NOTE — PATIENT INSTRUCTIONS
Avoid activities in bed other than sleep for the most part. You may read for 15-30 minutes to help you fall asleep, but hopefully you will fall asleep faster than that if you avoid going to bed until you feel ready to fall asleep. Set up a recliner in your room to do most of your wind-down activities. Keep the lights dim in the last hour before bed.           CBT-I Module - Core Sleep Training      Now that you understand a bit more about how sleep works and what causes insomnia, you are ready to begin CBT-I Core Sleep Training.  This process involves five key strategies that will:    Strengthen your sleep drive and circadian sleep rhythm  Strengthen the link between your bed and sleep    It will be important that you continue to keep track of your sleep using your Insomnia  Robert or your paper sleep diary.      Core Sleep Strategies    Much like physical activity and healthy eating strengthens our body, studies show that the following Core strategies are key to achieving stronger, healthier sleep. The focus is on quality of sleep (not quantity) and improving how you feel during the day.     Reduce Your Time in Bed    Spending extra time in bed trying to get more sleep actually can cause more sleep disruption and weaken sleep efficiency. Sleep efficiency is simply the percentage of time a person spends asleep while in bed. Normal sleep efficiency is thought to be 85% or greater.  By reducing your time in bed, you will be awake longer during the day leading to quicker and deeper sleep at night. This strategy does not reduce the amount of sleep you get, just the time you are awake in bed.                                             Your provider has recommended the following initial sleep schedule based on the average nightly amount of sleep you estimate you got in the past two weeks. It also takes into account the best time for you to sleep.     Your Sleep Schedule Prescription                       Total Time  in Bed:  7.5 hours                  Bedtime:  No earlier than 11:30 PM                   Wake-up Time:  Out of bed every day by 7 AM  Reduce you nap to no more than 30 minutes. Set an alarm for the naps and your morning wake time to prevent over sleeping.       Don't go to bed until you feel sleepy (not tired or fatigued)     This helps increase your sleep drive by keeping you awake longer.  If you go to bed when you're not sleepy, it gives your brain the wrong message and can lead to frustration.     If you feel sleepy before your prescribed bedtime, find activities that can help you stay awake until it is time for you to go to bed. Even brief naps in the evening can be very disruptive of sleep at night.      Don't stay in bed unless you are sleeping      If you are unable to fall asleep or return to sleep after about 30 minutes, get out of bed. This helps train your brain that the bed is for sleep. It is harmful to your sleep when you are worried or frustrated in bed. Do not read, eat, worry, think about sleep, use mobile phones or tablets, or watch TV in bed. Do not watch the clock during the night.     Go to another room and do something relaxing. Plan things you can do when you get out of bed. Avoid use of mobile devices or computers when out of bed.    Return to bed only when you feel sleepy again.  Repeat as often as needed throughout the night.      Get out of bed at the same time every day    Getting up at the same time helps set your biological clock. It is important to stick to your wake time no matter how much sleep you got the night before or how you feel in the morning.    Varying your wake can have the same effect as jet lag.  It disrupts your biological clock and makes you feel more tired and exhausted.  Trying to  catch up  on sleep on the weekends only makes things worse and sets you up for a cycle of poor sleep the following weekdays.      Make sure you set an alarm and keep it away from the bed  to prevent looking at the clock during the night.       Avoid daytime napping    Avoid daytime napping if possible. Napping partially fulfills your need for sleep and weakens your sleep drive at night.    However, if you find yourself sleepy (not just tired) you can take a brief 15-30 minute nap during the day if needed.  Naps within 7-8 hours of your prescribed wake time are less likely to affect your sleep the coming night.  Sleepy people make more mistakes and may hurt themselves or others. Therefore, safety trumps all other sleep prescriptions.  Never drive or operate equipment if drowsy or sleepy.     Changing Your Sleep Schedule Prescription    After two weeks, you can adjust your sleep schedule prescription based on how well you are sleeping. Use the following guidelines to change your prescribed time in bed based on information from your Insomnia  tim or paper sleep diary.          Increase Time in Bed by 15 Minutes IF:    Your Insomnia  tim Progress indicator estimates that your sleep efficiency has been greater than 90%.    OR you are falling asleep in less than 30 minutes AND awake less than 30 minutes during the night.              Decrease Time in Bed by 15 Minutes IF:    Your Insomnia  tim sleep diary Progress indicator estimates that your sleep efficiency has been less than 80%.    OR it is taking longer than 30 minutes to fall asleep OR you are awake more than 30 minutes during the night.      DO NOT decrease your sleep schedule below 7 hours unless instructed by your provider.    Change is Challenging    Research shows that making significant changes in sleep habits improves sleep quality and how you feel. Improvement takes time and is not always steady. Change is challenging and can be stressful.  This is especially true if old habits - like spending more time in bed -- were a way to avoid worry about getting enough sleep.              General recommendations for sleep problems  (Insomnia)  Allow 2-4 weeks to see results     Establish a regular sleep schedule    Most people only need 7-8 hours of sleep.  Don't be in bed longer than you need     to sleep or you will end up spending more time awake in bed. This trains your    brain to think of the bed as a place to not sleep.  Go to bed at same time each night   Get up at same time each day - Set an alarm everyday (even weekends). This is one of    the most important tips. It prevents you from relying on your insomnia to get you    up on time for your day. That actually reinforces insomnia. It also will help your    body get into a pattern where you start feeling tired at a consistent time each    night.  The body functions best when you keep a consistent routine.  Avoid sleeping-in and napping. Anytime you sleep during the day, you will be less tired at    night. You may be tired enough to fall asleep, but you will wake more in the    middle of the night because you will have met your sleep need before the night is    done.   Cut down time in bed (if not asleep, get up)- Use your bed only for sleep and sex    Anytime you spend time in bed doing activities other than sleep (reading,    watching TV, working, playing on the computer or phone, or even just laying in    bed trying to sleep), you are training  your brain to think of the bed as a place to    do activities other than sleep. If you are not falling asleep within 20-30 minutes,    get out of bed. While out of bed, avoid bright lights. Avoid work or chores. Being    productive in the middle of the night reinforces waking up at night. Find relaxing,    not particularly entertaining activities like reading, listening to music, or relaxation    exercises. Go back to bed if you start feeling groggy, or after about 30 minutes,    even if not feeling very tired. Sometimes, just getting out of bed stops the pattern    of getting frustrated about laying in bed not sleeping, and that can help  you fall    asleep.   Avoid trying to force yourself to sleep- sleep is not like everything else. The harder you    work at most things, the more you can accomplish. The harder you work at    sleep, the less you will sleep.     Make the bedroom comfortable - quiet, dark and cool are better. Consider ear plugs    (silicon). Use dark blinds or wear an eye mask if needed     Make a relaxing routine prior to bedtime  Relaxation exercises:   Progressive muscle relaxation: Relax each muscle group individually    Begin with your feet, flex, then relax. Try to imagine your feet feeling heavy and sinking into the bed. Move to your calves, do the same thing. Work through each muscle group toward your head.    Relaxing Mental Imagery: Try to imagine a trip that you took and found relaxing, or imagine a day at the beach. Try to walk yourself through the day in your mind as if you were dreaming it. Try to imagine sensing the different experiences, such as feeling sand between your toes, the heat of the sun on your skin, seeing the waves crashing the shore, the smell of the salt water, etc.     Deal with your worries before bedtime    Set aside a worry time around dinner time for 10-15 minutes. Write down the    things that are on your mind. Plan time in the coming days to address those    issues. Brainstorm ideas on how you will deal with them. Try to identify issues    that are out of your control, and try to let those issues go.  Listen to relaxation tapes   Classical Music or Nature sounds   Back Massage   Get regular exercise each day (at least 1-2 hours before bedtime)   Take medications only as directed   Eat a light bedtime snack or warm drink   Warm milk   Warm herbal tea (non-caffeinated)       Things to avoid   No overstimulating activities just before bed   No competitive games before bedtime   No exciting television programs before bedtime   Avoid caffeine after lunchtime   Avoid chocolate   Do not use alcohol to  induce sleep (worsens Insomnia)   Do not take someone else's sleeping pills   Do not look at the clock when awakening   Do not turn on light when getting up to use bathroom, use a nightlight     Online Programs   www.SHUTi.me (pronounced shut eye). There is a fee for this program. Enter the code  [facility]  if you decide to enroll in this program.    www.sleepIO.com (pronounced sleep ee oh). There is a fee for this program. Enter the code  [facility]  if you decide to enroll in this program.     Suggested Resources  Insomnia Treatment Books   Overcoming Insomnia by Justus Mejia and Deloris Luque (2008)  No More Sleepless Nights by Estuardo Dhaliwal and Tawny Milton (1996)  Say Juice to Insomnia by Mike Castanon (2009)  The Insomnia Workbook by Pema Escobedo and Edson Walden (2009)  The Insomnia Answer by Leodan Devries and Oscar Carreno (2006)      Stress Management and Relaxation Books  The Relaxation and Stress Reduction Workbook by Graciela Olguin, Rachel Sullivan and John Ocasio (2008)  Stress Management Workbook: Techniques and Self-Assessment Procedures by Jana Weston and Everette Moe (1997)  A Mindfulness-Based Stress Reduction Workbook by Lloyd Rodriguez and Nay Parkinson (2010)  The Complete Stress Management Workbook by Sulaiman De La Fuente, Mark Evans and Romain Bell (1996)  Assert Yourself by Barbara Keita and Jose Luis Keita (1977)    Relaxation Resources for Computer Download   These websites offer resources to help you relax. This list is for information only. [facility] is not responsible for the quality of services or the actions of any person or organization.  Progressive Muscle Relaxation (PMR):   http://www.Loyalis.com/progressive-muscle-relaxation-exercise.html   http://studentsupport.[facility]/counseling/resources/self-help/relaxation-and-stress-management/   Deep Breathing Exercises:  http://www.Loyalis.Zane Prep/breathing-awareness.html      Meditation:   www.EachNetrantheart.inSelly  www.the-guided-meditation-site.com You may have to pay for some of these resources.    Guided Imagery:  http://www.TuCreaz.com Application.inSelly/guided-imagery-scripts.html   http://[employer]/library/xatptmkvlh-lxpvjt-tfzopph/   Consider phone apps such as: Calm, Headspace or Insight Timer.    Counseling / Behavioral Health  [facility]  Visit [email or web address] or call [phone] to find a clinic close to you.  Or call [phone] for [facility].

## 2022-10-07 NOTE — NURSING NOTE
Patient signed VALENTE to release sleep studies from [facility], [facility].  Signed Valente also for [facility] to receive information to transfer patient from [place] to [facility] so she may obtain new supplies for her airsense 10.    Faxed signed VALENTE with Checklist for pap transfer to [facility] [phone], with facesheet, DME order, [facility] and titration 3/2/2016, office notes , machine and mask type, and requested patient to have an appointment for a mask fit, as she has a full face, and would like to try a nasal, or pillow mask..  Sticker on the Airsense 10 modem has [employer], 1. [phone]

## 2022-10-12 ENCOUNTER — TELEPHONE (OUTPATIENT)
Dept: SLEEP MEDICINE | Facility: CLINIC | Age: 72
End: 2022-10-12

## 2022-10-12 NOTE — TELEPHONE ENCOUNTER
CALLED PT TO SEE IF SHE IS WANTING TO TRANSFER CARE TO [facility]. PT STATED SHE DOES. EXPLAINED THE NEED TO BE RE-STUDIED DUE TO SLEEP STUDY HAS AHI UNDER 5 SO SHE DOES NOT QUALIFY FOR TATI. PT STATED SHE IS GOING TO PUT HER TRANSFER ON HOLD FOR NOW SINCE SHE DOES NOT BELIEVE SHE HAS TATI AND WILL WAIT TO HEAR FOR BENNETT GOLTZ.

## 2022-10-13 ENCOUNTER — OFFICE VISIT (OUTPATIENT)
Dept: INTERNAL MEDICINE | Facility: CLINIC | Age: 72
End: 2022-10-13
Payer: MEDICARE

## 2022-10-13 VITALS
RESPIRATION RATE: 10 BRPM | BODY MASS INDEX: 26.48 KG/M2 | OXYGEN SATURATION: 97 % | WEIGHT: 164 LBS | SYSTOLIC BLOOD PRESSURE: 109 MMHG | TEMPERATURE: 98 F | DIASTOLIC BLOOD PRESSURE: 76 MMHG | HEART RATE: 89 BPM

## 2022-10-13 DIAGNOSIS — L98.9 SKIN LESION: Primary | ICD-10-CM

## 2022-10-13 PROCEDURE — 17110 DESTRUCTION B9 LES UP TO 14: CPT

## 2022-10-13 NOTE — PROGRESS NOTES
Assessment & Plan       Skin lesion  Patient has what looks like a wart on her R shoulder. taked toher about freezing wart off- patient in agreement and understands that it will be painful, likely scab over and may need more than one treatment. Cryotherapy was used to remove wart. Two freeze thaw cycles were done. Patient tolerated procedure.     Referral for skin check placed  - Adult Dermatology Referral; Future    Return in about 3 weeks (around 11/3/2022) for pre op.    Emkea Richardson NP  [facility] NEIL Morris is a 71 year old accompanied by her self, presenting for the following health issues:  Mass (New growth on left shoulder about a month ago. It does not hurt but is itching.)      Mass    History of Present Illness       Reason for visit:  New growth    She eats 2-3 servings of fruits and vegetables daily.She consumes 0 sweetened beverage(s) daily.   She is taking medications regularly.     Growth on left shoulder   Onset: came up since september  Location: left shoulder   Duration: It was smaller and is now growing.  Characteristics: itchy, no pain    Has put some steroid cream on, it helps itching     Review of Systems   Constitutional, HEENT, cardiovascular, pulmonary, GI, , musculoskeletal, neuro, skin, endocrine and psych systems are negative, except as otherwise noted.      Objective    /76 (BP Location: Right arm, Patient Position: Sitting, Cuff Size: Adult Regular)   Pulse 89   Temp 98  F (36.7  C) (Tympanic)   Resp 10   Wt 74.4 kg (164 lb)   SpO2 97%   BMI 26.48 kg/m    Body mass index is 26.48 kg/m .  Physical Exam   GENERAL: healthy, alert and no distress  EYES: Eyes grossly normal to inspection  MS: no gross musculoskeletal defects noted, no edema  SKIN: no suspicious lesions or rashes, lesion on left upper shoulder raised, with rough surface  NEURO: Normal strength and tone, mentation intact and speech normal  PSYCH: mentation appears  normal, affect normal/bright

## 2022-10-19 ENCOUNTER — ANCILLARY PROCEDURE (OUTPATIENT)
Dept: BONE DENSITY | Facility: CLINIC | Age: 72
End: 2022-10-19
Payer: MEDICARE

## 2022-10-19 DIAGNOSIS — Z78.0 MENOPAUSE: ICD-10-CM

## 2022-10-19 DIAGNOSIS — Z78.0 POST-MENOPAUSAL: ICD-10-CM

## 2022-10-19 PROCEDURE — 77081 DXA BONE DENSITY APPENDICULR: CPT | Performed by: INTERNAL MEDICINE

## 2022-10-19 PROCEDURE — 77080 DXA BONE DENSITY AXIAL: CPT | Mod: XU | Performed by: INTERNAL MEDICINE

## 2022-10-21 ENCOUNTER — MYC MEDICAL ADVICE (OUTPATIENT)
Dept: INTERNAL MEDICINE | Facility: CLINIC | Age: 72
End: 2022-10-21

## 2022-11-02 ENCOUNTER — OFFICE VISIT (OUTPATIENT)
Dept: INTERNAL MEDICINE | Facility: CLINIC | Age: 72
End: 2022-11-02
Payer: MEDICARE

## 2022-11-02 VITALS
DIASTOLIC BLOOD PRESSURE: 65 MMHG | BODY MASS INDEX: 25.96 KG/M2 | HEIGHT: 66 IN | SYSTOLIC BLOOD PRESSURE: 101 MMHG | RESPIRATION RATE: 14 BRPM | OXYGEN SATURATION: 98 % | TEMPERATURE: 97.7 F | HEART RATE: 73 BPM | WEIGHT: 161.5 LBS

## 2022-11-02 DIAGNOSIS — Z79.899 ENCOUNTER FOR LONG-TERM (CURRENT) USE OF MEDICATIONS: ICD-10-CM

## 2022-11-02 DIAGNOSIS — M79.672 LEFT FOOT PAIN: ICD-10-CM

## 2022-11-02 DIAGNOSIS — R42 DIZZINESS: ICD-10-CM

## 2022-11-02 DIAGNOSIS — Z01.818 PREOP GENERAL PHYSICAL EXAM: Primary | ICD-10-CM

## 2022-11-02 LAB
ERYTHROCYTE [DISTWIDTH] IN BLOOD BY AUTOMATED COUNT: 12.7 % (ref 10–15)
HCT VFR BLD AUTO: 40.9 % (ref 35–47)
HGB BLD-MCNC: 13.4 G/DL (ref 11.7–15.7)
MCH RBC QN AUTO: 31.5 PG (ref 26.5–33)
MCHC RBC AUTO-ENTMCNC: 32.8 G/DL (ref 31.5–36.5)
MCV RBC AUTO: 96 FL (ref 78–100)
PLATELET # BLD AUTO: 262 10E3/UL (ref 150–450)
RBC # BLD AUTO: 4.25 10E6/UL (ref 3.8–5.2)
WBC # BLD AUTO: 7.8 10E3/UL (ref 4–11)

## 2022-11-02 PROCEDURE — 36415 COLL VENOUS BLD VENIPUNCTURE: CPT

## 2022-11-02 PROCEDURE — 85027 COMPLETE CBC AUTOMATED: CPT

## 2022-11-02 PROCEDURE — 80048 BASIC METABOLIC PNL TOTAL CA: CPT

## 2022-11-02 PROCEDURE — 99214 OFFICE O/P EST MOD 30 MIN: CPT

## 2022-11-02 PROCEDURE — 93000 ELECTROCARDIOGRAM COMPLETE: CPT

## 2022-11-02 NOTE — PATIENT INSTRUCTIONS
Hold losartan day of surgery, hold multivitamin for one week before surgery  Preparing for Your Surgery  Getting started  A nurse will call you to review your health history and instructions. They will give you an arrival time based on your scheduled surgery time. Please be ready to share:  Your doctor s clinic name and phone number  Your medical, surgical, and anesthesia history  A list of allergies and sensitivities  A list of medicines, including herbal treatments and over-the-counter drugs  Whether the patient has a legal guardian (ask how to send us the papers in advance)  Please tell us if you re pregnant--or if there s any chance you might be pregnant. Some surgeries may injure a fetus (unborn baby), so they require a pregnancy test. Surgeries that are safe for a fetus don t always need a test, and you can choose whether to have one.   If you have a child who s having surgery, please ask for a copy of Preparing for Your Child s Surgery.    Preparing for surgery  Within 10 to 30 days of surgery: Have a pre-op exam (sometimes called an H&P, or History and Physical). This can be done at a clinic or pre-operative center.  If you re having a , you may not need this exam. Talk to your care team.  At your pre-op exam, talk to your care team about all medicines you take. If you need to stop any medicines before surgery, ask when to start taking them again.  We do this for your safety. Many medicines can make you bleed too much during surgery. Some change how well surgery (anesthesia) drugs work.  Call your insurance company to let them know you re having surgery. (If you don t have insurance, call [phone].)  Call your clinic if there s any change in your health. This includes signs of a cold or flu (sore throat, runny nose, cough, rash, fever). It also includes a scrape or scratch near the surgery site.  If you have questions on the day of surgery, call your hospital or surgery center.  COVID  testing  You may need to be tested for COVID-19 before having surgery. If so, we will give you instructions (or click here).  Eating and drinking guidelines  For your safety: Unless your surgeon tells you otherwise, follow the guidelines below.  Eat and drink as usual until 8 hours before you arrive for surgery. After that, no food or milk.  Drink clear liquids until 2 hours before you arrive. These are liquids you can see through, like water, Gatorade, and Propel Water. They also include plain black coffee and tea (no cream or milk), candy, and breath mints. You can spit out gum when you arrive.  If you drink alcohol: Stop drinking it the night before surgery.  If your care team tells you to take medicine on the morning of surgery, it s okay to take it with a sip of water.  Preventing infection  Shower or bathe the night before and morning of your surgery. Follow the instructions your clinic gave you. (If no instructions, use regular soap.)  Don t shave or clip hair near your surgery site. We ll remove the hair if needed.  Don t smoke or vape the morning of surgery. You may chew nicotine gum up to 2 hours before surgery. A nicotine patch is okay.  Note: Some surgeries require you to completely quit smoking and nicotine. Check with your surgeon.  Your care team will make every effort to keep you safe from infection. We will:  Clean our hands often with soap and water (or an alcohol-based hand rub).  Clean the skin at your surgery site with a special soap that kills germs.  Give you a special gown to keep you warm. (Cold raises the risk of infection.)  Wear special hair covers, masks, gowns and gloves during surgery.  Give antibiotic medicine, if prescribed. Not all surgeries need antibiotics.  What to bring on the day of surgery  Photo ID and insurance card  Copy of your health care directive, if you have one  Glasses and hearing aids (bring cases)  You can t wear contacts during surgery  Inhaler and eye drops, if  you use them (tell us about these when you arrive)  CPAP machine or breathing device, if you use them  A few personal items, if spending the night  If you have . . .  A pacemaker, ICD (cardiac defibrillator) or other implant: Bring the ID card.  An implanted stimulator: Bring the remote control.  A legal guardian: Bring a copy of the certified (court-stamped) guardianship papers.  Please remove any jewelry, including body piercings. Leave jewelry and other valuables at home.  If you re going home the day of surgery  You must have a responsible adult drive you home. They should stay with you overnight as well.  If you don t have someone to stay with you, and you aren t safe to go home alone, we may keep you overnight. Insurance often won t pay for this.  After surgery  If it s hard to control your pain or you need more pain medicine, please call your surgeon s office.  Questions?   If you have any questions for your care team, list them here:   ____________________________________________________________________________________________________________________________________________________________________________________________________________________________________________________________________  For informational purposes only. Not to replace the advice of your health care provider. Copyright   2003, 2019 [facility]. All rights reserved. Clinically reviewed by Herminia Burks MD. [employer] [id number] - REV 10/22.

## 2022-11-02 NOTE — PROGRESS NOTES
[place] [address] [facility] [address]  Phone: [phone]  Primary Provider: Emeka Richardson  Pre-op Performing Provider: EMEKA RICHARDSON    PREOPERATIVE EVALUATION:  Today's date: 11/2/2022    Alexandra Thomas is a 71 year old female who presents for a preoperative evaluation.    Surgical Information:  Surgery/Procedure: Left foot surgery  Surgery Location: [facility]  Surgeon: Dr. Steele  Surgery Date: 11/16/22  Time of Surgery: AM  Where patient plans to recover: At home with family  Fax number for surgical facility: [phone]    Type of Anesthesia Anticipated: to be determined    Assessment & Plan     The proposed surgical procedure is considered INTERMEDIATE risk.    Preop general physical exam  Patient is okay for surgery pending cardiology assessment. Patient asked to schedule cardiology appointment.  - EKG 12-lead complete w/read - Clinics    Left foot pain  Related to upcoming surgery     Dizziness  Patient had lightheaded around 10/28 and a few days later had a dizzy spell that was pretty severe. She does have a history of V-tach and a-fib and is s/p ablation. She took her BP after first episode and it was in 90s. She stopped taking hydrochlorothiazide for a few days and is now taking again. Patient had been eating and drinking normally during that time. Is wondering if there are nitrates in food that could be causing symptoms     Leaving on a cruise this upcoming saturday      Risks and Recommendations:  The patient has the following additional risks and recommendations for perioperative complications:  Cardiovascular:   - Cardiology consulted given dizziness and palpitations. Have been following pain    Medication Instructions:  Patient should hold losartan before surgery  Hold multivitamin a week before surgery      RECOMMENDATION:  Patient referred to cardiology for evaluation before surgery. Surgery approval  pending completion of consultation. She is established with cardiology.    Subjective     HPI related to upcoming procedure: Removing hardware from foot    Preop Questions 11/2/2022   1. Have you ever had a heart attack or stroke? No   2. Have you ever had surgery on your heart or blood vessels, such as a stent placement, a coronary artery bypass, or surgery on an artery in your head, neck, heart, or legs? No   3. Do you have chest pain with activity? No   4. Do you have a history of  heart failure? No   5. Do you currently have a cold, bronchitis or symptoms of other infection? No   6. Do you have a cough, shortness of breath, or wheezing? No   7. Do you or anyone in your family have previous history of blood clots? No   8. Do you or does anyone in your family have a serious bleeding problem such as prolonged bleeding following surgeries or cuts? No   9. Have you ever had problems with anemia or been told to take iron pills? No   10. Have you had any abnormal blood loss such as black, tarry or bloody stools, or abnormal vaginal bleeding? No   11. Have you ever had a blood transfusion? No   12. Are you willing to have a blood transfusion if it is medically needed before, during, or after your surgery? Yes   13. Have you or any of your relatives ever had problems with anesthesia? No   14. Do you have sleep apnea, excessive snoring or daytime drowsiness? No   15. Do you have any artifical heart valves or other implanted medical devices like a pacemaker, defibrillator, or continuous glucose monitor? No   16. Do you have artificial joints? YES- both knee, both hips, R shoulder   17. Are you allergic to latex? No       Health Care Directive:  Patient has a Health Care Directive on file      Preoperative Review of :   reviewed - controlled substances reflected in medication list.    Status of Chronic Conditions:  History of VT and a-fib s/p ablation, esophageal spasms that have not happened recently, ascending  aortic aneurysm followed by cards,    Review of Systems  CONSTITUTIONAL: NEGATIVE for fever, chills, change in weight  INTEGUMENTARY/SKIN: NEGATIVE for worrisome rashes, moles or lesions  EYES: NEGATIVE for vision changes or irritation  ENT/MOUTH: NEGATIVE for ear, mouth and throat problems  RESP: NEGATIVE for significant cough or SOB  CV: NEGATIVE for chest pain or peripheral edema, POSITIVE for palpitations  GI: NEGATIVE for nausea, abdominal pain, heartburn, or change in bowel habits  : NEGATIVE for frequency, dysuria, or hematuria  MUSCULOSKELETAL: NEGATIVE for significant arthralgias or myalgia  NEURO: NEGATIVE for weakness, dizziness or paresthesias  ENDOCRINE: NEGATIVE for temperature intolerance, skin/hair changes  HEME: NEGATIVE for bleeding problems  PSYCHIATRIC: NEGATIVE for changes in mood or affect    Patient Active Problem List    Diagnosis Date Noted     Lattice degeneration of right retina 08/23/2022     Priority: Medium     S/P total hip arthroplasty 07/27/2022     Priority: Medium     Benign essential hypertension 05/29/2022     Priority: Medium     Gastroparesis 05/29/2022     Priority: Medium     Thoracic aortic aneurysm without rupture 05/29/2022     Priority: Medium     Gastroesophageal reflux disease without esophagitis 05/29/2022     Priority: Medium     Pain syndrome, chronic 05/29/2022     Priority: Medium     Orthopedic pains: Hip pain, foot pain, shoulder pain, on gabapentin       Sicca syndrome (H) 05/29/2022     Priority: Medium     NSVT (nonsustained ventricular tachycardia) 05/29/2022     Priority: Medium     TATI (obstructive sleep apnea) 05/29/2022     Priority: Medium      Past Medical History:   Diagnosis Date     Abdominal aortic aneurysm (AAA) without rupture     4.2 cm   Ascending     Arthritis     hips     Diverticulitis of colon      Gastroesophageal reflux disease     Esophageal spasms.     Gastroparesis      Heart murmur      High cholesterol      History of GI bleed       Hypertension      Sjogren's syndrome (H)      Past Surgical History:   Procedure Laterality Date     ABDOMEN SURGERY       APPENDECTOMY       ARTHROPLASTY HIP Left 07/27/2022    Procedure: Left total hip arthroplasty;  Surgeon: Berry Mcdonnell MD;  Location: [facility] OR     BACK SURGERY       BIOPSY       BREAST SURGERY       CHOLECYSTECTOMY       COLECTOMY PARTIAL      Due to recurrent diverticulitis     COLONOSCOPY       ENT SURGERY      T & A     ESOPHAGOSCOPY, GASTROSCOPY, DUODENOSCOPY (EGD), COMBINED N/A 09/13/2022    Procedure: ESOPHAGOGASTRODUODENOSCOPY (EGD) (fv);  Surgeon: Seymour Chatterjee MD;  Location:  GI     GENITOURINARY SURGERY       HYSTERECTOMY TOTAL ABDOMINAL       LASIK       left midfoot fusion Left     2nd toe osteotomy     shouder replacement Right      TOTAL HIP ARTHROPLASTY Right 06/07/2009     TOTAL KNEE ARTHROPLASTY Right 07/12/2006     TOTAL KNEE ARTHROPLASTY Left 02/18/2009     Current Outpatient Medications   Medication Sig Dispense Refill     acetaminophen (TYLENOL) 325 MG tablet Take 2 tablets (650 mg) by mouth every 4 hours as needed for other (mild pain) 100 tablet 0     gabapentin (NEURONTIN) 100 MG capsule Take 200 mg by mouth every morning       gabapentin (NEURONTIN) 300 MG capsule Take 600 mg by mouth At Bedtime       hydrochlorothiazide (HYDRODIURIL) 25 MG tablet Take 25 mg by mouth daily       losartan (COZAAR) 50 MG tablet Take 50 mg by mouth 2 times daily       melatonin 5 MG tablet Take 5 mg by mouth nightly as needed for sleep       methylcellulose (CITRUCEL) 500 MG TABS tablet Take 1 tablet by mouth daily       multivitamin w/minerals (THERA-VIT-M) tablet Take 1 tablet by mouth daily       nitroGLYcerin (NITROSTAT) 0.4 MG sublingual tablet as needed for other Esophageal spasm       omeprazole (PRILOSEC) 40 MG DR capsule        ondansetron (ZOFRAN ODT) 4 MG ODT tab Take 1 tablet (4 mg) by mouth every 8 hours as needed for nausea 12 tablet 0     rosuvastatin (CRESTOR) 10 MG  "tablet Take 10 mg by mouth daily       Vitamin D3 (CHOLECALCIFEROL) 25 mcg (1000 units) tablet Take 1 tablet by mouth daily         Allergies   Allergen Reactions     Food Anaphylaxis     EGGPLANT-->anaphylaxis       Venlafaxine Hcl Diarrhea, Shortness Of Breath and Nausea and Vomiting     Meperidine Other (See Comments)     Other reaction(s): Rash, non-urticarial  itchiness  Other reaction(s): rash       Adhesive Tape      Other reaction(s): Other (Specify with Comments)  blisters     Ciprofloxacin Other (See Comments)     Other reaction(s): Other (Specify with Comments)  Pt has aortic aneurysm.  Increased risk of rupture or dissection with use of fluoroquinolones.  Pt has aortic aneurysm.  Increased risk of rupture or dissection with use of fluoroquinolones.       Morphine Hives and Itching     Histamine reaction        Social History     Tobacco Use     Smoking status: Never     Smokeless tobacco: Never   Substance Use Topics     Alcohol use: Not Currently       History   Drug Use Unknown         Objective     /65 (BP Location: Right arm, Patient Position: Sitting, Cuff Size: Adult Large)   Pulse 73   Temp 97.7  F (36.5  C) (Tympanic)   Resp 14   Ht 1.676 m (5' 5.98\")   Wt 73.3 kg (161 lb 8 oz)   SpO2 98%   BMI 26.08 kg/m      Physical Exam    GENERAL APPEARANCE: alert and no distress     EYES: EOMI, PERRL     HENT: ear canals and TM's normal and nose and mouth without ulcers or lesions     NECK: no adenopathy, no asymmetry, masses, or scars and thyroid normal to palpation     RESP: lungs clear to auscultation - no rales, rhonchi or wheezes     CV: regular rates and rhythm, normal S1 S2, no S3 or S4 and no murmur, click or rub     ABDOMEN:  soft, nontender, no HSM or masses and bowel sounds normal     MS: extremities normal- no gross deformities noted, no evidence of inflammation in joints, FROM in all extremities.     SKIN: no suspicious lesions or rashes     NEURO: Normal strength and tone, sensory " exam grossly normal, mentation intact and speech normal     PSYCH: mentation appears normal. and affect normal/bright     LYMPHATICS: No cervical adenopathy    Recent Labs   Lab Test 08/19/22  0945 07/28/22  0708 06/28/22  1334   HGB 12.2 11.5* 13.2     --  198     --  140   POTASSIUM 4.0  --  4.0   CR 0.80  --  1.01        Diagnostics:  Labs pending at this time.  Results will be reviewed when available.   EKG: appears normal, NSR, no LVH by voltage criteria    Revised Cardiac Risk Index (RCRI):  The patient has the following serious cardiovascular risks for perioperative complications:   - No serious cardiac risks = 0 points     RCRI Interpretation: 0 points: Class I (very low risk - 0.4% complication rate)      Signed Electronically by: Emeka Richardson NP  Copy of this evaluation report is provided to requesting physician.

## 2022-11-03 ENCOUNTER — TELEPHONE (OUTPATIENT)
Dept: INTERNAL MEDICINE | Facility: CLINIC | Age: 72
End: 2022-11-03

## 2022-11-03 LAB
ANION GAP SERPL CALCULATED.3IONS-SCNC: 5 MMOL/L (ref 3–14)
BUN SERPL-MCNC: 32 MG/DL (ref 7–30)
CALCIUM SERPL-MCNC: 9 MG/DL (ref 8.5–10.1)
CHLORIDE BLD-SCNC: 102 MMOL/L (ref 94–109)
CO2 SERPL-SCNC: 29 MMOL/L (ref 20–32)
CREAT SERPL-MCNC: 0.91 MG/DL (ref 0.52–1.04)
GFR SERPL CREATININE-BSD FRML MDRD: 66 ML/MIN/1.73M2
GLUCOSE BLD-MCNC: 86 MG/DL (ref 70–99)
POTASSIUM BLD-SCNC: 4 MMOL/L (ref 3.4–5.3)
SODIUM SERPL-SCNC: 136 MMOL/L (ref 133–144)

## 2022-11-03 NOTE — TELEPHONE ENCOUNTER
Patient called and advised that cardiology recommends she been seen in clinic before surgery. Patient did not answer and I left a voicemail.    Emeka Richardson NP

## 2022-11-04 NOTE — TELEPHONE ENCOUNTER
Attempted to contact patient. Left voice message to call back.     Unsure if detailed voice message was left for patient yesterday. Please ask patient if Emeka left any recommendations in voice message, if not, please inform patient she will need to have in-person appointment with Cardiology prior to surgery.     Bren Cardona RN  [facility]

## 2022-11-04 NOTE — TELEPHONE ENCOUNTER
Patient sent [employer] message asking about recommendations for cardiology prior to surgery. Responded to [employer] message that she does need to see Cardiology for in-person appointment prior to surgery.     Bren Cardona RN  [facility]

## 2022-11-17 ENCOUNTER — OFFICE VISIT (OUTPATIENT)
Dept: CARDIOLOGY | Facility: CLINIC | Age: 72
End: 2022-11-17
Payer: MEDICARE

## 2022-11-17 VITALS
HEIGHT: 66 IN | HEART RATE: 64 BPM | WEIGHT: 165 LBS | OXYGEN SATURATION: 98 % | SYSTOLIC BLOOD PRESSURE: 120 MMHG | BODY MASS INDEX: 26.52 KG/M2 | DIASTOLIC BLOOD PRESSURE: 74 MMHG

## 2022-11-17 DIAGNOSIS — I48.0 PAF (PAROXYSMAL ATRIAL FIBRILLATION) (H): ICD-10-CM

## 2022-11-17 DIAGNOSIS — G47.33 OSA (OBSTRUCTIVE SLEEP APNEA): ICD-10-CM

## 2022-11-17 DIAGNOSIS — I71.20 THORACIC AORTIC ANEURYSM WITHOUT RUPTURE, UNSPECIFIED PART (H): Primary | ICD-10-CM

## 2022-11-17 DIAGNOSIS — I10 ESSENTIAL HYPERTENSION: ICD-10-CM

## 2022-11-17 PROCEDURE — 99214 OFFICE O/P EST MOD 30 MIN: CPT | Performed by: INTERNAL MEDICINE

## 2022-11-17 RX ORDER — ROSUVASTATIN CALCIUM 10 MG/1
10 TABLET, COATED ORAL DAILY
Qty: 90 TABLET | Refills: 3 | Status: SHIPPED | OUTPATIENT
Start: 2022-11-17 | End: 2023-01-24

## 2022-11-17 RX ORDER — HYDROCHLOROTHIAZIDE 25 MG/1
25 TABLET ORAL DAILY
Qty: 90 TABLET | Refills: 3 | Status: ON HOLD | OUTPATIENT
Start: 2022-11-17 | End: 2023-02-20

## 2022-11-17 RX ORDER — LOSARTAN POTASSIUM 50 MG/1
50 TABLET ORAL 2 TIMES DAILY
Qty: 180 TABLET | Refills: 3 | Status: SHIPPED | OUTPATIENT
Start: 2022-11-17 | End: 2023-03-30

## 2022-11-17 NOTE — PROGRESS NOTES
HPI and Plan:   See dictation    Today's clinic visit entailed:  Review of external notes as documented elsewhere in note    Provider  Link to MDM Help Grid     The level of medical decision making during this visit was of moderate complexity.      No orders of the defined types were placed in this encounter.      Orders Placed This Encounter   Medications     losartan (COZAAR) 50 MG tablet     Sig: Take 1 tablet (50 mg) by mouth 2 times daily     Dispense:  180 tablet     Refill:  3     hydrochlorothiazide (HYDRODIURIL) 25 MG tablet     Sig: Take 1 tablet (25 mg) by mouth daily     Dispense:  90 tablet     Refill:  3     rosuvastatin (CRESTOR) 10 MG tablet     Sig: Take 1 tablet (10 mg) by mouth daily     Dispense:  90 tablet     Refill:  3       Medications Discontinued During This Encounter   Medication Reason     losartan (COZAAR) 50 MG tablet Reorder     rosuvastatin (CRESTOR) 10 MG tablet Reorder     hydrochlorothiazide (HYDRODIURIL) 25 MG tablet Reorder         Encounter Diagnoses   Name Primary?     Thoracic aortic aneurysm without rupture, unspecified part Yes     Essential hypertension      TATI (obstructive sleep apnea)      PAF (paroxysmal atrial fibrillation) (H)        CURRENT MEDICATIONS:  Current Outpatient Medications   Medication Sig Dispense Refill     acetaminophen (TYLENOL) 325 MG tablet Take 2 tablets (650 mg) by mouth every 4 hours as needed for other (mild pain) 100 tablet 0     gabapentin (NEURONTIN) 100 MG capsule Take 200 mg by mouth every morning       gabapentin (NEURONTIN) 300 MG capsule Take 600 mg by mouth At Bedtime       hydrochlorothiazide (HYDRODIURIL) 25 MG tablet Take 1 tablet (25 mg) by mouth daily 90 tablet 3     losartan (COZAAR) 50 MG tablet Take 1 tablet (50 mg) by mouth 2 times daily 180 tablet 3     melatonin 5 MG tablet Take 5 mg by mouth nightly as needed for sleep       methylcellulose (CITRUCEL) 500 MG TABS tablet Take 1 tablet by mouth daily       multivitamin  w/minerals (THERA-VIT-M) tablet Take 1 tablet by mouth daily       nitroGLYcerin (NITROSTAT) 0.4 MG sublingual tablet as needed for other Esophageal spasm       omeprazole (PRILOSEC) 40 MG DR capsule        ondansetron (ZOFRAN ODT) 4 MG ODT tab Take 1 tablet (4 mg) by mouth every 8 hours as needed for nausea 12 tablet 0     rosuvastatin (CRESTOR) 10 MG tablet Take 1 tablet (10 mg) by mouth daily 90 tablet 3     Vitamin D3 (CHOLECALCIFEROL) 25 mcg (1000 units) tablet Take 1 tablet by mouth daily         ALLERGIES     Allergies   Allergen Reactions     Ciprofloxacin Other (See Comments)     Pt has aortic aneurysm.  Increased risk of rupture or dissection with use of fluoroquinolones.       Food Anaphylaxis     EGGPLANT-->anaphylaxis       Venlafaxine Hcl Diarrhea, Shortness Of Breath and Nausea and Vomiting     Meperidine Rash                Adhesive Tape      blisters     Morphine Hives and Itching     Histamine reaction       PAST MEDICAL HISTORY:  Past Medical History:   Diagnosis Date     Arthritis     hips     Ascending aortic aneurysm     4.2 cm   Ascending     Diverticulitis of colon      Gastroesophageal reflux disease     Esophageal spasms.     Gastroparesis      Heart murmur      High cholesterol      History of GI bleed      Hypertension      Sjogren's syndrome (H)        PAST SURGICAL HISTORY:  Past Surgical History:   Procedure Laterality Date     ABDOMEN SURGERY       APPENDECTOMY       ARTHROPLASTY HIP Left 07/27/2022    Procedure: Left total hip arthroplasty;  Surgeon: Berry Mcdonnell MD;  Location:  OR     BACK SURGERY       BIOPSY       BREAST SURGERY       CHOLECYSTECTOMY       COLECTOMY PARTIAL      Due to recurrent diverticulitis     COLONOSCOPY       ENT SURGERY      T & A     ESOPHAGOSCOPY, GASTROSCOPY, DUODENOSCOPY (EGD), COMBINED N/A 09/13/2022    Procedure: ESOPHAGOGASTRODUODENOSCOPY (EGD) (fv);  Surgeon: Seymour Chatterjee MD;  Location:  GI     GENITOURINARY SURGERY       HYSTERECTOMY  "TOTAL ABDOMINAL       LASIK       left midfoot fusion Left     2nd toe osteotomy     shouder replacement Right      TOTAL HIP ARTHROPLASTY Right 06/07/2009     TOTAL KNEE ARTHROPLASTY Right 07/12/2006     TOTAL KNEE ARTHROPLASTY Left 02/18/2009       FAMILY HISTORY:  Family History   Problem Relation Age of Onset     Chronic Obstructive Pulmonary Disease Mother      Dementia Father      Chronic Obstructive Pulmonary Disease Father      Macular Degeneration Maternal Grandmother      Other Cancer Maternal Grandmother         Liver     Breast Cancer Paternal Cousin         before 50     Diabetes Paternal Grandfather      Diabetes Paternal Grandmother      Other Cancer Cousin         Breast/Mets     Substance Abuse Sister      Glaucoma No family hx of        SOCIAL HISTORY:  Social History     Socioeconomic History     Marital status:      Spouse name: None     Number of children: None     Years of education: None     Highest education level: None   Tobacco Use     Smoking status: Never     Smokeless tobacco: Never   Vaping Use     Vaping Use: Never used   Substance and Sexual Activity     Alcohol use: Not Currently     Drug use: Never     Sexual activity: Not Currently   Other Topics Concern     Parent/sibling w/ CABG, MI or angioplasty before 65F 55M? No       Review of Systems:  Skin:          Eyes:         ENT:         Respiratory:          Cardiovascular:         Gastroenterology:        Genitourinary:         Musculoskeletal:         Neurologic:         Psychiatric:         Heme/Lymph/Imm:         Endocrine:           Physical Exam:  Vitals: /74 (BP Location: Right arm, Patient Position: Sitting, Cuff Size: Adult Regular)   Pulse 64   Ht 1.676 m (5' 6\")   Wt 74.8 kg (165 lb)   SpO2 98%   BMI 26.63 kg/m      Constitutional:  cooperative;in no acute distress        Skin:  warm and dry to the touch          Head:  normocephalic        Eyes:  pupils equal and round        Lymph:      ENT:  no pallor " or cyanosis        Neck:  no carotid bruit        Respiratory:  normal symmetry;clear to auscultation         Cardiac: regular rhythm;no murmurs, gallops or rubs detected                pulses full and equal                                        GI:  abdomen soft;no bruits        Extremities and Muscular Skeletal:  no deformities, clubbing, cyanosis, erythema observed;no edema              Neurological:  no gross motor deficits;affect appropriate        Psych:  Alert and Oriented x 3        CC  Referred Self, MD  No address on file

## 2022-11-17 NOTE — PROGRESS NOTES
Service Date: 11/17/2022    REFERRING PROVIDER:  Emeka Baker MD    HISTORY OF PRESENT ILLNESS:  Ms. Thomas is a pleasant 72-year-old female with a history of hypertension, thoracic aortic aneurysm very remote history of paroxysmal atrial fibrillation and chart history of nonsustained ventricular tachycardia, again very remote.  She is a patient of Dr. Ballesteros who I am seeing in a more urgent basis for cardiac clearance for foot surgery.  She has some hardware in her foot apparently from a previous fusion which is going to be removed.  She was ordered to cancel the operation until she was cleared by Cardiology.  She actually recently moved here from [place] to be closer to family.  She was initially seen by Dr. Ballesteros in September.  She was also seen by her primary for preop for this surgery but had complained of a couple episodes of dizziness.  One she described as lightheadedness and it was associated with a lower blood pressure. When she checked her blood pressure, systolic was 90.  She stopped hydrochlorothiazide for a few days and it improved.  She had a second episode while she was sitting on the couch, but this was different.  She had more vertiginous symptoms with room spinning.  Again, this was fairly self-limited and resolved on its own.  She has not had any recent chest pain symptoms.  She denies any shortness of breath.  She states she gets palpitations all the time.  This has remained unchanged and is not increased in frequency or duration.  Her last cardiac evaluation was with echocardiogram in February.  This was done at an outside facility, but I was able to review, showing normal LV function and RV function.  She does have a small atrial septal defect but normal sized atria. Ascending aorta was measured at 4.2, which has been stable, and I am looking back through her chart, dating back to 2015, that aneurysm size has remained stable.  She had a Holter monitor 02/2021 again at an outside  [facility] but did not show any evidence of ventricular tachycardia.  She did have some SVT, however, that were brief and short-lived episodes.    PHYSICAL EXAMINATION:    VITAL SIGNS:  Today, her blood pressure is 120/74, pulse is 64, weight is 165.  Body mass index of 26.  NECK:  Carotid upstrokes are brisk without bruit.  CARDIOVASCULAR:  Tones are regular without murmur, gallop or rub.    LUNGS:  Lung fields are clear.  EXTREMITIES:  She has no peripheral edema.    In summary, Ms. Thomas is a very pleasant 72-year-old female with a history of thoracic aortic aneurysm, hypertension, history of very remote atrial and ventricular arrhythmias, mainly chart history dating back to 2015, nothing recently in any cardiac testing.  She does have also noted subclinical coronary disease by chest CT.  She has been fairly asymptomatic.  She had one episode of lightheadedness associated with a lower blood pressure and another episode sounding more like vertigo with dizziness and room spinning that was self-limited.  She otherwise has been asymptomatic from a cardiac perspective. I would consider her low risk for any perioperative cardiac events.  We talked a little bit about this episode of lightheadedness associated with lower blood pressures.  She also complained sometimes of leg cramping, specifically in the foot that she is getting fixed, however. I have been reviewing her blood pressures and she has excellent control.  I also looked at her last lab tests 11/02/2022 suggesting contraction alkalosis with a BUN and creatinine ratio greater than 20.  I have suggested to her that she try taking half tablet of the hydrochlorothiazide or 12.5 mg daily.  She can continue to monitor her blood pressure to ensure it is well controlled, but this may help avoid low blood pressures, dehydration and possibly leg cramping.  She is willing to try this and she will continue to monitor her blood pressure.  I will have her follow up with  Dr. Ballesteros as previously scheduled.    Please feel free to contact me with any questions you have in regards to her care.    Melissa Torres,     cc:  Emeka Baker [facility]  [address] [place]  [address]    Henry Steele MD  [facility]  [address] [place]  [address]      Melissa Torres,         D: 2022   T: 2022   MT: samira    Name:     NILAM MCGOVERN  MRN:      [id number]        Account:      [id number]   :      1950           Service Date: 2022       Document: [id number]

## 2022-11-17 NOTE — LETTER
11/17/2022    Emeka Richardson, TONY  [address] [facility] [address]    RE: Alexandra Thomas       Dear Colleague,     I had the pleasure of seeing Alexandra Thomas in the [facility].  HPI and Plan:   See dictation    Today's clinic visit entailed:  Review of external notes as documented elsewhere in note    Provider  Link to MDM Help Grid     The level of medical decision making during this visit was of moderate complexity.      No orders of the defined types were placed in this encounter.      Orders Placed This Encounter   Medications     losartan (COZAAR) 50 MG tablet     Sig: Take 1 tablet (50 mg) by mouth 2 times daily     Dispense:  180 tablet     Refill:  3     hydrochlorothiazide (HYDRODIURIL) 25 MG tablet     Sig: Take 1 tablet (25 mg) by mouth daily     Dispense:  90 tablet     Refill:  3     rosuvastatin (CRESTOR) 10 MG tablet     Sig: Take 1 tablet (10 mg) by mouth daily     Dispense:  90 tablet     Refill:  3       Medications Discontinued During This Encounter   Medication Reason     losartan (COZAAR) 50 MG tablet Reorder     rosuvastatin (CRESTOR) 10 MG tablet Reorder     hydrochlorothiazide (HYDRODIURIL) 25 MG tablet Reorder         Encounter Diagnoses   Name Primary?     Thoracic aortic aneurysm without rupture, unspecified part Yes     Essential hypertension      TATI (obstructive sleep apnea)      PAF (paroxysmal atrial fibrillation) (H)        CURRENT MEDICATIONS:  Current Outpatient Medications   Medication Sig Dispense Refill     acetaminophen (TYLENOL) 325 MG tablet Take 2 tablets (650 mg) by mouth every 4 hours as needed for other (mild pain) 100 tablet 0     gabapentin (NEURONTIN) 100 MG capsule Take 200 mg by mouth every morning       gabapentin (NEURONTIN) 300 MG capsule Take 600 mg by mouth At Bedtime       hydrochlorothiazide (HYDRODIURIL) 25 MG tablet Take 1 tablet (25 mg) by mouth daily 90 tablet 3     losartan (COZAAR) 50 MG tablet Take 1 tablet  (50 mg) by mouth 2 times daily 180 tablet 3     melatonin 5 MG tablet Take 5 mg by mouth nightly as needed for sleep       methylcellulose (CITRUCEL) 500 MG TABS tablet Take 1 tablet by mouth daily       multivitamin w/minerals (THERA-VIT-M) tablet Take 1 tablet by mouth daily       nitroGLYcerin (NITROSTAT) 0.4 MG sublingual tablet as needed for other Esophageal spasm       omeprazole (PRILOSEC) 40 MG DR capsule        ondansetron (ZOFRAN ODT) 4 MG ODT tab Take 1 tablet (4 mg) by mouth every 8 hours as needed for nausea 12 tablet 0     rosuvastatin (CRESTOR) 10 MG tablet Take 1 tablet (10 mg) by mouth daily 90 tablet 3     Vitamin D3 (CHOLECALCIFEROL) 25 mcg (1000 units) tablet Take 1 tablet by mouth daily         ALLERGIES     Allergies   Allergen Reactions     Ciprofloxacin Other (See Comments)     Pt has aortic aneurysm.  Increased risk of rupture or dissection with use of fluoroquinolones.       Food Anaphylaxis     EGGPLANT-->anaphylaxis       Venlafaxine Hcl Diarrhea, Shortness Of Breath and Nausea and Vomiting     Meperidine Rash                Adhesive Tape      blisters     Morphine Hives and Itching     Histamine reaction       PAST MEDICAL HISTORY:  Past Medical History:   Diagnosis Date     Arthritis     hips     Ascending aortic aneurysm     4.2 cm   Ascending     Diverticulitis of colon      Gastroesophageal reflux disease     Esophageal spasms.     Gastroparesis      Heart murmur      High cholesterol      History of GI bleed      Hypertension      Sjogren's syndrome (H)        PAST SURGICAL HISTORY:  Past Surgical History:   Procedure Laterality Date     ABDOMEN SURGERY       APPENDECTOMY       ARTHROPLASTY HIP Left 07/27/2022    Procedure: Left total hip arthroplasty;  Surgeon: Berry Mcdonnell MD;  Location: [facility] OR     BACK SURGERY       BIOPSY       BREAST SURGERY       CHOLECYSTECTOMY       COLECTOMY PARTIAL      Due to recurrent diverticulitis     COLONOSCOPY       ENT SURGERY      T & A      "ESOPHAGOSCOPY, GASTROSCOPY, DUODENOSCOPY (EGD), COMBINED N/A 09/13/2022    Procedure: ESOPHAGOGASTRODUODENOSCOPY (EGD) (fv);  Surgeon: Seymour Chatterjee MD;  Location:  GI     GENITOURINARY SURGERY       HYSTERECTOMY TOTAL ABDOMINAL       LASIK       left midfoot fusion Left     2nd toe osteotomy     shouder replacement Right      TOTAL HIP ARTHROPLASTY Right 06/07/2009     TOTAL KNEE ARTHROPLASTY Right 07/12/2006     TOTAL KNEE ARTHROPLASTY Left 02/18/2009       FAMILY HISTORY:  Family History   Problem Relation Age of Onset     Chronic Obstructive Pulmonary Disease Mother      Dementia Father      Chronic Obstructive Pulmonary Disease Father      Macular Degeneration Maternal Grandmother      Other Cancer Maternal Grandmother         Liver     Breast Cancer Paternal Cousin         before 50     Diabetes Paternal Grandfather      Diabetes Paternal Grandmother      Other Cancer Cousin         Breast/Mets     Substance Abuse Sister      Glaucoma No family hx of        SOCIAL HISTORY:  Social History     Socioeconomic History     Marital status:      Spouse name: None     Number of children: None     Years of education: None     Highest education level: None   Tobacco Use     Smoking status: Never     Smokeless tobacco: Never   Vaping Use     Vaping Use: Never used   Substance and Sexual Activity     Alcohol use: Not Currently     Drug use: Never     Sexual activity: Not Currently   Other Topics Concern     Parent/sibling w/ CABG, MI or angioplasty before 65F 55M? No       Review of Systems:  Skin:          Eyes:         ENT:         Respiratory:          Cardiovascular:         Gastroenterology:        Genitourinary:         Musculoskeletal:         Neurologic:         Psychiatric:         Heme/Lymph/Imm:         Endocrine:           Physical Exam:  Vitals: /74 (BP Location: Right arm, Patient Position: Sitting, Cuff Size: Adult Regular)   Pulse 64   Ht 1.676 m (5' 6\")   Wt 74.8 kg (165 lb)   " SpO2 98%   BMI 26.63 kg/m      Constitutional:  cooperative;in no acute distress        Skin:  warm and dry to the touch          Head:  normocephalic        Eyes:  pupils equal and round        Lymph:      ENT:  no pallor or cyanosis        Neck:  no carotid bruit        Respiratory:  normal symmetry;clear to auscultation         Cardiac: regular rhythm;no murmurs, gallops or rubs detected                pulses full and equal                                        GI:  abdomen soft;no bruits        Extremities and Muscular Skeletal:  no deformities, clubbing, cyanosis, erythema observed;no edema              Neurological:  no gross motor deficits;affect appropriate        Psych:  Alert and Oriented x 3        CC  Referred MD Francisco      Thank you for allowing me to participate in the care of your patient.      Sincerely,     Melissa Torres DO     [facility]

## 2022-11-19 ENCOUNTER — E-VISIT (OUTPATIENT)
Dept: URGENT CARE | Facility: CLINIC | Age: 72
End: 2022-11-19
Payer: MEDICARE

## 2022-11-19 DIAGNOSIS — R07.0 THROAT PAIN: ICD-10-CM

## 2022-11-19 DIAGNOSIS — R06.2 WHEEZING: Primary | ICD-10-CM

## 2022-11-19 DIAGNOSIS — Z20.822 SUSPECTED COVID-19 VIRUS INFECTION: ICD-10-CM

## 2022-11-19 DIAGNOSIS — R05.9 COUGH, UNSPECIFIED TYPE: ICD-10-CM

## 2022-11-19 PROCEDURE — 99207 PR NO CHARGE LOS: CPT | Performed by: NURSE PRACTITIONER

## 2022-11-19 NOTE — PATIENT INSTRUCTIONS
Alexandra,    Your symptoms show that you may have coronavirus (COVID-19). This illness can cause fever, cough and trouble breathing. Many people get a mild case and get better on their own. Some people can get very sick.    Because you reported additional symptoms, I would like to also test you for flu strep and covid .    What should I do?  I have placed orders for these tests.   To schedule: go to your Pegg[employer] home page and scroll down to the section that says  You have an appointment that needs to be scheduled  and click the large green button that says  Schedule Now  and follow the steps to find the next available openings.     If you are unable to complete these Pegg'd scheduling steps, please call [phone] to schedule your testing.     These guidelines are for isolating before returning to work, school or .   For employers, schools and day cares: This is an official notice for this person s medical guidelines for returning in-person.   For health care sites: The CDC gives different isolation and quarantine guidelines for healthcare sites, please check with these sites before arriving.     How do I self-isolate?  You isolate when you have symptoms of COVID or a test shows you have COVID, even if you don t have symptoms.   If you DO have symptoms:  Stay home and away from others  For at least 5 days after your symptoms started, AND   You are fever free for 24 hours (with no medicine that reduces fever), AND  Your other symptoms are better.  Wear a mask for 10 full days any time you are around others.  If you DON T have symptoms:  Stay at home and away from others for at least 5 days after your positive test.  Wear a mask for 10 full days any time you are around others.    How can I take care of myself?  Over the counter medications may help with your symptoms such as runny or stuffy nose, cough, chills, or fever. Talk to your care team about your options.     Some people are at high risk of severe  illness (for example, you have a weak immune system, you re 65 years or older, or you have certain medical problems). If your risk is high and your symptoms started in the last 5 to 7 days, we strongly recommend for you to get COVID treatment as soon as possible. Paxlovid, Molnupiravir and the monoclonal antibody treatments are proven safe and effective, make you feel better faster, and prevent hospitalization and death.       To schedule an appointment to discuss COVID treatment, request an appointment on PastBook (select  COVID-19 Treatment ) or call [facility] ([phone]), press 7.    Get lots of rest. Drink extra fluids (unless a doctor has told you not to)  Take Tylenol (acetaminophen) or ibuprofen for fever or pain. If you have liver or kidney problems, ask your family doctor if it's okay to take Tylenol or ibuprofen  Take over the counter medications for your symptoms, as directed by your doctor. You may also talk to your pharmacist.    If you have other health problems (like cancer, heart failure, an organ transplant or severe kidney disease): Call your specialty clinic if you don't feel better in the next 2 days.  Know when to call 911. Emergency warning signs include:  Trouble breathing or shortness of breath  Pain or pressure in the chest that doesn't go away  Feeling confused like you haven't felt before, or not being able to wake up  Bluish-colored lips or face    Where can I get more information?  [facility] - About COVID-19: [email or web address]   CDC - What to Do If You're Sick: https://www.cdc.gov/coronavirus/2019-ncov/if-you-are-sick/index.html   CDC - Quarantine & Isolation: https://www.cdc.gov/coronavirus/2019-ncov/your-health/quarantine-isolation.html   [facility] clinical trials (COVID-19 research studies): clinicalaffairs.[facility]/n-clinical-trials  Below are the COVID-19 hotlines at the [place] Department of [employer] ([facility]). Interpreters are  available.  For health questions: Call 307-155-3530 or 1-938.369.7321 (7 a.m. to 7 p.m.)  For questions about schools and childcare: Call 005-427-4863 or 1-637.300.6492 (7 a.m. to 7 p.m.)

## 2022-11-29 SDOH — ECONOMIC STABILITY: FOOD INSECURITY: WITHIN THE PAST 12 MONTHS, THE FOOD YOU BOUGHT JUST DIDN'T LAST AND YOU DIDN'T HAVE MONEY TO GET MORE.: NEVER TRUE

## 2022-11-29 SDOH — HEALTH STABILITY: PHYSICAL HEALTH: ON AVERAGE, HOW MANY MINUTES DO YOU ENGAGE IN EXERCISE AT THIS LEVEL?: 10 MIN

## 2022-11-29 SDOH — ECONOMIC STABILITY: INCOME INSECURITY: HOW HARD IS IT FOR YOU TO PAY FOR THE VERY BASICS LIKE FOOD, HOUSING, MEDICAL CARE, AND HEATING?: NOT HARD AT ALL

## 2022-11-29 SDOH — ECONOMIC STABILITY: FOOD INSECURITY: WITHIN THE PAST 12 MONTHS, YOU WORRIED THAT YOUR FOOD WOULD RUN OUT BEFORE YOU GOT MONEY TO BUY MORE.: NEVER TRUE

## 2022-11-29 SDOH — ECONOMIC STABILITY: TRANSPORTATION INSECURITY
IN THE PAST 12 MONTHS, HAS LACK OF TRANSPORTATION KEPT YOU FROM MEETINGS, WORK, OR FROM GETTING THINGS NEEDED FOR DAILY LIVING?: NO

## 2022-11-29 SDOH — ECONOMIC STABILITY: TRANSPORTATION INSECURITY
IN THE PAST 12 MONTHS, HAS THE LACK OF TRANSPORTATION KEPT YOU FROM MEDICAL APPOINTMENTS OR FROM GETTING MEDICATIONS?: NO

## 2022-11-29 SDOH — ECONOMIC STABILITY: INCOME INSECURITY: IN THE LAST 12 MONTHS, WAS THERE A TIME WHEN YOU WERE NOT ABLE TO PAY THE MORTGAGE OR RENT ON TIME?: NO

## 2022-11-29 SDOH — HEALTH STABILITY: PHYSICAL HEALTH: ON AVERAGE, HOW MANY DAYS PER WEEK DO YOU ENGAGE IN MODERATE TO STRENUOUS EXERCISE (LIKE A BRISK WALK)?: 1 DAY

## 2022-11-29 ASSESSMENT — LIFESTYLE VARIABLES
HOW MANY STANDARD DRINKS CONTAINING ALCOHOL DO YOU HAVE ON A TYPICAL DAY: PATIENT DOES NOT DRINK
HOW OFTEN DO YOU HAVE SIX OR MORE DRINKS ON ONE OCCASION: NEVER
AUDIT-C TOTAL SCORE: 0
HOW OFTEN DO YOU HAVE A DRINK CONTAINING ALCOHOL: NEVER
SKIP TO QUESTIONS 9-10: 1

## 2022-11-29 ASSESSMENT — SOCIAL DETERMINANTS OF HEALTH (SDOH)
DO YOU BELONG TO ANY CLUBS OR ORGANIZATIONS SUCH AS CHURCH GROUPS UNIONS, FRATERNAL OR ATHLETIC GROUPS, OR SCHOOL GROUPS?: NO
IN A TYPICAL WEEK, HOW MANY TIMES DO YOU TALK ON THE PHONE WITH FAMILY, FRIENDS, OR NEIGHBORS?: MORE THAN THREE TIMES A WEEK
HOW OFTEN DO YOU ATTEND CHURCH OR RELIGIOUS SERVICES?: MORE THAN 4 TIMES PER YEAR
HOW OFTEN DO YOU GET TOGETHER WITH FRIENDS OR RELATIVES?: MORE THAN THREE TIMES A WEEK

## 2022-12-01 ENCOUNTER — OFFICE VISIT (OUTPATIENT)
Dept: FAMILY MEDICINE | Facility: CLINIC | Age: 72
End: 2022-12-01
Payer: MEDICARE

## 2022-12-01 VITALS
OXYGEN SATURATION: 97 % | TEMPERATURE: 97.7 F | RESPIRATION RATE: 18 BRPM | BODY MASS INDEX: 26.68 KG/M2 | DIASTOLIC BLOOD PRESSURE: 73 MMHG | HEART RATE: 68 BPM | HEIGHT: 66 IN | WEIGHT: 166 LBS | SYSTOLIC BLOOD PRESSURE: 138 MMHG

## 2022-12-01 DIAGNOSIS — M79.672 LEFT FOOT PAIN: ICD-10-CM

## 2022-12-01 DIAGNOSIS — I71.20 THORACIC AORTIC ANEURYSM WITHOUT RUPTURE, UNSPECIFIED PART (H): ICD-10-CM

## 2022-12-01 DIAGNOSIS — Z01.818 PREOP GENERAL PHYSICAL EXAM: Primary | ICD-10-CM

## 2022-12-01 LAB
ANION GAP SERPL CALCULATED.3IONS-SCNC: 9 MMOL/L (ref 7–15)
BUN SERPL-MCNC: 23.4 MG/DL (ref 8–23)
CALCIUM SERPL-MCNC: 9.3 MG/DL (ref 8.8–10.2)
CHLORIDE SERPL-SCNC: 102 MMOL/L (ref 98–107)
CREAT SERPL-MCNC: 0.88 MG/DL (ref 0.51–0.95)
DEPRECATED HCO3 PLAS-SCNC: 30 MMOL/L (ref 22–29)
ERYTHROCYTE [DISTWIDTH] IN BLOOD BY AUTOMATED COUNT: 13.4 % (ref 10–15)
GFR SERPL CREATININE-BSD FRML MDRD: 69 ML/MIN/1.73M2
GLUCOSE SERPL-MCNC: 86 MG/DL (ref 70–99)
HCT VFR BLD AUTO: 40.2 % (ref 35–47)
HGB BLD-MCNC: 12.9 G/DL (ref 11.7–15.7)
MCH RBC QN AUTO: 31.4 PG (ref 26.5–33)
MCHC RBC AUTO-ENTMCNC: 32.1 G/DL (ref 31.5–36.5)
MCV RBC AUTO: 98 FL (ref 78–100)
PLATELET # BLD AUTO: 249 10E3/UL (ref 150–450)
POTASSIUM SERPL-SCNC: 4 MMOL/L (ref 3.4–5.3)
RBC # BLD AUTO: 4.11 10E6/UL (ref 3.8–5.2)
SODIUM SERPL-SCNC: 141 MMOL/L (ref 136–145)
WBC # BLD AUTO: 5.3 10E3/UL (ref 4–11)

## 2022-12-01 PROCEDURE — 80048 BASIC METABOLIC PNL TOTAL CA: CPT | Performed by: PHYSICIAN ASSISTANT

## 2022-12-01 PROCEDURE — 85027 COMPLETE CBC AUTOMATED: CPT | Performed by: PHYSICIAN ASSISTANT

## 2022-12-01 PROCEDURE — 36415 COLL VENOUS BLD VENIPUNCTURE: CPT | Performed by: PHYSICIAN ASSISTANT

## 2022-12-01 PROCEDURE — 99214 OFFICE O/P EST MOD 30 MIN: CPT | Performed by: PHYSICIAN ASSISTANT

## 2022-12-01 RX ORDER — CYCLOBENZAPRINE HCL 10 MG
TABLET ORAL
COMMUNITY
Start: 2022-11-22 | End: 2023-02-19

## 2022-12-01 NOTE — PATIENT INSTRUCTIONS
For your meds, hold hydrochlorothiazide and losartan morning of procedure.     Hold multivitamin 1 week prior to procedure.     Preparing for Your Surgery  Getting started  A nurse will call you to review your health history and instructions. They will give you an arrival time based on your scheduled surgery time. Please be ready to share:  Your doctor's clinic name and phone number  Your medical, surgical, and anesthesia history  A list of allergies and sensitivities  A list of medicines, including herbal treatments and over-the-counter drugs  Whether the patient has a legal guardian (ask how to send us the papers in advance)  Please tell us if you're pregnant--or if there's any chance you might be pregnant. Some surgeries may injure a fetus (unborn baby), so they require a pregnancy test. Surgeries that are safe for a fetus don't always need a test, and you can choose whether to have one.   If you have a child who's having surgery, please ask for a copy of Preparing for Your Child's Surgery.    Preparing for surgery  Within 10 to 30 days of surgery: Have a pre-op exam (sometimes called an H&P, or History and Physical). This can be done at a clinic or pre-operative center.  If you're having a , you may not need this exam. Talk to your care team.  At your pre-op exam, talk to your care team about all medicines you take. If you need to stop any medicines before surgery, ask when to start taking them again.  We do this for your safety. Many medicines can make you bleed too much during surgery. Some change how well surgery (anesthesia) drugs work.  Call your insurance company to let them know you're having surgery. (If you don't have insurance, call [phone].)  Call your clinic if there's any change in your health. This includes signs of a cold or flu (sore throat, runny nose, cough, rash, fever). It also includes a scrape or scratch near the surgery site.  If you have questions on the day of surgery,  call your hospital or surgery center.  COVID testing  You may need to be tested for COVID-19 before having surgery. If so, we will give you instructions (or click here).  Eating and drinking guidelines  For your safety: Unless your surgeon tells you otherwise, follow the guidelines below.  Eat and drink as usual until 8 hours before you arrive for surgery. After that, no food or milk.  Drink clear liquids until 2 hours before you arrive. These are liquids you can see through, like water, Gatorade, and Propel Water. They also include plain black coffee and tea (no cream or milk), candy, and breath mints. You can spit out gum when you arrive.  If you drink alcohol: Stop drinking it the night before surgery.  If your care team tells you to take medicine on the morning of surgery, it's okay to take it with a sip of water.  Preventing infection  Shower or bathe the night before and morning of your surgery. Follow the instructions your clinic gave you. (If no instructions, use regular soap.)  Don't shave or clip hair near your surgery site. We'll remove the hair if needed.  Don't smoke or vape the morning of surgery. You may chew nicotine gum up to 2 hours before surgery. A nicotine patch is okay.  Note: Some surgeries require you to completely quit smoking and nicotine. Check with your surgeon.  Your care team will make every effort to keep you safe from infection. We will:  Clean our hands often with soap and water (or an alcohol-based hand rub).  Clean the skin at your surgery site with a special soap that kills germs.  Give you a special gown to keep you warm. (Cold raises the risk of infection.)  Wear special hair covers, masks, gowns and gloves during surgery.  Give antibiotic medicine, if prescribed. Not all surgeries need antibiotics.  What to bring on the day of surgery  Photo ID and insurance card  Copy of your health care directive, if you have one  Glasses and hearing aids (bring cases)  You can't wear  contacts during surgery  Inhaler and eye drops, if you use them (tell us about these when you arrive)  CPAP machine or breathing device, if you use them  A few personal items, if spending the night  If you have . . .  A pacemaker, ICD (cardiac defibrillator) or other implant: Bring the ID card.  An implanted stimulator: Bring the remote control.  A legal guardian: Bring a copy of the certified (court-stamped) guardianship papers.  Please remove any jewelry, including body piercings. Leave jewelry and other valuables at home.  If you're going home the day of surgery  You must have a responsible adult drive you home. They should stay with you overnight as well.  If you don't have someone to stay with you, and you aren't safe to go home alone, we may keep you overnight. Insurance often won't pay for this.  After surgery  If it's hard to control your pain or you need more pain medicine, please call your surgeon's office.  Questions?   If you have any questions for your care team, list them here: _________________________________________________________________________________________________________________________________________________________________________ ____________________________________ ____________________________________ ____________________________________  For informational purposes only. Not to replace the advice of your health care provider. Copyright   2003, 2019 [facility]. All rights reserved. Clinically reviewed by Herminia Burks MD. Black coin [id number] - REV 10/22.

## 2022-12-01 NOTE — PROGRESS NOTES
[facility]  [id number] [place] [id number]  Phone: [phone]  Primary Provider: Emeka Ricahrdson  Pre-op Performing Provider: PAUL CARPENTER      PREOPERATIVE EVALUATION:  Today's date: 12/1/2022    Alexandra Thomas is a 72 year old female who presents for a preoperative evaluation.    Surgical Information:  Surgery/Procedure: Left foot surgery  Surgery Location: [facility]  Surgeon: Dr. Steele  Surgery Date: 12/21/2022  Time of Surgery: AM  Where patient plans to recover: At home with family  Fax number for surgical facility: [phone]     Type of Anesthesia Anticipated: to be determined    Assessment & Plan     The proposed surgical procedure is considered INTERMEDIATE risk.    Preop general physical exam  Stable exam. Has obtained cardiac clearance. ekg obtained wnl in last month. Labs repeated. wnl last month. Assuming no acute changes on pending labs, cleared.   - Basic metabolic panel  (Ca, Cl, CO2, Creat, Gluc, K, Na, BUN); Future  - CBC with platelets; Future    Left foot pain  As above   - Basic metabolic panel  (Ca, Cl, CO2, Creat, Gluc, K, Na, BUN); Future  - CBC with platelets; Future      Thoracic aortic aneurysm without rupture, unspecified part  As above              Risks and Recommendations:  The patient has the following additional risks and recommendations for perioperative complications:  Cardiovascular:   - Cardiology consulted and has been cleared per patient.     Medication Instructions:  Patient is to take all scheduled medications on the day of surgery EXCEPT for modifications listed below:   - ACE/ARB: HOLD due to exceptional risk of hypotension during surgery.    - Diuretics: HOLD on the day of surgery.    RECOMMENDATION:  APPROVAL GIVEN to proceed with proposed procedure pending review of diagnostic evaluation.          Subjective     HPI related to upcoming procedure: Removing hardware from foot S/P  orif of left foot.         Preop Questions 11/29/2022   1. Have you ever had a heart attack or stroke? No   2. Have you ever had surgery on your heart or blood vessels, such as a stent placement, a coronary artery bypass, or surgery on an artery in your head, neck, heart, or legs? No   3. Do you have chest pain with activity? No   4. Do you have a history of  heart failure? No   5. Do you currently have a cold, bronchitis or symptoms of other infection? No   6. Do you have a cough, shortness of breath, or wheezing? No   7. Do you or anyone in your family have previous history of blood clots? No   8. Do you or does anyone in your family have a serious bleeding problem such as prolonged bleeding following surgeries or cuts? No   9. Have you ever had problems with anemia or been told to take iron pills? No   10. Have you had any abnormal blood loss such as black, tarry or bloody stools, or abnormal vaginal bleeding? No   11. Have you ever had a blood transfusion? No   12. Are you willing to have a blood transfusion if it is medically needed before, during, or after your surgery? Yes   13. Have you or any of your relatives ever had problems with anesthesia? No   14. Do you have sleep apnea, excessive snoring or daytime drowsiness? No   15. Do you have any artifical heart valves or other implanted medical devices like a pacemaker, defibrillator, or continuous glucose monitor? No   16. Do you have artificial joints? YES - Both knees, both hips, R shoulder   17. Are you allergic to latex? No     Health Care Directive:  Patient has a Health Care Directive on file    Preoperative Review of :   reviewed - controlled substances reflected in medication list.      Status of Chronic Conditions:  History of VT and a-fib s/p ablation, esophageal spasms that have not happened recently, ascending aortic aneurysm followed by cards    Review of Systems  CONSTITUTIONAL: NEGATIVE for fever, chills, change in  weight  INTEGUMENTARY/SKIN: NEGATIVE for worrisome rashes, moles or lesions  EYES: NEGATIVE for vision changes or irritation  ENT/MOUTH: NEGATIVE for ear, mouth and throat problems  RESP: NEGATIVE for significant cough or SOB  CV: NEGATIVE for chest pain, palpitations or peripheral edema  GI: NEGATIVE for nausea, abdominal pain, heartburn, or change in bowel habits  : NEGATIVE for frequency, dysuria, or hematuria  MUSCULOSKELETAL: NEGATIVE for significant arthralgias or myalgia  NEURO: NEGATIVE for weakness, dizziness or paresthesias  ENDOCRINE: NEGATIVE for temperature intolerance, skin/hair changes  HEME: NEGATIVE for bleeding problems  PSYCHIATRIC: NEGATIVE for changes in mood or affect    Patient Active Problem List    Diagnosis Date Noted     Lattice degeneration of right retina 08/23/2022     Priority: Medium     S/P total hip arthroplasty 07/27/2022     Priority: Medium     Benign essential hypertension 05/29/2022     Priority: Medium     Gastroparesis 05/29/2022     Priority: Medium     Thoracic aortic aneurysm without rupture 05/29/2022     Priority: Medium     Gastroesophageal reflux disease without esophagitis 05/29/2022     Priority: Medium     Pain syndrome, chronic 05/29/2022     Priority: Medium     Orthopedic pains: Hip pain, foot pain, shoulder pain, on gabapentin       Sicca syndrome (H) 05/29/2022     Priority: Medium     NSVT (nonsustained ventricular tachycardia) 05/29/2022     Priority: Medium     TATI (obstructive sleep apnea) 05/29/2022     Priority: Medium      Past Medical History:   Diagnosis Date     Arthritis     hips     Ascending aortic aneurysm     4.2 cm   Ascending     Diverticulitis of colon      Gastroesophageal reflux disease     Esophageal spasms.     Gastroparesis      Heart murmur      High cholesterol      History of GI bleed      Hypertension      Sjogren's syndrome (H)      Past Surgical History:   Procedure Laterality Date     ABDOMEN SURGERY       APPENDECTOMY        ARTHROPLASTY HIP Left 07/27/2022    Procedure: Left total hip arthroplasty;  Surgeon: Berry Mcdonnell MD;  Location: [facility] OR     BACK SURGERY       BIOPSY       BREAST SURGERY       CHOLECYSTECTOMY       COLECTOMY PARTIAL      Due to recurrent diverticulitis     COLONOSCOPY       ENT SURGERY      T & A     ESOPHAGOSCOPY, GASTROSCOPY, DUODENOSCOPY (EGD), COMBINED N/A 09/13/2022    Procedure: ESOPHAGOGASTRODUODENOSCOPY (EGD) (fv);  Surgeon: Seymour Chatterjee MD;  Location:  GI     GENITOURINARY SURGERY       HYSTERECTOMY TOTAL ABDOMINAL       LASIK       left midfoot fusion Left     2nd toe osteotomy     shouder replacement Right      TOTAL HIP ARTHROPLASTY Right 06/07/2009     TOTAL KNEE ARTHROPLASTY Right 07/12/2006     TOTAL KNEE ARTHROPLASTY Left 02/18/2009     Current Outpatient Medications   Medication Sig Dispense Refill     acetaminophen (TYLENOL) 325 MG tablet Take 2 tablets (650 mg) by mouth every 4 hours as needed for other (mild pain) 100 tablet 0     gabapentin (NEURONTIN) 100 MG capsule Take 200 mg by mouth every morning       gabapentin (NEURONTIN) 300 MG capsule Take 600 mg by mouth At Bedtime       hydrochlorothiazide (HYDRODIURIL) 25 MG tablet Take 1 tablet (25 mg) by mouth daily 90 tablet 3     losartan (COZAAR) 50 MG tablet Take 1 tablet (50 mg) by mouth 2 times daily 180 tablet 3     melatonin 5 MG tablet Take 5 mg by mouth nightly as needed for sleep       methylcellulose (CITRUCEL) 500 MG TABS tablet Take 1 tablet by mouth daily       multivitamin w/minerals (THERA-VIT-M) tablet Take 1 tablet by mouth daily       nitroGLYcerin (NITROSTAT) 0.4 MG sublingual tablet as needed for other Esophageal spasm       omeprazole (PRILOSEC) 40 MG DR capsule        ondansetron (ZOFRAN ODT) 4 MG ODT tab Take 1 tablet (4 mg) by mouth every 8 hours as needed for nausea 12 tablet 0     rosuvastatin (CRESTOR) 10 MG tablet Take 1 tablet (10 mg) by mouth daily 90 tablet 3     Vitamin D3 (CHOLECALCIFEROL) 25 mcg (1000  "units) tablet Take 1 tablet by mouth daily       cyclobenzaprine (FLEXERIL) 10 MG tablet TAKE 1 TABLET 3 TIMES DAILY AS NEEDED.         Allergies   Allergen Reactions     Ciprofloxacin Other (See Comments)     Pt has aortic aneurysm.  Increased risk of rupture or dissection with use of fluoroquinolones.       Food Anaphylaxis     EGGPLANT-->anaphylaxis       Venlafaxine Hcl Diarrhea, Shortness Of Breath and Nausea and Vomiting     Meperidine Rash                Adhesive Tape      blisters     Morphine Hives and Itching     Histamine reaction        Social History     Tobacco Use     Smoking status: Never     Smokeless tobacco: Never   Substance Use Topics     Alcohol use: Not Currently     Family History   Problem Relation Age of Onset     Chronic Obstructive Pulmonary Disease Mother      Dementia Father      Chronic Obstructive Pulmonary Disease Father      Macular Degeneration Maternal Grandmother      Other Cancer Maternal Grandmother         Liver     Breast Cancer Paternal Cousin         before 50     Diabetes Paternal Grandfather      Diabetes Paternal Grandmother      Other Cancer Cousin         Breast/Mets     Substance Abuse Sister      Glaucoma No family hx of      History   Drug Use Unknown         Objective     /73 (BP Location: Right arm, Patient Position: Sitting, Cuff Size: Adult Regular)   Pulse 68   Temp 97.7  F (36.5  C) (Oral)   Resp 18   Ht 1.676 m (5' 6\")   Wt 75.3 kg (166 lb)   SpO2 97%   BMI 26.79 kg/m      Physical Exam    GENERAL APPEARANCE: healthy, alert and no distress     EYES: EOMI,  PERRL     HENT: ear canals and TM's normal and nose and mouth without ulcers or lesions     NECK: no adenopathy, no asymmetry, masses, or scars and thyroid normal to palpation     RESP: lungs clear to auscultation - no rales, rhonchi or wheezes     CV: regular rates and rhythm     MS: extremities normal- no gross deformities noted, no evidence of inflammation in joints, FROM in all " extremities.     SKIN: no suspicious lesions or rashes     NEURO: Normal strength and tone, sensory exam grossly normal, mentation intact and speech normal     PSYCH: mentation appears normal. and affect normal/bright     LYMPHATICS: No cervical adenopathy    Recent Labs   Lab Test 22  1157 22  0945   HGB 13.4 12.2    308    139   POTASSIUM 4.0 4.0   CR 0.91 0.80        Diagnostics:  Labs pending at this time.  Results will be reviewed when available.   EK22: appears normal, NSR, no LVH by voltage criteria    Revised Cardiac Risk Index (RCRI):  The patient has the following serious cardiovascular risks for perioperative complications:   - No serious cardiac risks = 0 points     RCRI Interpretation: 0 points: Class I (very low risk - 0.4% complication rate)           Signed Electronically by: Vitaliy Gardner PA-C  Copy of this evaluation report is provided to requesting physician.

## 2022-12-04 ENCOUNTER — OFFICE VISIT (OUTPATIENT)
Dept: URGENT CARE | Facility: URGENT CARE | Age: 72
End: 2022-12-04
Payer: MEDICARE

## 2022-12-04 VITALS
TEMPERATURE: 97.8 F | HEART RATE: 74 BPM | DIASTOLIC BLOOD PRESSURE: 66 MMHG | SYSTOLIC BLOOD PRESSURE: 104 MMHG | OXYGEN SATURATION: 98 %

## 2022-12-04 DIAGNOSIS — N39.0 RECURRENT UTI: ICD-10-CM

## 2022-12-04 DIAGNOSIS — N30.01 ACUTE CYSTITIS WITH HEMATURIA: Primary | ICD-10-CM

## 2022-12-04 DIAGNOSIS — R30.0 DYSURIA: ICD-10-CM

## 2022-12-04 LAB
ALBUMIN UR-MCNC: NEGATIVE MG/DL
APPEARANCE UR: CLEAR
BACTERIA #/AREA URNS HPF: ABNORMAL /HPF
BILIRUB UR QL STRIP: NEGATIVE
COLOR UR AUTO: YELLOW
GLUCOSE UR STRIP-MCNC: NEGATIVE MG/DL
HGB UR QL STRIP: ABNORMAL
KETONES UR STRIP-MCNC: ABNORMAL MG/DL
LEUKOCYTE ESTERASE UR QL STRIP: ABNORMAL
NITRATE UR QL: NEGATIVE
PH UR STRIP: 5.5 [PH] (ref 5–7)
RBC #/AREA URNS AUTO: ABNORMAL /HPF
SP GR UR STRIP: 1.02 (ref 1–1.03)
UROBILINOGEN UR STRIP-ACNC: 0.2 E.U./DL
WBC #/AREA URNS AUTO: ABNORMAL /HPF

## 2022-12-04 PROCEDURE — 87186 SC STD MICRODIL/AGAR DIL: CPT | Performed by: PHYSICIAN ASSISTANT

## 2022-12-04 PROCEDURE — 99214 OFFICE O/P EST MOD 30 MIN: CPT | Performed by: PHYSICIAN ASSISTANT

## 2022-12-04 PROCEDURE — 81001 URINALYSIS AUTO W/SCOPE: CPT

## 2022-12-04 PROCEDURE — 87086 URINE CULTURE/COLONY COUNT: CPT | Performed by: PHYSICIAN ASSISTANT

## 2022-12-04 RX ORDER — CEPHALEXIN 500 MG/1
500 CAPSULE ORAL 2 TIMES DAILY
Qty: 14 CAPSULE | Refills: 0 | Status: SHIPPED | OUTPATIENT
Start: 2022-12-04 | End: 2022-12-04

## 2022-12-04 RX ORDER — CEPHALEXIN 500 MG/1
500 CAPSULE ORAL 2 TIMES DAILY
Qty: 14 CAPSULE | Refills: 0 | Status: SHIPPED | OUTPATIENT
Start: 2022-12-04 | End: 2022-12-11

## 2022-12-04 NOTE — PROGRESS NOTES
(N30.01) Acute cystitis with hematuria  (primary encounter diagnosis)  MDM: Symptoms and lab finding support lower tract UTI.  No clinical evidence of upper tract infection. History of recurrent infections by patient report.  Patient reports she tolerates Keflex well and has found that antibiotic works well for her UTIs in the past. She has a history of aortic aneurysm, so has been advised to avoid use of flouroquinolones. Keflex is prescribed pending urine culture and sensitivity reports. Patient is educated about criteria for urgent and emergent follow-up.     Plan: cephALEXin (KEFLEX) 500 MG capsule,         DISCONTINUED: cephALEXin (KEFLEX) 500 MG         capsule            December 4, 2022 [facility] Plan:        1. Drink extra water to flush your bladder      2. Start the antibiotics I prescribed today      3.  Take the  full course of antibiotic as prescribed.      4.  Follow-up with your primary care provider  if your symptoms do not improve after 4-6 doses  of antibiotic.      5. Follow up immediately if your symptoms worsen, if you develop fever, chills, back pain, abdominal pain, nausea, vomiting, weakness or any new symptoms.          (N39.0) Recurrent UTI      (R30.0) Dysuria  Plan: UA macro with reflex to Microscopic and Culture        - Clinc Collect, Urine Microscopic Exam, Urine         Culture     -----------------------------     Alexandra Thomas is a 72 year old female who presents to urgent care clinic today for evaluation of acute onset dysuria, urinary urgency/frequency and hematuria this morning. Current symptoms are reported to be similar to past UTIs. Patient states she has a history of frequent UTIs.       Review of Epic shows last urine culture postive UTI was 9/2/22 (E. Coli-Pan-sensitive). Patient was treated with Keflex and reports resolution of symptoms prior to acute onset symptoms this morning.         Patient Active Problem List   Diagnosis     Benign  essential hypertension     Gastroparesis     Thoracic aortic aneurysm without rupture     Gastroesophageal reflux disease without esophagitis     Pain syndrome, chronic     Sicca syndrome (H)     NSVT (nonsustained ventricular tachycardia)     TATI (obstructive sleep apnea)     S/P total hip arthroplasty     Lattice degeneration of right retina       Current Outpatient Medications   Medication     acetaminophen (TYLENOL) 325 MG tablet     cyclobenzaprine (FLEXERIL) 10 MG tablet     gabapentin (NEURONTIN) 100 MG capsule     gabapentin (NEURONTIN) 300 MG capsule     hydrochlorothiazide (HYDRODIURIL) 25 MG tablet     losartan (COZAAR) 50 MG tablet     melatonin 5 MG tablet     methylcellulose (CITRUCEL) 500 MG TABS tablet     multivitamin w/minerals (THERA-VIT-M) tablet     nitroGLYcerin (NITROSTAT) 0.4 MG sublingual tablet     omeprazole (PRILOSEC) 40 MG DR capsule     ondansetron (ZOFRAN ODT) 4 MG ODT tab     rosuvastatin (CRESTOR) 10 MG tablet     Vitamin D3 (CHOLECALCIFEROL) 25 mcg (1000 units) tablet     No current facility-administered medications for this visit.       Allergies   Allergen Reactions     Ciprofloxacin Other (See Comments)     Pt has aortic aneurysm.  Increased risk of rupture or dissection with use of fluoroquinolones.       Food Anaphylaxis     EGGPLANT-->anaphylaxis       Venlafaxine Hcl Diarrhea, Shortness Of Breath and Nausea and Vomiting     Meperidine Rash                Adhesive Tape      blisters     Morphine Hives and Itching     Histamine reaction          ROS:      CONSITUTIONAL: No sudden onset fever, chills or acute onset weakness  CARDIAC: No sudden onset chest pain or shortness of breath   RESP: No sudden onset cough, chest pain or shortness of breath   GI: No abdominal pain. No nausea, vomiting or diarrhea. No acute flank pain.   NEURO: Denies any confusion or mental status changes  RHEUM: No sudden onset hot, red, warm joints       OBJECTIVE:  /66   Pulse 74   Temp 97.8  F  (36.6  C) (Tympanic)   SpO2 98%                GENERAL:  NAD  SKIN: No rashes.  Normal color.  Sclera clear.  CARDIAC:NORMAL - regular rate and rhythm without murmur., normal s1/s2 and without extra heart sounds  RESP: Normal - CTA without rales, rhonchi, or wheezing.  ABDOMEN:  Soft, non-tender, non-distended.  Positive normal bowel sounds.  No HSM or masses.  Positive, mild, suprapubic tenderness.  No CVA tenderness.  NEURO: Alert and oriented.  Normal speech and mentation.  CN II/XII grossly intact.  Gait within normal limits.       LAB:     Component      Latest Ref Rng & Units 12/4/2022   Color Urine      Colorless, Straw, Light Yellow, Yellow Yellow   Appearance Urine      Clear Clear   Glucose Urine      Negative mg/dL Negative   Bilirubin Urine      Negative Negative   Ketones Urine      Negative mg/dL Trace (A)   Specific Gravity Urine      1.003 - 1.035 1.020   Blood Urine      Negative Trace (A)   pH Urine      5.0 - 7.0 5.5   Protein Albumin Urine      Negative mg/dL Negative   Urobilinogen Urine      0.2, 1.0 E.U./dL 0.2   Nitrite Urine      Negative Negative   Leukocyte Esterase Urine      Negative Large (A)   Bacteria Urine      None Seen /HPF Moderate (A)   RBC Urine      0-2 /HPF /HPF 2-5 (A)   WBC Urine      0-5 /HPF /HPF  (A)

## 2022-12-04 NOTE — PATIENT INSTRUCTIONS
December 4, 2022 [facility] Plan:        1. Drink extra water to flush your bladder      2. Start the antibiotics I prescribed today      3.  Take the  full course of antibiotic as prescribed.      4.  Follow-up with your primary care provider  if your symptoms do not improve after 4-6 doses  of antibiotic.      5. Follow up immediately if your symptoms worsen, if you develop fever, chills, back pain, abdominal pain, nausea, vomiting, weakness or any new symptoms.

## 2022-12-06 ENCOUNTER — MYC MEDICAL ADVICE (OUTPATIENT)
Dept: INTERNAL MEDICINE | Facility: CLINIC | Age: 72
End: 2022-12-06

## 2022-12-06 DIAGNOSIS — N39.0 RECURRENT UTI: Primary | ICD-10-CM

## 2022-12-07 LAB — BACTERIA UR CULT: ABNORMAL

## 2022-12-07 NOTE — TELEPHONE ENCOUNTER
Please review [email or web address] message and advise.     Bren Cardona RN  [facility]

## 2022-12-08 ENCOUNTER — TRANSFERRED RECORDS (OUTPATIENT)
Dept: HEALTH INFORMATION MANAGEMENT | Facility: CLINIC | Age: 72
End: 2022-12-08

## 2023-01-11 ENCOUNTER — VIRTUAL VISIT (OUTPATIENT)
Dept: FAMILY MEDICINE | Facility: CLINIC | Age: 73
End: 2023-01-11
Payer: MEDICARE

## 2023-01-11 DIAGNOSIS — U07.1 INFECTION DUE TO 2019 NOVEL CORONAVIRUS: Primary | ICD-10-CM

## 2023-01-11 PROCEDURE — 99213 OFFICE O/P EST LOW 20 MIN: CPT | Mod: CS | Performed by: INTERNAL MEDICINE

## 2023-01-11 NOTE — PROGRESS NOTES
Alexandra is a 72 year old who is being evaluated via a billable video visit.      How would you like to obtain your AVS? MyChart  If the video visit is dropped, the invitation should be resent by: Text to cell phone: [phone]  Will anyone else be joining your video visit? No          Assessment & Plan     Infection due to 2019 novel coronavirus  Started having scratchy throat yesterday  She tested positive for COVID today  She is interested in exploring the treatment options with Paxlovid  Discussed the side effects of the medicine including bitter taste/diarrhea/nausea/dizziness  Discussed about rebound COVID  Discussed about quarantine measures  She is on Crestor  He needs to hold this for 5 days when she is taking the Paxilovid  - nirmatrelvir and ritonavir (PAXLOVID) therapy pack; Take 3 tablets by mouth 2 times daily for 5 days (Take 2 Nirmatrelvir tablets and 1 Ritonavir tablet twice daily for 5 days)      25 minutes spent on the date of the encounter doing chart review, history and exam, documentation and further activities per the note           Return in about 4 weeks (around 2/8/2023), or if symptoms worsen or fail to improve.    Lenin Vee MD  [facility]   Alexandra is a 72 year old, presenting for the following health issues:  Covid Concern      HPI       COVID-19 Symptom Review  How many days ago did these symptoms start? 1/11/23  TESTED POSITIVE : 1/11/23  Are any of the following symptoms significant for you?    New or worsening difficulty breathing? No    Worsening cough? Yes, I am coughing up mucus.    Fever or chills? Yes, I felt feverish or had chills.    Headache: No    Sore throat: YES    Chest pain: No    Diarrhea: No    Body aches? No    What treatments has patient tried? TYLENOL   Does patient live in a nursing home, group home, or shelter? No  Does patient have a way to get food/medications during quarantined? Yes, I have a friend or family  member who can help me.                  Review of Systems   Constitutional, HEENT, cardiovascular, pulmonary, gi and gu systems are negative, except as otherwise noted.      Objective           Vitals:  No vitals were obtained today due to virtual visit.    Physical Exam   GENERAL: fatigued  EYES: Eyes grossly normal to inspection.  No discharge or erythema, or obvious scleral/conjunctival abnormalities.  RESP: No audible wheeze, cough, or visible cyanosis.  No visible retractions or increased work of breathing.    SKIN: Visible skin clear. No significant rash, abnormal pigmentation or lesions.  NEURO: Cranial nerves grossly intact.  Mentation and speech appropriate for age.  PSYCH: Mentation appears normal, affect normal/bright, judgement and insight intact, normal speech and appearance well-groomed.                Video-Visit Details    Type of service:  Video Visit   Video Start Time: 5:00 PM  Video End Time:5:05 PM    Originating Location (pt. Location): Home    Distant Location (provider location):  Off-site  Platform used for Video Visit: [employer]

## 2023-01-24 ENCOUNTER — VIRTUAL VISIT (OUTPATIENT)
Dept: URGENT CARE | Facility: CLINIC | Age: 73
End: 2023-01-24
Payer: MEDICARE

## 2023-01-24 DIAGNOSIS — U07.1 COVID: Primary | ICD-10-CM

## 2023-01-24 PROCEDURE — 99213 OFFICE O/P EST LOW 20 MIN: CPT | Mod: CS | Performed by: EMERGENCY MEDICINE

## 2023-01-24 NOTE — PROGRESS NOTES
"Video visit  Start time: 9:00 AM  Stop time: 9:07 AM  Duration: 7 minutes  Patient location: Home  Provider location: [facility] virtual appointment.  (Remote).  Platform used for video visit: [employer]        The patient has been notified of following:     \"This video visit will be conducted via a call between you and your physician/provider. We have found that certain health care needs can be provided without the need for a physical exam.  This service lets us provide the care you need with a short phone conversation.  If a prescription is necessary we can send it directly to your pharmacy.  If lab work is needed we can place an order for that and you can then stop by our lab to have the test done at a later time.    Video visits are billed at different rates depending on your insurance coverage. During this emergency period, for some insurers they may be billed the same as an in-person visit.  Please reach out to your insurance provider with any questions.    If during the course of the call the physician/provider feels a video visit is not appropriate, you will not be charged for this service.\"    Patient has given verbal consent for Telephone visit?  Yes    What phone number would you like to be contacted at?  [phone]    How would you like to obtain your AVS? MyChart    Subjective   CC: Alexandra Thomas  is a 72 year old female who presents via phone visit today for the following health issues:   Chief Complaint   Patient presents with     Infection        COVID-19 Symptom Review  How many days ago did these symptoms start? 3    Are any of the following symptoms significant for you?    New or worsening difficulty breathing? No    Worsening cough? Yes, it's a dry cough.     Fever or chills? Yes, I felt feverish or had chills.    Headache: YES    Sore throat: YES    Chest pain: No    Diarrhea: No    Body aches? YES    What treatments has patient tried? Acetaminophen   Does patient live in a nursing " home, group home, or shelter? No  Does patient have a way to get food/medications during quarantined? Yes, I have a friend or family member who can help me. and Yes                       Reviewed and updated as needed this visit by Provider                  Review of Systems         Objective    Gen: Patient is alert, oriented  Gen: No acute distress on video visit.  Nondyspneic appearing speaking in full sentences.  Resp: Speaking full sentence, no audible shortness of breath.  No cough of wheeze.              Assessment/Plan:  Patient is a 72-year-old female who is on day 3 of COVID infection.  Symptoms are typical with no shortness of breath.  Patient was treated with paxlovid approximately 2 weeks ago, on 1/11/2023,  with complete resolution of her symptoms.  These new symptoms have started at least a week later and is unclear whether this is rebound or a new onset COVID infection.  Patient is requesting to be retreated with paxlovid hoping for assistance with decreasing symptoms.  Her GFR is 69.  She understands to withhold her Crestor for the 5 days of treatment.  She will be treated with paxlovid again.        Casey Chong MD

## 2023-02-05 ENCOUNTER — OFFICE VISIT (OUTPATIENT)
Dept: URGENT CARE | Facility: URGENT CARE | Age: 73
End: 2023-02-05
Payer: MEDICARE

## 2023-02-05 VITALS
DIASTOLIC BLOOD PRESSURE: 79 MMHG | BODY MASS INDEX: 26.47 KG/M2 | SYSTOLIC BLOOD PRESSURE: 150 MMHG | TEMPERATURE: 97.6 F | HEART RATE: 69 BPM | WEIGHT: 164 LBS | OXYGEN SATURATION: 99 %

## 2023-02-05 DIAGNOSIS — L03.032 CELLULITIS OF GREAT TOE OF LEFT FOOT: Primary | ICD-10-CM

## 2023-02-05 PROCEDURE — 99213 OFFICE O/P EST LOW 20 MIN: CPT | Performed by: PHYSICIAN ASSISTANT

## 2023-02-05 RX ORDER — ROSUVASTATIN CALCIUM 10 MG/1
10 TABLET, COATED ORAL DAILY
COMMUNITY
End: 2023-03-30

## 2023-02-05 NOTE — PROGRESS NOTES
SUBJECTIVE:  Alexandra Thomas is a 72 year old female who comes in with concerns for infection of her left great toe.  Patient states that 3 days ago she had an ingrown toenail removed.  She now has redness, drainage and tenderness to the affected area.  She has been using Neosporin topically but does not seem to be improving her symptoms.  She not had any fevers.  She is otherwise at baseline health.      Past Medical History:   Diagnosis Date     Arthritis     hips     Ascending aortic aneurysm     4.2 cm   Ascending     Diverticulitis of colon      Gastroesophageal reflux disease     Esophageal spasms.     Gastroparesis      Heart murmur      High cholesterol      History of GI bleed      Hypertension      Sjogren's syndrome (H)      Current Outpatient Medications   Medication     acetaminophen (TYLENOL) 325 MG tablet     gabapentin (NEURONTIN) 100 MG capsule     gabapentin (NEURONTIN) 300 MG capsule     hydrochlorothiazide (HYDRODIURIL) 25 MG tablet     losartan (COZAAR) 50 MG tablet     melatonin 5 MG tablet     multivitamin w/minerals (THERA-VIT-M) tablet     omeprazole (PRILOSEC) 40 MG DR capsule     ondansetron (ZOFRAN ODT) 4 MG ODT tab     rosuvastatin (CRESTOR) 10 MG tablet     Vitamin D3 (CHOLECALCIFEROL) 25 mcg (1000 units) tablet     cyclobenzaprine (FLEXERIL) 10 MG tablet     methylcellulose (CITRUCEL) 500 MG TABS tablet     nitroGLYcerin (NITROSTAT) 0.4 MG sublingual tablet     No current facility-administered medications for this visit.     Social History     Socioeconomic History     Marital status:      Spouse name: Not on file     Number of children: Not on file     Years of education: Not on file     Highest education level: Not on file   Occupational History     Not on file   Tobacco Use     Smoking status: Never     Smokeless tobacco: Never   Vaping Use     Vaping Use: Never used   Substance and Sexual Activity     Alcohol use: Not Currently     Drug use: Never     Sexual activity:  Not Currently   Other Topics Concern     Parent/sibling w/ CABG, MI or angioplasty before 65F 55M? No   Social History Narrative     Not on file     Social Determinants of Health     Financial Resource Strain: Low Risk      Difficulty of Paying Living Expenses: Not hard at all   Food Insecurity: No Food Insecurity     Worried About Running Out of Food in the Last Year: Never true     Ran Out of Food in the Last Year: Never true   Transportation Needs: No Transportation Needs     Lack of Transportation (Medical): No     Lack of Transportation (Non-Medical): No   Physical Activity: Insufficiently Active     Days of Exercise per Week: 1 day     Minutes of Exercise per Session: 10 min   Stress: No Stress Concern Present     Feeling of Stress : Not at all   Social Connections: Moderately Isolated     Frequency of Communication with Friends and Family: More than three times a week     Frequency of Social Gatherings with Friends and Family: More than three times a week     Attends Mandaeism Services: More than 4 times per year     Active Member of Clubs or Organizations: No     Attends Club or Organization Meetings: Not on file     Marital Status:    Intimate Partner Violence: Not on file   Housing Stability: Low Risk      Unable to Pay for Housing in the Last Year: No     Number of Places Lived in the Last Year: 2     Unstable Housing in the Last Year: No     ROS  Negative other than stated above    Exam:  GENERAL APPEARANCE: healthy, alert and no distress  EYES: EOMI,  PERRL  MS: extremities normal- no gross deformities noted, no evidence of inflammation in joints, FROM in all extremities.  SKIN: Left great toe with newly removal of the nail.  She does have redness some drainage around the cuticle margin.  And tenderness to the touch.  There is no streaking proximally.  No abscess  NEURO: Normal strength and tone, sensory exam grossly normal, mentation intact and speech normal    assessment/plan:  (L03.032)  Cellulitis of great toe of left foot  (primary encounter diagnosis)  Comment:   Plan: amoxicillin-clavulanate (AUGMENTIN) 875-125 MG         tablet          Patient with recent removal of left great toenail.  She does appear to have secondary infection.  Continue with soap and water for cleaning along with topical antibiotics.  Will place on Augmentin.  Signs of spreading infection were discussed we will follow-up with primary as needed over-the-counter med as needed for pain

## 2023-02-19 ENCOUNTER — HOSPITAL ENCOUNTER (OUTPATIENT)
Facility: CLINIC | Age: 73
Setting detail: OBSERVATION
Discharge: HOME | End: 2023-02-20
Attending: PHYSICIAN ASSISTANT | Admitting: PHYSICIAN ASSISTANT
Payer: MEDICARE

## 2023-02-19 ENCOUNTER — APPOINTMENT (OUTPATIENT)
Dept: CT IMAGING | Facility: CLINIC | Age: 73
End: 2023-02-19
Attending: PHYSICIAN ASSISTANT
Payer: MEDICARE

## 2023-02-19 DIAGNOSIS — I71.20 THORACIC AORTIC ANEURYSM WITHOUT RUPTURE, UNSPECIFIED PART (H): ICD-10-CM

## 2023-02-19 DIAGNOSIS — I48.0 PAF (PAROXYSMAL ATRIAL FIBRILLATION) (H): ICD-10-CM

## 2023-02-19 DIAGNOSIS — I10 ESSENTIAL HYPERTENSION: ICD-10-CM

## 2023-02-19 DIAGNOSIS — G47.33 OSA (OBSTRUCTIVE SLEEP APNEA): ICD-10-CM

## 2023-02-19 DIAGNOSIS — R07.9 CHEST PAIN, UNSPECIFIED TYPE: ICD-10-CM

## 2023-02-19 LAB
ANION GAP SERPL CALCULATED.3IONS-SCNC: 10 MMOL/L (ref 7–15)
BASOPHILS # BLD AUTO: 0 10E3/UL (ref 0–0.2)
BASOPHILS NFR BLD AUTO: 1 %
BUN SERPL-MCNC: 21.3 MG/DL (ref 8–23)
CALCIUM SERPL-MCNC: 9.6 MG/DL (ref 8.8–10.2)
CHLORIDE SERPL-SCNC: 103 MMOL/L (ref 98–107)
CREAT BLD-MCNC: 1.1 MG/DL (ref 0.5–1)
CREAT SERPL-MCNC: 0.88 MG/DL (ref 0.51–0.95)
DEPRECATED HCO3 PLAS-SCNC: 27 MMOL/L (ref 22–29)
EOSINOPHIL # BLD AUTO: 0.1 10E3/UL (ref 0–0.7)
EOSINOPHIL NFR BLD AUTO: 1 %
ERYTHROCYTE [DISTWIDTH] IN BLOOD BY AUTOMATED COUNT: 12.8 % (ref 10–15)
GFR SERPL CREATININE-BSD FRML MDRD: 52 ML/MIN/1.73M2
GFR SERPL CREATININE-BSD FRML MDRD: 69 ML/MIN/1.73M2
GLUCOSE SERPL-MCNC: 101 MG/DL (ref 70–99)
HCT VFR BLD AUTO: 41.1 % (ref 35–47)
HGB BLD-MCNC: 13.4 G/DL (ref 11.7–15.7)
HOLD SPECIMEN: NORMAL
HOLD SPECIMEN: NORMAL
IMM GRANULOCYTES # BLD: 0 10E3/UL
IMM GRANULOCYTES NFR BLD: 0 %
LYMPHOCYTES # BLD AUTO: 2.1 10E3/UL (ref 0.8–5.3)
LYMPHOCYTES NFR BLD AUTO: 37 %
MCH RBC QN AUTO: 32 PG (ref 26.5–33)
MCHC RBC AUTO-ENTMCNC: 32.6 G/DL (ref 31.5–36.5)
MCV RBC AUTO: 98 FL (ref 78–100)
MONOCYTES # BLD AUTO: 0.4 10E3/UL (ref 0–1.3)
MONOCYTES NFR BLD AUTO: 8 %
NEUTROPHILS # BLD AUTO: 3.1 10E3/UL (ref 1.6–8.3)
NEUTROPHILS NFR BLD AUTO: 53 %
NRBC # BLD AUTO: 0 10E3/UL
NRBC BLD AUTO-RTO: 0 /100
PLATELET # BLD AUTO: 224 10E3/UL (ref 150–450)
POTASSIUM SERPL-SCNC: 4 MMOL/L (ref 3.4–5.3)
RBC # BLD AUTO: 4.19 10E6/UL (ref 3.8–5.2)
SODIUM SERPL-SCNC: 140 MMOL/L (ref 136–145)
TROPONIN T SERPL HS-MCNC: 10 NG/L
TROPONIN T SERPL HS-MCNC: 11 NG/L
TROPONIN T SERPL HS-MCNC: 12 NG/L
WBC # BLD AUTO: 5.8 10E3/UL (ref 4–11)

## 2023-02-19 PROCEDURE — 84484 ASSAY OF TROPONIN QUANT: CPT | Performed by: PHYSICIAN ASSISTANT

## 2023-02-19 PROCEDURE — 250N000013 HC RX MED GY IP 250 OP 250 PS 637: Performed by: PHYSICIAN ASSISTANT

## 2023-02-19 PROCEDURE — 99285 EMERGENCY DEPT VISIT HI MDM: CPT | Mod: 25

## 2023-02-19 PROCEDURE — 96374 THER/PROPH/DIAG INJ IV PUSH: CPT

## 2023-02-19 PROCEDURE — 250N000011 HC RX IP 250 OP 636: Performed by: PHYSICIAN ASSISTANT

## 2023-02-19 PROCEDURE — 99223 1ST HOSP IP/OBS HIGH 75: CPT | Mod: AI | Performed by: PHYSICIAN ASSISTANT

## 2023-02-19 PROCEDURE — 36415 COLL VENOUS BLD VENIPUNCTURE: CPT | Performed by: PHYSICIAN ASSISTANT

## 2023-02-19 PROCEDURE — 96375 TX/PRO/DX INJ NEW DRUG ADDON: CPT | Mod: 59

## 2023-02-19 PROCEDURE — 85014 HEMATOCRIT: CPT | Performed by: PHYSICIAN ASSISTANT

## 2023-02-19 PROCEDURE — 80048 BASIC METABOLIC PNL TOTAL CA: CPT | Performed by: PHYSICIAN ASSISTANT

## 2023-02-19 PROCEDURE — 93005 ELECTROCARDIOGRAM TRACING: CPT

## 2023-02-19 PROCEDURE — 250N000013 HC RX MED GY IP 250 OP 250 PS 637: Performed by: HOSPITALIST

## 2023-02-19 PROCEDURE — 250N000009 HC RX 250: Performed by: PHYSICIAN ASSISTANT

## 2023-02-19 PROCEDURE — C9113 INJ PANTOPRAZOLE SODIUM, VIA: HCPCS | Performed by: PHYSICIAN ASSISTANT

## 2023-02-19 PROCEDURE — 82565 ASSAY OF CREATININE: CPT

## 2023-02-19 PROCEDURE — 74177 CT ABD & PELVIS W/CONTRAST: CPT | Mod: MA

## 2023-02-19 PROCEDURE — G0378 HOSPITAL OBSERVATION PER HR: HCPCS

## 2023-02-19 RX ORDER — HYOSCYAMINE SULFATE 0.125 MG
125 TABLET ORAL EVERY 4 HOURS PRN
Status: DISCONTINUED | OUTPATIENT
Start: 2023-02-19 | End: 2023-02-19

## 2023-02-19 RX ORDER — GABAPENTIN 300 MG/1
600 CAPSULE ORAL AT BEDTIME
Status: DISCONTINUED | OUTPATIENT
Start: 2023-02-19 | End: 2023-02-20 | Stop reason: HOSPADM

## 2023-02-19 RX ORDER — IOPAMIDOL 755 MG/ML
500 INJECTION, SOLUTION INTRAVASCULAR ONCE
Status: COMPLETED | OUTPATIENT
Start: 2023-02-19 | End: 2023-02-19

## 2023-02-19 RX ORDER — MAGNESIUM HYDROXIDE/ALUMINUM HYDROXICE/SIMETHICONE 120; 1200; 1200 MG/30ML; MG/30ML; MG/30ML
30 SUSPENSION ORAL
COMMUNITY
End: 2023-07-31

## 2023-02-19 RX ORDER — ACETAMINOPHEN 325 MG/1
975 TABLET ORAL EVERY 8 HOURS
Status: DISCONTINUED | OUTPATIENT
Start: 2023-02-19 | End: 2023-02-20 | Stop reason: HOSPADM

## 2023-02-19 RX ORDER — OXYCODONE HYDROCHLORIDE 5 MG/1
5 TABLET ORAL EVERY 4 HOURS PRN
Status: DISCONTINUED | OUTPATIENT
Start: 2023-02-19 | End: 2023-02-20 | Stop reason: HOSPADM

## 2023-02-19 RX ORDER — ACETAMINOPHEN 325 MG/1
650 TABLET ORAL ONCE
Status: COMPLETED | OUTPATIENT
Start: 2023-02-19 | End: 2023-02-19

## 2023-02-19 RX ORDER — LIDOCAINE 40 MG/G
CREAM TOPICAL
Status: DISCONTINUED | OUTPATIENT
Start: 2023-02-19 | End: 2023-02-20 | Stop reason: HOSPADM

## 2023-02-19 RX ORDER — LACTOBACILLUS RHAMNOSUS GG 10B CELL
1 CAPSULE ORAL DAILY
COMMUNITY
End: 2024-01-12

## 2023-02-19 RX ORDER — HYDROMORPHONE HCL IN WATER/PF 6 MG/30 ML
0.4 PATIENT CONTROLLED ANALGESIA SYRINGE INTRAVENOUS
Status: DISCONTINUED | OUTPATIENT
Start: 2023-02-19 | End: 2023-02-20 | Stop reason: HOSPADM

## 2023-02-19 RX ORDER — HYDROCHLOROTHIAZIDE 12.5 MG/1
12.5 TABLET ORAL DAILY
Status: DISCONTINUED | OUTPATIENT
Start: 2023-02-20 | End: 2023-02-20

## 2023-02-19 RX ORDER — ASPIRIN 81 MG/1
81 TABLET ORAL DAILY
Status: DISCONTINUED | OUTPATIENT
Start: 2023-02-20 | End: 2023-02-20 | Stop reason: HOSPADM

## 2023-02-19 RX ORDER — CALCIUM CARBONATE 500 MG/1
500 TABLET, CHEWABLE ORAL 3 TIMES DAILY PRN
Status: DISCONTINUED | OUTPATIENT
Start: 2023-02-19 | End: 2023-02-20 | Stop reason: HOSPADM

## 2023-02-19 RX ORDER — CALCIUM CARBONATE 500 MG/1
1 TABLET, CHEWABLE ORAL DAILY PRN
COMMUNITY
End: 2023-07-31

## 2023-02-19 RX ORDER — ROSUVASTATIN CALCIUM 5 MG/1
10 TABLET, COATED ORAL AT BEDTIME
Status: DISCONTINUED | OUTPATIENT
Start: 2023-02-20 | End: 2023-02-20 | Stop reason: HOSPADM

## 2023-02-19 RX ORDER — GABAPENTIN 100 MG/1
200 CAPSULE ORAL EVERY MORNING
Status: DISCONTINUED | OUTPATIENT
Start: 2023-02-20 | End: 2023-02-20 | Stop reason: HOSPADM

## 2023-02-19 RX ORDER — HYDROMORPHONE HCL IN WATER/PF 6 MG/30 ML
0.2 PATIENT CONTROLLED ANALGESIA SYRINGE INTRAVENOUS
Status: DISCONTINUED | OUTPATIENT
Start: 2023-02-19 | End: 2023-02-20 | Stop reason: HOSPADM

## 2023-02-19 RX ORDER — LOSARTAN POTASSIUM 50 MG/1
50 TABLET ORAL 2 TIMES DAILY
Status: DISCONTINUED | OUTPATIENT
Start: 2023-02-19 | End: 2023-02-20 | Stop reason: HOSPADM

## 2023-02-19 RX ORDER — NITROGLYCERIN 0.4 MG/1
0.4 TABLET SUBLINGUAL EVERY 5 MIN PRN
Status: DISCONTINUED | OUTPATIENT
Start: 2023-02-19 | End: 2023-02-20 | Stop reason: HOSPADM

## 2023-02-19 RX ORDER — MAGNESIUM HYDROXIDE/ALUMINUM HYDROXICE/SIMETHICONE 120; 1200; 1200 MG/30ML; MG/30ML; MG/30ML
30 SUSPENSION ORAL EVERY 4 HOURS PRN
Status: DISCONTINUED | OUTPATIENT
Start: 2023-02-19 | End: 2023-02-20 | Stop reason: HOSPADM

## 2023-02-19 RX ORDER — MORPHINE SULFATE 2 MG/ML
2 INJECTION, SOLUTION INTRAMUSCULAR; INTRAVENOUS ONCE
Status: COMPLETED | OUTPATIENT
Start: 2023-02-19 | End: 2023-02-19

## 2023-02-19 RX ADMIN — LOSARTAN POTASSIUM 50 MG: 50 TABLET, FILM COATED ORAL at 21:32

## 2023-02-19 RX ADMIN — ALUMINUM HYDROXIDE, MAGNESIUM HYDROXIDE, AND SIMETHICONE 30 ML: 200; 200; 20 SUSPENSION ORAL at 20:33

## 2023-02-19 RX ADMIN — ACETAMINOPHEN 650 MG: 325 TABLET ORAL at 14:50

## 2023-02-19 RX ADMIN — ACETAMINOPHEN 975 MG: 325 TABLET, FILM COATED ORAL at 22:16

## 2023-02-19 RX ADMIN — HYDROMORPHONE HYDROCHLORIDE 0.2 MG: 0.2 INJECTION, SOLUTION INTRAMUSCULAR; INTRAVENOUS; SUBCUTANEOUS at 19:44

## 2023-02-19 RX ADMIN — HYOSCYAMINE SULFATE 125 MCG: 0.12 TABLET ORAL at 23:16

## 2023-02-19 RX ADMIN — SODIUM CHLORIDE 60 ML: 9 INJECTION, SOLUTION INTRAVENOUS at 13:37

## 2023-02-19 RX ADMIN — MORPHINE SULFATE 2 MG: 2 INJECTION, SOLUTION INTRAMUSCULAR; INTRAVENOUS at 16:40

## 2023-02-19 RX ADMIN — IOPAMIDOL 80 ML: 755 INJECTION, SOLUTION INTRAVENOUS at 13:37

## 2023-02-19 RX ADMIN — GABAPENTIN 600 MG: 300 CAPSULE ORAL at 21:31

## 2023-02-19 RX ADMIN — ALUMINUM HYDROXIDE, MAGNESIUM HYDROXIDE, AND SIMETHICONE 30 ML: 200; 200; 20 SUSPENSION ORAL at 14:51

## 2023-02-19 RX ADMIN — PANTOPRAZOLE SODIUM 40 MG: 40 INJECTION, POWDER, FOR SOLUTION INTRAVENOUS at 18:52

## 2023-02-19 ASSESSMENT — ACTIVITIES OF DAILY LIVING (ADL)
ADLS_ACUITY_SCORE: 35

## 2023-02-19 ASSESSMENT — ENCOUNTER SYMPTOMS
VOMITING: 0
SHORTNESS OF BREATH: 0

## 2023-02-19 NOTE — ED TRIAGE NOTES
Sudden onset of mid sternal chest and through to the back pain at 1030.  Pain is constant, has taken nitroglycerine with no relief. Hx of ascending aortic aneurism that's 4.2 cm last time it was checked.  Hypertensive in triage, A&o x 4.

## 2023-02-19 NOTE — H&P
[facility]  Internal Medicine  H & P      Patient Name: Alexandra Thomas MRN# [id number]   Age: 72 year old YOB: 1950     Date of Admission:2/19/2023    Primary care provider: Emeka Richardson  Date of Service: 2/19/2023         Assessment and Plan:   Alexandra Thomas is a 72 year old female with a history of Sjogren's Syndrome, Diverticulitis, Gastroparesis, GIB, GERD, Esophageal Spasm, HTN, Paroxysmal Atrial Fibrillation, NSVT, SVT, Thoracic Aortic Aneurysm, CAD, recent COVID-19 (1/11/2023) who presents to the ED today with chest pain.      Acute Chest Pain - pt presents with acute onset of midsternal chest pain with radiation to the back.  Hemodynamically stable, afebrile, on room air.  Troponin x2 negative.  EKG with NSR with no acute ischemic changes.  CT Aorta revealed minimal calcified atherosclerotic disease and no dissection or acute periaortic hematoma involving the thoracic or abdominal aorta.  Patient has had persistent pain despite tylenol, GI cocktail, morphine and took a sl nitroglycerin pta.  Given persistent pain, ED staff discussed the case with Cardiology on call and no heparin was indicated at this time.  - Acute ACS is felt to be less likely given negative serial troponin and nonischemic EKG.  Possible her symptoms are related to her known GERD and Esophageal Spasms.  Given persistent pain and CAD hx, will admit for ACS rule out and cardiac stress testing  - serial troponin levels  - telemetry monitoring  - exercise stress echocardiogram (pt declines any chemical stress tests due to prior experience not tolerating, no hx true allergy)      Hx GERD and Esophageal Spasm - followed by [facility] and per most recent note, patient had an EGD 9/2022 was normal and nitroglycerin prn was recommended.  [facility] saw pt on 12/2022 and recommended esophageal manometry testing.  Currently on Prilosec bid.      - start IV PPI, Maalox, Levsin, Tums, prn Nitroglycerin as chest pain  symptoms may be due to reflux/spasm  - if symptoms not controlled and cardiac work up negative, consider GI consult     CAD  HTN, HLD - Minimal calcified atherosclerotic disease noted on CT imaging today.  Per Cardiology note, pt with a negative exercise stress echocardiogram in 2016.  - obtain stress test as above      - continue Rosuvastatin, hydrochlorothiazide, Losartan    Thoracic Aortic Aneurysm - known Aneurysm.  CT on admission today revealed no dissection or acute periaortic hematoma involving the thoracic or abdominal aorta. Ectasia of the ascending thoracic aorta measuring 4.1 cm       Hx Paroxysmal atrial fibrillation - which developed after a chemical exposure on a cruise ship s/p successful ablation in 2011 without documented recurrence.  EKG on admission in NSR.    Hx PVC/NSVT - for which she is intolerant to beta-blocker.     TATI - not currently on cpap    Chronic Pain Syndrome - continue Gabapentin      Selena Houston MS, PA-C  Physician Assistant   Hospitalist Service  Pager: [phone]           Chief Complaint:   Chest Pain         HPI:   72 year old female with a history of Sjogren's Syndrome, Diverticulitis, Gastroparesis, GIB, GERD, Esophageal Spasm, HTN, Paroxysmal Atrial Fibrillation, NSVT, SVT, Thoracic Aortic Aneurysm, CAD, recent COVID-19 (1/11/2023) who presents to the ED today with chest pain.    Patient reports she had a sudden onset of midsternal chest pain with radiation to the back around 1030 am today which has been persistent.  She denies any radiation to the jaw or arm, shortness of breath.  Pain is not worse with deep breaths or movement and is not reproducible.  Pt reports heart burn and a hx of esophageal spasms, but reports this feels worse than any spasm episode she has has in the past.  She reports her last cardiac stress test was in 2016.  She has been following with GI and on a PPI bid.  She has nitroglycerine ordered for her spasms, but reports she has not had to use  it.  She is schedule to have a manometry test on Wednesday.           Past Medical History:     Past Medical History:   Diagnosis Date     Arthritis     hips     Ascending aortic aneurysm     4.2 cm   Ascending     Diverticulitis of colon      Gastroesophageal reflux disease     Esophageal spasms.     Gastroparesis      Heart murmur      High cholesterol      History of GI bleed      Hypertension      Sjogren's syndrome (H)           Past Surgical History:     Past Surgical History:   Procedure Laterality Date     ABDOMEN SURGERY       APPENDECTOMY       ARTHROPLASTY HIP Left 07/27/2022    Procedure: Left total hip arthroplasty;  Surgeon: Berry Mcdonnell MD;  Location: [facility] OR     BACK SURGERY       BIOPSY       BREAST SURGERY       CHOLECYSTECTOMY       COLECTOMY PARTIAL      Due to recurrent diverticulitis     COLONOSCOPY       ENT SURGERY      T & A     ESOPHAGOSCOPY, GASTROSCOPY, DUODENOSCOPY (EGD), COMBINED N/A 09/13/2022    Procedure: ESOPHAGOGASTRODUODENOSCOPY (EGD) (fv);  Surgeon: Seymour Chatterjee MD;  Location:  GI     GENITOURINARY SURGERY       HYSTERECTOMY TOTAL ABDOMINAL       LASIK       left midfoot fusion Left     2nd toe osteotomy     shouder replacement Right      TOTAL HIP ARTHROPLASTY Right 06/07/2009     TOTAL KNEE ARTHROPLASTY Right 07/12/2006     TOTAL KNEE ARTHROPLASTY Left 02/18/2009          Social History:     Social History     Socioeconomic History     Marital status:      Spouse name: Not on file     Number of children: Not on file     Years of education: Not on file     Highest education level: Not on file   Occupational History     Not on file   Tobacco Use     Smoking status: Never     Smokeless tobacco: Never   Vaping Use     Vaping Use: Never used   Substance and Sexual Activity     Alcohol use: Not Currently     Drug use: Never     Sexual activity: Not Currently   Other Topics Concern     Parent/sibling w/ CABG, MI or angioplasty before 65F 55M? No   Social History  Narrative     Not on file     Social Determinants of Health     Financial Resource Strain: Low Risk      Difficulty of Paying Living Expenses: Not hard at all   Food Insecurity: No Food Insecurity     Worried About Running Out of Food in the Last Year: Never true     Ran Out of Food in the Last Year: Never true   Transportation Needs: No Transportation Needs     Lack of Transportation (Medical): No     Lack of Transportation (Non-Medical): No   Physical Activity: Insufficiently Active     Days of Exercise per Week: 1 day     Minutes of Exercise per Session: 10 min   Stress: No Stress Concern Present     Feeling of Stress : Not at all   Social Connections: Moderately Isolated     Frequency of Communication with Friends and Family: More than three times a week     Frequency of Social Gatherings with Friends and Family: More than three times a week     Attends Sikh Services: More than 4 times per year     Active Member of Clubs or Organizations: No     Attends Club or Organization Meetings: Not on file     Marital Status:    Intimate Partner Violence: Not on file   Housing Stability: Low Risk      Unable to Pay for Housing in the Last Year: No     Number of Places Lived in the Last Year: 2     Unstable Housing in the Last Year: No          Family History:     Family History   Problem Relation Age of Onset     Chronic Obstructive Pulmonary Disease Mother      Dementia Father      Chronic Obstructive Pulmonary Disease Father      Macular Degeneration Maternal Grandmother      Other Cancer Maternal Grandmother         Liver     Breast Cancer Paternal Cousin         before 50     Diabetes Paternal Grandfather      Diabetes Paternal Grandmother      Other Cancer Cousin         Breast/Mets     Substance Abuse Sister      Glaucoma No family hx of           Allergies:      Allergies   Allergen Reactions     Ciprofloxacin Other (See Comments)     Pt has aortic aneurysm.  Increased risk of rupture or dissection with  use of fluoroquinolones.       Food Anaphylaxis     EGGPLANT-->anaphylaxis       Venlafaxine Hcl Diarrhea, Shortness Of Breath and Nausea and Vomiting     Meperidine Rash                Adhesive Tape      blisters     Morphine Hives and Itching     Histamine reaction          Medications:     Prior to Admission medications    Medication Sig Last Dose Taking? Auth Provider Long Term End Date   acetaminophen (TYLENOL) 325 MG tablet Take 2 tablets (650 mg) by mouth every 4 hours as needed for other (mild pain)   Tiffany Lundy PA-C     cyclobenzaprine (FLEXERIL) 10 MG tablet TAKE 1 TABLET 3 TIMES DAILY AS NEEDED.  Patient not taking: Reported on 2/5/2023   Reported, Patient     gabapentin (NEURONTIN) 100 MG capsule Take 200 mg by mouth every morning   Reported, Patient Yes    gabapentin (NEURONTIN) 300 MG capsule Take 600 mg by mouth At Bedtime   Reported, Patient Yes    hydrochlorothiazide (HYDRODIURIL) 25 MG tablet Take 1 tablet (25 mg) by mouth daily   Melissa Torres, DO Yes    losartan (COZAAR) 50 MG tablet Take 1 tablet (50 mg) by mouth 2 times daily   Melissa Torres, DO Yes    melatonin 5 MG tablet Take 5 mg by mouth nightly as needed for sleep   Reported, Patient     methylcellulose (CITRUCEL) 500 MG TABS tablet Take 1 tablet by mouth daily   Reported, Patient     multivitamin w/minerals (THERA-VIT-M) tablet Take 1 tablet by mouth daily   Reported, Patient     nitroGLYcerin (NITROSTAT) 0.4 MG sublingual tablet as needed for other Esophageal spasm  Patient not taking: Reported on 2/5/2023   Reported, Patient Yes    omeprazole (PRILOSEC) 40 MG DR capsule    Reported, Patient     ondansetron (ZOFRAN ODT) 4 MG ODT tab Take 1 tablet (4 mg) by mouth every 8 hours as needed for nausea   Delfina Negrete MD     rosuvastatin (CRESTOR) 10 MG tablet Take 10 mg by mouth daily   Reported, Patient No    Vitamin D3 (CHOLECALCIFEROL) 25 mcg (1000 units) tablet Take 1 tablet by mouth daily    Reported, Patient            Review of Systems:   A complete ROS was performed and is negative other than what is stated in the HPI.       Physical Exam:   Blood pressure (!) 141/94, pulse 67, temperature 96.9  F (36.1  C), temperature source Temporal, resp. rate 16, SpO2 96 %, not currently breastfeeding.  General: Alert, interactive, NAD, talking on the telephone  HEENT: AT/NC  Chest/Resp: clear to auscultation bilaterally, no crackles or wheezes  Heart/CV: regular rate and rhythm, no murmur, no reproducible pain.  Abdomen/GI: Soft, nontender, nondistended. +BS.  No rebound or guarding.  Extremities/MSK: No LE edema  Skin: Warm and dry  Neuro: Alert & oriented x 3, no focal deficits, moves all extremities equally       Labs:   ROUTINE ICU LABS (Last four results)  CMP  Recent Labs   Lab 02/19/23  1326 02/19/23  1315   NA  --  140   POTASSIUM  --  4.0   CHLORIDE  --  103   CO2  --  27   ANIONGAP  --  10   GLC  --  101*   BUN  --  21.3   CR 1.1* 0.88   GFRESTIMATED 52* 69   CHESTER  --  9.6     CBC  Recent Labs   Lab 02/19/23  1315   WBC 5.8   RBC 4.19   HGB 13.4   HCT 41.1   MCV 98   MCH 32.0   MCHC 32.6   RDW 12.8        INRNo lab results found in last 7 days.  Arterial Blood GasNo lab results found in last 7 days.       Imaging/Procedures:     Results for orders placed or performed during the hospital encounter of 02/19/23   CT Aortic Survey w Contrast    Narrative    EXAM: CT AORTIC SURVEY WITH CONTRAST  LOCATION: [facility]  DATE/TIME: 02/19/2023, 1:45 PM    INDICATION: Thoracic aortic aneurysm. Chest pain. Back pain.  COMPARISON: None.  TECHNIQUE: CT angiogram chest abdomen pelvis during arterial phase of injection of IV contrast. 2D and 3D MIP reconstructions were performed by the CT technologist. Dose reduction techniques were used.   CONTRAST: 80 mL Isovue 370.    FINDINGS:   CT ANGIOGRAM CHEST, ABDOMEN, AND PELVIS: No dissection involving the thoracic or abdominal aorta. Patency  of the main branch vessels taking off of the thoracic and abdominal aorta. Minimal calcified atherosclerotic disease noted. Ectasia of the ascending   thoracic aorta measuring 4.1 cm series 5 image 75. No periaortic hematoma identified.    LUNGS AND PLEURA: No effusions or pneumothorax. No acute airspace disease.    MEDIASTINUM/AXILLAE: No suspicious lymph node. No acute mediastinal abnormality.    CORONARY ARTERY CALCIFICATION: Mild-moderate.    HEPATOBILIARY: Cholecystectomy. No acute hepatic abnormality.    PANCREAS: Normal.    SPLEEN: A few splenic calcifications.    ADRENAL GLANDS: Normal.    KIDNEYS/BLADDER: No hydronephrosis or urolithiasis. No acute renal abnormality. The bladder is obscured by streak artifact.    BOWEL: No small bowel obstruction. Moderate stool throughout the colon. No acute inflammatory change. A few colonic diverticula noted. Postoperative change at the sigmoid level.    LYMPH NODES: Normal.    PELVIC ORGANS: Largely obscured by streak artifact from the hip arthroplasties.    MUSCULOSKELETAL: Bilateral hip arthroplasties. Degenerative changes.      Impression    IMPRESSION:  1.  No dissection or acute periaortic hematoma involving the thoracic or abdominal aorta.  2.  Ectasia of the ascending thoracic aorta measuring 4.1 cm.  3.  Coronary artery calcifications.

## 2023-02-19 NOTE — ED PROVIDER NOTES
ED ATTENDING PHYSICIAN NOTE:   I evaluated this patient in conjunction with John Wright PA-C  I have participated in the care of the patient and personally performed key elements of the history, exam, and medical decision making.      HPI:   Alexandra Thomas is a 72 year old female who presents with chest pain. Symptoms began around 1300 this afternoon. She has never experience pain like this before. The pain has been constant in the middle of her chest. It is dull in nature. She has had esophageal spasms before but this does not feel similar. No trauma. No fever, chills, cough, or shortness of breath.      EXAM:   Constitutional: Well appearing.  HEENT: Atraumatic.   Moist mucous membranes.  Neck: Soft.  Supple.  No JVD.  Cardiac: Regular rate and rhythm.  No murmur or rub.  Respiratory: Clear to auscultation bilaterally.  No respiratory distress.  No wheezing, rhonchi, or rales.  Abdomen: Soft and nontender. Nondistended.  Musculoskeletal: No edema.  Normal range of motion.  Neurologic: Alert and oriented x3.  Normal tone and bulk.   Skin: No rashes.  No edema.  Psych: Normal affect.  Normal behavior.       MEDICAL DECISION MAKING/ASSESSMENT AND PLAN:   Alexandra Thomas is a 72 year woman who is afebrile and hemodynamically stable. EKG and troponin without ischemia. Aneurysm is stable in CT imaging. Plan for admission for chest pain rule out.      DIAGNOSIS:   1. Chest pain.    DISPOSITION:   Admission to the hospitalist service.      2/19/2023  [facility] EMERGENCY DEPT     Gurdeep Lamb MD  02/27/23 1500

## 2023-02-19 NOTE — ED PROVIDER NOTES
History     Chief Complaint:  Chest Pain       HPI   Alexandra Thomas is a 72 year old female with a history of AAA, GERD, DVT, HTN, and Sjogren's disease who presents with chest pain. The patient states that she was sitting [employer] when she developed a dull central chest pain about 3.5 hours ago. The pain is constant and hasn't changed.  Nothing seems to make the pain better or worse.  She denies any shortness of breath or vomiting. She states that this does not feel like an esophageal spasm. She has a stable aortic aneurysm. She is supposed to have a spasm study done in 3 days.  She has no swelling in her legs or calves, history of DVT or PE, recent surgeries, travel, history of cancer, cough, hemoptysis.  At [employer] at 1030 this morning.  See HPI.  Her vitals are unremarkable.      Independent Historian:   None - Patient Only    Review of External Notes: cardiology visit from 11/17/22     ROS:  Review of Systems   Respiratory: Negative for shortness of breath.    Cardiovascular: Positive for chest pain.   Gastrointestinal: Negative for vomiting.   All other systems reviewed and are negative.      Allergies:  Ciprofloxacin  Venlafaxine Hcl  Meperidine  Adhesive Tape  Morphine     Medications:    cyclobenzaprine  gabapentin   hydrochlorothiazide   losartan   methylcellulose   nitroGLYcerin  omeprazole   Rosuvastatin    Past Medical History:    Arthritis  AAA  DVT of colon  GERD  Gastroparesis  GI bleed  HTN  Sjorens syndrome  High cholesterol    Past Surgical History:    Appendectomy  Hip arthroplasty  Back surgery  Cholecystectomy  Colectomy partial  Tonsillectomy  Adenoidectomy  Hysterectomy  Lasik  Knee arthroplasty     Family History:    Mother-COPD  Father-Dementia, COPD    Social History:  The patient presents to the ED alone.    Physical Exam     Patient Vitals for the past 24 hrs:   BP Temp Temp src Pulse Resp SpO2   02/19/23 1700 (!) 144/94 -- -- 66 16 95 %   02/19/23 1630 (!) 141/94 -- -- 67 16 96 %    02/19/23 1615 (!) 164/90 -- -- 70 15 98 %   02/19/23 1600 (!) 148/96 -- -- 67 12 98 %   02/19/23 1545 (!) 141/84 -- -- 65 11 99 %   02/19/23 1500 (!) 156/89 -- -- 67 25 99 %   02/19/23 1415 (!) 162/91 -- -- 65 10 98 %   02/19/23 1400 (!) 162/94 -- -- 73 17 99 %   02/19/23 1345 (!) 180/93 -- -- 72 -- 100 %   02/19/23 1309 (!) 197/112 96.9  F (36.1  C) Temporal 80 16 100 %        Physical Exam  General: Well appearing, pleasant  HEENT: Conjunctive are clear.  Extraocular eye movements intact.  Neck range of motion intact.  Nose and throat patent.  Respiratory: Good breath sounds bilaterally  Cardiovascular: Normal rate and rhythm.  Radial pulses equal and symmetric.  Gastrointestinal: Soft, nontender  Musculoskeletal: Atraumatic.  Moves all extremities.  Does not precipitate the pain.  No chest wall tenderness.  Skin: Exposed skin clear.  Neurologic: Alert.  Psych:  Patient is cooperative    Emergency Department Course     Imaging:  CT Aortic Survey w Contrast   Final Result   IMPRESSION:   1.  No dissection or acute periaortic hematoma involving the thoracic or abdominal aorta.   2.  Ectasia of the ascending thoracic aorta measuring 4.1 cm.   3.  Coronary artery calcifications.            Report per radiology    Laboratory:  Labs Ordered and Resulted from Time of ED Arrival to Time of ED Departure   BASIC METABOLIC PANEL - Abnormal       Result Value    Sodium 140      Potassium 4.0      Chloride 103      Carbon Dioxide (CO2) 27      Anion Gap 10      Urea Nitrogen 21.3      Creatinine 0.88      Calcium 9.6      Glucose 101 (*)     GFR Estimate 69     ISTAT CREATININE POCT - Abnormal    Creatinine POCT 1.1 (*)     GFR, ESTIMATED POCT 52 (*)    TROPONIN T, HIGH SENSITIVITY - Normal    Troponin T, High Sensitivity 11     TROPONIN T, HIGH SENSITIVITY - Normal    Troponin T, High Sensitivity 10     CBC WITH PLATELETS AND DIFFERENTIAL    WBC Count 5.8      RBC Count 4.19      Hemoglobin 13.4      Hematocrit 41.1       MCV 98      MCH 32.0      MCHC 32.6      RDW 12.8      Platelet Count 224      % Neutrophils 53      % Lymphocytes 37      % Monocytes 8      % Eosinophils 1      % Basophils 1      % Immature Granulocytes 0      NRBCs per 100 WBC 0      Absolute Neutrophils 3.1      Absolute Lymphocytes 2.1      Absolute Monocytes 0.4      Absolute Eosinophils 0.1      Absolute Basophils 0.0      Absolute Immature Granulocytes 0.0      Absolute NRBCs 0.0     ISTAT CREATININE POCT        Procedures   none    Emergency Department Course & Assessments:    Interventions:  Medications   CT Scan Flush (60 mLs Intravenous Given 2/19/23 1337)   iopamidol (ISOVUE-370) solution 500 mL (80 mLs Intravenous Given 2/19/23 1337)   acetaminophen (TYLENOL) tablet 650 mg (650 mg Oral Given 2/19/23 1450)   lidocaine (viscous) (XYLOCAINE) 2 % 15 mL, alum & mag hydroxide-simethicone (MAALOX) 15 mL GI Cocktail (30 mLs Oral Given 2/19/23 1451)   morphine (PF) injection 2 mg (2 mg Intravenous Given 2/19/23 1640)        Independent Interpretation (X-rays, CTs, rhythm strip):  none     Consultations/Discussion of Management or Tests:   1608 I staffed the patient with Dr. Lamb.   1640 I consulted with Selena Houston accepting for Dr. Yanez of the hospitalist service and discussed patient admission. She wished for me to consult cardiology before admitting the patient.   1656 I spoke with Dr. Gilman with cardiology about the patient's history and plan of care. He agreed to the plan for admission and will consult during the patient's stay.   1701 I consulted with Selena Houston accepting for Dr. Yanez of the hospitalist service again to update her after speaking with Dr. Gilman. She accepted care of the patient.        Social Determinants of Health affecting care:   None    Assessments:  1355 Obtained the patients history and performed initial exam  1557 I rechecked and updated the patient.  1614 I rechecked the patient after speaking with Dr. Lamb, and he  performed an assessment of the patient.     Disposition:  The patient was admitted to the hospital under the care of Dr. Yanez.     Impression & Plan    CMS Diagnoses: None      Medical Decision Making:  Alexandra Thomas is a 72 year old female with a history of thoracic aortic aneurysm, hypertension, hyperlipidemia, presents emergency room today for evaluation of an abrupt onset of chest pain that started at [employer] at 1030 this morning.  See HPI.  Her vitals are unremarkable.  She has a reassuring exam and is in no acute distress.  Her EKG is reassuring.  CBC, BMP, troponin, serial troponin unremarkable.  Patient was taken to CT urgently given her symptoms and history of aortic aneurysm.  This was thankfully unremarkable for any hematoma or dissection.  It measures at 4.1 cm.  There is mild to moderate coronary artery calcification.  Patient took nitroglycerin before arrival without relief in her symptoms.  She was given a GI cocktail which sometimes helps her esophageal spasm and this did not help either here.  She was given morphine with some relief in her symptoms.  At this point, the patient is high risk for coronary artery disease given her age, coronary artery disease on CT, hypertension, hyperlipidemia.  She has not had a stress test since 2016 in [place] that was unremarkable.  I staffed the case with Dr. Lamb.  She has a heart score of 4 for risk factors and age.  I talked with cardiology for to review the case. We will hold off on any heparin at this time and admit for further monitoring and cares.  Pain is relieved some with morphine.  Hospital service is kindly accepting.  Patient is comfortable with the plan and has no further questions.  Cardiology recommended formal consultation tomorrow.      Diagnosis:    ICD-10-CM    1. Chest pain, unspecified type  R07.9            Discharge Medications:  New Prescriptions    No medications on file          Scribe Disclosure:  Benji BETTS am  serving as a scribe at 1:58 PM on 2/19/2023 to document services personally performed by John Wright PA-C based on my observations and the provider's statements to me.     Scribe Disclosure:  I, Lucindamelissa Frenchy, am serving as a scribe at 3:57 PM on 2/19/2023 to document services personally performed by John Wright PA-C based on my observations and the provider's statements to me.    2/19/2023   John Wright PA-C Cyr, Matthew R, PA-C  02/19/23 1730

## 2023-02-19 NOTE — ED NOTES
[facility]  ED Nurse Handoff Report    Alexandra Thomas is a 72 year old female   ED Chief complaint: Chest Pain  . ED Diagnosis:   Final diagnoses:   Chest pain, unspecified type     Allergies:   Allergies   Allergen Reactions     Ciprofloxacin Other (See Comments)     Pt has aortic aneurysm.  Increased risk of rupture or dissection with use of fluoroquinolones.       Food Anaphylaxis     EGGPLANT-->anaphylaxis       Venlafaxine Hcl Diarrhea, Shortness Of Breath and Nausea and Vomiting     Meperidine Rash                Adhesive Tape      blisters     Morphine Hives and Itching     Histamine reaction       Code Status: Has an Advance Directive  Activity level - Baseline/Home:  Independent. Activity Level - Current:   Independent. Lift room needed: No. Bariatric: No   Needed: No   Isolation: No. Infection: Not Applicable.     Vital Signs:   Vitals:    02/19/23 1600 02/19/23 1615 02/19/23 1630 02/19/23 1700   BP: (!) 148/96 (!) 164/90 (!) 141/94 (!) 144/94   Pulse: 67 70 67 66   Resp: 12 15 16 16   Temp:       TempSrc:       SpO2: 98% 98% 96% 95%       Cardiac Rhythm:  ,      Pain level:    Patient confused: No. Patient Falls Risk: No.   Elimination Status: Has voided   Patient Report - Initial Complaint: Chest Pain. Focused Assessment:  72 year old female with a history of AAA, GERD, DVT, HTN, and Sjogren's disease who presents with chest pain. The patient states that she was sitting [employer] when she developed a dull central chest pain about 3.5 hours ago. The pain is constant and hasn't changed. It hurts to take a deep breath. She denies any shortness of breath or vomiting. She states that this feels somewhat like an esophageal spasm. She has a stable aortic aneurysm. She is supposed to have a spasm study done in 3 days.        Independent Historian:   None - Patient Only  ROS:  Review of Systems   Respiratory: Negative for shortness of breath.    Cardiovascular: Positive for chest  pain.   Gastrointestinal: Negative for vomiting.   All other systems reviewed and are negative.  Tests Performed: labs, imaging. Abnormal Results:   Labs Ordered and Resulted from Time of ED Arrival to Time of ED Departure   BASIC METABOLIC PANEL - Abnormal       Result Value    Sodium 140      Potassium 4.0      Chloride 103      Carbon Dioxide (CO2) 27      Anion Gap 10      Urea Nitrogen 21.3      Creatinine 0.88      Calcium 9.6      Glucose 101 (*)     GFR Estimate 69     ISTAT CREATININE POCT - Abnormal    Creatinine POCT 1.1 (*)     GFR, ESTIMATED POCT 52 (*)    TROPONIN T, HIGH SENSITIVITY - Normal    Troponin T, High Sensitivity 11     TROPONIN T, HIGH SENSITIVITY - Normal    Troponin T, High Sensitivity 10     CBC WITH PLATELETS AND DIFFERENTIAL    WBC Count 5.8      RBC Count 4.19      Hemoglobin 13.4      Hematocrit 41.1      MCV 98      MCH 32.0      MCHC 32.6      RDW 12.8      Platelet Count 224      % Neutrophils 53      % Lymphocytes 37      % Monocytes 8      % Eosinophils 1      % Basophils 1      % Immature Granulocytes 0      NRBCs per 100 WBC 0      Absolute Neutrophils 3.1      Absolute Lymphocytes 2.1      Absolute Monocytes 0.4      Absolute Eosinophils 0.1      Absolute Basophils 0.0      Absolute Immature Granulocytes 0.0      Absolute NRBCs 0.0     ISTAT CREATININE POCT     CT Aortic Survey w Contrast   Final Result   IMPRESSION:   1.  No dissection or acute periaortic hematoma involving the thoracic or abdominal aorta.   2.  Ectasia of the ascending thoracic aorta measuring 4.1 cm.   3.  Coronary artery calcifications.            Treatments provided: See MAR  Family Comments: Not present  OBS brochure/video discussed/provided to patient:  Yes  ED Medications:   Medications   CT Scan Flush (60 mLs Intravenous Given 2/19/23 1337)   iopamidol (ISOVUE-370) solution 500 mL (80 mLs Intravenous Given 2/19/23 1337)   acetaminophen (TYLENOL) tablet 650 mg (650 mg Oral Given 2/19/23 1450)    lidocaine (viscous) (XYLOCAINE) 2 % 15 mL, alum & mag hydroxide-simethicone (MAALOX) 15 mL GI Cocktail (30 mLs Oral Given 2/19/23 1451)   morphine (PF) injection 2 mg (2 mg Intravenous Given 2/19/23 1640)     Drips infusing:  No  For the majority of the shift, the patient's behavior Green. Interventions performed were na.    Sepsis treatment initiated: No     Patient tested for COVID 19 prior to admission: NO    ED Nurse Name/Phone Number: Aline Kaminski RN,   5:09 PM    RECEIVING UNIT ED HANDOFF REVIEW    Above ED Nurse Handoff Report was reviewed: Yes  Reviewed by: Rosa George RN on February 20, 2023 at 4:23 AM

## 2023-02-20 ENCOUNTER — APPOINTMENT (OUTPATIENT)
Dept: CARDIOLOGY | Facility: CLINIC | Age: 73
End: 2023-02-20
Attending: PHYSICIAN ASSISTANT
Payer: MEDICARE

## 2023-02-20 VITALS
RESPIRATION RATE: 14 BRPM | TEMPERATURE: 98.2 F | WEIGHT: 160.9 LBS | OXYGEN SATURATION: 96 % | HEIGHT: 67 IN | HEART RATE: 82 BPM | DIASTOLIC BLOOD PRESSURE: 83 MMHG | BODY MASS INDEX: 25.25 KG/M2 | SYSTOLIC BLOOD PRESSURE: 135 MMHG

## 2023-02-20 LAB
ATRIAL RATE - MUSE: 76 BPM
DIASTOLIC BLOOD PRESSURE - MUSE: NORMAL MMHG
INTERPRETATION ECG - MUSE: NORMAL
P AXIS - MUSE: 57 DEGREES
PR INTERVAL - MUSE: 160 MS
QRS DURATION - MUSE: 74 MS
QT - MUSE: 406 MS
QTC - MUSE: 456 MS
R AXIS - MUSE: 33 DEGREES
SYSTOLIC BLOOD PRESSURE - MUSE: NORMAL MMHG
T AXIS - MUSE: 54 DEGREES
TROPONIN T SERPL HS-MCNC: 13 NG/L
VENTRICULAR RATE- MUSE: 76 BPM

## 2023-02-20 PROCEDURE — 36415 COLL VENOUS BLD VENIPUNCTURE: CPT | Performed by: PHYSICIAN ASSISTANT

## 2023-02-20 PROCEDURE — 93325 DOPPLER ECHO COLOR FLOW MAPG: CPT | Mod: 26 | Performed by: INTERNAL MEDICINE

## 2023-02-20 PROCEDURE — 93016 CV STRESS TEST SUPVJ ONLY: CPT | Performed by: INTERNAL MEDICINE

## 2023-02-20 PROCEDURE — 96376 TX/PRO/DX INJ SAME DRUG ADON: CPT

## 2023-02-20 PROCEDURE — 93321 DOPPLER ECHO F-UP/LMTD STD: CPT | Mod: 26 | Performed by: INTERNAL MEDICINE

## 2023-02-20 PROCEDURE — C8928 TTE W OR W/O FOL W/CON,STRES: HCPCS

## 2023-02-20 PROCEDURE — 250N000013 HC RX MED GY IP 250 OP 250 PS 637: Performed by: HOSPITALIST

## 2023-02-20 PROCEDURE — 250N000013 HC RX MED GY IP 250 OP 250 PS 637: Performed by: EMERGENCY MEDICINE

## 2023-02-20 PROCEDURE — 93350 STRESS TTE ONLY: CPT | Mod: 26 | Performed by: INTERNAL MEDICINE

## 2023-02-20 PROCEDURE — 250N000011 HC RX IP 250 OP 636: Performed by: PHYSICIAN ASSISTANT

## 2023-02-20 PROCEDURE — 255N000002 HC RX 255 OP 636: Performed by: PHYSICIAN ASSISTANT

## 2023-02-20 PROCEDURE — 250N000013 HC RX MED GY IP 250 OP 250 PS 637: Performed by: PHYSICIAN ASSISTANT

## 2023-02-20 PROCEDURE — 84484 ASSAY OF TROPONIN QUANT: CPT | Performed by: PHYSICIAN ASSISTANT

## 2023-02-20 PROCEDURE — 250N000009 HC RX 250: Performed by: NURSE PRACTITIONER

## 2023-02-20 PROCEDURE — G0378 HOSPITAL OBSERVATION PER HR: HCPCS

## 2023-02-20 PROCEDURE — 93018 CV STRESS TEST I&R ONLY: CPT | Performed by: INTERNAL MEDICINE

## 2023-02-20 PROCEDURE — 99232 SBSQ HOSP IP/OBS MODERATE 35: CPT | Performed by: NURSE PRACTITIONER

## 2023-02-20 PROCEDURE — C9113 INJ PANTOPRAZOLE SODIUM, VIA: HCPCS | Performed by: PHYSICIAN ASSISTANT

## 2023-02-20 PROCEDURE — 250N000013 HC RX MED GY IP 250 OP 250 PS 637: Performed by: NURSE PRACTITIONER

## 2023-02-20 RX ORDER — HYDROCHLOROTHIAZIDE 25 MG/1
12.5 TABLET ORAL DAILY
Start: 2023-02-20 | End: 2023-03-30

## 2023-02-20 RX ORDER — HYDROCHLOROTHIAZIDE 12.5 MG/1
12.5 CAPSULE ORAL DAILY
Status: DISCONTINUED | OUTPATIENT
Start: 2023-02-20 | End: 2023-02-20 | Stop reason: HOSPADM

## 2023-02-20 RX ORDER — SUCRALFATE ORAL 1 G/10ML
1 SUSPENSION ORAL
Status: DISCONTINUED | OUTPATIENT
Start: 2023-02-20 | End: 2023-02-20 | Stop reason: HOSPADM

## 2023-02-20 RX ADMIN — HYDROCHLOROTHIAZIDE 12.5 MG: 12.5 CAPSULE ORAL at 09:31

## 2023-02-20 RX ADMIN — ASPIRIN 81 MG: 81 TABLET, COATED ORAL at 09:28

## 2023-02-20 RX ADMIN — ACETAMINOPHEN 975 MG: 325 TABLET, FILM COATED ORAL at 06:08

## 2023-02-20 RX ADMIN — HUMAN ALBUMIN MICROSPHERES AND PERFLUTREN 4 ML: 10; .22 INJECTION, SOLUTION INTRAVENOUS at 07:30

## 2023-02-20 RX ADMIN — PANTOPRAZOLE SODIUM 40 MG: 40 INJECTION, POWDER, FOR SOLUTION INTRAVENOUS at 06:08

## 2023-02-20 RX ADMIN — SUCRALFATE ORAL 1 G: 1 SUSPENSION ORAL at 11:20

## 2023-02-20 RX ADMIN — HYOSCYAMINE SULFATE 125 MCG: 0.12 TABLET ORAL at 04:06

## 2023-02-20 RX ADMIN — GABAPENTIN 200 MG: 100 CAPSULE ORAL at 09:29

## 2023-02-20 RX ADMIN — ALUMINUM HYDROXIDE, MAGNESIUM HYDROXIDE, AND SIMETHICONE 30 ML GIVEN: 200; 200; 20 SUSPENSION ORAL at 11:19

## 2023-02-20 RX ADMIN — LOSARTAN POTASSIUM 50 MG: 50 TABLET, FILM COATED ORAL at 09:30

## 2023-02-20 RX ADMIN — CALCIUM CARBONATE (ANTACID) CHEW TAB 500 MG 500 MG: 500 CHEW TAB at 04:55

## 2023-02-20 ASSESSMENT — ACTIVITIES OF DAILY LIVING (ADL)
ADLS_ACUITY_SCORE: 35
ADLS_ACUITY_SCORE: 33
ADLS_ACUITY_SCORE: 35
ADLS_ACUITY_SCORE: 33
ADLS_ACUITY_SCORE: 35

## 2023-02-20 NOTE — PROGRESS NOTES
[facility]    Medicine Progress Note - Hospitalist Service    Date of Admission:  2/19/2023    Assessment & Plan     Alexandra Thomas is a 72 year old female with a history of Sjogren's Syndrome, Diverticulitis, Gastroparesis, GIB, GERD, Esophageal Spasm, HTN, Paroxysmal Atrial Fibrillation, NSVT, SVT, Thoracic Aortic Aneurysm, CAD, recent COVID-19 (1/11/2023) who presents to the ED today with chest pain.        Acute Chest Pain   Hx GERD and Esophageal Spasm  Pt presents with acute onset of midsternal chest pain with radiation to the back.  Hemodynamically stable, afebrile, on room air. Cardiac work up has been reassuring with normal labs and normal stress echocardiogram but continues to have chest pain.  Is scheduled to have outpatient GI testing on Wednesday but feels that she cannot last until then and is also concerned about impending snow storm.     -- GI cocktail.  -- Check H.pylori.  -- Continue PPI for now.  -- GI consult. Known to [facility]. Scheduled to have esophageal manometry testing later thisweek.   -- Carafate added.     CAD  HTN, HLD - Minimal calcified atherosclerotic disease noted on CT imaging today.  Per Cardiology note, pt with a negative exercise stress echocardiogram in 2016.  - stress test (echo) without evidence of ischemia.   - continue Rosuvastatin, hydrochlorothiazide, Losartan     Thoracic Aortic Aneurysm - known Aneurysm.  CT on admission  revealed no dissection or acute periaortic hematoma involving the thoracic or abdominal aorta. Ectasia of the ascending thoracic aorta measuring 4.1 cm        Hx Paroxysmal atrial fibrillation - which developed after a chemical exposure on a cruise ship s/p successful ablation in 2011 without documented recurrence.  EKG on admission in NSR.     Hx PVC/NSVT - for which she is intolerant to beta-blocker.      TATI - not currently on cpap     Chronic Pain Syndrome - continue Gabapentin       Diet: Combination Diet Clear Liquid;  No Caffeine Diet    DVT Prophylaxis: Low Risk/Ambulatory with no VTE prophylaxis indicated  Marin Catheter: Not present  Lines: None     Cardiac Monitoring: ACTIVE order. Indication: Chest pain/ ACS rule out (24 hours)  Code Status: Full Code        Disposition Plan  pending clinical course.     Expected Discharge Date: 02/20/2023                The patient's care was discussed with the Bedside Nurse, Care Coordinator/ and Patient.    JESUS Mendez [facility]  Hospitalist Service  [facility]  Securely message with [employer] (more info)  Text page via [employer] Paging/Directory   ______________________________________________________________________    Interval History   Ms. Thomas was seen and examined. VSS.    Still with chest and esophageal pain.    Stress test reassuring.    No significant telemetry ectopy.     Physical Exam   Vital Signs: Temp: 97.5  F (36.4  C) Temp src: Oral BP: 124/75 Pulse: 60   Resp: 12 SpO2: 99 % O2 Device: None (Room air)    Weight: 160 lbs 14.4 oz    GEN:   Alert, oriented x 3, appears comfortable, NAD.  NECK:   Supple ,no mass or thyromegaly   HEENT:  Normocephalic/atraumatic, no scleral icterus, no nasal discharge, mouth moist.  CV:   Regular rate and rhythm, no murmur or JVD.  S1 + S2 noted, no S3 or S4.  LUNGS:   Clear to auscultation bilaterally without rales/rhonchi/wheezing/retractions.  Symmetric chest rise on inhalation noted.  ABD:   Active bowel sounds, soft, non-tender/non-distended.  No rebound/guarding/rigidity.  EXT:   No edema.  No cyanosis.  No joint synovitis noted.  SKIN:   Dry to touch, no exanthems noted in the visualized areas.  Neurologic: Grossly intact,non focal.   Neuropsychiatric:  General: normal, calm and normal eye contact  Level of consciousness: alert / normal  Affect: normal  Orientation: oriented to self, place, time and situation     Medical Decision Making       45 MINUTES SPENT BY ME on the date of service doing  chart review, history, exam, documentation & further activities per the note.      Data     I have personally reviewed the following data over the past 24 hrs:    5.8  \   13.4   / 224     140 103 21.3 /  101 (H)   4.0 27 1.1 (H) \       Trop: 13 BNP: N/A       Imaging results reviewed over the past 24 hrs:   Recent Results (from the past 24 hour(s))   CT Aortic Survey w Contrast    Narrative    EXAM: CT AORTIC SURVEY WITH CONTRAST  LOCATION: [facility]  DATE/TIME: 02/19/2023, 1:45 PM    INDICATION: Thoracic aortic aneurysm. Chest pain. Back pain.  COMPARISON: None.  TECHNIQUE: CT angiogram chest abdomen pelvis during arterial phase of injection of IV contrast. 2D and 3D MIP reconstructions were performed by the CT technologist. Dose reduction techniques were used.   CONTRAST: 80 mL Isovue 370.    FINDINGS:   CT ANGIOGRAM CHEST, ABDOMEN, AND PELVIS: No dissection involving the thoracic or abdominal aorta. Patency of the main branch vessels taking off of the thoracic and abdominal aorta. Minimal calcified atherosclerotic disease noted. Ectasia of the ascending   thoracic aorta measuring 4.1 cm series 5 image 75. No periaortic hematoma identified.    LUNGS AND PLEURA: No effusions or pneumothorax. No acute airspace disease.    MEDIASTINUM/AXILLAE: No suspicious lymph node. No acute mediastinal abnormality.    CORONARY ARTERY CALCIFICATION: Mild-moderate.    HEPATOBILIARY: Cholecystectomy. No acute hepatic abnormality.    PANCREAS: Normal.    SPLEEN: A few splenic calcifications.    ADRENAL GLANDS: Normal.    KIDNEYS/BLADDER: No hydronephrosis or urolithiasis. No acute renal abnormality. The bladder is obscured by streak artifact.    BOWEL: No small bowel obstruction. Moderate stool throughout the colon. No acute inflammatory change. A few colonic diverticula noted. Postoperative change at the sigmoid level.    LYMPH NODES: Normal.    PELVIC ORGANS: Largely obscured by streak artifact from the  hip arthroplasties.    MUSCULOSKELETAL: Bilateral hip arthroplasties. Degenerative changes.      Impression    IMPRESSION:  1.  No dissection or acute periaortic hematoma involving the thoracic or abdominal aorta.  2.  Ectasia of the ascending thoracic aorta measuring 4.1 cm.  3.  Coronary artery calcifications.     Echo Stress Echocardiogram    Narrative    [id number]  [id number]  [id number]  [id number]^FRAN^CHELSIE^S     [facility]  Echocardiography Laboratory  [address] [place] [address]     Name: NILAM MCGOVERN  MRN: [id number]  : 1950  Study Date: 2023 07:24 AM  Age: 72 yrs  Gender: Female  Patient Location: [facility]  Reason For Study: Chest Pain  History: AAA,High cholesterol,Hypertension  Ordering Physician: CHELSIE PETERS  Referring Physician: Emeka Richardson  Performed By: Kellen Ramos RDCS     BSA: 1.8 m2  Height: 67 in  Weight: 160 lb  HR: 70  BP: 112/70 mmHg  ______________________________________________________________________________  Procedure  Stress Echo Complete. Contrast Optison.  ______________________________________________________________________________  Interpretation Summary  Patient reported 4/10 central chest ache at rest, maximal exercise and in  recovery.  This was a normal stress echocardiogram with no evidence of stress-induced  ischemia. This was a normal stress EKG with no evidence of stress-induced  ischemia.  ______________________________________________________________________________  Stress  Exercise was stopped due to fatigue.  There was a normal BP response to exercise.  Patient reported 4/10 central chest ache at rest, maximal exercise and in  recovery.  The patient exercised 6:13.  A low to moderate workload was achieved.  Target Heart Rate was achieved.  The Duke treadmill score was intermediate risk ( -11< Ochoa score <5).  A treadmill exercise test according to the Kishor protocol was performed.  The EKG portion of this  stress test was negative for inducible ischemia (see  echo results below).  Arrhythmia noted in recovery: occasional PVC's.  Left ventricular cavity size decreases with exercise.  Global LV systolic function augments with exercise.  Normal resting wall motion and no stress-induced wall motion abnormality.  The visual ejection fraction is >70%.     Baseline  The patient is in normal sinus rhythm.  RsR' in V2.  The visual ejection fraction is estimated at 55-60%.  No regional wall motion abnormalities noted.     Stress Results             Protocol:  Kishor          Maximum Predicted HR:   146 bpm             Target HR: 124 bpm        % Maximum Predicted HR: 86 %              Duration  Heart    Stage   (mm:ss)   Rate     BP                    Comment                     (bpm)   Stage 1   3:00     100    142/704/10 chest ache   Stage 2   3:00     120    158/704/10 chest ache   Stage 3   0:13     126      /   4/10 chest ache             5:00      76    126/72RPP [id number], FAC Average, DUKE 2, 4/10 chest  RecoveryR                        ache            Stress Duration:   6:13 mm:ss *        Recovery Time: 5:00 mm:ss         Maximum Stress HR: 126 bpm *           METS:          7     Mitral Valve  There is mild (1+) mitral regurgitation.     Tricuspid Valve  There is mild (1+) tricuspid regurgitation.     Aortic Valve  The aortic valve is trileaflet. There is trivial trileaflet aortic sclerosis.  There is trace aortic regurgitation. No hemodynamically significant valvular  aortic stenosis.     Pulmonic Valve  There is trace pulmonic valvular regurgitation.     Vessels  The descending aorta is Mildly dilated.     Pericardium  There is no pericardial effusion.  ______________________________________________________________________________  MMode/2D Measurements & Calculations  asc Aorta Diam: 4.1 cm     Doppler Measurements & Calculations  TR max migel: 233.7 cm/sec  TR max P.8 mmHg      ______________________________________________________________________________  Report approved by: Hong Cervantes 02/20/2023 08:11 AM

## 2023-02-20 NOTE — PROGRESS NOTES
ROOM # [id number]    Living Situation (if not independent, order SW consult): Home with son  Facility name:  : [facility]    Activity level at baseline: Ind  Activity level on admit: Ind    Who will be transporting you at discharge: Ivelisse Ge    Patient registered to observation; given Patient Bill of Rights; given the opportunity to ask questions about observation status and their plan of care.  Patient has been oriented to the observation room, bathroom and call light is in place.    Discussed discharge goals and expectations with patient/family.

## 2023-02-20 NOTE — PHARMACY-ADMISSION MEDICATION HISTORY
Admission medication history interview status for this patient is complete. See [facility] admission navigator for allergy information, prior to admission medications and immunization status.     Medication history interview done, indicate source(s): Patient  Medication history resources (including written lists, pill bottles, clinic record):None  Pharmacy: [id number]    Changes made to PTA medication list:  Added: Tylenol PM, Tums, priobiotic, Mylanta  Changed: hydrochlorothiazide 25 mg to 12.5 mg, added sig to omeprazole  Reported as Not Taking: -  Removed: cyclobenzaprine    Actions taken by pharmacist (provider contacted, etc):None     Additional medication history information:None    Medication reconciliation/reorder completed by provider prior to medication history?  Y   (Y/N)     Medication Affordability:  Not including over the counter (OTC) medications, was there a time in the past 12 months when you did not take your medications as prescribed because of cost?: No     Prior to Admission medications    Medication Sig Last Dose Taking? Auth Provider Long Term End Date   alum & mag hydroxide-simethicone (MAALOX) 200-200-20 MG/5ML SUSP suspension Take 30 mLs by mouth once as needed 2/19/2023 at x 1 Yes Unknown, Entered By History     calcium carbonate (TUMS) 500 MG chewable tablet Take 1 chew tab by mouth daily as needed 2/19/2023 at x1 Yes Unknown, Entered By History     diphenhydrAMINE-acetaminophen (TYLENOL PM)  MG tablet Take 1 tablet by mouth At Bedtime 2/18/2023 Yes Unknown, Entered By History     gabapentin (NEURONTIN) 100 MG capsule Take 200 mg by mouth every morning 2/18/2023 Yes Reported, Patient Yes    gabapentin (NEURONTIN) 300 MG capsule Take 600 mg by mouth At Bedtime 2/18/2023 Yes Reported, Patient Yes    hydrochlorothiazide (HYDRODIURIL) 25 MG tablet Take 1 tablet (25 mg) by mouth daily  Patient taking differently: Take 12.5 mg by mouth daily 2/18/2023 Yes Melissa Torres,  Yes     lactobacillus rhamnosus, GG, (CULTURELL) capsule Take 1 capsule by mouth daily 2/18/2023 Yes Unknown, Entered By History     losartan (COZAAR) 50 MG tablet Take 1 tablet (50 mg) by mouth 2 times daily 2/18/2023 Yes Melissa Torres DO Yes    melatonin 5 MG tablet Take 5 mg by mouth At Bedtime 2/18/2023 Yes Reported, Patient     multivitamin w/minerals (THERA-VIT-M) tablet Take 1 tablet by mouth daily 2/18/2023 Yes Reported, Patient     nitroGLYcerin (NITROSTAT) 0.4 MG sublingual tablet as needed for other Esophageal spasm 2/19/2023 at x1 Yes Reported, Patient Yes    omeprazole (PRILOSEC) 40 MG DR capsule 40 mg 2 times daily 2/18/2023 Yes Reported, Patient     rosuvastatin (CRESTOR) 10 MG tablet Take 10 mg by mouth daily 2/18/2023 at bedtime Yes Reported, Patient No    Vitamin D3 (CHOLECALCIFEROL) 25 mcg (1000 units) tablet Take 1 tablet by mouth daily 2/18/2023 Yes Reported, Patient     acetaminophen (TYLENOL) 325 MG tablet Take 2 tablets (650 mg) by mouth every 4 hours as needed for other (mild pain)   Tiffany Lundy PA-C     ondansetron (ZOFRAN ODT) 4 MG ODT tab Take 1 tablet (4 mg) by mouth every 8 hours as needed for nausea   Delfina Negrete MD

## 2023-02-20 NOTE — CONSULTS
[facility]  Gastroenterology Consultation    Alexandra Thomas  [address] [address]  72 year old female    Admission Date/Time: 2/19/2023  Primary Care Provider: Emeka Richardson    We were asked to see the patient in consultation by Nilson Low NP for evaluation of atypical chest pain.      HPI:  Alexandra Thomas is a 72 year old female who has history of Sjogren Syndrome, diverticulitis (s/p partial colectomy), gastroparesis, GERD, esophageal spasm, HTN, A. Fib (s/p ablation- no anticoagulation), thoracic aortic aneurysm, CAD who presents with constant chest pain.    Patient notes her chest pains began 10:30 AM yesterday morning.  Yesterday, this was constant and rated at an 8/10.  She denied any alleviating or aggravating factors.  Patient notes that this does feel different than her prior esophageal spasms.  At home, she took her as needed nitroglycerin, Tums, Maalox and this was not improving her symptoms.  Because this was not improved, she presented to the ED.  Here, patient has had serial troponins, EKG, stress echocardiogram, CT aortic survey all negative for cardiac cause.  Today, her pain has improved to 4/10 and does wax and wane but is still present constantly.  She continues to deny any aggravating or alleviating factors.  Denies abdominal pain, nausea or vomiting, change to her bowel habits.  Denies any evidence of GI bleeding or unintentional weight loss.  Patient denies any odynophagia or dysphagia.     GI was consulted to consider upper GI/esophageal cause given her history of esophageal spasms.  She was seen in clinic by Roxana José, nurse practitioner on 12/8/23 for esophageal spasm.  Plan at that time was to increase her Prilosec to 40 mg twice daily and set her up for a manometry study.  This has been set up for 2/22.  Patient recently had an upper endoscopy on 9/13/2022 with Dr. Chatterjee.  Esophagus, stomach, duodenum appeared normal.  No  specimens were taken.  At that time, she was prescribed nitroglycerin as needed for esophageal spasm.    Patient denies any family history of GI conditions.  Prior abdominal surgeries include partial colectomy for diverticulitis and cholecystectomy.  Patient denies tobacco, NSAID, alcohol use.    ROS: A comprehensive ten point review of systems was negative aside from those in mentioned in the HPI.      MEDICATIONS: No current facility-administered medications on file prior to encounter.  alum & mag hydroxide-simethicone (MAALOX) 200-200-20 MG/5ML SUSP suspension, Take 30 mLs by mouth once as needed  calcium carbonate (TUMS) 500 MG chewable tablet, Take 1 chew tab by mouth daily as needed  diphenhydrAMINE-acetaminophen (TYLENOL PM)  MG tablet, Take 1 tablet by mouth At Bedtime  gabapentin (NEURONTIN) 100 MG capsule, Take 200 mg by mouth every morning  gabapentin (NEURONTIN) 300 MG capsule, Take 600 mg by mouth At Bedtime  hydrochlorothiazide (HYDRODIURIL) 25 MG tablet, Take 1 tablet (25 mg) by mouth daily (Patient taking differently: Take 12.5 mg by mouth daily)  lactobacillus rhamnosus, GG, (CULTURELL) capsule, Take 1 capsule by mouth daily  losartan (COZAAR) 50 MG tablet, Take 1 tablet (50 mg) by mouth 2 times daily  melatonin 5 MG tablet, Take 5 mg by mouth At Bedtime  multivitamin w/minerals (THERA-VIT-M) tablet, Take 1 tablet by mouth daily  nitroGLYcerin (NITROSTAT) 0.4 MG sublingual tablet, as needed for other Esophageal spasm  omeprazole (PRILOSEC) 40 MG DR capsule, 40 mg 2 times daily  rosuvastatin (CRESTOR) 10 MG tablet, Take 10 mg by mouth daily  Vitamin D3 (CHOLECALCIFEROL) 25 mcg (1000 units) tablet, Take 1 tablet by mouth daily  acetaminophen (TYLENOL) 325 MG tablet, Take 2 tablets (650 mg) by mouth every 4 hours as needed for other (mild pain)  ondansetron (ZOFRAN ODT) 4 MG ODT tab, Take 1 tablet (4 mg) by mouth every 8 hours as needed for nausea        ALLERGIES:   Allergies   Allergen  Reactions     Ciprofloxacin Other (See Comments)     Pt has aortic aneurysm.  Increased risk of rupture or dissection with use of fluoroquinolones.       Food Anaphylaxis     EGGPLANT-->anaphylaxis       Venlafaxine Hcl Diarrhea, Shortness Of Breath and Nausea and Vomiting     Meperidine Rash                Adhesive Tape      blisters     Morphine Hives and Itching     Histamine reaction       Past Medical History:   Diagnosis Date     Arthritis     hips     Ascending aortic aneurysm     4.2 cm   Ascending     Diverticulitis of colon      Gastroesophageal reflux disease     Esophageal spasms.     Gastroparesis      Heart murmur      High cholesterol      History of GI bleed      Hypertension      Sjogren's syndrome (H)        Past Surgical History:   Procedure Laterality Date     ABDOMEN SURGERY       APPENDECTOMY       ARTHROPLASTY HIP Left 07/27/2022    Procedure: Left total hip arthroplasty;  Surgeon: Berry Mcdonnell MD;  Location: [facility] OR     BACK SURGERY       BIOPSY       BREAST SURGERY       CHOLECYSTECTOMY       COLECTOMY PARTIAL      Due to recurrent diverticulitis     COLONOSCOPY       ENT SURGERY      T & A     ESOPHAGOSCOPY, GASTROSCOPY, DUODENOSCOPY (EGD), COMBINED N/A 09/13/2022    Procedure: ESOPHAGOGASTRODUODENOSCOPY (EGD) (fv);  Surgeon: Seymour Chatterjee MD;  Location:  GI     GENITOURINARY SURGERY       HYSTERECTOMY TOTAL ABDOMINAL       LASIK       left midfoot fusion Left     2nd toe osteotomy     shouder replacement Right      TOTAL HIP ARTHROPLASTY Right 06/07/2009     TOTAL KNEE ARTHROPLASTY Right 07/12/2006     TOTAL KNEE ARTHROPLASTY Left 02/18/2009         SOCIAL HISTORY:  Social History     Tobacco Use     Smoking status: Never     Smokeless tobacco: Never   Vaping Use     Vaping Use: Never used   Substance Use Topics     Alcohol use: Not Currently     Drug use: Never       FAMILY HISTORY:  Family History   Problem Relation Age of Onset     Chronic Obstructive Pulmonary Disease Mother       "Dementia Father      Chronic Obstructive Pulmonary Disease Father      Macular Degeneration Maternal Grandmother      Other Cancer Maternal Grandmother         Liver     Breast Cancer Paternal Cousin         before 50     Diabetes Paternal Grandfather      Diabetes Paternal Grandmother      Other Cancer Cousin         Breast/Mets     Substance Abuse Sister      Glaucoma No family hx of        PHYSICAL EXAM:   /75 (BP Location: Left arm)   Pulse 60   Temp 97.5  F (36.4  C) (Oral)   Resp 12   Ht 1.702 m (5' 7\")   Wt 73 kg (160 lb 14.4 oz)   SpO2 99%   BMI 25.20 kg/m      Constitutional: NAD, comfortable, laying in bed  Cardiovascular: RRR  Chest: No tenderness to palpation over the chest wall  Respiratory: CTAB  Psychiatric: mentation appears normal and affect normal  Head: Normocephalic. Atraumatic.    Neck: Neck supple  Eyes:  PERRL, no icterus  ENT: Hearing adequate  Abdomen: Abdomen is soft, nontender, nondistended, normal bowel sounds  NEURO: grossly negative  SKIN: no suspicious lesions or rashes      ADDITIONAL COMMENTS:   I reviewed the patient's new clinical lab test results.   Recent Labs   Lab Test 02/19/23  1315 12/01/22  1010 11/02/22  1157   WBC 5.8 5.3 7.8   HGB 13.4 12.9 13.4   MCV 98 98 96    249 262     Recent Labs   Lab Test 02/19/23  1326 02/19/23  1315 12/01/22  1010 11/02/22  1157   NA  --  140 141 136   POTASSIUM  --  4.0 4.0 4.0   CHLORIDE  --  103 102 102   CO2  --  27 30* 29   BUN  --  21.3 23.4* 32*   CR 1.1* 0.88 0.88 0.91   ANIONGAP  --  10 9 5   CHESTER  --  9.6 9.3 9.0   GLC  --  101* 86 86     Recent Labs   Lab Test 12/04/22  1508 09/02/22  1530 08/19/22  0945   ALBUMIN  --   --  3.3*   BILITOTAL  --   --  0.3   ALT  --   --  14   AST  --   --  15   ALKPHOS  --   --  118   PROTEIN Negative Negative  --      Prior Endoscopy Procedures:  EGD 9/13/22 ([facility]):  - Normal esophagus.   - Normal stomach.   - Normal examined duodenum.   - No specimens collected. "       CONSULTATION ASSESSMENT AND PLAN:    Active Problems:  Atypical Chest Pain  Assessment: This 72-year-old female with history of esophageal spasm (on as needed nitroglycerin), diverticulitis (s/p partial colectomy), gastroparesis, A-fib (s/p ablation without further anticoagulation needed), thoracic artery aneurysm who presents with chest pain lasting for 2 days.  Cardiac work-up including serial troponins, EKG, stress echocardiogram, CT scan aortic survey all negative.  GI was consulted to consider upper GI source of pain given her history of esophageal spasms; patient notes this is different than her prior esophageal spasm pain.  Nitroglycerin, PPI, Mylanta, GI cocktails have been minimally helpful. EGD September 2022 was fully normal without specimens collected. Overall, pain has improved today.      At this time, I do not recommend adding further medications as this could interfere with planned manometry study. Unfortunately, cannot do manometry as inpatient- only available as outpatient. Do not think a repeat EGD would add any information. Would recommend continued conservative treatment and continue with outpatient manometry study as scheduled.      Plan:  -- Continue conservative treatment   -- Continue with outpatient manometry study as planned  -- Ok to ADAT from GI perspective  -- No plans for repeat EGD at this time     I discussed the patient's findings and plan with Dr. Ching, GI staff.      45 minutes spent on patient care including chart review, patient visit, documentation, coordination.     REBECCA Blanc  [facility]  Office:  [phone] call if needed after 5PM  Cell:  [phone], not available after 5PM at this number

## 2023-02-20 NOTE — DISCHARGE SUMMARY
[facility]  Hospitalist Discharge Summary      Date of Admission:  2/19/2023  Date of Discharge:2/20/2023   Discharging Provider: JESUS Mendez CNP  Discharge Service: Hospitalist Service    Discharge Diagnoses   Noncardiac chest pain  GERD  Esophageal spasms  CAD  HTN  HLD  Thoracic aortic aneurysm  Hx paroxysmal atrial fibrillation, PVC, NSVT  TATI  Chronic pain syndrome    Follow-ups Needed After Discharge   Follow-up Appointments     Follow-up and recommended labs and tests       Follow up with primary care provider, Emeka Richardson, within 7 days for   hospital follow- up.  No follow up labs or test are needed.    Keep your appointment with [facility] on 2/22/2023 for esophageal motility   (manometry) testing.  This was previously scheduled. Follow the attached   instructions for your preparation.  Please call [phone] if you have   to cancel.             Unresulted Labs Ordered in the Past 30 Days of this Admission     No orders found for last 31 day(s).      These results will be followed up by NA    Discharge Disposition   Discharged to home  Condition at discharge: Stable      Hospital Course   72 year old female with a history of Sjogren's Syndrome, Diverticulitis, Gastroparesis, GIB, GERD, Esophageal Spasm, HTN, Paroxysmal Atrial Fibrillation, NSVT, SVT, Thoracic Aortic Aneurysm, CAD, recent COVID-19 (1/11/2023) who presents to the ED today with chest pain.     Patient reports she had a sudden onset of midsternal chest pain with radiation to the back around 1030 am today which has been persistent.  She denies any radiation to the jaw or arm, shortness of breath.  Pain is not worse with deep breaths or movement and is not reproducible.  Pt reports heart burn and a hx of esophageal spasms, but reports this feels worse than any spasm episode she has has in the past.  She reports her last cardiac stress test was in 2016.  She has been following with GI and on a PPI bid.  She has  nitroglycerine ordered for her spasms, but reports she has not had to use it.  She is schedule to have a manometry test on Wednesday.      She was admitted to observation and underwent cardiac testing including troponins, EKG monitoring, and stress echocardiogram, all of which are reassuring. No evidence of ACS or aneurysmal dissection.  Her stress echocardiogram did not reveal any acute ischemia.  EF greater than 70% with exercise and 55-60% at rest. No regional wall motion abnormality noted.  She was seen by GI team as she continued to have symptoms.  No acute changes to medical management. She has outpatient manometry testing scheduled for Wednesday, 2/22.      She has a follow up appointment with Cardiology scheduled for later this week on 2/24/2023.  No medication changes.  At time of discharge she was hemodynamically stable and had improvement in her symptoms by 50%.  Questions were answered.  She is being discharged home with outpatient follow up.     Consultations This Hospital Stay   GASTROENTEROLOGY IP CONSULT    Code Status   Full Code    Time Spent on this Encounter   I, JESUS Mendez CNP, personally saw the patient today and spent greater than 30 minutes discharging this patient.       JESUS Mendez CNP  [facility] OBSERVATION DEPT  [address] [place] [address]  Phone: [phone]  ______________________________________________________________________    Physical Exam   Vital Signs: Temp: 97.4  F (36.3  C) Temp src: Oral BP: 121/80 Pulse: 57   Resp: 12 SpO2: 97 % O2 Device: None (Room air)    Weight: 160 lbs 14.4 oz    GEN:   Alert, oriented x 3, appears comfortable, NAD.  NECK:   Supple ,no mass or thyromegaly   HEENT:  Normocephalic/atraumatic, no scleral icterus, no nasal discharge, mouth moist.  CV:   Regular rate and rhythm, no murmur or JVD.  S1 + S2 noted, no S3 or S4.  LUNGS:   Clear to auscultation bilaterally without  rales/rhonchi/wheezing/retractions.  Symmetric chest rise on inhalation noted.  ABD:   Active bowel sounds, soft, non-tender/non-distended.  No rebound/guarding/rigidity.  EXT:   No edema.  No cyanosis.  No joint synovitis noted.  SKIN:   Dry to touch, no exanthems noted in the visualized areas.  Neurologic: Grossly intact,non focal.   Neuropsychiatric:  General: normal, calm and normal eye contact  Level of consciousness: alert / normal  Affect: normal  Orientation: oriented to self, place, time and situation        Primary Care Physician   Emeka Richardson    Discharge Orders      Reason for your hospital stay    Non-cardiac chest pain.  Esophageal spasms  GERD  Chronic pain syndrome     Follow-up and recommended labs and tests     Follow up with primary care provider, Emeka Richardson, within 7 days for hospital follow- up.  No follow up labs or test are needed.    Keep your appointment with [facility] on 2/22/2023 for esophageal motility (manometry) testing.  This was previously scheduled. Follow the attached instructions for your preparation.  Please call [phone] if you have to cancel.     Activity    Your activity upon discharge: activity as tolerated     When to contact your care team    Call your primary doctor if you have any of the following: increased pain or any other concerns.     Discharge Instructions    48 hours (now until Wednesday) -- do not take metoclopramide, bethanechol, erythromycin, muscle relaxants, or benzodiazepines (see hand out).    Wednesday AM:  You may take your other medications with a small sip of water up to 3 ours before your procedure. Please do not take any narcotics. Please call [phone] with any questions and ask to speak to the motility nurse.   Do not eat or drink anything 6 hours before the procedure.     Diet    Follow this diet upon discharge: Orders Placed This Encounter      Regular Diet Adult       Significant Results and Procedures   Most Recent 3 CBC's:Recent Labs    Lab Test 02/19/23  1315 12/01/22  1010 11/02/22  1157   WBC 5.8 5.3 7.8   HGB 13.4 12.9 13.4   MCV 98 98 96    249 262     Most Recent 3 BMP's:Recent Labs   Lab Test 02/19/23  1326 02/19/23  1315 12/01/22  1010 11/02/22  1157   NA  --  140 141 136   POTASSIUM  --  4.0 4.0 4.0   CHLORIDE  --  103 102 102   CO2  --  27 30* 29   BUN  --  21.3 23.4* 32*   CR 1.1* 0.88 0.88 0.91   ANIONGAP  --  10 9 5   CHESTER  --  9.6 9.3 9.0   GLC  --  101* 86 86     HS Samantha 10-13.  Results for orders placed or performed during the hospital encounter of 02/19/23   CT Aortic Survey w Contrast    Narrative    EXAM: CT AORTIC SURVEY WITH CONTRAST  LOCATION: [facility]  DATE/TIME: 02/19/2023, 1:45 PM    INDICATION: Thoracic aortic aneurysm. Chest pain. Back pain.  COMPARISON: None.  TECHNIQUE: CT angiogram chest abdomen pelvis during arterial phase of injection of IV contrast. 2D and 3D MIP reconstructions were performed by the CT technologist. Dose reduction techniques were used.   CONTRAST: 80 mL Isovue 370.    FINDINGS:   CT ANGIOGRAM CHEST, ABDOMEN, AND PELVIS: No dissection involving the thoracic or abdominal aorta. Patency of the main branch vessels taking off of the thoracic and abdominal aorta. Minimal calcified atherosclerotic disease noted. Ectasia of the ascending   thoracic aorta measuring 4.1 cm series 5 image 75. No periaortic hematoma identified.    LUNGS AND PLEURA: No effusions or pneumothorax. No acute airspace disease.    MEDIASTINUM/AXILLAE: No suspicious lymph node. No acute mediastinal abnormality.    CORONARY ARTERY CALCIFICATION: Mild-moderate.    HEPATOBILIARY: Cholecystectomy. No acute hepatic abnormality.    PANCREAS: Normal.    SPLEEN: A few splenic calcifications.    ADRENAL GLANDS: Normal.    KIDNEYS/BLADDER: No hydronephrosis or urolithiasis. No acute renal abnormality. The bladder is obscured by streak artifact.    BOWEL: No small bowel obstruction. Moderate stool throughout the  colon. No acute inflammatory change. A few colonic diverticula noted. Postoperative change at the sigmoid level.    LYMPH NODES: Normal.    PELVIC ORGANS: Largely obscured by streak artifact from the hip arthroplasties.    MUSCULOSKELETAL: Bilateral hip arthroplasties. Degenerative changes.      Impression    IMPRESSION:  1.  No dissection or acute periaortic hematoma involving the thoracic or abdominal aorta.  2.  Ectasia of the ascending thoracic aorta measuring 4.1 cm.  3.  Coronary artery calcifications.     Echo Stress Echocardiogram    Narrative    [id number]  [id number]  [id number]^FRAN^CHELSIE^S     [facility]  Echocardiography Laboratory  [address] [place] [address]     Name: NILAM MCGOVERN  MRN: [id number]  : 1950  Study Date: 2023 07:24 AM  Age: 72 yrs  Gender: Female  Patient Location: [facility]  Reason For Study: Chest Pain  History: AAA,High cholesterol,Hypertension  Ordering Physician: CHELSIE PETERS  Referring Physician: Emeka Richardson  Performed By: Kellen Ramos RDCS     BSA: 1.8 m2  Height: 67 in  Weight: 160 lb  HR: 70  BP: 112/70 mmHg  ______________________________________________________________________________  Procedure  Stress Echo Complete. Contrast Optison.  ______________________________________________________________________________  Interpretation Summary  Patient reported 4/10 central chest ache at rest, maximal exercise and in  recovery.  This was a normal stress echocardiogram with no evidence of stress-induced  ischemia. This was a normal stress EKG with no evidence of stress-induced  ischemia.  ______________________________________________________________________________  Stress  Exercise was stopped due to fatigue.  There was a normal BP response to exercise.  Patient reported 4/10 central chest ache at rest, maximal exercise and in  recovery.  The patient exercised 6:13.  A low to moderate workload was achieved.  Target  Heart Rate was achieved.  The Duke treadmill score was intermediate risk ( -11< Ochoa score <5).  A treadmill exercise test according to the Kishor protocol was performed.  The EKG portion of this stress test was negative for inducible ischemia (see  echo results below).  Arrhythmia noted in recovery: occasional PVC's.  Left ventricular cavity size decreases with exercise.  Global LV systolic function augments with exercise.  Normal resting wall motion and no stress-induced wall motion abnormality.  The visual ejection fraction is >70%.     Baseline  The patient is in normal sinus rhythm.  RsR' in V2.  The visual ejection fraction is estimated at 55-60%.  No regional wall motion abnormalities noted.     Stress Results             Protocol:  Kishor          Maximum Predicted HR:   146 bpm             Target HR: 124 bpm        % Maximum Predicted HR: 86 %              Duration  Heart    Stage   (mm:ss)   Rate     BP                    Comment                     (bpm)   Stage 1   3:00     100    142/704/10 chest ache   Stage 2   3:00     120    158/704/10 chest ache   Stage 3   0:13     126      /   4/10 chest ache             5:00      76    126/72RPP 17345, FAC Average, DUKE 2, 4/10 chest  RecoveryR                        ache            Stress Duration:   6:13 mm:ss *        Recovery Time: 5:00 mm:ss         Maximum Stress HR: 126 bpm *           METS:          7     Mitral Valve  There is mild (1+) mitral regurgitation.     Tricuspid Valve  There is mild (1+) tricuspid regurgitation.     Aortic Valve  The aortic valve is trileaflet. There is trivial trileaflet aortic sclerosis.  There is trace aortic regurgitation. No hemodynamically significant valvular  aortic stenosis.     Pulmonic Valve  There is trace pulmonic valvular regurgitation.     Vessels  The descending aorta is Mildly dilated.     Pericardium  There is no pericardial  effusion.  ______________________________________________________________________________  MMode/2D Measurements & Calculations  asc Aorta Diam: 4.1 cm     Doppler Measurements & Calculations  TR max migel: 233.7 cm/sec  TR max P.8 mmHg     ______________________________________________________________________________  Report approved by: Hong Cervantes 2023 08:11 AM               Discharge Medications   Current Discharge Medication List      CONTINUE these medications which have CHANGED    Details   hydrochlorothiazide (HYDRODIURIL) 25 MG tablet Take 0.5 tablets (12.5 mg) by mouth daily    Associated Diagnoses: PAF (paroxysmal atrial fibrillation) (H); Essential hypertension; TATI (obstructive sleep apnea); Thoracic aortic aneurysm without rupture, unspecified part         CONTINUE these medications which have NOT CHANGED    Details   alum & mag hydroxide-simethicone (MAALOX) 200-200-20 MG/5ML SUSP suspension Take 30 mLs by mouth once as needed      calcium carbonate (TUMS) 500 MG chewable tablet Take 1 chew tab by mouth daily as needed      diphenhydrAMINE-acetaminophen (TYLENOL PM)  MG tablet Take 1 tablet by mouth At Bedtime      !! gabapentin (NEURONTIN) 100 MG capsule Take 200 mg by mouth every morning      !! gabapentin (NEURONTIN) 300 MG capsule Take 600 mg by mouth At Bedtime      lactobacillus rhamnosus, GG, (CULTURELL) capsule Take 1 capsule by mouth daily      losartan (COZAAR) 50 MG tablet Take 1 tablet (50 mg) by mouth 2 times daily  Qty: 180 tablet, Refills: 3    Associated Diagnoses: Thoracic aortic aneurysm without rupture, unspecified part; Essential hypertension; TATI (obstructive sleep apnea); PAF (paroxysmal atrial fibrillation) (H)      melatonin 5 MG tablet Take 5 mg by mouth At Bedtime      multivitamin w/minerals (THERA-VIT-M) tablet Take 1 tablet by mouth daily      nitroGLYcerin (NITROSTAT) 0.4 MG sublingual tablet as needed for other Esophageal spasm      omeprazole  (PRILOSEC) 40 MG DR capsule 40 mg 2 times daily    Associated Diagnoses: Acute cystitis without hematuria      rosuvastatin (CRESTOR) 10 MG tablet Take 10 mg by mouth daily      Vitamin D3 (CHOLECALCIFEROL) 25 mcg (1000 units) tablet Take 1 tablet by mouth daily      acetaminophen (TYLENOL) 325 MG tablet Take 2 tablets (650 mg) by mouth every 4 hours as needed for other (mild pain)  Qty: 100 tablet, Refills: 0    Associated Diagnoses: Status post total replacement of left hip      ondansetron (ZOFRAN ODT) 4 MG ODT tab Take 1 tablet (4 mg) by mouth every 8 hours as needed for nausea  Qty: 12 tablet, Refills: 0       !! - Potential duplicate medications found. Please discuss with provider.        Allergies   Allergies   Allergen Reactions     Ciprofloxacin Other (See Comments)     Pt has aortic aneurysm.  Increased risk of rupture or dissection with use of fluoroquinolones.       Food Anaphylaxis     EGGPLANT-->anaphylaxis       Venlafaxine Hcl Diarrhea, Shortness Of Breath and Nausea and Vomiting     Meperidine Rash                Adhesive Tape      blisters     Morphine Hives and Itching     Histamine reaction

## 2023-02-20 NOTE — PLAN OF CARE
PRIMARY DIAGNOSIS: CHEST PAIN  OUTPATIENT/OBSERVATION GOALS TO BE MET BEFORE DISCHARGE:  1. Ruled out acute coronary syndrome (negative or stable Troponin):  Yes  2. Pain Status: No improvement   3. Appropriate provocative testing performed: Yes  - Stress Test Procedure: Echo  - Interpretation of cardiac rhythm per telemetry tech: SB    4. Cleared by Consultants (if applicable):No, GI consult  5. Return to near baseline physical activity: Yes  Discharge Planner Nurse   Safe discharge environment identified: Yes  Barriers to discharge: Yes       Entered by: Padmini Niño RN 02/20/2023 12:30 PM     Please review provider order for any additional goals.   Nurse to notify provider when observation goals have been met and patient is ready for discharge.

## 2023-02-20 NOTE — PLAN OF CARE
Patient's After Visit Summary was reviewed with patient.   Patient verbalized understanding of After Visit Summary, recommended follow up and was given an opportunity to ask questions.   Discharge medications sent home with patient/family: No   Discharged with self    OBSERVATION patient END time: 1400    A&Ox4, independent in the room, tolerating regular diet, heart sounds WNL, tele- SB, lungs- clear, bowels- active, voiding, reports pain unchanged with GI cocktail, plan to follow up with GI on Wednesday.

## 2023-02-20 NOTE — ED NOTES
Bed: ED32  Expected date: 2/19/23  Expected time: 6:02 PM  Means of arrival: Ambulance  Comments:  JIA2

## 2023-02-20 NOTE — PROGRESS NOTES
"Patient A&Ox4. Up with stand by assist. Continues to have pain in her chest that radiates to her back. Lowest pain was 5/10. Tried Tylenol and dilaudid with minimal relief. Maalox given with the most relief. Gave levsin x 2 and pt reported pain drastically decreased to 2/10 and patient was able to sleep. States pain is \"sore\" and \"goes up to her chin on the right side\". Reports when she had her esophageal spasms, it did the same.       "

## 2023-02-20 NOTE — PLAN OF CARE
PRIMARY DIAGNOSIS: CHEST PAIN  OUTPATIENT/OBSERVATION GOALS TO BE MET BEFORE DISCHARGE:  1. Ruled out acute coronary syndrome (negative or stable Troponin):  Yes  2. Pain Status: No improvement   3. Appropriate provocative testing performed: Yes  - Stress Test Procedure: Echo  - Interpretation of cardiac rhythm per telemetry tech: SB    4. Cleared by Consultants (if applicable):Yes  5. Return to near baseline physical activity: Yes  Discharge Planner Nurse   Safe discharge environment identified: Yes  Barriers to discharge: Yes       Entered by: Padmini Niño RN 02/20/2023 12:33 PM     Please review provider order for any additional goals.   Nurse to notify provider when observation goals have been met and patient is ready for discharge.  A&Ox4, SBA, diet advanced to regular diet, tele, lungs-clear, bowels- active, stool sample needed, voiding, pt reports no improvement in pain with GI cocktail at this time, GI signed off

## 2023-02-21 ENCOUNTER — TELEPHONE (OUTPATIENT)
Dept: NURSING | Facility: CLINIC | Age: 73
End: 2023-02-21
Payer: MEDICARE

## 2023-02-21 NOTE — TELEPHONE ENCOUNTER
Pt calling to check on a prescription for Carafate.    States she was just discharged from the hospital yesterday, 2/20 and says her provider told her he would send a prescription for Carafate in to [facility] in [place].     She is at the pharmacy and it is not there.    No Rx for Carafate in her med list, nor is it mentioned in her discharge summary. Will attempt to route a message to the discharging provider to see if he intended to have her start on the medication. Pt would appreciate a phone call if it is called in to the pharmacy.    Laura Cortez, RN, BSN  [facility]   Triage Nurse Advisor

## 2023-02-22 ENCOUNTER — PATIENT OUTREACH (OUTPATIENT)
Dept: INTERNAL MEDICINE | Facility: CLINIC | Age: 73
End: 2023-02-22
Payer: MEDICARE

## 2023-02-22 NOTE — TELEPHONE ENCOUNTER
Hi.   Per discussion with GI team -- they felt that Carafate may interfere with the results of the manometry reading and would likely not improve her symptoms.  She was given GI cocktail and Carafate while in the hospital with neither improving her symptoms.   Carafate not ordered at discharge.       Thank you for checking.     JESUS Corbin CNP

## 2023-02-22 NOTE — TELEPHONE ENCOUNTER
Date: 2/20/2023  Diagnosis: Atypical Chest Pain  Is patient active in care coordination? No  Was patient in TCU? No    Follow up with primary care provider, Emeka Richardson, within 7 days for   hospital follow- up.  No follow up labs or test are needed.    Attempt #1  LVM request call back to clinic

## 2023-03-01 ENCOUNTER — TRANSFERRED RECORDS (OUTPATIENT)
Dept: HEALTH INFORMATION MANAGEMENT | Facility: CLINIC | Age: 73
End: 2023-03-01

## 2023-03-01 ENCOUNTER — OFFICE VISIT (OUTPATIENT)
Dept: UROLOGY | Facility: CLINIC | Age: 73
End: 2023-03-01
Payer: MEDICARE

## 2023-03-01 VITALS
SYSTOLIC BLOOD PRESSURE: 96 MMHG | DIASTOLIC BLOOD PRESSURE: 64 MMHG | WEIGHT: 160 LBS | HEIGHT: 67 IN | BODY MASS INDEX: 25.11 KG/M2

## 2023-03-01 DIAGNOSIS — M35.00 SICCA SYNDROME (H): ICD-10-CM

## 2023-03-01 DIAGNOSIS — N39.0 RECURRENT UTI: ICD-10-CM

## 2023-03-01 DIAGNOSIS — N39.46 MIXED INCONTINENCE: Primary | ICD-10-CM

## 2023-03-01 DIAGNOSIS — N95.2 ATROPHIC VAGINITIS: ICD-10-CM

## 2023-03-01 PROBLEM — M62.89 PELVIC FLOOR DYSFUNCTION: Status: ACTIVE | Noted: 2019-09-10

## 2023-03-01 LAB
ALBUMIN UR-MCNC: NEGATIVE MG/DL
APPEARANCE UR: CLEAR
BILIRUB UR QL STRIP: NEGATIVE
COLOR UR AUTO: YELLOW
GLUCOSE UR STRIP-MCNC: NEGATIVE MG/DL
HGB UR QL STRIP: NEGATIVE
KETONES UR STRIP-MCNC: NEGATIVE MG/DL
LEUKOCYTE ESTERASE UR QL STRIP: ABNORMAL
NITRATE UR QL: NEGATIVE
PH UR STRIP: 7 [PH] (ref 5–7)
RESIDUAL VOLUME (RV) (EXTERNAL): 12
SP GR UR STRIP: 1.01 (ref 1–1.03)
UROBILINOGEN UR STRIP-ACNC: 0.2 E.U./DL

## 2023-03-01 PROCEDURE — 81003 URINALYSIS AUTO W/O SCOPE: CPT | Mod: QW | Performed by: PHYSICIAN ASSISTANT

## 2023-03-01 PROCEDURE — 51798 US URINE CAPACITY MEASURE: CPT | Performed by: PHYSICIAN ASSISTANT

## 2023-03-01 PROCEDURE — 99213 OFFICE O/P EST LOW 20 MIN: CPT | Mod: 25 | Performed by: PHYSICIAN ASSISTANT

## 2023-03-01 ASSESSMENT — ENCOUNTER SYMPTOMS
DIARRHEA: 1
DYSURIA: 0
CONSTIPATION: 1
HEMATURIA: 0
NAUSEA: 0
CHILLS: 0
FEVER: 0
FREQUENCY: 1
SHORTNESS OF BREATH: 0
VOMITING: 0

## 2023-03-01 ASSESSMENT — PAIN SCALES - GENERAL: PAINLEVEL: NO PAIN (0)

## 2023-03-01 NOTE — PROGRESS NOTES
CC: Recurrent UTIs and urinary incontinence    HPI: Ms. Thomas is a very pleasant 72 year old year old, , female seen in consultation today for recurrent UTIs and urinary incontinence.  She is not immunosuppressed.  She is not diabetic.      Patient notes that she has been getting recurrent urinary tract infections for many years.  She moved to [place] from [place] approximately a year ago.  She was evaluated while she was there and has trialed multiple therapies.  She got her first urinary tract infection when she was in her 20s.  She denies any history of hospitalizations or pyelonephritis.  She did have one episode of nephrolithiasis.    Patient endorses urinary incontinence with a sense of urgency.  She also endorses incontinence with laughing, coughing, and sneezing.  She notes that this is worse than the urge incontinence.  She has tried to do Kegel's.      Patient does feel like she empties her bladder completely.  She does shift positions and bend over to empty her bladder.    Patient endorses difficulties with diarrhea and constipation.  No known family history of urinary tract infections.    Typical symptoms of urinary tract infection  include pelvic pressure, urgency, and occasional dysuria.    Patient has previously trialed probiotics, topical estrogen, Azo, cranberry pills, d-mannose, urinating every 2 hours, and vitamin C.    She does note that she is still taking her probiotic.  She does have the topical estrogen cream, but has not been using it.  The remainder of the therapies she does not use.  She denies having a cystoscopy.  She does note a history of urethral stricture and had a urethral dilatation.  She denies previously using Hiprex.  She does not wear a pad during the daytime, unless she is having issues with her IBS.  She does wear a pad at night due to the urgency.      Patient denies hematuria, dysuria, and incomplete emptying.  She endorses nocturia x1.    She does try to  hydrate.    Recent cultures are as follows:  12/4/2022 greater than 100,000 CFU per mL of pansensitive E. coli  09/02/2022 greater than 100,000 CFU per mL of pansensitive E. coli.  She did bring outside records from [facility] which were reviewed and summarized as above.    Patient has Sjogren's.    The following portions of the patient's history were reviewed and updated as appropriate: allergies, current medications, past family history, past medical history, past social history, past surgical history and problem list.     Review of Systems   Constitutional: Negative for chills and fever.   Respiratory: Negative for shortness of breath.    Cardiovascular: Negative for chest pain.   Gastrointestinal: Positive for constipation and diarrhea. Negative for nausea and vomiting.   Genitourinary: Positive for frequency and urgency. Negative for dysuria and hematuria.        Patient Active Problem List   Diagnosis     Benign essential hypertension     Gastroparesis     Thoracic aortic aneurysm without rupture     Gastroesophageal reflux disease without esophagitis     Pain syndrome, chronic     Sicca syndrome (H)     NSVT (nonsustained ventricular tachycardia)     TATI (obstructive sleep apnea)     S/P total hip arthroplasty     Lattice degeneration of right retina     Hyperlipidemia, acquired     Mixed urge and stress incontinence     Pelvic floor dysfunction     Recurrent UTI       Past Medical History:   Diagnosis Date     Arthritis     hips     Ascending aortic aneurysm     4.2 cm   Ascending     Biceps tendon rupture      Diverticulitis of colon      Gastroesophageal reflux disease     Esophageal spasms.     Gastroparesis      Heart murmur      High cholesterol      History of GI bleed      Hypertension      Sjogren's syndrome (H)        Past Surgical History:   Procedure Laterality Date     ABDOMEN SURGERY       APPENDECTOMY       ARTHROPLASTY HIP Left 07/27/2022    Procedure: Left total hip arthroplasty;   "Surgeon: Berry Mcdonnell MD;  Location: [facility] OR     BACK SURGERY       BIOPSY       BREAST SURGERY       CHOLECYSTECTOMY       COLECTOMY PARTIAL      Due to recurrent diverticulitis     COLONOSCOPY       ENT SURGERY      T & A     ESOPHAGOSCOPY, GASTROSCOPY, DUODENOSCOPY (EGD), COMBINED N/A 09/13/2022    Procedure: ESOPHAGOGASTRODUODENOSCOPY (EGD) (fv);  Surgeon: Seymour Chatterjee MD;  Location:  GI     GENITOURINARY SURGERY       HYSTERECTOMY TOTAL ABDOMINAL       LASIK       left midfoot fusion Left     2nd toe osteotomy     shouder replacement Right      TOTAL HIP ARTHROPLASTY Right 06/07/2009     TOTAL KNEE ARTHROPLASTY Right 07/12/2006     TOTAL KNEE ARTHROPLASTY Left 02/18/2009     Social History:   Never smoker.    Family History:  No family history of recurrent or chronic urinary tract infections.    GENERAL PHYSICAL EXAM:   Vitals: BP 96/64   Ht 1.702 m (5' 7\")   Wt 72.6 kg (160 lb)   BMI 25.06 kg/m    Physical Exam  Constitutional:       Appearance: Normal appearance.   HENT:      Head: Normocephalic.      Nose: Nose normal.   Eyes:      General: No scleral icterus.  Pulmonary:      Effort: Pulmonary effort is normal.   Abdominal:      General: There is no distension.   Genitourinary:     Comments: Normal-appearing external labia.  Vaginal atrophy and slight labial atrophy appreciated.  Normal intact perineal sensation.  No palpable urethral masses.  Small carbuncle at the 6 o'clock position of the urethra.  Hypermobility of the urethra appreciated.  Small amount of dribbling stress incontinence elicited with cough and Valsalva.  Right levator tenderness.  No left levator tenderness Kegel's 4 out of 5, moderate strength.  Anterior grade 1, apical well supported, rectal well supported.  Musculoskeletal:         General: Normal range of motion.      Cervical back: Normal range of motion.      Right lower leg: No edema.      Left lower leg: No edema.   Skin:     General: Skin is warm and dry. "   Neurological:      General: No focal deficit present.      Mental Status: She is alert and oriented to person, place, and time.   Psychiatric:         Mood and Affect: Mood normal.         Behavior: Behavior normal.        Urine -   Recent Labs   Lab Test 03/01/23  1256 12/04/22  1508   COLOR Yellow Yellow   APPEARANCE Clear Clear   URINEGLC Negative Negative   URINEBILI Negative Negative   URINEKETONE Negative Trace*   SG 1.015 1.020   UBLD Negative Trace*   URINEPH 7.0 5.5   PROTEIN Negative Negative   UROBILINOGEN 0.2 0.2   NITRITE Negative Negative   LEUKEST Trace* Large*   RBCU  --  2-5*   WBCU  --  *     TESTING  PVR: 12 mL    ASSESSMENT:   1. Mixed incontinence    2. Recurrent UTI    3. Sicca syndrome (H)    4. Atrophic vaginitis      I had the pleasure today of meeting with Ms. Thomas to discuss her recurrent UTIs and mixed urinary incontinence.  Urinary tract infections are 2nd to the common cold and causing symptoms in women.  We now know that there is strong family history for urinary tract infections due to some tissue markers allow for adherence of bacteria to the bladder lining. In addition we also know that urinary tract infections tend to cluster together. That means that the most likely time to get one is right after you have cleared one.    That does not mean that we would use antibiotics in order to reduce your risk. This is called antibiotic prophylaxis, and we now know that it markedly increases the risk of multiple resistant bacteria.     We will often do an anatomic evaluation for recurrent urinary tract infections which will typically include CT urogram and cystoscopy (scope with your bladder).  If concern for pyelonephritis, we often will do VCUG.  We discussed the anatomic evaluation often does not find any cause for urinary tract infections.  We may be able to reduce urinary tract infections, but we are unable to cure them.    She is more bothered by the stress incontinence.    We discussed stress incontinence treatment options including Kegel exercises/biofeedback bladder retraining, pessary, Poise Impressa, sling, Estim, and bulking agents.     Typical treatments for urge incontinence include avoiding bladder irritants, biofeedback bladder retraining, overactive bladder medications such as oxybutynin, posterior tibial nerve stimulation (PTNS), Botox (which would require learning to perform clean intermittent catheterization and place it works too well), and InterStim. We typically go through this in a stepwise fashion.       PLAN:   1. Hydrate with water to keep the urine dilute.   2. Restart estrogen cream a pea sized amount by the urethra and vaginal introitus at bedtime three times a week (Monday, Wednesday, and Friday).  If >$40, let me know and we can get via a compound pharmacy.  3.  Call if issues.  [phone].    4.  I put in a standing order for a UA and urine culture that can be done at any [place] lab.  5.  Continue with your probiotic.  6.  **This order will print in the [facility] Scheduling Office**    Physical Therapy available through:    *[facility]    Call one number to schedule at any of the above locations: [phone].    Your provider has referred you to: Physical Therapy at [facility] or [facility]    Indication/Reason for Referral: Women's Health.      Trial for 3 months.  If not enough improvement in stress incontinence, would recommend you see Dr. Campos about pessary, urethral bulking, or sling.  7. Lifestyle and hygiene modifications: wiping front to back, wearing cotton breathable underwear, voiding before and after intercourse to flush the urethra, minimizing baths and opting for showers, and appropriate perineal hygiene.   8.  Bladder irritant list provided.   9.  Let me know how you are doing, and if you need a change in plan/to see Dr. Campos/etc.    Possible options in the future:  Hiprex 1000mg BID  Vitamin C 1000mg BID  Prebiotics  Ellura or  TheraCran  Gentamicin irrigations  Vegan diet  Cysto with first available urologist  CT Urogram    Of note, anticholinergics should be avoided given her history of Sjogren's.    Santa Petit PA-C  [facility]   [phone]

## 2023-03-01 NOTE — NURSING NOTE
Chief Complaint   Patient presents with     Urinary incontinence     Recurrent UTI     Pt here for recurrent infections, no UTI symptoms today.  Pt states she has noticed an increase in urinary leakage, pt states this happens with sneezing and coughing, or when she is not able to make it to the bathroom in time.  Pt has to bend over while urinating to empty bladder.    PVR: 12 mL    Eneida Jacobs, CMA

## 2023-03-01 NOTE — LETTER
3/1/2023       RE: Alexandra Thomas  [address] [address]     Dear Colleague,    Thank you for referring your patient, Alexandra Thomas, to the [facility] [place] at [facility]. Please see a copy of my visit note below.    CC: Recurrent UTIs and urinary incontinence    HPI: Ms. Thomas is a very pleasant 72 year old year old, , female seen in consultation today for recurrent UTIs and urinary incontinence.  She is not immunosuppressed.  She is not diabetic.      Patient notes that she has been getting recurrent urinary tract infections for many years.  She moved to [place] from [place] approximately a year ago.  She was evaluated while she was there and has trialed multiple therapies.  She got her first urinary tract infection when she was in her 20s.  She denies any history of hospitalizations or pyelonephritis.  She did have one episode of nephrolithiasis.    Patient endorses urinary incontinence with a sense of urgency.  She also endorses incontinence with laughing, coughing, and sneezing.  She notes that this is worse than the urge incontinence.  She has tried to do Kegel's.      Patient does feel like she empties her bladder completely.  She does shift positions and bend over to empty her bladder.    Patient endorses difficulties with diarrhea and constipation.  No known family history of urinary tract infections.    Typical symptoms of urinary tract infection  include pelvic pressure, urgency, and occasional dysuria.    Patient has previously trialed probiotics, topical estrogen, Azo, cranberry pills, d-mannose, urinating every 2 hours, and vitamin C.    She does note that she is still taking her probiotic.  She does have the topical estrogen cream, but has not been using it.  The remainder of the therapies she does not use.  She denies having a cystoscopy.  She does note a history of urethral  stricture and had a urethral dilatation.  She denies previously using Hiprex.  She does not wear a pad during the daytime, unless she is having issues with her IBS.  She does wear a pad at night due to the urgency.      Patient denies hematuria, dysuria, and incomplete emptying.  She endorses nocturia x1.    She does try to hydrate.    Recent cultures are as follows:  12/4/2022 greater than 100,000 CFU per mL of pansensitive E. coli  09/02/2022 greater than 100,000 CFU per mL of pansensitive E. coli.  She did bring outside records from [facility] which were reviewed and summarized as above.    Patient has Sjogren's.    The following portions of the patient's history were reviewed and updated as appropriate: allergies, current medications, past family history, past medical history, past social history, past surgical history and problem list.     Review of Systems   Constitutional: Negative for chills and fever.   Respiratory: Negative for shortness of breath.    Cardiovascular: Negative for chest pain.   Gastrointestinal: Positive for constipation and diarrhea. Negative for nausea and vomiting.   Genitourinary: Positive for frequency and urgency. Negative for dysuria and hematuria.        Patient Active Problem List   Diagnosis     Benign essential hypertension     Gastroparesis     Thoracic aortic aneurysm without rupture     Gastroesophageal reflux disease without esophagitis     Pain syndrome, chronic     Sicca syndrome (H)     NSVT (nonsustained ventricular tachycardia)     TATI (obstructive sleep apnea)     S/P total hip arthroplasty     Lattice degeneration of right retina     Hyperlipidemia, acquired     Mixed urge and stress incontinence     Pelvic floor dysfunction     Recurrent UTI       Past Medical History:   Diagnosis Date     Arthritis     hips     Ascending aortic aneurysm     4.2 cm   Ascending     Biceps tendon rupture      Diverticulitis of colon      Gastroesophageal reflux disease      "Esophageal spasms.     Gastroparesis      Heart murmur      High cholesterol      History of GI bleed      Hypertension      Sjogren's syndrome (H)        Past Surgical History:   Procedure Laterality Date     ABDOMEN SURGERY       APPENDECTOMY       ARTHROPLASTY HIP Left 07/27/2022    Procedure: Left total hip arthroplasty;  Surgeon: Berry Mcdonnell MD;  Location: [facility] OR     BACK SURGERY       BIOPSY       BREAST SURGERY       CHOLECYSTECTOMY       COLECTOMY PARTIAL      Due to recurrent diverticulitis     COLONOSCOPY       ENT SURGERY      T & A     ESOPHAGOSCOPY, GASTROSCOPY, DUODENOSCOPY (EGD), COMBINED N/A 09/13/2022    Procedure: ESOPHAGOGASTRODUODENOSCOPY (EGD) (fv);  Surgeon: Seymour Chatterjee MD;  Location:  GI     GENITOURINARY SURGERY       HYSTERECTOMY TOTAL ABDOMINAL       LASIK       left midfoot fusion Left     2nd toe osteotomy     shouder replacement Right      TOTAL HIP ARTHROPLASTY Right 06/07/2009     TOTAL KNEE ARTHROPLASTY Right 07/12/2006     TOTAL KNEE ARTHROPLASTY Left 02/18/2009     Social History:   Never smoker.    Family History:  No family history of recurrent or chronic urinary tract infections.    GENERAL PHYSICAL EXAM:   Vitals: BP 96/64   Ht 1.702 m (5' 7\")   Wt 72.6 kg (160 lb)   BMI 25.06 kg/m    Physical Exam  Constitutional:       Appearance: Normal appearance.   HENT:      Head: Normocephalic.      Nose: Nose normal.   Eyes:      General: No scleral icterus.  Pulmonary:      Effort: Pulmonary effort is normal.   Abdominal:      General: There is no distension.   Genitourinary:     Comments: Normal-appearing external labia.  Vaginal atrophy and slight labial atrophy appreciated.  Normal intact perineal sensation.  No palpable urethral masses.  Small carbuncle at the 6 o'clock position of the urethra.  Hypermobility of the urethra appreciated.  Small amount of dribbling stress incontinence elicited with cough and Valsalva.  Right levator tenderness.  No left levator tenderness " Kegel's 4 out of 5, moderate strength.  Anterior grade 1, apical well supported, rectal well supported.  Musculoskeletal:         General: Normal range of motion.      Cervical back: Normal range of motion.      Right lower leg: No edema.      Left lower leg: No edema.   Skin:     General: Skin is warm and dry.   Neurological:      General: No focal deficit present.      Mental Status: She is alert and oriented to person, place, and time.   Psychiatric:         Mood and Affect: Mood normal.         Behavior: Behavior normal.        Urine -   Recent Labs   Lab Test 03/01/23  1256 12/04/22  1508   COLOR Yellow Yellow   APPEARANCE Clear Clear   URINEGLC Negative Negative   URINEBILI Negative Negative   URINEKETONE Negative Trace*   SG 1.015 1.020   UBLD Negative Trace*   URINEPH 7.0 5.5   PROTEIN Negative Negative   UROBILINOGEN 0.2 0.2   NITRITE Negative Negative   LEUKEST Trace* Large*   RBCU  --  2-5*   WBCU  --  *     TESTING  PVR: 12 mL    ASSESSMENT:   1. Mixed incontinence    2. Recurrent UTI    3. Sicca syndrome (H)    4. Atrophic vaginitis      I had the pleasure today of meeting with Ms. Thomas to discuss her recurrent UTIs and mixed urinary incontinence.  Urinary tract infections are 2nd to the common cold and causing symptoms in women.  We now know that there is strong family history for urinary tract infections due to some tissue markers allow for adherence of bacteria to the bladder lining. In addition we also know that urinary tract infections tend to cluster together. That means that the most likely time to get one is right after you have cleared one.    That does not mean that we would use antibiotics in order to reduce your risk. This is called antibiotic prophylaxis, and we now know that it markedly increases the risk of multiple resistant bacteria.     We will often do an anatomic evaluation for recurrent urinary tract infections which will typically include CT urogram and cystoscopy (scope  with your bladder).  If concern for pyelonephritis, we often will do VCUG.  We discussed the anatomic evaluation often does not find any cause for urinary tract infections.  We may be able to reduce urinary tract infections, but we are unable to cure them.    She is more bothered by the stress incontinence.   We discussed stress incontinence treatment options including Kegel exercises/biofeedback bladder retraining, pessary, Poise Impressa, sling, Estim, and bulking agents.     Typical treatments for urge incontinence include avoiding bladder irritants, biofeedback bladder retraining, overactive bladder medications such as oxybutynin, posterior tibial nerve stimulation (PTNS), Botox (which would require learning to perform clean intermittent catheterization and place it works too well), and InterStim. We typically go through this in a stepwise fashion.       PLAN:   1. Hydrate with water to keep the urine dilute.   2. Restart estrogen cream a pea sized amount by the urethra and vaginal introitus at bedtime three times a week (Monday, Wednesday, and Friday).  If >$40, let me know and we can get via a compound pharmacy.  3.  Call if issues.  [phone].    4.  I put in a standing order for a UA and urine culture that can be done at any [facility] lab.  5.  Continue with your probiotic.  6.  **This order will print in the [facility] Scheduling Office**    Physical Therapy available through:    *[facility]    Call one number to schedule at any of the above locations: [phone].    Your provider has referred you to: Physical Therapy at [facility] or [facility]    Indication/Reason for Referral: Women's Health.      Trial for 3 months.  If not enough improvement in stress incontinence, would recommend you see Dr. Campos about pessary, urethral bulking, or sling.  7. Lifestyle and hygiene modifications: wiping front to back, wearing cotton breathable underwear, voiding before and after intercourse to flush the urethra,  minimizing baths and opting for showers, and appropriate perineal hygiene.   8.  Bladder irritant list provided.   9.  Let me know how you are doing, and if you need a change in plan/to see Dr. Campos/etc.    Possible options in the future:  Hiprex 1000mg BID  Vitamin C 1000mg BID  Prebiotics  Ellura or TheraCran  Gentamicin irrigations  Vegan diet  Cysto with first available urologist  CT Urogram    Of note, anticholinergics should be avoided given her history of Sjogren's.    Santa Petit PA-C  [facility]   [phone]

## 2023-03-01 NOTE — PATIENT INSTRUCTIONS
Hydrate with water to keep the urine dilute.   2. Restart estrogen cream a pea sized amount by the urethra and vaginal introitus at bedtime three times a week (Monday, Wednesday, and Friday).  If >$40, let me know and we can get via a compound pharmacy.  3.  Call if issues.  [phone].    4.  I put in a standing order for a UA and urine culture that can be done at any [facility] lab.  5.  Continue with your probiotic.  6.  **This order will print in the [facility] Scheduling Office**    Physical Therapy available through:    *[facility]    Call one number to schedule at any of the above locations: [phone].    Your provider has referred you to: Physical Therapy at [facility] or [facility]    Indication/Reason for Referral: Women's Health.  Trial for 3 months.  If not enough improvement in stress incontinence, would recommend you see Dr. Campos about pessary, urethral bulking, or sling.  7. Lifestyle and hygiene modifications: wiping front to back, wearing cotton breathable underwear, voiding before and after intercourse to flush the urethra, minimizing baths and opting for showers, and appropriate perineal hygiene.     8. Below is a list of things that can irritate the bladder and should try to remove to see if it improves your symptoms:     Caffeinated soft drinks.  Coffee.  Tea.  Chocolate.  Tomato-based foods.  Acidic juices and fruits. (includes cranberry juice)  Alcohol.  Carbonated drinks.  Aspartame/Nutrasweet (artificial sweeteners)  Vitamin C supplements and citrus fruit  Spicy food   9.  Let me know how you are doing, and if you need a change in plan/to see Dr. Campos/etc.    Contact us in the interim with questions, concerns, or changes in symptomatology.  [phone]

## 2023-03-04 ENCOUNTER — OFFICE VISIT (OUTPATIENT)
Dept: URGENT CARE | Facility: URGENT CARE | Age: 73
End: 2023-03-04
Payer: MEDICARE

## 2023-03-04 VITALS
TEMPERATURE: 97.6 F | OXYGEN SATURATION: 99 % | BODY MASS INDEX: 25.7 KG/M2 | HEART RATE: 63 BPM | WEIGHT: 164.1 LBS | DIASTOLIC BLOOD PRESSURE: 78 MMHG | SYSTOLIC BLOOD PRESSURE: 135 MMHG

## 2023-03-04 DIAGNOSIS — N76.0 VAGINITIS AND VULVOVAGINITIS: ICD-10-CM

## 2023-03-04 DIAGNOSIS — R35.0 URINARY FREQUENCY: Primary | ICD-10-CM

## 2023-03-04 LAB
ALBUMIN UR-MCNC: NEGATIVE MG/DL
APPEARANCE UR: CLEAR
BILIRUB UR QL STRIP: NEGATIVE
CLUE CELLS: ABNORMAL
COLOR UR AUTO: YELLOW
GLUCOSE UR STRIP-MCNC: NEGATIVE MG/DL
HGB UR QL STRIP: ABNORMAL
KETONES UR STRIP-MCNC: NEGATIVE MG/DL
LEUKOCYTE ESTERASE UR QL STRIP: NEGATIVE
NITRATE UR QL: NEGATIVE
PH UR STRIP: 6 [PH] (ref 5–7)
RBC #/AREA URNS AUTO: ABNORMAL /HPF
SP GR UR STRIP: 1.01 (ref 1–1.03)
SQUAMOUS #/AREA URNS AUTO: ABNORMAL /LPF
TRICHOMONAS, WET PREP: ABNORMAL
UROBILINOGEN UR STRIP-ACNC: 0.2 E.U./DL
WBC #/AREA URNS AUTO: ABNORMAL /HPF
WBC'S/HIGH POWER FIELD, WET PREP: ABNORMAL
YEAST, WET PREP: ABNORMAL

## 2023-03-04 PROCEDURE — 99213 OFFICE O/P EST LOW 20 MIN: CPT | Performed by: FAMILY MEDICINE

## 2023-03-04 PROCEDURE — 87210 SMEAR WET MOUNT SALINE/INK: CPT

## 2023-03-04 PROCEDURE — 81001 URINALYSIS AUTO W/SCOPE: CPT

## 2023-03-04 PROCEDURE — 87086 URINE CULTURE/COLONY COUNT: CPT | Performed by: FAMILY MEDICINE

## 2023-03-04 RX ORDER — FLUCONAZOLE 150 MG/1
150 TABLET ORAL ONCE
Qty: 1 TABLET | Refills: 0 | Status: SHIPPED | OUTPATIENT
Start: 2023-03-04 | End: 2023-03-04

## 2023-03-04 NOTE — PROGRESS NOTES
SUBJECTIVE:  Chief Complaint   Patient presents with     Vaginal Problem     Itching, burning x days     Alexandra Thomas is a 72 year old female who presents with a chief complaint of vaginal itchiness and burning.    Was seen by Urology and had pelvic exam.  Recommend to try estrogen cream which she has already, this was old and states that afterwards that developed itchiness.  Feels more on the outside.    Was given estrogen cream due to recurrent UTI, states that was still getting UTI so ended up stopping about 2 years ago.    Took Augmentin yesterday before dental cleaning    Past Medical History:   Diagnosis Date     Arthritis     hips     Ascending aortic aneurysm     4.2 cm   Ascending     Biceps tendon rupture      Diverticulitis of colon      Gastroesophageal reflux disease     Esophageal spasms.     Gastroparesis      Heart murmur      High cholesterol      History of GI bleed      Hypertension      Sjogren's syndrome (H)      Current Outpatient Medications   Medication Sig Dispense Refill     acetaminophen (TYLENOL) 325 MG tablet Take 2 tablets (650 mg) by mouth every 4 hours as needed for other (mild pain) 100 tablet 0     alum & mag hydroxide-simethicone (MAALOX) 200-200-20 MG/5ML SUSP suspension Take 30 mLs by mouth once as needed       calcium carbonate (TUMS) 500 MG chewable tablet Take 1 chew tab by mouth daily as needed       diphenhydrAMINE-acetaminophen (TYLENOL PM)  MG tablet Take 1 tablet by mouth At Bedtime       gabapentin (NEURONTIN) 100 MG capsule Take 200 mg by mouth every morning       gabapentin (NEURONTIN) 300 MG capsule Take 600 mg by mouth At Bedtime       hydrochlorothiazide (HYDRODIURIL) 25 MG tablet Take 0.5 tablets (12.5 mg) by mouth daily       lactobacillus rhamnosus (GG) (CULTURELL) capsule Take 1 capsule by mouth daily       losartan (COZAAR) 50 MG tablet Take 1 tablet (50 mg) by mouth 2 times daily 180 tablet 3     melatonin 5 MG tablet Take 5 mg by mouth At  Bedtime       multivitamin w/minerals (THERA-VIT-M) tablet Take 1 tablet by mouth daily       nitroGLYcerin (NITROSTAT) 0.4 MG sublingual tablet as needed for other Esophageal spasm       omeprazole (PRILOSEC) 40 MG DR capsule 40 mg 2 times daily       ondansetron (ZOFRAN ODT) 4 MG ODT tab Take 1 tablet (4 mg) by mouth every 8 hours as needed for nausea 12 tablet 0     rosuvastatin (CRESTOR) 10 MG tablet Take 10 mg by mouth daily       Vitamin D3 (CHOLECALCIFEROL) 25 mcg (1000 units) tablet Take 1 tablet by mouth daily       Social History     Tobacco Use     Smoking status: Never     Smokeless tobacco: Never   Substance Use Topics     Alcohol use: Not Currently       ROS:  Review of systems negative except as stated above.    EXAM:   /78   Pulse 63   Temp 97.6  F (36.4  C)   Wt 74.4 kg (164 lb 1.6 oz)   SpO2 99%   BMI 25.70 kg/m    GENERAL APPEARANCE: healthy, alert and no distress  PSYCH:alert, affect bright    Results for orders placed or performed in visit on 03/04/23   UA macro with reflex to Microscopic and Culture - Clinc Collect     Status: Abnormal    Specimen: Urine, Midstream   Result Value Ref Range    Color Urine Yellow Colorless, Straw, Light Yellow, Yellow    Appearance Urine Clear Clear    Glucose Urine Negative Negative mg/dL    Bilirubin Urine Negative Negative    Ketones Urine Negative Negative mg/dL    Specific Gravity Urine 1.015 1.003 - 1.035    Blood Urine Trace (A) Negative    pH Urine 6.0 5.0 - 7.0    Protein Albumin Urine Negative Negative mg/dL    Urobilinogen Urine 0.2 0.2, 1.0 E.U./dL    Nitrite Urine Negative Negative    Leukocyte Esterase Urine Negative Negative   UA Microscopic with Reflex to Culture     Status: Abnormal   Result Value Ref Range    RBC Urine 2-5 (A) 0-2 /HPF /HPF    WBC Urine 0-5 0-5 /HPF /HPF    Squamous Epithelials Urine Few (A) None Seen /LPF    Narrative    Urine Culture not indicated   Wet prep - Clinic Collect     Status: Abnormal    Specimen: Vagina;  Swab   Result Value Ref Range    Trichomonas Absent Absent    Yeast Absent Absent    Clue Cells Absent Absent    WBCs/high power field 2+ (A) None         ASSESSMENT/PLAN:  (R35.0) Urinary frequency  (primary encounter diagnosis)  Plan: UA macro with reflex to Microscopic and Culture        - Clinc Collect, Wet prep - Clinic Collect, UA         Microscopic with Reflex to Culture, Urine         Culture Aerobic Bacterial - lab collect            (N76.0) Vaginitis and vulvovaginitis  Plan: fluconazole (DIFLUCAN) 150 MG tablet            Reassurance given, reviewed labs and will obtain full urine culture in setting of recurrent UTI and treat if positive for true infection.  Encourage plenty of fluids.    Discussed possible irritation from estrogen cream vs yeast more on labia area instead of vaginal, RX diflucan given to see if this will help alleviate symptoms.  Okay to apply topical aquaphor for symptomatic relief.  Hold off on estrogen cream until symptoms improve and can try again, if develops similar symptoms then may be due to contact reaction.    Follow up with primary provider in 1-2 weeks    Saw Mcclelland MD  March 4, 2023 11:37 AM

## 2023-03-06 LAB — BACTERIA UR CULT: NO GROWTH

## 2023-03-20 ENCOUNTER — TRANSFERRED RECORDS (OUTPATIENT)
Dept: HEALTH INFORMATION MANAGEMENT | Facility: CLINIC | Age: 73
End: 2023-03-20
Payer: MEDICARE

## 2023-03-22 ENCOUNTER — OFFICE VISIT (OUTPATIENT)
Dept: INTERNAL MEDICINE | Facility: CLINIC | Age: 73
End: 2023-03-22
Attending: PHYSICIAN ASSISTANT
Payer: MEDICARE

## 2023-03-22 VITALS
TEMPERATURE: 97.7 F | HEART RATE: 77 BPM | RESPIRATION RATE: 12 BRPM | WEIGHT: 168.7 LBS | DIASTOLIC BLOOD PRESSURE: 77 MMHG | OXYGEN SATURATION: 98 % | HEIGHT: 67 IN | BODY MASS INDEX: 26.48 KG/M2 | SYSTOLIC BLOOD PRESSURE: 126 MMHG

## 2023-03-22 DIAGNOSIS — N89.8 VAGINAL ITCHING: ICD-10-CM

## 2023-03-22 DIAGNOSIS — I48.0 PAF (PAROXYSMAL ATRIAL FIBRILLATION) (H): ICD-10-CM

## 2023-03-22 DIAGNOSIS — L30.9 DERMATITIS: Primary | ICD-10-CM

## 2023-03-22 DIAGNOSIS — I71.20 THORACIC AORTIC ANEURYSM WITHOUT RUPTURE, UNSPECIFIED PART (H): ICD-10-CM

## 2023-03-22 PROCEDURE — 99213 OFFICE O/P EST LOW 20 MIN: CPT

## 2023-03-22 RX ORDER — TRIAMCINOLONE ACETONIDE 1 MG/G
CREAM TOPICAL
Qty: 15 G | Refills: 0 | Status: SHIPPED | OUTPATIENT
Start: 2023-03-22 | End: 2023-10-11

## 2023-03-22 ASSESSMENT — ENCOUNTER SYMPTOMS
ARTHRALGIAS: 0
PALPITATIONS: 0
ABDOMINAL PAIN: 0
SORE THROAT: 0
HEMATURIA: 0
NAUSEA: 0
DIZZINESS: 0
DIARRHEA: 0
FEVER: 0
COUGH: 0
PARESTHESIAS: 0
HEMATOCHEZIA: 0
NERVOUS/ANXIOUS: 0
SHORTNESS OF BREATH: 0
MYALGIAS: 1
HEARTBURN: 1
BREAST MASS: 0
JOINT SWELLING: 0
DYSURIA: 0
FREQUENCY: 0
CHILLS: 0
HEADACHES: 0
WEAKNESS: 1
CONSTIPATION: 0
EYE PAIN: 0

## 2023-03-22 ASSESSMENT — ACTIVITIES OF DAILY LIVING (ADL): CURRENT_FUNCTION: NO ASSISTANCE NEEDED

## 2023-03-22 NOTE — PROGRESS NOTES
"  Assessment & Plan     Dermatitis  Unsure of etiology of itching   - Ob/Gyn Referral; Future  - triamcinolone (KENALOG) 0.1 % external cream; Use as needed for itching for up to 2 weeks    Vaginal itching  Has been ongoing since appointment with urology and after using estrogen cream. on exam areas to the left and right labia have redness she has used OTC hydrocortisone cream with some relief.  Patient should see OB regarding further treatment.    I will give a prescription for Kenalog as needed for a trial and for patient comfort.  Patient should not use this for longer than 2 weeks at a time.    She did complete a wet prep which was negative and she was treated with diflucan. She also does experience some incontinence which could be contributing  - Ob/Gyn Referral; Future  - triamcinolone (KENALOG) 0.1 % external cream; Use as needed for itching for up to 2 weeks    Thoracic aortic aneurysm without rupture, unspecified part  Follows with cardiology    PAF (paroxysmal atrial fibrillation) (H)  Follows with cardiology     MED REC REQUIRED  Post Medication Reconciliation Status: discharge medications reconciled and changed, per note/orders  BMI:   Estimated body mass index is 26.42 kg/m  as calculated from the following:    Height as of this encounter: 1.702 m (5' 7\").    Weight as of this encounter: 76.5 kg (168 lb 11.2 oz).       Emeka Richardson NP  [facility] NEIL Morris is a 72 year old, presenting for the following health issues:    Need parking permit (She needs a handicap parking permit.), Vaginal Problem (She has itching on the outside of her vaginal area for 2 1/2 weeks.), and RECHECK ([facility]    [facility]     ED/UC Followup:    Facility:  [facility]  Date of visit: 3/4/23  Reason for visit: Vaginal problem  Current Status: Not improving    Was given estrogen cream for recent UTI. They did an exam at urologist. " "Since then she has been having an awful itching and went to UC- Wet prep and UA was   Denies new use of new product in area or any new product that touches area. Denies redness, and denies discharge     Onset: 3/4/2023  Location: crease on outside of skin  Characteristics: itching, has been slightly improving   Aggravating/relieving: vulvovaginal hydrocortisone cream had helped     [place] on feet where sandals expose skin    Review of Systems   Constitutional, HEENT, cardiovascular, pulmonary, GI, , musculoskeletal, neuro, skin, endocrine and psych systems are negative, except as otherwise noted.      Objective    /77 (BP Location: Right arm, Patient Position: Sitting, Cuff Size: Adult Large)   Pulse 77   Temp 97.7  F (36.5  C) (Oral)   Resp 12   Ht 1.702 m (5' 7\")   Wt 76.5 kg (168 lb 11.2 oz)   SpO2 98%   BMI 26.42 kg/m    Body mass index is 26.42 kg/m .  Physical Exam   GENERAL: alert and no distress  EYES: Eyes grossly normal to inspection  NECK: no adenopathy, no asymmetry, masses, or scars and thyroid normal to palpation   (female): normal female external genitalia, normal urethral meatus, vaginal mucosa pink, moist, some redness bilateral vuvlva, vaginal atrophy noted  MS: no gross musculoskeletal defects noted, no edema  SKIN: no suspicious lesions or rashes  NEURO: Normal strength and tone, mentation intact and speech normal  PSYCH: mentation appears normal, affect normal/bright          "

## 2023-03-24 ENCOUNTER — OFFICE VISIT (OUTPATIENT)
Dept: OBGYN | Facility: CLINIC | Age: 73
End: 2023-03-24
Payer: MEDICARE

## 2023-03-24 VITALS — DIASTOLIC BLOOD PRESSURE: 74 MMHG | BODY MASS INDEX: 25.69 KG/M2 | SYSTOLIC BLOOD PRESSURE: 124 MMHG | WEIGHT: 164 LBS

## 2023-03-24 DIAGNOSIS — N76.2 ACUTE VULVITIS: Primary | ICD-10-CM

## 2023-03-24 PROCEDURE — 99203 OFFICE O/P NEW LOW 30 MIN: CPT | Performed by: OBSTETRICS & GYNECOLOGY

## 2023-03-24 RX ORDER — CLOBETASOL PROPIONATE 0.5 MG/G
OINTMENT TOPICAL
Qty: 45 G | Refills: 3 | Status: SHIPPED | OUTPATIENT
Start: 2023-03-24 | End: 2023-10-11

## 2023-03-24 NOTE — NURSING NOTE
"Chief Complaint   Patient presents with     Vaginal Problem     Vag itching since 3/2/23-day after seeing urology-has been using OTC hydrocortisone        Initial /74   Wt 74.4 kg (164 lb)   BMI 25.69 kg/m   Estimated body mass index is 25.69 kg/m  as calculated from the following:    Height as of 3/22/23: 1.702 m (5' 7\").    Weight as of this encounter: 74.4 kg (164 lb).  BP completed using cuff size: regular    Questioned patient about current smoking habits.  Pt. has never smoked.      "

## 2023-03-24 NOTE — PROGRESS NOTES
Assessment & Plan     Acute vulvitis  - Based on Hx and exam, this is most c/w acute focal inflammation from recent Urology evaluation, unresponsive to 2 days of Triamcinolone cream  - Rx sent to pharm clobetasol (TEMOVATE) 0.05 % external ointment; Apply to affected skin outside vagina twice a day x 1 week, then once a day x 1 week, then every other day for 1 week, then twice weekly.  - I advised Pt that vulvar irritation seems to respond better to ointments than creams.  - Pt to ensure doing laundry and showering with fragrance/dye-free soaps and detergents  - If sx's do not resolve after 3-week Clobetasol taper, may RTC and consider vulvar Bx  - Also, Pt should hold off on topical estradiol cream until after this episode resolves.  Then can use 1 g E2 cream vaginally at bedtime x 3 weeks, then stop, and repeat Q 3 months prn    Review of the result(s) of each unique test - Wet prep Neg, Ur C&S Neg 3/4/2023             No follow-ups on file.    Ned Barlow MD  [facility] [place] is a 72 year old, presenting for the following health issues:  Vaginal Problem (Vag itching since 3/2/23-day after seeing urology-has been using OTC hydrocortisone )  No flowsheet data found.  Pt here for acute irritation of left anterior vulvar area.  Pt has been  > 20 years, on no HRT.  Pt was fine w/o vulvar sx's until a Urology appt 3/1/2023 when, after a pelvic exam with that provider, she developed acute burning and itching in one spot, the left anterior labium minor.  She has no other vulvar irritation or itching.  No abnl vag discharge, and no VB.  She had a tube of Estradiol vag cream from her prior Gyn in [place] which she brought with her and is still good for another year.  Urology recommended she apply it to the irritated area but she did it once and it burned too much to use.  She saw Urgent Care 3/4/2023 and had neg UA, C&S, and wet prep.  She was given Diflucan 150 mg  x 1 dose as empiric Rx for possible yeast, and also given Triamcinolone cream by her PCP 2 days ago.  However after 2 days of use it doesn't help much.               Review of Systems         Objective    /74   Wt 74.4 kg (164 lb)   BMI 25.69 kg/m    Body mass index is 25.69 kg/m .  Physical Exam  Genitourinary:     Labia:         Right: No rash, tenderness, lesion or injury.         Left: No rash, tenderness, lesion or injury.       Urethra: No urethral lesion.      Vagina: Normal. No signs of injury and foreign body. No vaginal discharge, erythema, tenderness, bleeding, lesions or prolapsed vaginal walls.      Cervix: No discharge, lesion or cervical bleeding.          Comments: Moderate vulvovaginal atrophy present.       Labs 3/4/2023:  Office Visit on 03/04/2023   Component Date Value Ref Range Status     Color Urine 03/04/2023 Yellow  Colorless, Straw, Light Yellow, Yellow Final     Appearance Urine 03/04/2023 Clear  Clear Final     Glucose Urine 03/04/2023 Negative  Negative mg/dL Final     Bilirubin Urine 03/04/2023 Negative  Negative Final     Ketones Urine 03/04/2023 Negative  Negative mg/dL Final     Specific Gravity Urine 03/04/2023 1.015  1.003 - 1.035 Final     Blood Urine 03/04/2023 Trace (A)  Negative Final     pH Urine 03/04/2023 6.0  5.0 - 7.0 Final     Protein Albumin Urine 03/04/2023 Negative  Negative mg/dL Final     Urobilinogen Urine 03/04/2023 0.2  0.2, 1.0 E.U./dL Final     Nitrite Urine 03/04/2023 Negative  Negative Final     Leukocyte Esterase Urine 03/04/2023 Negative  Negative Final     Trichomonas 03/04/2023 Absent  Absent Final     Yeast 03/04/2023 Absent  Absent Final     Clue Cells 03/04/2023 Absent  Absent Final     WBCs/high power field 03/04/2023 2+ (A)  None Final     RBC Urine 03/04/2023 2-5 (A)  0-2 /HPF /HPF Final     WBC Urine 03/04/2023 0-5  0-5 /HPF /HPF Final     Squamous Epithelials Urine 03/04/2023 Few (A)  None Seen /LPF Final     Culture 03/04/2023 No Growth    Final      Latest Reference Range & Units 03/04/23 10:34   Trichomonas Absent  Absent   WBCs/high power field None  2+ !   Yeast Absent  Absent      Clue Cells Absent Absent

## 2023-03-29 ENCOUNTER — LAB (OUTPATIENT)
Dept: LAB | Facility: CLINIC | Age: 73
End: 2023-03-29
Payer: MEDICARE

## 2023-03-29 ENCOUNTER — TRANSFERRED RECORDS (OUTPATIENT)
Dept: HEALTH INFORMATION MANAGEMENT | Facility: CLINIC | Age: 73
End: 2023-03-29

## 2023-03-29 DIAGNOSIS — I71.20 THORACIC AORTIC ANEURYSM WITHOUT RUPTURE (H): ICD-10-CM

## 2023-03-29 DIAGNOSIS — E78.2 MIXED HYPERLIPIDEMIA: ICD-10-CM

## 2023-03-29 LAB
ALT SERPL W P-5'-P-CCNC: 16 U/L (ref 10–35)
CHOLEST SERPL-MCNC: 192 MG/DL
HDLC SERPL-MCNC: 74 MG/DL
LDLC SERPL CALC-MCNC: 104 MG/DL
NONHDLC SERPL-MCNC: 118 MG/DL
TRIGL SERPL-MCNC: 72 MG/DL

## 2023-03-29 PROCEDURE — 84460 ALANINE AMINO (ALT) (SGPT): CPT | Performed by: INTERNAL MEDICINE

## 2023-03-29 PROCEDURE — 36415 COLL VENOUS BLD VENIPUNCTURE: CPT | Performed by: INTERNAL MEDICINE

## 2023-03-29 PROCEDURE — 80061 LIPID PANEL: CPT | Performed by: INTERNAL MEDICINE

## 2023-03-30 ENCOUNTER — OFFICE VISIT (OUTPATIENT)
Dept: CARDIOLOGY | Facility: CLINIC | Age: 73
End: 2023-03-30
Payer: MEDICARE

## 2023-03-30 VITALS
SYSTOLIC BLOOD PRESSURE: 122 MMHG | BODY MASS INDEX: 25.41 KG/M2 | HEART RATE: 67 BPM | DIASTOLIC BLOOD PRESSURE: 80 MMHG | HEIGHT: 67 IN | OXYGEN SATURATION: 98 % | WEIGHT: 161.9 LBS

## 2023-03-30 DIAGNOSIS — I10 BENIGN ESSENTIAL HYPERTENSION: ICD-10-CM

## 2023-03-30 DIAGNOSIS — E78.5 HYPERLIPIDEMIA, ACQUIRED: ICD-10-CM

## 2023-03-30 DIAGNOSIS — R07.89 OTHER CHEST PAIN: Primary | ICD-10-CM

## 2023-03-30 DIAGNOSIS — I71.21 ANEURYSM OF ASCENDING AORTA WITHOUT RUPTURE (H): ICD-10-CM

## 2023-03-30 DIAGNOSIS — K21.9 GASTROESOPHAGEAL REFLUX DISEASE WITHOUT ESOPHAGITIS: ICD-10-CM

## 2023-03-30 PROCEDURE — 99214 OFFICE O/P EST MOD 30 MIN: CPT | Performed by: NURSE PRACTITIONER

## 2023-03-30 RX ORDER — HYDROCHLOROTHIAZIDE 12.5 MG/1
12.5 TABLET ORAL DAILY
Qty: 90 TABLET | Refills: 3 | Status: SHIPPED | OUTPATIENT
Start: 2023-03-30 | End: 2023-07-31

## 2023-03-30 RX ORDER — ROSUVASTATIN CALCIUM 10 MG/1
10 TABLET, COATED ORAL DAILY
Qty: 90 TABLET | Refills: 3 | Status: SHIPPED | OUTPATIENT
Start: 2023-03-30 | End: 2023-10-11

## 2023-03-30 RX ORDER — LOSARTAN POTASSIUM 50 MG/1
50 TABLET ORAL 2 TIMES DAILY
Qty: 180 TABLET | Refills: 3 | Status: SHIPPED | OUTPATIENT
Start: 2023-03-30 | End: 2023-10-18

## 2023-03-30 NOTE — PATIENT INSTRUCTIONS
Thanks for participating in a office visit with the [facility] today.    Continues with occasional episodes of dull and sharp chest pain.   Cardiac work up negative. Normal echo, negative stress test and CT - stable   Ascending aneurysm stable at 4.1 cm  Blood pressures well controlled - Continue current medical therapy.   Follow up with GI for further workup for possible esophageal spasm.     Follow up with Dr. Ballesteros in 6 months or sooner if needed.     Please call my nurse at  [phone] with any questions or concerns.    Scheduling phone number: [phone]  Reminder: Please bring in all current medications, over the counter supplements and vitamin bottles to your next appointment.

## 2023-03-30 NOTE — PROGRESS NOTES
"Cardiology Clinic Progress Note  Alexandra Thomas MRN# [id number]   YOB: 1950 Age: 72 year old     Reason For Visit:  Follow up   Primary Cardiologist:   Dr. Ballesteros           History of Presenting Illness:      Alexandra Thomas is a pleasant 72 year old patient who carries a past medical history significant for hypertension, hyperlipidemia, thoracic aortic aneurysm, palpitations, remote history of paroxysmal A-fib s/p ablation and nonsustained VT, Sjogren's syndrome, diverticulitis, GI bleed, and COVID-19 (1/2023)    She was last seen on 11/17/2022 by Dr. Torres in the absence of Dr. Ballesteros for urgent cardiac clearance for foot surgery.  She had previously been seen by her PCP for preoperative clearance and reported reported episodes of hypotension accompanied by lightheadedness as well as a single episode of vertigo-like \" room spinning\" dizziness.  Hydrochlorothiazide was reduced to 12.5 mg daily with a complete resolution of symptoms.  She went on to have an uncomplicated orthopedic surgery.    On 2/19/2023, she presented to the emergency room with chest pain.  ER work-up showed no acute ischemic changes on EKG, negative troponin, CT of the aorta showed no dissection.  No improvement with GI cocktail.  A follow-up stress echocardiogram was negative for stress-induced ischemia.  EF is normal at 55 to 60%, mild mitral regurgitation and mild tricuspid regurgitation.  Ascending aortic dilation of 4.1 cm.    She presents to the office today for follow-up.  Her primary complaint is intermittent episodes of sharp and dull chest pain.  She is currently following with GI where she recently underwent an endoscopy for evaluation of esophageal spasm.  Unfortunately, I do not have access to these results.  She tells me she has been instructed to use nitroglycerin as needed for pain.  At present, she denies chest pain, shortness of breath, PND, orthopnea, presyncope, syncope, edema, heart racing, or " palpitations.  She admits she is under a significant amount of personal stress.     Blood pressure is well controlled at 122/80, managed on hydrochlorothiazide and losartan  BMP and CBC are unremarkable  Lipid panel yesterday showed a total cholesterol 192, HDL 74, , triglycerides 72  BMI 25.36, 160 pounds    Remains compliant with all medications.                   Assessment and Plan:       1.  Atypical chest pain -intermittent episodes of dull and sharp chest pain.  Likely noncardiac in nature.  Stress echocardiogram was negative for stress-induced ischemia.  Echo showed a normal ejection fraction estimated at 55 to 60%, mild MR and TR.  CT showed mild to moderate coronary calcification.  We had a lengthy conversation regarding negative cardiac work-up not suggesting the need for invasive cardiac evaluation.  Continue with risk modification, exercise and heart healthy diet.  Follow-up with GI for further evaluation of possible esophageal spasm.    2.  Hypertension -well-controlled, continue hydrochlorothiazide and losartan  3.  Aortic aneurysm -CT shows 4.1 cm dilation   4.  Hyperlipidemia -on statin           Thank you for allowing me to participate in this delightful patient's care. I have recommended she follow up in 6 months with Dr. Ballesteros or sooner if needed.       Sierra Wilkes, JESUS CNP         Review of Systems:     Review of Systems:  Skin:        Eyes:       ENT:  Negative    Respiratory:  Negative    Cardiovascular:    palpitations;Positive for;chest pain  Gastroenterology:      Genitourinary:       Musculoskeletal:       Neurologic:       Psychiatric:       Heme/Lymph/Imm:       Endocrine:                   Physical Exam:     GEN:  In general, this is a well nourished female in no acute distress.  HEENT:  Pupils equal, round. Sclerae nonicteric. Clear oropharynx. Mucous membranes moist.  NECK: Supple, no masses appreciated. Trachea midline.  No JVD   C/V:  Regular rate and rhythm, no  murmur, rub or gallop. No S3 or RV heave.   RESP: Respirations are unlabored. No use of accessory muscles. Clear to auscultation bilaterally without wheezing, rales, or rhonchi.  GI: Not assessed  EXTREM: No LE edema. No cyanosis or clubbing.  NEURO: Alert and oriented, cooperative. No obvious focal deficits.   PSYCH: Normal affect.  SKIN: Warm and dry. No rashes or petechiae appreciated.          Past Medical History:     Past Medical History:   Diagnosis Date     Arthritis     hips     Ascending aortic aneurysm     4.2 cm   Ascending     Biceps tendon rupture      Diverticulitis of colon      Gastroesophageal reflux disease     Esophageal spasms.     Gastroparesis      Heart murmur      High cholesterol      History of GI bleed      Hypertension      Sjogren's syndrome (H)               Past Surgical History:     Past Surgical History:   Procedure Laterality Date     ABDOMEN SURGERY       APPENDECTOMY       ARTHROPLASTY HIP Left 07/27/2022    Procedure: Left total hip arthroplasty;  Surgeon: Berry Mcdonnell MD;  Location:  OR     BACK SURGERY       BIOPSY       BREAST SURGERY       CHOLECYSTECTOMY       COLECTOMY PARTIAL      Due to recurrent diverticulitis     COLONOSCOPY       ENT SURGERY      T & A     ESOPHAGOSCOPY, GASTROSCOPY, DUODENOSCOPY (EGD), COMBINED N/A 09/13/2022    Procedure: ESOPHAGOGASTRODUODENOSCOPY (EGD) (fv);  Surgeon: Seymour Chatterjee MD;  Location:  GI     GENITOURINARY SURGERY       HYSTERECTOMY TOTAL ABDOMINAL       LASIK       left midfoot fusion Left     2nd toe osteotomy     shouder replacement Right      TOTAL HIP ARTHROPLASTY Right 06/07/2009     TOTAL KNEE ARTHROPLASTY Right 07/12/2006     TOTAL KNEE ARTHROPLASTY Left 02/18/2009              Allergies:   Ciprofloxacin, Food, Venlafaxine hcl, Meperidine, Adhesive tape, and Morphine       Data:   All laboratory data reviewed:    LAST CHOLESTEROL:  Lab Results   Component Value Date    CHOL 192 03/29/2023     Lab Results   Component  Value Date    HDL 74 03/29/2023     Lab Results   Component Value Date     03/29/2023     Lab Results   Component Value Date    TRIG 72 03/29/2023     No results found for: CHOLHDLRATIO    LAST BMP:  Lab Results   Component Value Date     02/19/2023      Lab Results   Component Value Date    POTASSIUM 4.0 02/19/2023    POTASSIUM 4.0 11/02/2022     Lab Results   Component Value Date    CHLORIDE 103 02/19/2023    CHLORIDE 102 11/02/2022     Lab Results   Component Value Date    CHESTER 9.6 02/19/2023     Lab Results   Component Value Date    CO2 27 02/19/2023    CO2 29 11/02/2022     Lab Results   Component Value Date    BUN 21.3 02/19/2023    BUN 32 11/02/2022     Lab Results   Component Value Date    CR 1.1 02/19/2023    CR 0.88 02/19/2023     Lab Results   Component Value Date     02/19/2023    GLC 86 11/02/2022       LAST CBC:  Lab Results   Component Value Date    WBC 5.8 02/19/2023     Lab Results   Component Value Date    RBC 4.19 02/19/2023     Lab Results   Component Value Date    HGB 13.4 02/19/2023     Lab Results   Component Value Date    HCT 41.1 02/19/2023     Lab Results   Component Value Date    MCV 98 02/19/2023     Lab Results   Component Value Date    MCH 32.0 02/19/2023     Lab Results   Component Value Date    MCHC 32.6 02/19/2023     Lab Results   Component Value Date    RDW 12.8 02/19/2023     Lab Results   Component Value Date     02/19/2023

## 2023-04-10 ENCOUNTER — OFFICE VISIT (OUTPATIENT)
Dept: DERMATOLOGY | Facility: CLINIC | Age: 73
End: 2023-04-10
Payer: MEDICARE

## 2023-04-10 DIAGNOSIS — L57.8 ACTINIC SKIN DAMAGE: Primary | ICD-10-CM

## 2023-04-10 DIAGNOSIS — L98.9 SKIN LESION: ICD-10-CM

## 2023-04-10 DIAGNOSIS — B07.9 VERRUCA VULGARIS: ICD-10-CM

## 2023-04-10 DIAGNOSIS — L81.4 LENTIGINES: ICD-10-CM

## 2023-04-10 PROCEDURE — 17110 DESTRUCTION B9 LES UP TO 14: CPT | Performed by: STUDENT IN AN ORGANIZED HEALTH CARE EDUCATION/TRAINING PROGRAM

## 2023-04-10 PROCEDURE — 99203 OFFICE O/P NEW LOW 30 MIN: CPT | Mod: 25 | Performed by: STUDENT IN AN ORGANIZED HEALTH CARE EDUCATION/TRAINING PROGRAM

## 2023-04-10 ASSESSMENT — PAIN SCALES - GENERAL: PAINLEVEL: NO PAIN (0)

## 2023-04-10 NOTE — LETTER
4/10/2023         RE: Alexandra Thomas  [address] [address]        Dear Colleague,    Thank you for referring your patient, Alexandra Thomas, to the [facility]. Please see a copy of my visit note below.    [facility] Dermatology Note    Encounter Date: Apr 10, 2023    Dermatology Problem List:  -  ______________________________________    Impression/Plan:  Alexandra was seen today for skin check.    Diagnoses and all orders for this visit:    Actinic skin damage  Lentigines  - discussed sunprotective behaviors, clothing, and the use of sunscreen    Verruca vulgaris  -     DESTRUCT BENIGN LESION, UP TO 14  - R neck, wart  - see procedure note          Cryotherapy procedure note: After verbal consent and discussion of risks and benefits including but no limited to dyspigmentation/scar, blister, and pain, 1 R neck  Wart was(were) treated with 1-2mm freeze border for 2 cycles with liquid nitrogen. Post cryotherapy instructions were provided.       Follow-up in 1 year      Staff Involved:  Staff Only    Phoenix Maldonado MD   of Dermatology  Department of Dermatology  [facility]      CC:   Chief Complaint   Patient presents with     Skin Check     [facility]       History of Present Illness:  Ms. Alexandra Thomas is a 72 year old female who presents as a new patient.    Presents today for examination of spots on her trunk and extremities.  She has noticed a papule on her right neck which sometimes irritates her.  She like to have this treated a possible    Labs:      Physical exam:  Vitals: There were no vitals taken for this visit.  GEN: well developed, well-nourished, in no acute distress, in a pleasant mood.     SKIN: Archer phototype 1  - Full skin, which includes the head/face, both arms, chest, back, abdomen,both legs, genitalia and/or groin buttocks, digits and/or nails, was  examined.  - Flat brown macules and patches in a sun exposed areas on face and extremities  - verrucous papule R neck   - No other lesions of concern on areas examined.     Past Medical History:   Past Medical History:   Diagnosis Date     Arthritis     hips     Ascending aortic aneurysm (H)     4.2 cm   Ascending     Biceps tendon rupture      Diverticulitis of colon      Gastroesophageal reflux disease     Esophageal spasms.     Gastroparesis      Heart murmur      High cholesterol      History of GI bleed      Hypertension      Sjogren's syndrome (H)      Past Surgical History:   Procedure Laterality Date     ABDOMEN SURGERY       APPENDECTOMY       ARTHROPLASTY HIP Left 07/27/2022    Procedure: Left total hip arthroplasty;  Surgeon: Berry Mcdonnell MD;  Location: [facility] OR     BACK SURGERY       BIOPSY       BREAST SURGERY       CHOLECYSTECTOMY       COLECTOMY PARTIAL      Due to recurrent diverticulitis     COLONOSCOPY       ENT SURGERY      T & A     ESOPHAGOSCOPY, GASTROSCOPY, DUODENOSCOPY (EGD), COMBINED N/A 09/13/2022    Procedure: ESOPHAGOGASTRODUODENOSCOPY (EGD) (fv);  Surgeon: Seymour Chatterjee MD;  Location:  GI     GENITOURINARY SURGERY       HYSTERECTOMY TOTAL ABDOMINAL       LASIK       left midfoot fusion Left     2nd toe osteotomy     shouder replacement Right      TOTAL HIP ARTHROPLASTY Right 06/07/2009     TOTAL KNEE ARTHROPLASTY Right 07/12/2006     TOTAL KNEE ARTHROPLASTY Left 02/18/2009       Social History:   reports that she has never smoked. She has never used smokeless tobacco. She reports that she does not currently use alcohol. She reports that she does not use drugs.    Family History:  Family History   Problem Relation Age of Onset     Chronic Obstructive Pulmonary Disease Mother      Dementia Father      Chronic Obstructive Pulmonary Disease Father      Macular Degeneration Maternal Grandmother      Other Cancer Maternal Grandmother         Liver     Breast Cancer Paternal Cousin          before 50     Diabetes Paternal Grandfather      Diabetes Paternal Grandmother      Other Cancer Cousin         Breast/Mets     Substance Abuse Sister      Glaucoma No family hx of        Medications:  Current Outpatient Medications   Medication Sig Dispense Refill     acetaminophen (TYLENOL) 325 MG tablet Take 2 tablets (650 mg) by mouth every 4 hours as needed for other (mild pain) 100 tablet 0     alum & mag hydroxide-simethicone (MAALOX) 200-200-20 MG/5ML SUSP suspension Take 30 mLs by mouth once as needed       calcium carbonate (TUMS) 500 MG chewable tablet Take 1 chew tab by mouth daily as needed       clobetasol (TEMOVATE) 0.05 % external ointment Apply to affected skin outside vagina twice a day x 1 week, then once a day x 1 week, then every other day for 1 week, then twice weekly. 45 g 3     diphenhydrAMINE-acetaminophen (TYLENOL PM)  MG tablet Take 1 tablet by mouth At Bedtime       gabapentin (NEURONTIN) 100 MG capsule Take 200 mg by mouth every morning       gabapentin (NEURONTIN) 300 MG capsule Take 600 mg by mouth At Bedtime       hydrochlorothiazide (HYDRODIURIL) 12.5 MG tablet Take 1 tablet (12.5 mg) by mouth daily 90 tablet 3     lactobacillus rhamnosus (GG) (CULTURELL) capsule Take 1 capsule by mouth daily       losartan (COZAAR) 50 MG tablet Take 1 tablet (50 mg) by mouth 2 times daily 180 tablet 3     melatonin 5 MG tablet Take 5 mg by mouth At Bedtime       multivitamin w/minerals (THERA-VIT-M) tablet Take 1 tablet by mouth daily       nitroGLYcerin (NITROSTAT) 0.4 MG sublingual tablet as needed for other Esophageal spasm       omeprazole (PRILOSEC) 40 MG DR capsule 40 mg 2 times daily       ondansetron (ZOFRAN ODT) 4 MG ODT tab Take 1 tablet (4 mg) by mouth every 8 hours as needed for nausea 12 tablet 0     rosuvastatin (CRESTOR) 10 MG tablet Take 1 tablet (10 mg) by mouth daily 90 tablet 3     triamcinolone (KENALOG) 0.1 % external cream Use as needed for itching for up to 2 weeks 15 g  0     Vitamin D3 (CHOLECALCIFEROL) 25 mcg (1000 units) tablet Take 1 tablet by mouth daily       Allergies   Allergen Reactions     Ciprofloxacin Other (See Comments)     Pt has aortic aneurysm.  Increased risk of rupture or dissection with use of fluoroquinolones.       Food Anaphylaxis     EGGPLANT-->anaphylaxis       Venlafaxine Hcl Diarrhea, Shortness Of Breath and Nausea and Vomiting     Meperidine Rash                Adhesive Tape      blisters     Morphine Hives and Itching     Histamine reaction       Again, thank you for allowing me to participate in the care of your patient.        Sincerely,        Phoenix Maldonado MD

## 2023-04-10 NOTE — PROGRESS NOTES
[facility] Dermatology Note    Encounter Date: Apr 10, 2023    Dermatology Problem List:  -  ______________________________________    Impression/Plan:  Alexandra was seen today for skin check.    Diagnoses and all orders for this visit:    Actinic skin damage  Lentigines  - discussed sunprotective behaviors, clothing, and the use of sunscreen    Verruca vulgaris  -     DESTRUCT BENIGN LESION, UP TO 14  - R neck, wart  - see procedure note          Cryotherapy procedure note: After verbal consent and discussion of risks and benefits including but no limited to dyspigmentation/scar, blister, and pain, 1 R neck  Wart was(were) treated with 1-2mm freeze border for 2 cycles with liquid nitrogen. Post cryotherapy instructions were provided.       Follow-up in 1 year      Staff Involved:  Staff Only    Phoenix Maldonado MD   of Dermatology  Department of Dermatology  [facility]      CC:   Chief Complaint   Patient presents with     Skin Check     [facility]       History of Present Illness:  Ms. Alexandra Thomas is a 72 year old female who presents as a new patient.    Presents today for examination of spots on her trunk and extremities.  She has noticed a papule on her right neck which sometimes irritates her.  She like to have this treated a possible    Labs:      Physical exam:  Vitals: There were no vitals taken for this visit.  GEN: well developed, well-nourished, in no acute distress, in a pleasant mood.     SKIN: Archer phototype 1  - Full skin, which includes the head/face, both arms, chest, back, abdomen,both legs, genitalia and/or groin buttocks, digits and/or nails, was examined.  - Flat brown macules and patches in a sun exposed areas on face and extremities  - verrucous papule R neck   - No other lesions of concern on areas examined.     Past Medical History:   Past Medical History:   Diagnosis Date     Arthritis     hips     Ascending  aortic aneurysm (H)     4.2 cm   Ascending     Biceps tendon rupture      Diverticulitis of colon      Gastroesophageal reflux disease     Esophageal spasms.     Gastroparesis      Heart murmur      High cholesterol      History of GI bleed      Hypertension      Sjogren's syndrome (H)      Past Surgical History:   Procedure Laterality Date     ABDOMEN SURGERY       APPENDECTOMY       ARTHROPLASTY HIP Left 07/27/2022    Procedure: Left total hip arthroplasty;  Surgeon: Berry Mcdonnell MD;  Location: [facility] OR     BACK SURGERY       BIOPSY       BREAST SURGERY       CHOLECYSTECTOMY       COLECTOMY PARTIAL      Due to recurrent diverticulitis     COLONOSCOPY       ENT SURGERY      T & A     ESOPHAGOSCOPY, GASTROSCOPY, DUODENOSCOPY (EGD), COMBINED N/A 09/13/2022    Procedure: ESOPHAGOGASTRODUODENOSCOPY (EGD) (fv);  Surgeon: Seymour Chatterjee MD;  Location:  GI     GENITOURINARY SURGERY       HYSTERECTOMY TOTAL ABDOMINAL       LASIK       left midfoot fusion Left     2nd toe osteotomy     shouder replacement Right      TOTAL HIP ARTHROPLASTY Right 06/07/2009     TOTAL KNEE ARTHROPLASTY Right 07/12/2006     TOTAL KNEE ARTHROPLASTY Left 02/18/2009       Social History:   reports that she has never smoked. She has never used smokeless tobacco. She reports that she does not currently use alcohol. She reports that she does not use drugs.    Family History:  Family History   Problem Relation Age of Onset     Chronic Obstructive Pulmonary Disease Mother      Dementia Father      Chronic Obstructive Pulmonary Disease Father      Macular Degeneration Maternal Grandmother      Other Cancer Maternal Grandmother         Liver     Breast Cancer Paternal Cousin         before 50     Diabetes Paternal Grandfather      Diabetes Paternal Grandmother      Other Cancer Cousin         Breast/Mets     Substance Abuse Sister      Glaucoma No family hx of        Medications:  Current Outpatient Medications   Medication Sig Dispense Refill      acetaminophen (TYLENOL) 325 MG tablet Take 2 tablets (650 mg) by mouth every 4 hours as needed for other (mild pain) 100 tablet 0     alum & mag hydroxide-simethicone (MAALOX) 200-200-20 MG/5ML SUSP suspension Take 30 mLs by mouth once as needed       calcium carbonate (TUMS) 500 MG chewable tablet Take 1 chew tab by mouth daily as needed       clobetasol (TEMOVATE) 0.05 % external ointment Apply to affected skin outside vagina twice a day x 1 week, then once a day x 1 week, then every other day for 1 week, then twice weekly. 45 g 3     diphenhydrAMINE-acetaminophen (TYLENOL PM)  MG tablet Take 1 tablet by mouth At Bedtime       gabapentin (NEURONTIN) 100 MG capsule Take 200 mg by mouth every morning       gabapentin (NEURONTIN) 300 MG capsule Take 600 mg by mouth At Bedtime       hydrochlorothiazide (HYDRODIURIL) 12.5 MG tablet Take 1 tablet (12.5 mg) by mouth daily 90 tablet 3     lactobacillus rhamnosus (GG) (CULTURELL) capsule Take 1 capsule by mouth daily       losartan (COZAAR) 50 MG tablet Take 1 tablet (50 mg) by mouth 2 times daily 180 tablet 3     melatonin 5 MG tablet Take 5 mg by mouth At Bedtime       multivitamin w/minerals (THERA-VIT-M) tablet Take 1 tablet by mouth daily       nitroGLYcerin (NITROSTAT) 0.4 MG sublingual tablet as needed for other Esophageal spasm       omeprazole (PRILOSEC) 40 MG DR capsule 40 mg 2 times daily       ondansetron (ZOFRAN ODT) 4 MG ODT tab Take 1 tablet (4 mg) by mouth every 8 hours as needed for nausea 12 tablet 0     rosuvastatin (CRESTOR) 10 MG tablet Take 1 tablet (10 mg) by mouth daily 90 tablet 3     triamcinolone (KENALOG) 0.1 % external cream Use as needed for itching for up to 2 weeks 15 g 0     Vitamin D3 (CHOLECALCIFEROL) 25 mcg (1000 units) tablet Take 1 tablet by mouth daily       Allergies   Allergen Reactions     Ciprofloxacin Other (See Comments)     Pt has aortic aneurysm.  Increased risk of rupture or dissection with use of fluoroquinolones.        Food Anaphylaxis     EGGPLANT-->anaphylaxis       Venlafaxine Hcl Diarrhea, Shortness Of Breath and Nausea and Vomiting     Meperidine Rash                Adhesive Tape      blisters     Morphine Hives and Itching     Histamine reaction

## 2023-04-17 ENCOUNTER — THERAPY VISIT (OUTPATIENT)
Dept: PHYSICAL THERAPY | Facility: CLINIC | Age: 73
End: 2023-04-17
Attending: PHYSICIAN ASSISTANT
Payer: MEDICARE

## 2023-04-17 DIAGNOSIS — N39.46 MIXED INCONTINENCE: ICD-10-CM

## 2023-04-17 PROCEDURE — 97110 THERAPEUTIC EXERCISES: CPT | Mod: GP | Performed by: PHYSICAL THERAPIST

## 2023-04-17 PROCEDURE — 97161 PT EVAL LOW COMPLEX 20 MIN: CPT | Mod: GP | Performed by: PHYSICAL THERAPIST

## 2023-04-17 PROCEDURE — 97530 THERAPEUTIC ACTIVITIES: CPT | Mod: GP | Performed by: PHYSICAL THERAPIST

## 2023-04-17 NOTE — PROGRESS NOTES
Physical Therapy Initial Evaluation  Subjective:  SUBJECTIVE:  Patient reports onset of symptoms years ago.Symptoms include leaking w/ coughing, sneezing, jumping, and running. Alexandra also reports IBS w/ intermittent constipation/ diarrhea. Since onset symptoms have been getting better, worse or staying the same? same   Urination:  Do you leak on the way to the bathroom or with a strong urge to void? Yes   Do you leak with cough,sneeze, jumping, running?Yes   Any other activities that cause leaking? No   Do you have triggers that make you feel you can't wait to go to the bathroom? No what are they N/A.  Type of pad and number used per day? Usually just one at night; Depends  When you leak what is the amount? small  How long can you delay the need to urinate? 2 hours.   How many times do you get up to urinate at night? once   Can you stop the flow of urine when on the toilet? Yes  Is the volume of urine passed usually: medium. (8sec rule= 250ml with average bladder storing 400-600ml)  Do you strain to pass urine? Sometimes  Do you have a slow or hesitant urinary stream? Yes  Do you have difficulty initiating the urine stream? No  Is urination painful? No  How many bladder infections have you had in last 12 months? 3-4  Fluid intake(one glass is 8oz or one cup) 4 glasses/day, 1 caffinated glasses/day  0  alcohol glasses/day.  Bowel habits:  Frequency of bowel movements? 4 times a week  Consistancy of stool? soft, hard, [place] Stool Scale N/A  Do you ignore the urge to defecate? No  Do you strain to pass stool? Yes  Pelvic Pain:  Do you have any pelvic pain with intercourse, exams, use of tampons? No  Is initial penetration during intercourse painful? No  Is deeper penetration painful? No  Do you use lubricant? No What kind? N/A     Given birth? Yes Any complications? No  # of vaginal delieveries?2  # of C-sections?0  # of episiotomies?1.  Are you sexually active?No  Have you ever been worried for your physical  safety? No   Any abdominal or pelvic surgeries? Hysterectomy and colectomy  Are you having any regular exercise? Yes  Have you practiced the PF(kegel) exercises for 4 or more weeks? Yes    Marinoff Scale:Level N/A  (Level 3: Abstinence from intercourse because of severe pain. Level 2: Painful intercourse which limites frequency of activity. Level 1: Painful intercourse not severe enough to prevent activity.)        Patient Health History         Pain is reported as 0/10 on pain scale.  General health as reported by patient is excellent.       Medical allergies: adhesives.   Surgeries include:  Orthopedic surgery.    Current medications:  High blood pressure medication.                                           Objective:  Standing Alignment:          Pelvic:  ASIS high R                                                 Pelvic Dysfunction Evaluation:            Pelvic Clock Exam:  Pelvic clock exam: OI painful on L.  Ischiocavernosis pain:  -  Bulbocavernosis pain:  -  Transverse Perineal:  -  Levator ANI:  -  Perineal Body:  -      External Assessment:    Skin Condition:  Normal    Bearing Down/Coughing:  Normal        Internal Assessment:      Contraction/Grade:  Good squeeze, good hold with lift, repeatable (4)                               General     ROS    Assessment/Plan:    Patient is a 72 year old female with pelvic complaints.    Patient has the following significant findings with corresponding treatment plan.                Diagnosis 1:  Mixed incontinence  Decreased proprioception - neuro re-education, therapeutic activities and home program  Impaired muscle performance - biofeedback, electric stimulation, neuro re-education and home program  Decreased function - therapeutic activities and home program  Diagnosis 2:  Leg length discrepency   Pain -  hot/cold therapy, US, electric stimulation, mechanical traction, manual therapy, STS, splint/taping/bracing/orthotics, self management, education, directional  preference exercise and home program  Impaired balance - neuro re-education, gait training, therapeutic activities, adaptive equipment/assistive device and home program  Decreased proprioception - neuro re-education, gait training, therapeutic activities and home program  Impaired muscle performance - biofeedback, electric stimulation, neuro re-education and home program  Diagnosis 3:  Constipation  Pain -  hot/cold therapy, US, electric stimulation, mechanical traction, manual therapy and STS  Decreased ROM/flexibility - manual therapy, therapeutic exercise, therapeutic activity and home program  Decreased proprioception - neuro re-education, gait training, therapeutic activities and home program  Impaired muscle performance - biofeedback, electric stimulation, neuro re-education and home program  Decreased function - therapeutic activities and home program       Therapy Evaluation Codes:   1) History comprised of:   Personal factors that impact the plan of care:      Age and Time since onset of symptoms.    Comorbidity factors that impact the plan of care are:      IBS.     Medications impacting care: See EMR.  2) Examination of Body Systems comprised of:   Body structures and functions that impact the plan of care:      Hip, Pelvis and Sacral illiac joint.   Activity limitations that impact the plan of care are:      Jumping, Lifting, Running, Sports, Walking, Urinary incontinence and passing stool.  3) Clinical presentation characteristics are:   Stable/Uncomplicated.  4) Decision-Making    Low complexity using standardized patient assessment instrument and/or measureable assessment of functional outcome.  Cumulative Therapy Evaluation is: Low complexity.    Previous and current functional limitations:  (See Goal Flow Sheet for this information)    Short term and Long term goals: (See Goal Flow Sheet for this information)     Communication ability:  Patient appears to be able to clearly communicate and  understand verbal and written communication and follow directions correctly.  Treatment Explanation - The following has been discussed with the patient:   RX ordered/plan of care  Anticipated outcomes  Possible risks and side effects  This patient would benefit from PT intervention to resume normal activities.   Rehab potential is good.    Frequency:  1 X week, once daily  Duration:  for 12 weeks  Discharge Plan:  Achieve all LTG.  Independent in home treatment program.  Reach maximal therapeutic benefit.    Please refer to the daily flowsheet for treatment today, total treatment time and time spent performing 1:1 timed codes.

## 2023-04-18 NOTE — PROGRESS NOTES
[facility]    OUTPATIENT Physical Therapy ORTHOPEDIC EVALUATION  PLAN OF TREATMENT FOR OUTPATIENT REHABILITATION  (COMPLETE FOR INITIAL CLAIMS ONLY)  Patient's Last Name, First Name, M.I.  YOB: 1950  Alexandra Thomas    Provider s Name:  [facility]   Medical Record No.  [id number]   Start of Care Date:  04/17/23   Onset Date:   03/01/23   Treatment Diagnosis:  Mixed incontinence, constipation Medical Diagnosis:  Mixed incontinence       Goals:     04/17/23 0700   Urinary Leakage   Previous Functional Level No problems   Current Functional Level Leakage with cough or sneeze;Leakage with jogging;Number of urinary leakage episodes in a week  (4/week)   STG Target Performance Decrease urinary leakage episodes in a week to  (2)   Rationale continence throughout the day;for healthy hygiene and to prevent skin breakdown   Due Date 05/29/23   LTG Target Performance Decrease urinary leakage episodes in a week to  (0)   Rationale continence throughout the day;for healthy hygiene and to prevent skin breakdown   Due Date 07/10/23   Constipation/Obstructive Defecation   Previous Functional Level No issues   Current Functional Level Frequency of bowel movements  (4/week)   Performance Level straining, incomplete passage of stool   STG Target Performance Increase frequency of bowel movements to  (6 per week)   Performance Level no straining, complete passing   Rationale Work toward normal voiding or evacuation patterns to focus on ADLS, work, or school   Due Date 05/29/23   LTG Target Performance Increase frequency of bowel movements to  (7)   Performance Level No straining, complete passing   Rationale Work toward normal voiding or evacuation patterns to focus on ADLS, work, or school   Due Date 07/10/23         Therapy Frequency:  1 x per week  Predicted Duration of  Therapy Intervention:  12 weeks    Mehul Dong, PT                 I CERTIFY THE NEED FOR THESE SERVICES FURNISHED UNDER        THIS PLAN OF TREATMENT AND WHILE UNDER MY CARE     (Physician attestation of this document indicates review and certification of the therapy plan).                     Certification Date From:  04/17/23   Certification Date To:  07/15/23    Referring Provider:  Santa Petit    Initial Assessment        See Epic Evaluation SOC Date: 04/17/23

## 2023-04-24 ENCOUNTER — THERAPY VISIT (OUTPATIENT)
Dept: PHYSICAL THERAPY | Facility: CLINIC | Age: 73
End: 2023-04-24
Attending: PHYSICIAN ASSISTANT
Payer: MEDICARE

## 2023-04-24 DIAGNOSIS — N39.46 MIXED INCONTINENCE: Primary | ICD-10-CM

## 2023-04-24 PROCEDURE — 97530 THERAPEUTIC ACTIVITIES: CPT | Mod: GP | Performed by: PHYSICAL THERAPIST

## 2023-04-24 PROCEDURE — 97140 MANUAL THERAPY 1/> REGIONS: CPT | Mod: GP | Performed by: PHYSICAL THERAPIST

## 2023-05-01 ENCOUNTER — THERAPY VISIT (OUTPATIENT)
Dept: PHYSICAL THERAPY | Facility: CLINIC | Age: 73
End: 2023-05-01
Attending: PHYSICIAN ASSISTANT
Payer: MEDICARE

## 2023-05-01 DIAGNOSIS — N39.46 MIXED INCONTINENCE: Primary | ICD-10-CM

## 2023-05-01 PROCEDURE — 97110 THERAPEUTIC EXERCISES: CPT | Mod: GP | Performed by: PHYSICAL THERAPIST

## 2023-05-01 PROCEDURE — 97140 MANUAL THERAPY 1/> REGIONS: CPT | Mod: GP | Performed by: PHYSICAL THERAPIST

## 2023-05-11 ENCOUNTER — OFFICE VISIT (OUTPATIENT)
Dept: URGENT CARE | Facility: URGENT CARE | Age: 73
End: 2023-05-11
Payer: MEDICARE

## 2023-05-11 VITALS
SYSTOLIC BLOOD PRESSURE: 117 MMHG | OXYGEN SATURATION: 98 % | TEMPERATURE: 97.2 F | DIASTOLIC BLOOD PRESSURE: 81 MMHG | HEART RATE: 68 BPM | RESPIRATION RATE: 16 BRPM

## 2023-05-11 DIAGNOSIS — J20.9 ACUTE BRONCHITIS WITH WHEEZING: Primary | ICD-10-CM

## 2023-05-11 PROCEDURE — 99213 OFFICE O/P EST LOW 20 MIN: CPT | Performed by: PHYSICIAN ASSISTANT

## 2023-05-11 RX ORDER — BENZONATATE 200 MG/1
200 CAPSULE ORAL 3 TIMES DAILY PRN
Qty: 30 CAPSULE | Refills: 0 | Status: SHIPPED | OUTPATIENT
Start: 2023-05-11 | End: 2023-07-31

## 2023-05-11 RX ORDER — ALBUTEROL SULFATE 90 UG/1
2 AEROSOL, METERED RESPIRATORY (INHALATION) EVERY 6 HOURS PRN
Qty: 18 G | Refills: 0 | Status: SHIPPED | OUTPATIENT
Start: 2023-05-11 | End: 2023-08-09

## 2023-05-11 ASSESSMENT — ENCOUNTER SYMPTOMS
NAUSEA: 1
COUGH: 1
FEVER: 0
DIARRHEA: 0
RHINORRHEA: 1
SORE THROAT: 1
VOMITING: 0

## 2023-05-11 NOTE — PROGRESS NOTES
"  Assessment & Plan:        ICD-10-CM    1. Acute bronchitis with wheezing  J20.9 benzonatate (TESSALON) 200 MG capsule     albuterol (PROAIR HFA/PROVENTIL HFA/VENTOLIN HFA) 108 (90 Base) MCG/ACT inhaler            Plan/Clinical Decision Making:    Patient with nasal congestion, cough with intermittent wheezing for 8-9 days. Normal lung exam. O2sat 98%. No sinus pain or pressure. Symptoms consistent with viral illness.   Due to persistent cough with wheezing, will start on albuterol inhaler for wheezing. Can use tessalon for cough as needed.     Return if symptoms worsen or fail to improve, for in 3-5 days.     At the end of the encounter, I discussed results, diagnosis, medications. Discussed red flags for immediate return to clinic/ER, as well as indications for follow up if no improvement. Patient understood and agreed to plan. Patient was stable for discharge.        Veronica Pepe PA-C on 5/11/2023 at 1:02 PM          Subjective:     HPI:    Alexandra is a 72 year old female who presents to clinic today for the following health issues:  Chief Complaint   Patient presents with     Urgent Care     Flu     Flu like sx-achey, diarrhea, vomiting, \"rattling\" cough, chills and head feels tingly-Also weakness and shakey-Sx for 9 days+-Neg COVID x2     HPI    Last Wednesday, had dry cough, body aches, congestion. No fever, but some chills. Felt weak. Yesterday felt a little better, but today felt worse again. Had diarrhea initially and now better.   Had one episode of vomiting after taking liquid Mucinex.   Clear nasal drainage.     History obtained from the patient.    Review of Systems   Constitutional: Negative for fever.   HENT: Positive for congestion, rhinorrhea and sore throat (slight).    Respiratory: Positive for cough.    Gastrointestinal: Positive for nausea. Negative for diarrhea and vomiting.         Patient Active Problem List   Diagnosis     Benign essential hypertension     Gastroparesis     Thoracic " aortic aneurysm without rupture (H)     Gastroesophageal reflux disease without esophagitis     Pain syndrome, chronic     Sicca syndrome (H)     NSVT (nonsustained ventricular tachycardia) (H)     TATI (obstructive sleep apnea)     S/P total hip arthroplasty     Lattice degeneration of right retina     Hyperlipidemia, acquired     Mixed urge and stress incontinence     Pelvic floor dysfunction     Recurrent UTI     Other chest pain        Past Medical History:   Diagnosis Date     Arthritis     hips     Ascending aortic aneurysm (H)     4.2 cm   Ascending     Biceps tendon rupture      Diverticulitis of colon      Gastroesophageal reflux disease     Esophageal spasms.     Gastroparesis      Heart murmur      High cholesterol      History of GI bleed      Hypertension      Sjogren's syndrome (H)        Social History     Tobacco Use     Smoking status: Never     Smokeless tobacco: Never   Vaping Use     Vaping status: Never Used     Passive vaping exposure: Yes   Substance Use Topics     Alcohol use: Not Currently             Objective:     Vitals:    05/11/23 1254   BP: 117/81   Pulse: 68   Resp: 16   Temp: 97.2  F (36.2  C)   TempSrc: Tympanic   SpO2: 98%         Physical Exam   EXAM:  Pleasant, alert, appropriate appearance. NAD.  Head Exam: Normocephalic, atraumatic.  Eye Exam:   non icteric/injection.    Ear Exam: TMs grey without bulging. Normal canals.  Normal pinna.  Nose Exam: Normal external nose.  No sinus tenderness.   OroPharynx Exam:  Moist mucous membranes. No erythema, pharynx without exudate or hypertrophy.  Neck/Thyroid Exam:  No LAD.   Chest/Respiratory Exam: CTAB.  Cardiovascular Exam: RRR. No murmur or rubs.      Results:  No results found for any visits on 05/11/23.

## 2023-05-13 ENCOUNTER — OFFICE VISIT (OUTPATIENT)
Dept: URGENT CARE | Facility: URGENT CARE | Age: 73
End: 2023-05-13
Payer: MEDICARE

## 2023-05-13 VITALS
SYSTOLIC BLOOD PRESSURE: 105 MMHG | TEMPERATURE: 97.8 F | OXYGEN SATURATION: 98 % | DIASTOLIC BLOOD PRESSURE: 70 MMHG | RESPIRATION RATE: 16 BRPM | HEART RATE: 71 BPM

## 2023-05-13 DIAGNOSIS — N30.01 ACUTE CYSTITIS WITH HEMATURIA: Primary | ICD-10-CM

## 2023-05-13 LAB
ALBUMIN UR-MCNC: NEGATIVE MG/DL
APPEARANCE UR: CLEAR
BACTERIA #/AREA URNS HPF: ABNORMAL /HPF
BILIRUB UR QL STRIP: NEGATIVE
COLOR UR AUTO: YELLOW
GLUCOSE UR STRIP-MCNC: NEGATIVE MG/DL
HGB UR QL STRIP: ABNORMAL
KETONES UR STRIP-MCNC: NEGATIVE MG/DL
LEUKOCYTE ESTERASE UR QL STRIP: ABNORMAL
NITRATE UR QL: NEGATIVE
PH UR STRIP: 5.5 [PH] (ref 5–7)
RBC #/AREA URNS AUTO: ABNORMAL /HPF
SP GR UR STRIP: <=1.005 (ref 1–1.03)
SQUAMOUS #/AREA URNS AUTO: ABNORMAL /LPF
UROBILINOGEN UR STRIP-ACNC: 0.2 E.U./DL
WBC #/AREA URNS AUTO: >100 /HPF

## 2023-05-13 PROCEDURE — 99213 OFFICE O/P EST LOW 20 MIN: CPT | Performed by: PHYSICIAN ASSISTANT

## 2023-05-13 PROCEDURE — 87186 SC STD MICRODIL/AGAR DIL: CPT | Performed by: PHYSICIAN ASSISTANT

## 2023-05-13 PROCEDURE — 87086 URINE CULTURE/COLONY COUNT: CPT | Performed by: PHYSICIAN ASSISTANT

## 2023-05-13 PROCEDURE — 81001 URINALYSIS AUTO W/SCOPE: CPT | Performed by: PHYSICIAN ASSISTANT

## 2023-05-13 RX ORDER — CEPHALEXIN 500 MG/1
500 CAPSULE ORAL 2 TIMES DAILY
Qty: 14 CAPSULE | Refills: 0 | Status: SHIPPED | OUTPATIENT
Start: 2023-05-13 | End: 2023-05-20

## 2023-05-13 NOTE — PROGRESS NOTES
URGENT CARE VISIT:    SUBJECTIVE:    Alexandra Thomas is a 72 year old female who  presents today for a possible UTI. Symptoms of dysuria, urgency, frequency and suprapubic pain and pressure have been going on for 1 day(s). Symptoms were sudden onset and mild.  Patient denies back pain, nausea, vomiting, fever and chills or vaginal discharge. This patient does have a history of urinary tract infections.     PMH:   Past Medical History:   Diagnosis Date     Arthritis     hips     Ascending aortic aneurysm (H)     4.2 cm   Ascending     Biceps tendon rupture      Diverticulitis of colon      Gastroesophageal reflux disease     Esophageal spasms.     Gastroparesis      Heart murmur      High cholesterol      History of GI bleed      Hypertension      Sjogren's syndrome (H)      Allergies: Ciprofloxacin, Food, Venlafaxine hcl, Meperidine, Adhesive tape, and Morphine   Medications:   Current Outpatient Medications   Medication Sig Dispense Refill     acetaminophen (TYLENOL) 325 MG tablet Take 2 tablets (650 mg) by mouth every 4 hours as needed for other (mild pain) 100 tablet 0     albuterol (PROAIR HFA/PROVENTIL HFA/VENTOLIN HFA) 108 (90 Base) MCG/ACT inhaler Inhale 2 puffs into the lungs every 6 hours as needed for shortness of breath, wheezing or cough 18 g 0     alum & mag hydroxide-simethicone (MAALOX) 200-200-20 MG/5ML SUSP suspension Take 30 mLs by mouth once as needed       benzonatate (TESSALON) 200 MG capsule Take 1 capsule (200 mg) by mouth 3 times daily as needed for cough 30 capsule 0     calcium carbonate (TUMS) 500 MG chewable tablet Take 1 chew tab by mouth daily as needed       cephALEXin (KEFLEX) 500 MG capsule Take 1 capsule (500 mg) by mouth 2 times daily for 7 days 14 capsule 0     clobetasol (TEMOVATE) 0.05 % external ointment Apply to affected skin outside vagina twice a day x 1 week, then once a day x 1 week, then every other day for 1 week, then twice weekly. 45 g 3      diphenhydrAMINE-acetaminophen (TYLENOL PM)  MG tablet Take 1 tablet by mouth At Bedtime       gabapentin (NEURONTIN) 100 MG capsule Take 200 mg by mouth every morning       gabapentin (NEURONTIN) 300 MG capsule Take 600 mg by mouth At Bedtime       hydrochlorothiazide (HYDRODIURIL) 12.5 MG tablet Take 1 tablet (12.5 mg) by mouth daily 90 tablet 3     lactobacillus rhamnosus (GG) (CULTURELL) capsule Take 1 capsule by mouth daily       losartan (COZAAR) 50 MG tablet Take 1 tablet (50 mg) by mouth 2 times daily 180 tablet 3     melatonin 5 MG tablet Take 5 mg by mouth At Bedtime       multivitamin w/minerals (THERA-VIT-M) tablet Take 1 tablet by mouth daily       nitroGLYcerin (NITROSTAT) 0.4 MG sublingual tablet as needed for other Esophageal spasm       omeprazole (PRILOSEC) 40 MG DR capsule 40 mg 2 times daily       ondansetron (ZOFRAN ODT) 4 MG ODT tab Take 1 tablet (4 mg) by mouth every 8 hours as needed for nausea 12 tablet 0     rosuvastatin (CRESTOR) 10 MG tablet Take 1 tablet (10 mg) by mouth daily 90 tablet 3     triamcinolone (KENALOG) 0.1 % external cream Use as needed for itching for up to 2 weeks 15 g 0     Vitamin D3 (CHOLECALCIFEROL) 25 mcg (1000 units) tablet Take 1 tablet by mouth daily       Social History:   Social History     Tobacco Use     Smoking status: Never     Smokeless tobacco: Never   Vaping Use     Vaping status: Never Used     Passive vaping exposure: Yes   Substance Use Topics     Alcohol use: Not Currently       ROS:  Review of systems negative except as stated above.    OBJECTIVE:  /70   Pulse 71   Temp 97.8  F (36.6  C)   Resp 16   SpO2 98%   GENERAL APPEARANCE: healthy, alert and no distress  RESP: lungs clear to auscultation - no rales, rhonchi or wheezes  CV: regular rates and rhythm, normal S1 S2, no murmur noted  ABDOMEN:  soft, nontender, no HSM or masses and bowel sounds normal  BACK: No CVA tenderness  SKIN: no suspicious lesions or rashes    Labs:    Results  for orders placed or performed in visit on 05/13/23   UA Macroscopic with reflex to Microscopic and Culture     Status: Abnormal    Specimen: Urine, Midstream   Result Value Ref Range    Color Urine Yellow Colorless, Straw, Light Yellow, Yellow    Appearance Urine Clear Clear    Glucose Urine Negative Negative mg/dL    Bilirubin Urine Negative Negative    Ketones Urine Negative Negative mg/dL    Specific Gravity Urine <=1.005 1.003 - 1.035    Blood Urine Trace (A) Negative    pH Urine 5.5 5.0 - 7.0    Protein Albumin Urine Negative Negative mg/dL    Urobilinogen Urine 0.2 0.2, 1.0 E.U./dL    Nitrite Urine Negative Negative    Leukocyte Esterase Urine Large (A) Negative   Urine Microscopic Exam     Status: Abnormal   Result Value Ref Range    Bacteria Urine Moderate (A) None Seen /HPF    RBC Urine 5-10 (A) 0-2 /HPF /HPF    WBC Urine >100 (A) 0-5 /HPF /HPF    Squamous Epithelials Urine Few (A) None Seen /LPF       ASSESSMENT:     ICD-10-CM    1. Acute cystitis with hematuria  N30.01 UA Macroscopic with reflex to Microscopic and Culture     Urine Microscopic Exam     Urine Culture     cephALEXin (KEFLEX) 500 MG capsule          PLAN:  Patient Instructions   Patient was educated on the natural course of condition.  Take medication as directed. Side effects discussed. Conservative measures include drinking fluids (water), wiping from front to back, and avoiding holding urine when there is urge to urinate. See your primary care provider if symptoms do not improve in 3 days. Seek emergency care if you develop severe abdominal/flank pain, or fever.    Patient verbalized understanding and is agreeable to plan. The patient was discharged ambulatory and in stable condition.    Jolie Villareal PA-C on 5/13/2023 at 1:04 PM

## 2023-05-13 NOTE — PATIENT INSTRUCTIONS
Patient was educated on the natural course of condition.  Take medication as directed. Side effects discussed. Conservative measures include drinking fluids (water), wiping from front to back, and avoiding holding urine when there is urge to urinate. See your primary care provider if symptoms do not improve in 3 days. Seek emergency care if you develop severe abdominal/flank pain, or fever.

## 2023-05-15 LAB — BACTERIA UR CULT: ABNORMAL

## 2023-05-17 ENCOUNTER — OFFICE VISIT (OUTPATIENT)
Dept: INTERNAL MEDICINE | Facility: CLINIC | Age: 73
End: 2023-05-17
Payer: MEDICARE

## 2023-05-17 VITALS
RESPIRATION RATE: 10 BRPM | TEMPERATURE: 97.5 F | WEIGHT: 162 LBS | HEART RATE: 77 BPM | SYSTOLIC BLOOD PRESSURE: 105 MMHG | OXYGEN SATURATION: 97 % | HEIGHT: 66 IN | BODY MASS INDEX: 26.03 KG/M2 | DIASTOLIC BLOOD PRESSURE: 65 MMHG

## 2023-05-17 DIAGNOSIS — M79.2 NERVE PAIN: ICD-10-CM

## 2023-05-17 DIAGNOSIS — M25.551 HIP PAIN, RIGHT: ICD-10-CM

## 2023-05-17 DIAGNOSIS — Z00.00 ENCOUNTER FOR MEDICARE ANNUAL WELLNESS EXAM: Primary | ICD-10-CM

## 2023-05-17 PROCEDURE — 99213 OFFICE O/P EST LOW 20 MIN: CPT | Mod: 25

## 2023-05-17 PROCEDURE — 99207 PR ANNUAL WELLNESS VISIT, PPS, SUBSEQUENT STAT: CPT

## 2023-05-17 PROCEDURE — 90715 TDAP VACCINE 7 YRS/> IM: CPT

## 2023-05-17 PROCEDURE — 90471 IMMUNIZATION ADMIN: CPT

## 2023-05-17 RX ORDER — LIDOCAINE 4 G/G
1 PATCH TOPICAL EVERY 24 HOURS
Qty: 30 PATCH | Refills: 1 | Status: SHIPPED | OUTPATIENT
Start: 2023-05-17 | End: 2024-01-12

## 2023-05-17 RX ORDER — GABAPENTIN 300 MG/1
600 CAPSULE ORAL AT BEDTIME
Qty: 90 CAPSULE | Refills: 1 | Status: SHIPPED | OUTPATIENT
Start: 2023-05-17 | End: 2023-05-22

## 2023-05-17 RX ORDER — GABAPENTIN 100 MG/1
200 CAPSULE ORAL EVERY MORNING
Qty: 180 CAPSULE | Refills: 1 | Status: SHIPPED | OUTPATIENT
Start: 2023-05-17 | End: 2023-10-11

## 2023-05-17 ASSESSMENT — ENCOUNTER SYMPTOMS
NAUSEA: 1
HEADACHES: 0
COUGH: 1
BREAST MASS: 0
FEVER: 0
JOINT SWELLING: 0
CONSTIPATION: 1
EYE PAIN: 0
HEMATURIA: 0
ABDOMINAL PAIN: 0
HEMATOCHEZIA: 0
MYALGIAS: 1
HEARTBURN: 0
NERVOUS/ANXIOUS: 0
SHORTNESS OF BREATH: 0
ARTHRALGIAS: 1
PARESTHESIAS: 1
DIARRHEA: 1
PALPITATIONS: 0
WEAKNESS: 1
SORE THROAT: 1
DIZZINESS: 0
CHILLS: 1
DYSURIA: 0
FREQUENCY: 0

## 2023-05-17 ASSESSMENT — ACTIVITIES OF DAILY LIVING (ADL): CURRENT_FUNCTION: NO ASSISTANCE NEEDED

## 2023-05-17 NOTE — PATIENT INSTRUCTIONS
Lidocaine patches may be helpful for pain.    Make sure that BP is less than 130 at home. Can try stopping hydrochlorothiazide to see if this helps reduce cramping.       Patient Education   Personalized Prevention Plan  You are due for the preventive services outlined below.  Your care team is available to assist you in scheduling these services.  If you have already completed any of these items, please share that information with your care team to update in your medical record.  Health Maintenance Due   Topic Date Due    URINE DRUG SCREEN  Never done    Annual Wellness Visit  Never done    COVID-19 Vaccine (6 - Moderna series) 02/10/2023

## 2023-05-17 NOTE — PROGRESS NOTES
"SUBJECTIVE:   Alexandra is a 72 year old who presents for Preventive Visit.      5/17/2023     8:48 AM   Additional Questions   Roomed by Jolie Gupta MA   Accompanied by Herself     Patient has been advised of split billing requirements and indicates understanding: Yes  Are you in the first 12 months of your Medicare coverage?  No    Healthy Habits:     In general, how would you rate your overall health?  Good    Frequency of exercise:  2-3 days/week    Duration of exercise:  Less than 15 minutes    Do you usually eat at least 4 servings of fruit and vegetables a day, include whole grains    & fiber and avoid regularly eating high fat or \"junk\" foods?  Yes    Taking medications regularly:  Yes    Medication side effects:  None    Ability to successfully perform activities of daily living:  No assistance needed    Home Safety:  No safety concerns identified    Hearing Impairment:  No hearing concerns    In the past 6 months, have you been bothered by leaking of urine? Yes    In general, how would you rate your overall mental or emotional health?  Excellent      PHQ-2 Total Score: 0    Additional concerns today:  Yes      Have you ever done Advance Care Planning? (For example, a Health Directive, POLST, or a discussion with a medical provider or your loved ones about your wishes): Yes, patient states has an Advance Care Planning document and will bring a copy to the clinic.    Fall risk  Fallen 2 or more times in the past year?: No  Any fall with injury in the past year?: No    Cognitive Screening   1) Repeat 3 items (Leader, Season, Table)    2) Clock draw: NORMAL  3) 3 item recall: Recalls 3 objects  Results: 3 items recalled: COGNITIVE IMPAIRMENT LESS LIKELY    Mini-CogTM Copyright JUAQUIN Alfaro. Licensed by the author for use in [facility]; reprinted with permission (rakesh@.[facility]). All rights reserved.      Do you have sleep apnea, excessive snoring or daytime drowsiness?: no    Reviewed and updated as " needed this visit by clinical staff   Tobacco  Allergies  Meds              Reviewed and updated as needed this visit by Provider                 Social History     Tobacco Use     Smoking status: Never     Smokeless tobacco: Never   Vaping Use     Vaping status: Never Used     Passive vaping exposure: Yes   Substance Use Topics     Alcohol use: Not Currently           5/17/2023     8:39 AM   Alcohol Use   Prescreen: >3 drinks/day or >7 drinks/week? Not Applicable          View : No data to display.              Do you have a current opioid prescription? No  Do you use any other controlled substances or medications that are not prescribed by a provider? None      Current providers sharing in care for this patient include:   Patient Care Team:  Emeka Richardson NP as PCP - General (Nurse Practitioner Primary Care)  Genet Dahl OD as MD (Ophthalmology)  Emeka Richardson NP as Nurse Practitioner (Nurse Practitioner Primary Care)  Lex Veliz MD as MD (Gastroenterology)  Goltz, Bennett Ezra, PA-C as Assigned Neuroscience Provider  Melissa Torres DO as MD (Cardiovascular Disease)  Emeka Richardson NP as Assigned PCP  Sierra Wilkes APRN CNP as Assigned Heart and Vascular Provider  Ned Barlow MD as Assigned OBGYN Provider  Phoenix Maldonado MD as Assigned Surgical Provider    The following health maintenance items are reviewed in Epic and correct as of today:  Health Maintenance   Topic Date Due     URINE DRUG SCREEN  Never done     MEDICARE ANNUAL WELLNESS VISIT  Never done     COVID-19 Vaccine (6 - Moderna series) 02/10/2023     DTAP/TDAP/TD IMMUNIZATION (4 - Td or Tdap) 07/28/2023     ANNUAL REVIEW OF HM ORDERS  08/19/2023     FALL RISK ASSESSMENT  05/17/2024     MAMMO SCREENING  09/08/2024     ADVANCE CARE PLANNING  08/29/2027     LIPID  03/29/2028     COLORECTAL CANCER SCREENING  01/05/2031     DEXA  10/19/2037     HEPATITIS C SCREENING  Completed     PHQ-2 (once  "per calendar year)  Completed     INFLUENZA VACCINE  Completed     Pneumococcal Vaccine: 65+ Years  Completed     ZOSTER IMMUNIZATION  Completed     IPV IMMUNIZATION  Aged Out     MENINGITIS IMMUNIZATION  Aged Out     Lab work is in process    Review of Systems   Constitutional: Positive for chills. Negative for fever.   HENT: Positive for congestion, ear pain and sore throat. Negative for hearing loss.    Eyes: Negative for pain and visual disturbance.   Respiratory: Positive for cough. Negative for shortness of breath.    Cardiovascular: Negative for chest pain, palpitations and peripheral edema.   Gastrointestinal: Positive for constipation, diarrhea and nausea. Negative for abdominal pain, heartburn and hematochezia.   Breasts:  Negative for tenderness, breast mass and discharge.   Genitourinary: Negative for dysuria, frequency, genital sores, hematuria, pelvic pain, urgency, vaginal bleeding and vaginal discharge.   Musculoskeletal: Positive for arthralgias and myalgias. Negative for joint swelling.   Skin: Negative for rash.   Neurological: Positive for weakness and paresthesias. Negative for dizziness and headaches.   Psychiatric/Behavioral: Negative for mood changes. The patient is not nervous/anxious.      L hip replacement is causing gait disturbance in R leg. This is stopping her from doing a lot of exercise. Pain is described as sharp and is constant sharp pain.  Has been using aleve intermittently, and ice to treatment. Any weight bearing movement it hurts. Saw hip specialist 8 weeks ago    Is seeing PT for pelvic leakage    Is getting cramping in feet. This has going on since foot surgery. Foot cramping has been ongoing     BP at home has been around 120/70     OBJECTIVE:   /65 (BP Location: Right arm, Patient Position: Sitting, Cuff Size: Adult Regular)   Pulse 77   Temp 97.5  F (36.4  C) (Oral)   Resp 10   Ht 1.683 m (5' 6.25\")   Wt 73.5 kg (162 lb)   SpO2 97%   BMI 25.95 kg/m   " "Estimated body mass index is 25.95 kg/m  as calculated from the following:    Height as of this encounter: 1.683 m (5' 6.25\").    Weight as of this encounter: 73.5 kg (162 lb).  Physical Exam  GENERAL: healthy, alert and no distress  EYES: Eyes grossly normal to inspection  HENT: ear canals and TM's normal, nose and mouth without ulcers or lesions  NECK: no adenopathy, no asymmetry, masses, or scars and thyroid normal to palpation  RESP: lungs clear to auscultation - no rales, rhonchi or wheezes  CV: regular rate and rhythm, normal S1 S2, no S3 or S4, no murmur, click or rub, no peripheral edema and peripheral pulses strong  ABDOMEN: soft, nontender, no hepatosplenomegaly, no masses and bowel sounds normal  MS: no gross musculoskeletal defects noted, no edema  SKIN: no suspicious lesions or rashes  NEURO: Normal strength and tone, mentation intact and speech normal  PSYCH: mentation appears normal, affect normal/bright      ASSESSMENT / PLAN:   (Z00.00) Encounter for Medicare annual wellness exam  (primary encounter diagnosis)  Comment: wellness  Plan:     (M25.551) Hip pain, right  Comment: Patient has pain in right hip when weightbearing.  She had x-rays done with Ortho specialist within the last few months.  They stated that there is nothing wrong with her prosthesis but offered MRI for additional work-up.  I will recommend patient see physical therapy to see if they can help alleviate symptoms.  If physical therapy is not beneficial patient should follow up with Ortho    I do not have records to orthopedics the above information is from patient report  Plan: Physical Therapy Referral, Lidocaine (LIDOCARE)        4 % Patch            (M79.2) Nerve pain  Comment: Reorder  Plan: gabapentin (NEURONTIN) 100 MG capsule,         gabapentin (NEURONTIN) 300 MG capsule          Patient complains of foot cramping at nighttime especially left foot.  She has had several surgeries on that foot which I think could be relating. "  Of note she does take hydrochlorothiazide.  She could try to discontinue this medication to see if it alleviates pain.  When she does that she should carefully watch her blood pressure.  If blood pressure is elevated and cramping is gone, can abduct amlodipine.  If there was no improvement from discontinuation of hydrochlorothiazide she could be placed back on the medication      She reports that she has never smoked. She has never used smokeless tobacco.      Appropriate preventive services were discussed with this patient, including applicable screening as appropriate for cardiovascular disease, diabetes, osteopenia/osteoporosis, and glaucoma.  As appropriate for age/gender, discussed screening for colorectal cancer, prostate cancer, breast cancer, and cervical cancer. Checklist reviewing preventive services available has been given to the patient.    Reviewed patients plan of care and provided an AVS. The Basic Care Plan (routine screening as documented in Health Maintenance) for Alexandra meets the Care Plan requirement. This Care Plan has been established and reviewed with the Patient.      Emeka Richardson NP  [facility]

## 2023-05-17 NOTE — NURSING NOTE
Prior to immunization administration, verified patients identity using patient s name and date of birth. Please see Immunization Activity for additional information.     Screening Questionnaire for Adult Immunization    Are you sick today?   No   Do you have allergies to medications, food, a vaccine component or latex?   No   Have you ever had a serious reaction after receiving a vaccination?   No   Do you have a long-term health problem with heart, lung, kidney, or metabolic disease (e.g., diabetes), asthma, a blood disorder, no spleen, complement component deficiency, a cochlear implant, or a spinal fluid leak?  Are you on long-term aspirin therapy?   No   Do you have cancer, leukemia, HIV/AIDS, or any other immune system problem?   No   Do you have a parent, brother, or sister with an immune system problem?   No   In the past 3 months, have you taken medications that affect  your immune system, such as prednisone, other steroids, or anticancer drugs; drugs for the treatment of rheumatoid arthritis, Crohn s disease, or psoriasis; or have you had radiation treatments?   No   Have you had a seizure, or a brain or other nervous system problem?   No   During the past year, have you received a transfusion of blood or blood    products, or been given immune (gamma) globulin or antiviral drug?   No   For women: Are you pregnant or is there a chance you could become       pregnant during the next month?   No   Have you received any vaccinations in the past 4 weeks?   No     Immunization questionnaire answers were all negative.      Injection of TDAP given by Jolie Gupta MA. Patient instructed to remain in clinic for 15 minutes afterwards, and to report any adverse reactions.     Screening performed by Jolie Gupta MA on 5/17/2023 at 9:41 AM.

## 2023-05-29 ASSESSMENT — ACTIVITIES OF DAILY LIVING (ADL)
TWISTING/PIVOTING ON INVOLVED LEG: NO DIFFICULTY AT ALL
GOING_DOWN_1_FLIGHT_OF_STAIRS: SLIGHT DIFFICULTY
HOW_WOULD_YOU_RATE_YOUR_CURRENT_LEVEL_OF_FUNCTION?: ABNORMAL
LIGHT_TO_MODERATE_WORK: SLIGHT DIFFICULTY
SPORTS_COUNT: 9
SPORTS_HIGHEST_POTENTIAL_SCORE: 36
RECREATIONAL ACTIVITIES: MODERATE DIFFICULTY
GETTING_INTO_AND_OUT_OF_A_BATHTUB: EXTREME DIFFICULTY
HOS_ADL_SCORE(%): 65.63
ROLLING OVER IN BED: SLIGHT DIFFICULTY
ABILITY_TO_PERFORM_ACTIVITY_WITH_YOUR_NORMAL_TECHNIQUE: MODERATE DIFFICULTY
SITTING FOR 15 MINUTES: NO DIFFICULTY AT ALL
HOS_ADL_HIGHEST_POTENTIAL_SCORE: 64
PUTTING ON SOCKS AND SHOES: NO DIFFICULTY AT ALL
STARTING_AND_STOPPING_QUICKLY: MODERATE DIFFICULTY
WALKING_UP_STEEP_HILLS: MODERATE DIFFICULTY
HOS_ADL_ITEM_SCORE_TOTAL: 42
SITTING_FOR_15_MINUTES: NO DIFFICULTY AT ALL
GOING_UP_1_FLIGHT_OF_STAIRS: SLIGHT DIFFICULTY
ADL_SCORE(%): 0
GOING UP 1 FLIGHT OF STAIRS: SLIGHT DIFFICULTY
WALKING_UP_STEEP_HILLS: MODERATE DIFFICULTY
STEPPING_UP_AND_DOWN_CURBS: NO DIFFICULTY AT ALL
RECREATIONAL_ACTIVITIES: MODERATE DIFFICULTY
LIGHT_TO_MODERATE_WORK: SLIGHT DIFFICULTY
LOW_IMPACT_ACTIVITIES_LIKE_FAST_WALKING: MODERATE DIFFICULTY
SPORTS_SCORE(%): 0
WALKING_DOWN_STEEP_HILLS: SLIGHT DIFFICULTY
ADL_HIGHEST_POTENTIAL_SCORE: 68
WALKING_INITIALLY: MODERATE DIFFICULTY
STANDING FOR 15 MINUTES: NO DIFFICULTY AT ALL
STEPPING UP AND DOWN CURBS: NO DIFFICULTY AT ALL
GOING DOWN 1 FLIGHT OF STAIRS: SLIGHT DIFFICULTY
WALKING_15_MINUTES_OR_GREATER: MODERATE DIFFICULTY
WALKING_APPROXIMATELY_10_MINUTES: MODERATE DIFFICULTY
WALKING_15_MINUTES_OR_GREATER: MODERATE DIFFICULTY
GETTING INTO AND OUT OF AN AVERAGE CAR: NO DIFFICULTY AT ALL
CUTTING/LATERAL_MOVEMENTS: SLIGHT DIFFICULTY
TWISTING/PIVOTING_ON_INVOLVED_LEG: NO DIFFICULTY AT ALL
ABILITY_TO_PARTICIPATE_IN_YOUR_DESIRED_SPORT_AS_LONG_AS_YOU_WOULD_LIKE: EXTREME DIFFICULTY
PUTTING_ON_SOCKS_AND_SHOES: NO DIFFICULTY AT ALL
GETTING_INTO_AND_OUT_OF_AN_AVERAGE_CAR: NO DIFFICULTY AT ALL
DEEP SQUATTING: UNABLE TO DO
ADL_COUNT: 17
ROLLING_OVER_IN_BED: SLIGHT DIFFICULTY
PLEASE_INDICATE_YOR_PRIMARY_REASON_FOR_REFERRAL_TO_THERAPY:: HIP
DEEP_SQUATTING: UNABLE TO DO
SWINGING_OBJECTS_LIKE_A_GOLF_CLUB: UNABLE TO DO
HOW_WOULD_YOU_RATE_YOUR_CURRENT_LEVEL_OF_FUNCTION_DURING_YOUR_USUAL_ACTIVITIES_OF_DAILY_LIVING_FROM_0_TO_100_WITH_100_BEING_YOUR_LEVEL_OF_FUNCTION_PRIOR_TO_YOUR_HIP_PROBLEM_AND_0_BEING_THE_INABILITY_TO_PERFORM_ANY_OF_YOUR_USUAL_DAILY_ACTIVITIES?: 50
SPORTS_TOTAL_ITEM_SCORE: 0
HOW_WOULD_YOU_RATE_YOUR_CURRENT_LEVEL_OF_FUNCTION_DURING_YOUR_USUAL_ACTIVITIES_OF_DAILY_LIVING_FROM_0_TO_100_WITH_100_BEING_YOUR_LEVEL_OF_FUNCTION_PRIOR_TO_YOUR_HIP_PROBLEM_AND_0_BEING_THE_INABILITY_TO_PERFORM_ANY_OF_YOUR_USUAL_DAILY_ACTIVITIES?: 50
WALKING_INITIALLY: MODERATE DIFFICULTY
WALKING_FOR_APPROXIMATELY_10_MINUTES: MODERATE DIFFICULTY
GETTING_INTO_AND_OUT_OF_A_BATHTUB: EXTREME DIFFICULTY
WALKING_DOWN_STEEP_HILLS: SLIGHT DIFFICULTY
STANDING_FOR_15_MINUTES: NO DIFFICULTY AT ALL
ADL_TOTAL_ITEM_SCORE: 0

## 2023-05-30 ENCOUNTER — THERAPY VISIT (OUTPATIENT)
Dept: PHYSICAL THERAPY | Facility: CLINIC | Age: 73
End: 2023-05-30
Payer: MEDICARE

## 2023-05-30 DIAGNOSIS — M25.551 HIP PAIN, RIGHT: ICD-10-CM

## 2023-05-30 PROCEDURE — 97110 THERAPEUTIC EXERCISES: CPT | Mod: GP | Performed by: SPECIALIST/TECHNOLOGIST

## 2023-05-30 PROCEDURE — 97161 PT EVAL LOW COMPLEX 20 MIN: CPT | Mod: GP | Performed by: SPECIALIST/TECHNOLOGIST

## 2023-05-30 NOTE — PROGRESS NOTES
PHYSICAL THERAPY EVALUATION  Type of Visit: Evaluation    See electronic medical record for Abuse and Falls Screening details.    Subjective      Presenting condition or subjective complaint: Pain in thigh.   Tilted pelvis  Date of onset: 05/17/23    Has had both hips and both knees replaced. Reports that L hip got bad after the R hip replacement. And the R hip started hurting again after the L hip replacement. Reports that symptoms have actually been much better recently, including deciding to do a long walk even with pain but the pain did not change with that walk.   Relevant medical history: Arthritis; Bladder or bowel problems; Hearing problems; Heart problems; High blood pressure; Incontinence; Numbness or tingling in perianal area; Osteoarthritis; Osteoporosis   Dates & types of surgery: 5 joint replace.  Colectomy. Mid foot fushion and revision.  Back surgery x2.    Prior diagnostic imaging/testing results: X-ray     Prior therapy history for the same diagnosis, illness or injury: No      Prior Level of Function   Transfers: Independent  Ambulation: Independent  ADL: Independent      Living Environment  Social support: With family members   Type of home: House; Basement   Stairs to enter the home: No       Ramp: No   Stairs inside the home: Yes 12 Is there a railing: Yes   Help at home: None; Self Cares (home health aide/personal care attendant, family, etc)  Equipment owned: Straight Cane; Walker; Grab bars; Raised toilet seat; Lift chair     Employment: No    Hobbies/Interests: Hiking. Crafts. Reading. Shopping    Patient goals for therapy: Long walks    Pain assessment: Pain present     Objective   HIP EVALUATION  PAIN: Pain is Exacerbated By: Prolonged standing/walking    GAIT:   Weightbearing Status: WBAT  Assistive Device(s): None  Gait Deviations: R lateral trunk lean, initial contact during gait with R leg is more stiff. Trendelenburg.   Strength: WFL; R hip abduction and extension decreased compared  to L side      Assessment & Plan   CLINICAL IMPRESSIONS   Medical Diagnosis: R Hip Pain    Treatment Diagnosis: R Hip Pain   Impression/Assessment: Patient is a 72 year old female with R hip pain complaints.  The following significant findings have been identified: Pain, Decreased strength, Impaired balance, Impaired gait and Decreased activity tolerance. These impairments interfere with their ability to perform self care tasks, recreational activities, household chores, household mobility and community mobility as compared to previous level of function.     Clinical Decision Making (Complexity):   Clinical Presentation: Stable/Uncomplicated  Clinical Presentation Rationale: based on medical and personal factors listed in PT evaluation  Clinical Decision Making (Complexity): Low complexity    PLAN OF CARE  Treatment Interventions:  Interventions: Manual Therapy, Neuromuscular Re-education, Therapeutic Activity, Therapeutic Exercise, Self-Care/Home Management    Long Term Goals     PT Goal 1  Goal Description: Walk for 1 mile with pain 1/10  Rationale: to maximize safety and independence within the community  Target Date: 09/05/23      Frequency of Treatment: 1x/wk for 6 weeks, 1x/2wks for 4 weeks  Duration of Treatment: 14    Recommended Referrals to Other Professionals:   Education Assessment:   Learner/Method: Patient    Risks and benefits of evaluation/treatment have been explained.   Patient/Family/caregiver agrees with Plan of Care.     Evaluation Time:     PT Eval, Low Complexity Minutes (25530): 15      Signing Clinician: Vitaliy Elam, PT      [facility]                                                                                   OUTPATIENT PHYSICAL THERAPY      PLAN OF TREATMENT FOR OUTPATIENT REHABILITATION   Patient's Last Name, First Name, Alexandra Livingston YOB: 1950   Provider's Name   [facility]   Medical  Record No.  [id number]     Onset Date: 05/17/23  Start of Care Date: 05/30/23     Medical Diagnosis:  R Hip Pain      PT Treatment Diagnosis:  R Hip Pain Plan of Treatment  Frequency/Duration: 1x/wk for 6 weeks, 1x/2wks for 4 weeks/ 14    Certification date from 05/30/23 to 09/05/23         See note for plan of treatment details and functional goals     Vitaliy Elam, PT                         I CERTIFY THE NEED FOR THESE SERVICES FURNISHED UNDER        THIS PLAN OF TREATMENT AND WHILE UNDER MY CARE     (Physician attestation of this document indicates review and certification of the therapy plan).                  Referring Provider:  Emeka Richardson      Initial Assessment  See Epic Evaluation- Start of Care Date: 05/30/23           I have reviewed and confirmed nurses' notes for patient's medications, allergies, medical history, and surgical history.

## 2023-06-06 ENCOUNTER — THERAPY VISIT (OUTPATIENT)
Dept: PHYSICAL THERAPY | Facility: CLINIC | Age: 73
End: 2023-06-06
Payer: MEDICARE

## 2023-06-06 DIAGNOSIS — N39.46 MIXED INCONTINENCE: Primary | ICD-10-CM

## 2023-06-06 PROCEDURE — 97530 THERAPEUTIC ACTIVITIES: CPT | Mod: GP | Performed by: PHYSICAL THERAPIST

## 2023-06-06 PROCEDURE — 97110 THERAPEUTIC EXERCISES: CPT | Mod: 59 | Performed by: PHYSICAL THERAPIST

## 2023-06-06 NOTE — PROGRESS NOTES
06/06/23 0500   Appointment Info   Signing clinician's name / credentials Rosibel Quiroz, PT, OCS   Total/Authorized Visits EPIC 03/01/23   Visits Used 4   Medical Diagnosis mixed incontinence   PT Tx Diagnosis mixed incontinence   Precautions/Limitations Discussed with patient/guardian reason for referral regarding pelvic health needs and external/internal pelvic floor muscle examination.  Opportunity provided to ask questions and verbal consent for assessment and intervention was given.   Other pertinent information [id number]   Quick Adds Certification   Progress Note/Certification   Start of Care Date 04/17/23   Onset of illness/injury or Date of Surgery 03/01/23  (order date)   Therapy Frequency 1x week   Predicted Duration 12 weeks   Certification date from 04/17/23   Certification date to 07/15/23   Progress Note Due Date 06/06/23   Progress Note Completed Date 06/06/23   PT Goal 1   Goal Identifier urinary leakage   Goal Description episodes of leakage in a week to 0   Rationale   (continence throughout the day;for healthy hygiene and to prevent skin breakdown)   Goal Progress not met but progressing, using 1 pad day max   Target Date 07/10/23   Subjective Report   Subjective Report Had a really bad virus for 2 weeks, then got UTI and now finally recovering. Also started PT for R hip.Also had a fall by stairs at [facility] when in the dark so just sore from that/bruised. Also had L thumb injection.Did have significant leakage with sneeze/cough but done real well since then with no leakage. Urge can intensify quickly but generally able to control. Wearing ~ 1 pad/day just in case or at night. Bowels pretty regular with Miralax.   Objective Measures   Objective Measures Objective Measure 1   Objective Measure 1   Objective Measure Rev'd all HEP and modified to focus on strengthening   Treatment Interventions (PT)   Interventions Therapeutic Procedure/Exercise;Therapeutic Activity;Manual Therapy  "  Therapeutic Procedure/Exercise   Therapeutic Procedures: strength, endurance, ROM, flexibillity minutes (08488) 15   PTRx Ther Proc 1 Roll Ins   PTRx Ther Proc 1 - Details Hold 5 secondsPULL IN LOWER ABDOMEN AS YOU DO KEGEL \"Zipper\" exhale as you contract muscles   PTRx Ther Proc 2 Roll Outs   PTRx Ther Proc 2 - Details Perform slow and controlledPULL IN LOWER ABDOMEN AS YOU DO KEGEL \"Zipper\" exhale as you contract muscles.20 reps 1 x day   PTRx Ther Proc 3 Bridging Pelvic Floor    PTRx Ther Proc 3 - Details with lift: exhale pull in abdomen kegel ensure to fully relax and inhale between reps20 reps x 1 xday   Therapeutic Activity   PTRx Ther Act 1 Functions of Fiber   PTRx Ther Act 1 - Details continue   PTRx Ther Act 2 Toileting Position   PTRx Ther Act 2 - Details rev'd position   PTRx Ther Act 3 Abdominal Massage   PTRx Ther Act 3 - Details continue 3x week   PTRx Ther Act 4 Overactive Dysfunction and Suggestions to Reduce Overactive Dysfunction   PTRx Ther Act 4 - Details No Notes   Therapeutic Activities: dynamic activities to improve functional performance minutes (49118) 15   Intervention (Other)   PTRx Other  1 General Bladder Information   PTRx Other 1 - Details No Notes   Education   Learner/Method Patient;No Barriers to Learning   Plan   Home program PTRX   Total Session Time   Timed Code Treatment Minutes 30   Total Treatment Time (sum of timed and untimed services) 30         DISCHARGE  Reason for Discharge: Patient chooses to discontinue therapy.    Equipment Issued: NA    Discharge Plan: Patient to continue home program.    Referring Provider:  Sanat Petit    "

## 2023-06-07 ENCOUNTER — THERAPY VISIT (OUTPATIENT)
Dept: PHYSICAL THERAPY | Facility: CLINIC | Age: 73
End: 2023-06-07
Payer: MEDICARE

## 2023-06-07 DIAGNOSIS — M25.551 HIP PAIN, RIGHT: Primary | ICD-10-CM

## 2023-06-07 PROCEDURE — 97110 THERAPEUTIC EXERCISES: CPT | Mod: GP | Performed by: SPECIALIST/TECHNOLOGIST

## 2023-06-09 ENCOUNTER — MYC MEDICAL ADVICE (OUTPATIENT)
Dept: INTERNAL MEDICINE | Facility: CLINIC | Age: 73
End: 2023-06-09
Payer: MEDICARE

## 2023-06-09 DIAGNOSIS — I10 BENIGN ESSENTIAL HYPERTENSION: Primary | ICD-10-CM

## 2023-06-09 NOTE — TELEPHONE ENCOUNTER
Please review [employer] message and advise.     Bren Cardona RN  [facility]

## 2023-06-12 RX ORDER — AMLODIPINE BESYLATE 5 MG/1
5 TABLET ORAL DAILY
Qty: 30 TABLET | Refills: 1 | Status: SHIPPED | OUTPATIENT
Start: 2023-06-12 | End: 2023-10-11

## 2023-06-12 NOTE — TELEPHONE ENCOUNTER
Add amlodipine 5mg daily. Home blood pressure readings in 3 weeks to make sure blood pressure is at good levels. Swelling of ankles can be a side effect, we generally see this at higher doses but just so patient is aware

## 2023-06-13 NOTE — TELEPHONE ENCOUNTER
BISON message sent to patient with provider recommendations.     Bren Cardona RN  [facility]    normal...

## 2023-06-14 ENCOUNTER — THERAPY VISIT (OUTPATIENT)
Dept: PHYSICAL THERAPY | Facility: CLINIC | Age: 73
End: 2023-06-14
Payer: MEDICARE

## 2023-06-14 DIAGNOSIS — M25.551 HIP PAIN, RIGHT: Primary | ICD-10-CM

## 2023-06-14 PROCEDURE — 97110 THERAPEUTIC EXERCISES: CPT | Mod: GP | Performed by: SPECIALIST/TECHNOLOGIST

## 2023-06-23 ENCOUNTER — TRANSFERRED RECORDS (OUTPATIENT)
Dept: HEALTH INFORMATION MANAGEMENT | Facility: CLINIC | Age: 73
End: 2023-06-23
Payer: MEDICARE

## 2023-07-08 ENCOUNTER — TRANSFERRED RECORDS (OUTPATIENT)
Dept: HEALTH INFORMATION MANAGEMENT | Facility: CLINIC | Age: 73
End: 2023-07-08

## 2023-07-31 ENCOUNTER — TELEPHONE (OUTPATIENT)
Dept: OPTOMETRY | Facility: CLINIC | Age: 73
End: 2023-07-31

## 2023-07-31 ENCOUNTER — OFFICE VISIT (OUTPATIENT)
Dept: URGENT CARE | Facility: URGENT CARE | Age: 73
End: 2023-07-31
Payer: MEDICARE

## 2023-07-31 VITALS
DIASTOLIC BLOOD PRESSURE: 75 MMHG | BODY MASS INDEX: 25.5 KG/M2 | SYSTOLIC BLOOD PRESSURE: 128 MMHG | WEIGHT: 159.2 LBS | HEART RATE: 67 BPM | OXYGEN SATURATION: 100 % | TEMPERATURE: 97.3 F

## 2023-07-31 DIAGNOSIS — H57.12 ACUTE LEFT EYE PAIN: Primary | ICD-10-CM

## 2023-07-31 PROCEDURE — 99214 OFFICE O/P EST MOD 30 MIN: CPT | Performed by: PHYSICIAN ASSISTANT

## 2023-07-31 RX ORDER — CYCLOBENZAPRINE HCL 5 MG
5 TABLET ORAL 3 TIMES DAILY PRN
COMMUNITY
End: 2024-01-12

## 2023-07-31 ASSESSMENT — ENCOUNTER SYMPTOMS
EYE REDNESS: 0
EYE PAIN: 1
PHOTOPHOBIA: 1
EYE ITCHING: 0
EYE DISCHARGE: 0

## 2023-07-31 NOTE — TELEPHONE ENCOUNTER
[facility] Call Center    Phone Message    May a detailed message be left on voicemail: yes     Reason for Call: Symptoms or Concerns     If patient has red-flag symptoms, warm transfer to triage line    Current symptom or concern: Sharp Pain in left eye    Symptoms have been present for: 6 hour(s)    Has patient previously been seen for this? No    By : n/a    Date: n/a    Are there any new or worsening symptoms? No    Action Taken: Message routed to:  [facility]    Travel Screening: Not Applicable    Pt has an appt with  on 8/3 but would like to inform her of this recent pain

## 2023-07-31 NOTE — TELEPHONE ENCOUNTER
This encounter is being sent to inform the clinic that this patient has a referral from Veronica rodriguez PA-C in  URGENT CARE   for the diagnoses of Acute left eye pain and has requested that this patient be seen within 1-2 days and/or with eye provider.  Based on the availability of our provider(s), we are unable to accommodate this request.    Were all sites offered this patient?  Yes    Does scheduling algorithm request to schedule next available?  Patient has been scheduled for the first available opening with Dr Srivastava on 8/1.  We have informed the patient that the clinic will review their referral and reach out if a sooner appointment is medically necessary.      Norma from [facility] provider would like patient to be seen today. Please call patient back to see if we can fit her in today instead of tomorrow. Norma can be reached at [phone]. Thank you.

## 2023-07-31 NOTE — PROGRESS NOTES
Assessment & Plan:        ICD-10-CM    1. Acute left eye pain  H57.12 Adult Eye  Referral            Plan/Clinical Decision Making:    Patient with acute left eye pain started this morning. No vision changes, mild photophobia. No discharge or erythema.   Will refer to eye doctor for further evaluation. Coordinating consult with [facility] today at 4pm.     Patient understood and agreed to plan. Patient was stable for discharge.        Seen with student: YOUSUF Narvaez    Physician Attestation   I, Veronica Pepe PA-C, was present with the medical/ANGELICA student who participated in the service and in the documentation of the note.  I have verified the history and personally performed the physical exam and medical decision making.  I agree with the assessment and plan of care as documented in the note.        Veronica Pepe PA-C      Subjective:     HPI:    Alexandra is a 72 year old female who presents to clinic today for the following health issues:  Chief Complaint   Patient presents with    Eye Problem     Start early this morning sx left eye pain currently/worst 8-9/10, woke her up, pain and swelling, can see fine tx warm compress      HPI  Patient reports eye pain this morning that woke her up. Patient denies trauma, changes in vision, but has photophobia and hurts to move her left eye. Tenderness to eye with palpation. No pain on eyelid.     History obtained from the patient.    Review of Systems   Eyes:  Positive for photophobia and pain. Negative for discharge, redness, itching and visual disturbance.         Patient Active Problem List   Diagnosis    Benign essential hypertension    Gastroparesis    Thoracic aortic aneurysm without rupture (H)    Gastroesophageal reflux disease without esophagitis    Pain syndrome, chronic    Sicca syndrome (H)    NSVT (nonsustained ventricular tachycardia) (H)    TATI (obstructive sleep apnea)    S/P total hip arthroplasty    Lattice degeneration of right  retina    Hyperlipidemia, acquired    Mixed incontinence    Pelvic floor dysfunction    Recurrent UTI    Other chest pain    Hip pain, right        Past Medical History:   Diagnosis Date    Arthritis     hips    Ascending aortic aneurysm (H)     4.2 cm   Ascending    Biceps tendon rupture     Diverticulitis of colon     Gastroesophageal reflux disease     Esophageal spasms.    Gastroparesis     Heart murmur     High cholesterol     History of GI bleed     Hypertension     Sjogren's syndrome (H)        Social History     Tobacco Use    Smoking status: Never    Smokeless tobacco: Never   Substance Use Topics    Alcohol use: Not Currently             Objective:     Vitals:    07/31/23 1201   BP: 128/75   Pulse: 67   Temp: 97.3  F (36.3  C)   SpO2: 100%   Weight: 72.2 kg (159 lb 3.2 oz)         Physical Exam   EXAM:   Pleasant, alert, appropriate appearance. NAD.  Eye Exam:  PERRLA, EOMI, non icteric/injection. Fluorescein stain was negative for uptake.   Chest/Respiratory Exam: CTAB.  Cardiovascular Exam: RRR. No murmur or rubs.  Skin: no rash or lesion.      Results:  No results found for any visits on 07/31/23.

## 2023-08-03 ENCOUNTER — OFFICE VISIT (OUTPATIENT)
Dept: OPTOMETRY | Facility: CLINIC | Age: 73
End: 2023-08-03
Payer: MEDICARE

## 2023-08-03 DIAGNOSIS — H35.411 LATTICE DEGENERATION OF RIGHT RETINA: ICD-10-CM

## 2023-08-03 DIAGNOSIS — H52.202 MYOPIA OF LEFT EYE WITH ASTIGMATISM: ICD-10-CM

## 2023-08-03 DIAGNOSIS — H04.129 DRY EYE: ICD-10-CM

## 2023-08-03 DIAGNOSIS — H52.4 PRESBYOPIA: Primary | ICD-10-CM

## 2023-08-03 DIAGNOSIS — H52.01 HYPEROPIA OF RIGHT EYE: ICD-10-CM

## 2023-08-03 DIAGNOSIS — H52.12 MYOPIA OF LEFT EYE WITH ASTIGMATISM: ICD-10-CM

## 2023-08-03 DIAGNOSIS — H26.9 BILATERAL INCIPIENT CATARACTS: ICD-10-CM

## 2023-08-03 PROCEDURE — 92015 DETERMINE REFRACTIVE STATE: CPT | Mod: GY | Performed by: OPTOMETRIST

## 2023-08-03 PROCEDURE — 92014 COMPRE OPH EXAM EST PT 1/>: CPT | Performed by: OPTOMETRIST

## 2023-08-03 ASSESSMENT — VISUAL ACUITY
OD_SC: 20/30
METHOD: SNELLEN - LINEAR
OD_SC: 20/80
OS_SC: 20/60
OS_SC: 20/30
OD_SC+: -1

## 2023-08-03 ASSESSMENT — CUP TO DISC RATIO
OD_RATIO: 0.3
OS_RATIO: 0.35

## 2023-08-03 ASSESSMENT — REFRACTION_MANIFEST
OS_ADD: +2.50
OS_SPHERE: -0.50
OD_AXIS: 168
METHOD_AUTOREFRACTION: 1
OD_CYLINDER: +0.50
OS_CYLINDER: +0.50
OS_SPHERE: -1.50
OD_ADD: +2.50
OD_SPHERE: +0.50
OS_CYLINDER: +0.75
OD_SPHERE: PLANO
OD_CYLINDER: SPHERE
OS_AXIS: 165
OS_AXIS: 156

## 2023-08-03 ASSESSMENT — CONF VISUAL FIELD
OS_INFERIOR_TEMPORAL_RESTRICTION: 0
OD_SUPERIOR_NASAL_RESTRICTION: 0
OS_NORMAL: 1
OD_INFERIOR_NASAL_RESTRICTION: 0
OS_INFERIOR_NASAL_RESTRICTION: 0
OS_SUPERIOR_NASAL_RESTRICTION: 0
OD_INFERIOR_TEMPORAL_RESTRICTION: 0
METHOD: COUNTING FINGERS
OD_NORMAL: 1
OD_SUPERIOR_TEMPORAL_RESTRICTION: 0
OS_SUPERIOR_TEMPORAL_RESTRICTION: 0

## 2023-08-03 ASSESSMENT — EXTERNAL EXAM - LEFT EYE: OS_EXAM: NORMAL

## 2023-08-03 ASSESSMENT — TONOMETRY
OD_IOP_MMHG: 16
OS_IOP_MMHG: 16
IOP_METHOD: APPLANATION

## 2023-08-03 ASSESSMENT — EXTERNAL EXAM - RIGHT EYE: OD_EXAM: NORMAL

## 2023-08-03 ASSESSMENT — SLIT LAMP EXAM - LIDS: COMMENTS: MEIBOMIAN GLAND DYSFUNCTION

## 2023-08-03 NOTE — PROGRESS NOTES
Chief Complaint   Patient presents with    Annual Eye Exam      Patient woke up with eye pain on Monday morning - saw [facility] - was told to be more aggressive with her dry eye treatments - was not using artificial tears as consistently and now is and helping  Wants distance prescription     Last Eye Exam:08/2022  Dilated Previously: Yes, side effects of dilation explained today    What are you currently using to see?  readers       Distance Vision Acuity: Noticed gradual change in both eyes unaided     Near Vision Acuity: Satisfied with vision while reading and using computer with readers    Eye Comfort: dry and irritated itchy in corners today   Do you use eye drops? : Yes: Refresh 3 times daily and refresh gel at night and baby shampoo on lids  on the shower      Gricelda Will - Optometric Assistant           Medical, surgical and family histories reviewed and updated 8/3/2023.     Floppy eyelids, KCS     OBJECTIVE: See Ophthalmology exam    ASSESSMENT:    ICD-10-CM    1. Presbyopia  H52.4 EYE EXAM (SIMPLE-NONBILLABLE)     REFRACTION      2. Dry eye  H04.129 EYE EXAM (SIMPLE-NONBILLABLE)      3. Hyperopia of right eye  H52.01 REFRACTION      4. Myopia of left eye with astigmatism  H52.12 REFRACTION    H52.202       5. Bilateral incipient cataracts  H26.9       6. Lattice degeneration of right retina  H35.411           PLAN:   Ongoing lid hygiene/ artificial tears  / gel   Trial systane, can add over the counter allergy  Likes single vision prescription for distance and over the counter readers +2.50  S/s retinal detachment and need for ER visit  Genet Dahl OD

## 2023-08-03 NOTE — LETTER
8/3/2023         RE: Alexandra Thomas  [address] [address]        Dear Colleague,    Thank you for referring your patient, Alexandra Thomas, to the [facility]. Please see a copy of my visit note below.    Chief Complaint   Patient presents with     Annual Eye Exam      Patient woke up with eye pain on Monday morning - saw [facility] - was told to be more aggressive with her dry eye treatments - was not using artificial tears as consistently and now is and helping  Wants distance prescription     Last Eye Exam:08/2022  Dilated Previously: Yes, side effects of dilation explained today    What are you currently using to see?  readers       Distance Vision Acuity: Noticed gradual change in both eyes unaided     Near Vision Acuity: Satisfied with vision while reading and using computer with readers    Eye Comfort: dry and irritated itchy in corners today   Do you use eye drops? : Yes: Refresh 3 times daily and refresh gel at night and baby shampoo on lids  on the shower      Gricelda Will - Optometric Assistant           Medical, surgical and family histories reviewed and updated 8/3/2023.     Floppy eyelids, KCS     OBJECTIVE: See Ophthalmology exam    ASSESSMENT:    ICD-10-CM    1. Presbyopia  H52.4 EYE EXAM (SIMPLE-NONBILLABLE)     REFRACTION      2. Dry eye  H04.129 EYE EXAM (SIMPLE-NONBILLABLE)      3. Hyperopia of right eye  H52.01 REFRACTION      4. Myopia of left eye with astigmatism  H52.12 REFRACTION    H52.202       5. Bilateral incipient cataracts  H26.9       6. Lattice degeneration of right retina  H35.411           PLAN:   Ongoing lid hygiene/ artificial tears  / gel   Trial systane, can add over the counter allergy  Likes single vision prescription for distance and over the counter readers +2.50  S/s retinal detachment and need for ER visit  Genet Dahl OD     Again, thank you for allowing me to participate in the care of your patient.         Sincerely,        Genet Dahl, OD

## 2023-08-07 ENCOUNTER — OFFICE VISIT (OUTPATIENT)
Dept: FAMILY MEDICINE | Facility: CLINIC | Age: 73
End: 2023-08-07
Payer: MEDICARE

## 2023-08-07 VITALS
BODY MASS INDEX: 24.96 KG/M2 | SYSTOLIC BLOOD PRESSURE: 118 MMHG | HEART RATE: 72 BPM | OXYGEN SATURATION: 98 % | TEMPERATURE: 97.8 F | HEIGHT: 67 IN | WEIGHT: 159 LBS | RESPIRATION RATE: 14 BRPM | DIASTOLIC BLOOD PRESSURE: 74 MMHG

## 2023-08-07 DIAGNOSIS — R20.3 TOUCH SENSITIVITY INCREASED: Primary | ICD-10-CM

## 2023-08-07 PROCEDURE — 99213 OFFICE O/P EST LOW 20 MIN: CPT | Performed by: PHYSICIAN ASSISTANT

## 2023-08-07 NOTE — PROGRESS NOTES
Assessment & Plan     Touch sensitivity increased    Possibly mild case of shingles as patient suspected. Since mostly gone now, will monitor. If pain returns or if rash appears, I will send in shingles treatment. She has had the shingrix vaccines.                 Luke Chand PA-C  [facility] MARLO Morris is a 72 year old, presenting for the following health issues:  Derm Problem        8/7/2023     9:06 AM   Additional Questions   Roomed by Maryann       History of Present Illness       Reason for visit:  Nerve Ending Pain - Right Side  Symptom onset:  1-3 days ago  Symptoms include:  Pain like sunburn  Symptom intensity:  Moderate  Symptom progression:  Staying the same  Had these symptoms before:  Yes  Has tried/received treatment for these symptoms:  No  What makes it worse:  Touching the area  What makes it better:  No    She eats 2-3 servings of fruits and vegetables daily.She consumes 0 sweetened beverage(s) daily.She exercises with enough effort to increase her heart rate 30 to 60 minutes per day.  She exercises with enough effort to increase her heart rate 4 days per week.   She is taking medications regularly.    Description  Location: face, R breast, chest and upper back - right  Joint Swelling: No  Redness: No  Pain: YES  Warmth: No  Accompanying signs and symptoms:   Fevers: No  Numbness/tingling/weakness: No  History  Trauma to the area: No  Recent illness:  No  Previous similar problem: YES  Previous evaluation:  No  Precipitating or alleviating factors:  Aggravating factors include: none  Therapies tried and outcome: acetaminophen and gold bond powder        Review of Systems   Constitutional, HEENT, cardiovascular, pulmonary, gi and gu systems are negative, except as otherwise noted.        Objective    /74 (BP Location: Right arm, Patient Position: Chair, Cuff Size: Adult Regular)   Pulse 72   Temp 97.8  F (36.6  C) (Oral)   Resp 14   Ht 1.702  "m (5' 7\")   Wt 72.1 kg (159 lb)   SpO2 98%   BMI 24.90 kg/m    Body mass index is 24.9 kg/m .      Physical Exam   GENERAL: healthy, alert and no distress  EYES: Eyes grossly normal to inspection, PERRL and conjunctivae and sclerae normal  MS: no gross musculoskeletal defects noted, no edema  SKIN: no suspicious lesions or rashes  NEURO: Normal strength and tone, mentation intact and speech normal  PSYCH: mentation appears normal, affect normal/bright                      "

## 2023-08-20 PROBLEM — M25.551 HIP PAIN, RIGHT: Status: RESOLVED | Noted: 2023-05-30 | Resolved: 2023-08-20

## 2023-10-11 ENCOUNTER — VIRTUAL VISIT (OUTPATIENT)
Dept: INTERNAL MEDICINE | Facility: CLINIC | Age: 73
End: 2023-10-11
Payer: MEDICARE

## 2023-10-11 DIAGNOSIS — K21.9 GASTROESOPHAGEAL REFLUX DISEASE WITHOUT ESOPHAGITIS: Primary | ICD-10-CM

## 2023-10-11 DIAGNOSIS — I10 BENIGN ESSENTIAL HYPERTENSION: ICD-10-CM

## 2023-10-11 DIAGNOSIS — M79.2 NERVE PAIN: ICD-10-CM

## 2023-10-11 DIAGNOSIS — E78.5 HYPERLIPIDEMIA, ACQUIRED: ICD-10-CM

## 2023-10-11 PROCEDURE — 99214 OFFICE O/P EST MOD 30 MIN: CPT | Mod: 95 | Performed by: INTERNAL MEDICINE

## 2023-10-11 RX ORDER — ROSUVASTATIN CALCIUM 10 MG/1
10 TABLET, COATED ORAL DAILY
Qty: 90 TABLET | Refills: 1 | Status: SHIPPED | OUTPATIENT
Start: 2023-10-11 | End: 2023-10-18

## 2023-10-11 RX ORDER — GABAPENTIN 300 MG/1
600 CAPSULE ORAL AT BEDTIME
Qty: 180 CAPSULE | Refills: 1 | Status: SHIPPED | OUTPATIENT
Start: 2023-10-11 | End: 2024-02-19

## 2023-10-11 RX ORDER — GABAPENTIN 100 MG/1
CAPSULE ORAL
Qty: 270 CAPSULE | Refills: 1 | Status: SHIPPED | OUTPATIENT
Start: 2023-10-11 | End: 2024-02-19

## 2023-10-11 ASSESSMENT — ENCOUNTER SYMPTOMS
CARDIOVASCULAR NEGATIVE: 1
RESPIRATORY NEGATIVE: 1
CONSTITUTIONAL NEGATIVE: 1
GASTROINTESTINAL NEGATIVE: 1
ARTHRALGIAS: 1
PARESTHESIAS: 1

## 2023-10-11 NOTE — PROGRESS NOTES
Alexandra is a 72 year old who is being evaluated via a billable video visit.      How would you like to obtain your AVS? MyChart  If the video visit is dropped, the invitation should be resent by: Send to e-mail at: [email or web address]  Will anyone else be joining your video visit? No      Assessment & Plan     Gastroesophageal reflux disease without esophagitis  At this time, patient's reflux symptoms are currently under good control.  She will continue her omeprazole at 40 mg by mouth per day.  Patient states that her prescription has been written for twice daily dosing, but she typically only takes it once per day and it has kept her symptoms under good control.  Side effects of PPI use were reviewed.  Reflux precautions were discussed.    Hyperlipidemia, acquired  Review of her record does show that she did receive her prescription for rosuvastatin from her cardiologist.  Patient does have an appointment with her cardiologist coming up within the next few weeks, but she states that she is about to run out of her rosuvastatin.  I did submit a one-time refill of rosuvastatin 10 mg by mouth per day for ongoing management of her cholesterol.  Side effects of statins were discussed.  Dietary modifications for improved cholesterol control were reviewed.  Further recommendations in regards to ongoing statin usage will be made by her cardiologist.  - rosuvastatin (CRESTOR) 10 MG tablet; Take 1 tablet (10 mg) by mouth daily    Benign essential hypertension  Patient blood pressure does appear to be under good control with use of losartan 50 mg daily.  Again, this medication has been prescribed to her by her cardiologist, and it does appear that she has refills until March 2024.  She will continue her losartan as currently prescribed.  Side effects of medication were reviewed.  Patient does have her follow-up with a cardiologist within the next few weeks.    Nerve pain  Patient has had some issues with increased pain,  and she did express an interest in adjusting her gabapentin dosage.  After much discussion, we did elect to continue her gabapentin at 200 mg in the morning with an additional 100 mg dose at noon.  Patient will continue her 600 mg dose of gabapentin in the evening.  Side effects of gabapentin use were reviewed.  We will monitor her response to this adjustment to her medication regimen.  - gabapentin (NEURONTIN) 300 MG capsule; Take 2 capsules (600 mg) by mouth at bedtime  - gabapentin (NEURONTIN) 100 MG capsule; Take 2 capsules (200 mg) in a.m. and 1 additional capsule (100 mg) at noon.    Prescription drug management  22 minutes spent by me on the date of the encounter doing chart review, history and exam, documentation and further activities per the note       See Patient Instructions    Jaswinder Pal MD  [facility] is a 72 year [facility], presenting for the following health issues:  No chief complaint on file.      Patient is a 72-year-old  female who presented to the virtual visit for medication refills.  Her primary care provider is leaving the clinic, and she does want to ensure that her medications have refills until her next visit.  She does have a history of hypertension for which she has been taking losartan 50 mg by mouth per day.  Patient had also been placed on a trial of amlodipine 5 mg daily for additional assistance with managing her blood pressure.  Patient reports that she has not been taking the amlodipine, and her blood pressure readings at home have been normal.  She reports that her systolic pressures are typically in the 120s.  She does take 10 mg of rosuvastatin for management of her cholesterol.  Patient is tolerating this medication without issue.  Patient has had some issues with chronic pain as well as neuropathy.  She has been taking gabapentin for management of this issue.  Patient's current regimen is gabapentin 200 mg in  the morning with an additional 600 mg in the evening.  Patient has had some recent issues with increased pain, she is wondering if she can make some adjustments to her gabapentin.  Patient is reluctant to take higher doses of gabapentin earlier in the day as she did seem to have issues with falling in the past when on higher a.m. doses of gabapentin.  She also takes omeprazole 40 mg daily for management of reflux.  Patient is tolerating all medications without issue.    History of Present Illness       Reason for visit:  Update all prescription orders due to my PCP leaving    She eats 4 or more servings of fruits and vegetables daily.She consumes 0 sweetened beverage(s) daily.She exercises with enough effort to increase her heart rate 10 to 19 minutes per day.  She exercises with enough effort to increase her heart rate 3 or less days per week.   She is taking medications regularly.       Review of Systems   Constitutional: Negative.    HENT: Negative.     Respiratory: Negative.     Cardiovascular: Negative.    Gastrointestinal: Negative.    Musculoskeletal:  Positive for arthralgias.   Neurological:  Positive for paresthesias.          Objective         Vitals:  No vitals were obtained today due to virtual visit.    Physical Exam   GENERAL: Healthy, alert and no distress  EYES: Eyes grossly normal to inspection.  No discharge or erythema, or obvious scleral/conjunctival abnormalities.  RESP: No audible wheeze, cough, or visible cyanosis.  No visible retractions or increased work of breathing.    SKIN: Visible skin clear. No significant rash, abnormal pigmentation or lesions.  NEURO: Cranial nerves grossly intact.  Mentation and speech appropriate for age.  PSYCH: Mentation appears normal, affect normal/bright, judgement and insight intact, normal speech and appearance well-groomed.          Video-Visit Details    Type of service:  Video Visit   Video Start Time:  10:50 AM  Video End Time:11:12 AM    Originating  Location (pt. Location): Home  Distant Location (provider location):  On-site  Platform used for Video Visit: Lauryn

## 2023-10-17 ENCOUNTER — OFFICE VISIT (OUTPATIENT)
Dept: PODIATRY | Facility: CLINIC | Age: 73
End: 2023-10-17
Payer: MEDICARE

## 2023-10-17 VITALS — SYSTOLIC BLOOD PRESSURE: 142 MMHG | WEIGHT: 159 LBS | BODY MASS INDEX: 24.9 KG/M2 | DIASTOLIC BLOOD PRESSURE: 80 MMHG

## 2023-10-17 DIAGNOSIS — L60.0 ONYCHOCRYPTOSIS: Primary | ICD-10-CM

## 2023-10-17 PROCEDURE — 99203 OFFICE O/P NEW LOW 30 MIN: CPT | Performed by: PODIATRIST

## 2023-10-17 NOTE — PROGRESS NOTES
Foot & Ankle Surgery  October 17, 2023    CC: Ingrown nail; left foot cramping    I was asked to see Alexandra Thomas regarding the chief complaint by:  self    HPI:  Pt is a 72 year old female who presents with above complaint.  Patient underwent a previous ingrown nail procedure approxi-7 months ago at the medial left great toe.  This has grown back and while it is less symptomatic than it was prior to the procedure, this is causing some level of discomfort.  She also mentions cramping in the left second and third toes, lateral foot and along the lateral left lower leg.  She underwent previous midfoot fusions in [place] and then subsequent hardware removal.  She does mention some tingling in the toes.  Her main complaint is cramping in the second and third toes.    ROS:   Pos for CC.  The patient denies current nausea, vomiting, chills, fevers, belly pain, calf pain, chest pain or SOB.  Complete remainder of ROS is otherwise neg.    VITALS:    Vitals:    10/17/23 0932   BP: (!) 142/80   Weight: 72.1 kg (159 lb)       PMH:    Past Medical History:   Diagnosis Date    Arthritis     hips    Ascending aortic aneurysm (H24)     4.2 cm   Ascending    Biceps tendon rupture     Diverticulitis of colon     Gastroesophageal reflux disease     Esophageal spasms.    Gastroparesis     Heart murmur     High cholesterol     History of GI bleed     Hypertension     Sjogren's syndrome (H24)        SXHX:    Past Surgical History:   Procedure Laterality Date    ABDOMEN SURGERY      APPENDECTOMY      ARTHROPLASTY HIP Left 07/27/2022    Procedure: Left total hip arthroplasty;  Surgeon: Berry Mcdonnell MD;  Location: [facility] OR    BACK SURGERY      BIOPSY      BREAST SURGERY      CHOLECYSTECTOMY      COLECTOMY PARTIAL      Due to recurrent diverticulitis    COLONOSCOPY      ENT SURGERY      T & A    ESOPHAGOSCOPY, GASTROSCOPY, DUODENOSCOPY (EGD), COMBINED N/A 09/13/2022    Procedure: ESOPHAGOGASTRODUODENOSCOPY (EGD) (fv);  Surgeon:  Seymour Chatterjee MD;  Location:  GI    GENITOURINARY SURGERY      HYSTERECTOMY TOTAL ABDOMINAL      LASIK      left midfoot fusion Left     2nd toe osteotomy    shouder replacement Right     TOTAL HIP ARTHROPLASTY Right 06/07/2009    TOTAL KNEE ARTHROPLASTY Right 07/12/2006    TOTAL KNEE ARTHROPLASTY Left 02/18/2009        MEDS:    Current Outpatient Medications   Medication    acetaminophen (TYLENOL) 325 MG tablet    cyclobenzaprine (FLEXERIL) 5 MG tablet    diphenhydrAMINE-acetaminophen (TYLENOL PM)  MG tablet    gabapentin (NEURONTIN) 100 MG capsule    gabapentin (NEURONTIN) 300 MG capsule    lactobacillus rhamnosus (GG) (CULTURELL) capsule    Lidocaine (LIDOCARE) 4 % Patch    losartan (COZAAR) 50 MG tablet    melatonin 5 MG tablet    multivitamin w/minerals (THERA-VIT-M) tablet    omeprazole (PRILOSEC) 40 MG DR capsule    ondansetron (ZOFRAN ODT) 4 MG ODT tab    rosuvastatin (CRESTOR) 10 MG tablet    Vitamin D3 (CHOLECALCIFEROL) 25 mcg (1000 units) tablet     No current facility-administered medications for this visit.       ALL:     Allergies   Allergen Reactions    Ciprofloxacin Other (See Comments)     Pt has aortic aneurysm.  Increased risk of rupture or dissection with use of fluoroquinolones.      Food Anaphylaxis     EGGPLANT-->anaphylaxis      Venlafaxine Hcl Diarrhea, Shortness Of Breath and Nausea and Vomiting    Meperidine Rash               Adhesive Tape      blisters    Morphine Hives and Itching     Histamine reaction       FMH:    Family History   Problem Relation Age of Onset    Chronic Obstructive Pulmonary Disease Mother     Dementia Father     Chronic Obstructive Pulmonary Disease Father     Macular Degeneration Maternal Grandmother     Other Cancer Maternal Grandmother         Liver    Breast Cancer Paternal Cousin         before 50    Diabetes Paternal Grandfather     Diabetes Paternal Grandmother     Other Cancer Cousin         Breast/Mets    Substance Abuse Sister     Glaucoma No  family hx of        SocHx:    Social History     Socioeconomic History    Marital status:      Spouse name: Not on file    Number of children: Not on file    Years of education: Not on file    Highest education level: Not on file   Occupational History    Not on file   Tobacco Use    Smoking status: Never    Smokeless tobacco: Never   Vaping Use    Vaping Use: Never used   Substance and Sexual Activity    Alcohol use: Not Currently    Drug use: Never    Sexual activity: Not Currently     Birth control/protection: Post-menopausal, Female Surgical, None   Other Topics Concern    Parent/sibling w/ CABG, MI or angioplasty before 65F 55M? No   Social History Narrative    Not on file     Social Determinants of Health     Financial Resource Strain: Low Risk  (10/11/2023)    Financial Resource Strain     Within the past 12 months, have you or your family members you live with been unable to get utilities (heat, electricity) when it was really needed?: No   Food Insecurity: Low Risk  (10/11/2023)    Food Insecurity     Within the past 12 months, did you worry that your food would run out before you got money to buy more?: No     Within the past 12 months, did the food you bought just not last and you didn t have money to get more?: No   Transportation Needs: Low Risk  (10/11/2023)    Transportation Needs     Within the past 12 months, has lack of transportation kept you from medical appointments, getting your medicines, non-medical meetings or appointments, work, or from getting things that you need?: No   Physical Activity: Insufficiently Active (11/29/2022)    Exercise Vital Sign     Days of Exercise per Week: 1 day     Minutes of Exercise per Session: 10 min   Stress: No Stress Concern Present (11/29/2022)    [employer] - Occupational Stress Questionnaire     Feeling of Stress : Not at all   Social Connections: Moderately Isolated (11/29/2022)    Social Connection and Isolation Panel  [NHANES]     Frequency of Communication with Friends and Family: More than three times a week     Frequency of Social Gatherings with Friends and Family: More than three times a week     Attends Oriental orthodox Services: More than 4 times per year     Active Member of Clubs or Organizations: No     Attends Club or Organization Meetings: Not on file     Marital Status:    Interpersonal Safety: Not on file   Housing Stability: Low Risk  (10/11/2023)    Housing Stability     Do you have housing? : Yes     Are you worried about losing your housing?: No           EXAMINATION:  Gen:   No apparent distress  Neuro:   A&Ox3, no deficits  Psych:    Answering questions appropriately for age and situation with normal affect  Head:    NCAT  Eye:    Visual scanning without deficit  Ear:    Response to auditory stimuli wnl  Lung:    Non-labored breathing on RA noted  Abd:    NTND per patient report  Lymph:    Neg for pitting/non-pitting edema BLE  Vasc:    Pulses palpable, CFT minimally delayed  Neuro:    Light touch sensation intact to all sensory nerve distributions without paresthesias  Derm:    Mild onychocryptosis medial left hallux without paronychia or signs of infection  MSK:    Left lower extremity -lesser digital hammertoes.  Specifically, the second and third toes present with mild fixed deformity at the PIPJ.  This is not tender.  She does not have any second or third MPJ discomfort.  The main area of pain is in the distal second intermetatarsal space.  Calf:    Neg for redness, swelling or tenderness    Assessment:  72 year old female with recurrent onychocryptosis medial left hallux; cramping left second and third toes with second interspace neuroma      Medical Decision Making/Plan:  Discussed etiologies, anatomy and options  1.  Recurrent onychocryptosis medial left hallux  -I personally reviewed and interpreted the patient's lower extremity history pertinent to today's visit, including imaging/labs, in  preparation for initiating a treatment program.  -As she has previously had a temporary procedure with recurrent pain, the next up to consider be a partial permanent avulsion.  She would like to avoid that today.  As this is clinically stable, there is no indication to proceed today.  Follow-up for 30-minute appointment if/when she would like to proceed    2.  Left second and third toe cramping with second interspace neuroma  -Advised comfortable accommodative shoes, inserts and RICE/Tylenol as needed based on pain (she cannot take NSAIDs)  -If this fails to provide sufficient relief, we would consider diagnostic/therapeutic second interspace injection.  She will follow-up as needed for 15-minute appointment if/when she would like to proceed    Follow up: As needed or sooner with acute issues      Patient's medical history was reviewed today      Emeka Harding DPM FACFAS FACFAOM  Podiatric Foot & Ankle Surgeon  [facility]  [phone]    Disclaimer: This note consists of symbols derived from keyboarding, dictation and/or voice recognition software. As a result, there may be errors in the script that have gone undetected. Please consider this when interpreting information found in this chart.

## 2023-10-17 NOTE — PATIENT INSTRUCTIONS
Thank you for choosing [facility] Podiatry / Foot & Ankle Surgery!    DR. TREVINO'S CLINIC LOCATIONS:     [facility] (Friday) TRIAGE LINE: [phone] [address] [facility]  APPOINTMENTS: [phone]   [place] [address] RADIOLOGY: [phone]    PHYSICAL THERAPY: [phone]    SET UP SURGERY: [phone]   [place] (Mon-Tues AM-Thurs) BILLING QUESTIONS: [phone] [address] FAX: [phone]   [place] [address] [facility]: [phone]     You are seen today for 2 issues:    1.  Recurrent ingrown nail medial left great toe.  The neck step to consider would be a partial permanent avulsion.  This was done in clinic during a 30-minute appointment.  You will be given a copy of post procedure instructions and we will review those prior to discharge.    2.  Cramping in your left second and third toes.  Your exam is consistent with a pinched nerve between the bases of the second and third toes.  You do not have any pain in the toes or the adjacent toe joints.  Initial therapies include comfortable accommodative shoes, inserts and resting/icing/Tylenol.  If this fails to provide sufficient relief, we would consider a diagnostic/therapeutic injection.  This can be done during a 15-minute clinic appointment

## 2023-10-17 NOTE — LETTER
10/17/2023         RE: Alexandra Thomas  [address] [facility] [address]        Dear Colleague,    Thank you for referring your patient, Alexandra Thomas, to the [facility]. Please see a copy of my visit note below.    Foot & Ankle Surgery  October 17, 2023    CC: Ingrown nail; left foot cramping    I was asked to see Alexandra Thomas regarding the chief complaint by:  self    HPI:  Pt is a 72 year old female who presents with above complaint.  Patient underwent a previous ingrown nail procedure approxi-7 months ago at the medial left great toe.  This has grown back and while it is less symptomatic than it was prior to the procedure, this is causing some level of discomfort.  She also mentions cramping in the left second and third toes, lateral foot and along the lateral left lower leg.  She underwent previous midfoot fusions in [place] and then subsequent hardware removal.  She does mention some tingling in the toes.  Her main complaint is cramping in the second and third toes.    ROS:   Pos for CC.  The patient denies current nausea, vomiting, chills, fevers, belly pain, calf pain, chest pain or SOB.  Complete remainder of ROS is otherwise neg.    VITALS:    Vitals:    10/17/23 0932   BP: (!) 142/80   Weight: 72.1 kg (159 lb)       PMH:    Past Medical History:   Diagnosis Date     Arthritis     hips     Ascending aortic aneurysm (H24)     4.2 cm   Ascending     Biceps tendon rupture      Diverticulitis of colon      Gastroesophageal reflux disease     Esophageal spasms.     Gastroparesis      Heart murmur      High cholesterol      History of GI bleed      Hypertension      Sjogren's syndrome (H24)        SXHX:    Past Surgical History:   Procedure Laterality Date     ABDOMEN SURGERY       APPENDECTOMY       ARTHROPLASTY HIP Left 07/27/2022    Procedure: Left total hip arthroplasty;  Surgeon: Berry Mcdonnell MD;  Location: [facility] OR     BACK SURGERY        BIOPSY       BREAST SURGERY       CHOLECYSTECTOMY       COLECTOMY PARTIAL      Due to recurrent diverticulitis     COLONOSCOPY       ENT SURGERY      T & A     ESOPHAGOSCOPY, GASTROSCOPY, DUODENOSCOPY (EGD), COMBINED N/A 09/13/2022    Procedure: ESOPHAGOGASTRODUODENOSCOPY (EGD) (fv);  Surgeon: Seymour Chatterjee MD;  Location:  GI     GENITOURINARY SURGERY       HYSTERECTOMY TOTAL ABDOMINAL       LASIK       left midfoot fusion Left     2nd toe osteotomy     shouder replacement Right      TOTAL HIP ARTHROPLASTY Right 06/07/2009     TOTAL KNEE ARTHROPLASTY Right 07/12/2006     TOTAL KNEE ARTHROPLASTY Left 02/18/2009        MEDS:    Current Outpatient Medications   Medication     acetaminophen (TYLENOL) 325 MG tablet     cyclobenzaprine (FLEXERIL) 5 MG tablet     diphenhydrAMINE-acetaminophen (TYLENOL PM)  MG tablet     gabapentin (NEURONTIN) 100 MG capsule     gabapentin (NEURONTIN) 300 MG capsule     lactobacillus rhamnosus (GG) (CULTURELL) capsule     Lidocaine (LIDOCARE) 4 % Patch     losartan (COZAAR) 50 MG tablet     melatonin 5 MG tablet     multivitamin w/minerals (THERA-VIT-M) tablet     omeprazole (PRILOSEC) 40 MG DR capsule     ondansetron (ZOFRAN ODT) 4 MG ODT tab     rosuvastatin (CRESTOR) 10 MG tablet     Vitamin D3 (CHOLECALCIFEROL) 25 mcg (1000 units) tablet     No current facility-administered medications for this visit.       ALL:     Allergies   Allergen Reactions     Ciprofloxacin Other (See Comments)     Pt has aortic aneurysm.  Increased risk of rupture or dissection with use of fluoroquinolones.       Food Anaphylaxis     EGGPLANT-->anaphylaxis       Venlafaxine Hcl Diarrhea, Shortness Of Breath and Nausea and Vomiting     Meperidine Rash                Adhesive Tape      blisters     Morphine Hives and Itching     Histamine reaction       FMH:    Family History   Problem Relation Age of Onset     Chronic Obstructive Pulmonary Disease Mother      Dementia Father      Chronic Obstructive  Pulmonary Disease Father      Macular Degeneration Maternal Grandmother      Other Cancer Maternal Grandmother         Liver     Breast Cancer Paternal Cousin         before 50     Diabetes Paternal Grandfather      Diabetes Paternal Grandmother      Other Cancer Cousin         Breast/Mets     Substance Abuse Sister      Glaucoma No family hx of        SocHx:    Social History     Socioeconomic History     Marital status:      Spouse name: Not on file     Number of children: Not on file     Years of education: Not on file     Highest education level: Not on file   Occupational History     Not on file   Tobacco Use     Smoking status: Never     Smokeless tobacco: Never   Vaping Use     Vaping Use: Never used   Substance and Sexual Activity     Alcohol use: Not Currently     Drug use: Never     Sexual activity: Not Currently     Birth control/protection: Post-menopausal, Female Surgical, None   Other Topics Concern     Parent/sibling w/ CABG, MI or angioplasty before 65F 55M? No   Social History Narrative     Not on file     Social Determinants of Health     Financial Resource Strain: Low Risk  (10/11/2023)    Financial Resource Strain      Within the past 12 months, have you or your family members you live with been unable to get utilities (heat, electricity) when it was really needed?: No   Food Insecurity: Low Risk  (10/11/2023)    Food Insecurity      Within the past 12 months, did you worry that your food would run out before you got money to buy more?: No      Within the past 12 months, did the food you bought just not last and you didn t have money to get more?: No   Transportation Needs: Low Risk  (10/11/2023)    Transportation Needs      Within the past 12 months, has lack of transportation kept you from medical appointments, getting your medicines, non-medical meetings or appointments, work, or from getting things that you need?: No   Physical Activity: Insufficiently Active (11/29/2022)    Exercise  Vital Sign      Days of Exercise per Week: 1 day      Minutes of Exercise per Session: 10 min   Stress: No Stress Concern Present (11/29/2022)    [employer] - Occupational Stress Questionnaire      Feeling of Stress : Not at all   Social Connections: Moderately Isolated (11/29/2022)    Social Connection and Isolation Panel [NHANES]      Frequency of Communication with Friends and Family: More than three times a week      Frequency of Social Gatherings with Friends and Family: More than three times a week      Attends [employer] Services: More than 4 times per year      Active Member of Clubs or Organizations: No      Attends Club or Organization Meetings: Not on file      Marital Status:    Interpersonal Safety: Not on file   Housing Stability: Low Risk  (10/11/2023)    Housing Stability      Do you have housing? : Yes      Are you worried about losing your housing?: No           EXAMINATION:  Gen:   No apparent distress  Neuro:   A&Ox3, no deficits  Psych:    Answering questions appropriately for age and situation with normal affect  Head:    NCAT  Eye:    Visual scanning without deficit  Ear:    Response to auditory stimuli wnl  Lung:    Non-labored breathing on RA noted  Abd:    NTND per patient report  Lymph:    Neg for pitting/non-pitting edema BLE  Vasc:    Pulses palpable, CFT minimally delayed  Neuro:    Light touch sensation intact to all sensory nerve distributions without paresthesias  Derm:    Mild onychocryptosis medial left hallux without paronychia or signs of infection  MSK:    Left lower extremity -lesser digital hammertoes.  Specifically, the second and third toes present with mild fixed deformity at the PIPJ.  This is not tender.  She does not have any second or third MPJ discomfort.  The main area of pain is in the distal second intermetatarsal space.  Calf:    Neg for redness, swelling or tenderness    Assessment:  72 year old female with recurrent  onychocryptosis medial left hallux; cramping left second and third toes with second interspace neuroma      Medical Decision Making/Plan:  Discussed etiologies, anatomy and options  1.  Recurrent onychocryptosis medial left hallux  -I personally reviewed and interpreted the patient's lower extremity history pertinent to today's visit, including imaging/labs, in preparation for initiating a treatment program.  -As she has previously had a temporary procedure with recurrent pain, the next up to consider be a partial permanent avulsion.  She would like to avoid that today.  As this is clinically stable, there is no indication to proceed today.  Follow-up for 30-minute appointment if/when she would like to proceed    2.  Left second and third toe cramping with second interspace neuroma  -Advised comfortable accommodative shoes, inserts and RICE/Tylenol as needed based on pain (she cannot take NSAIDs)  -If this fails to provide sufficient relief, we would consider diagnostic/therapeutic second interspace injection.  She will follow-up as needed for 15-minute appointment if/when she would like to proceed    Follow up: As needed or sooner with acute issues      Patient's medical history was reviewed today      Emeka Harding DPM FACFAS FACFAOM  Podiatric Foot & Ankle Surgeon  [facility]  [phone]    Disclaimer: This note consists of symbols derived from keyboarding, dictation and/or voice recognition software. As a result, there may be errors in the script that have gone undetected. Please consider this when interpreting information found in this chart.          Again, thank you for allowing me to participate in the care of your patient.        Sincerely,        Emeka Harding DPM, MACEY

## 2023-10-18 ENCOUNTER — OFFICE VISIT (OUTPATIENT)
Dept: CARDIOLOGY | Facility: CLINIC | Age: 73
End: 2023-10-18
Payer: MEDICARE

## 2023-10-18 VITALS
BODY MASS INDEX: 24.96 KG/M2 | HEART RATE: 67 BPM | DIASTOLIC BLOOD PRESSURE: 85 MMHG | HEIGHT: 67 IN | SYSTOLIC BLOOD PRESSURE: 148 MMHG | WEIGHT: 159 LBS | OXYGEN SATURATION: 100 %

## 2023-10-18 DIAGNOSIS — E78.5 HYPERLIPIDEMIA, ACQUIRED: ICD-10-CM

## 2023-10-18 DIAGNOSIS — I10 BENIGN ESSENTIAL HYPERTENSION: ICD-10-CM

## 2023-10-18 DIAGNOSIS — G47.33 OSA (OBSTRUCTIVE SLEEP APNEA): ICD-10-CM

## 2023-10-18 DIAGNOSIS — I77.810 ASCENDING AORTA DILATION (H): Primary | ICD-10-CM

## 2023-10-18 DIAGNOSIS — I48.0 PAF (PAROXYSMAL ATRIAL FIBRILLATION) (H): ICD-10-CM

## 2023-10-18 PROCEDURE — 99214 OFFICE O/P EST MOD 30 MIN: CPT | Performed by: INTERNAL MEDICINE

## 2023-10-18 RX ORDER — LOSARTAN POTASSIUM 50 MG/1
50 TABLET ORAL 2 TIMES DAILY
Qty: 180 TABLET | Refills: 3 | Status: SHIPPED | OUTPATIENT
Start: 2023-10-18 | End: 2024-02-19

## 2023-10-18 RX ORDER — ROSUVASTATIN CALCIUM 10 MG/1
10 TABLET, COATED ORAL DAILY
Qty: 90 TABLET | Refills: 2 | Status: SHIPPED | OUTPATIENT
Start: 2023-10-18 | End: 2024-02-19

## 2023-10-18 NOTE — LETTER
10/18/2023    Jaswinder Pal MD  [address] [facility] [address]    RE: Alexandra Thomas       Dear Colleague,     I had the pleasure of seeing Alexandra Thomas in the [facility].  CARDIOLOGY CLINIC FOLLOW-UP NOTE      REASON FOR VISIT:   F/u thoracic aortic aneurysm    PRIMARY CARE PHYSICIAN:  Jaswinder Pal        History of Present Illness  Alexandra Thomas is an extremely pleasant 72 year old female here for routine follow-up.  She previously followed with [place] in [place] before moving to [place] to be closer to her family.  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  She is a never smoker.  She has no family history of heart disease that she is aware of.  She did have a maternal uncle that  suddenly in his 60s, but she is unsure if this is related to an aneurysm or any other cardiac condition.    Earlier this year she had some recurrent severe episodes of severe chest discomfort similar to her previously diagnosed esophageal spasm, including one episode that led to an ER visit.  However, since her last visit with our clinic in 2023, she reports that she has been doing well with no recurrent chest pain issues or other cardiac symptoms.  Her mild palpitations are stable and unchanged.    Her most recent labs are from 3/29/2023, showing total cholesterol 192, HDL 74, , triglycerides 72, and a normal ALT.   Her most recent ischemic evaluation was the negative exercise stress echo  from 2/20/2023.  This was also her most recent echocardiogram.  Her most recent cross-sectional aortic imaging was a CTA from 2/2023 showing maximal aortic diameter of 4.1 cm with no acute aortic emergency and mild-moderate coronary calcification.      Assessment & Plan      Paroxysmal atrial fibrillation which developed after a chemical exposure on a cruise ship s/p successful ablation in 2011 without documented recurrence, currently off anticoagulation  Ascending aortic dilation (4.1 cm on 2/2023 CTA, 4.2 cm on 2/2022 TTE)  PVC/NSVT for which she is intolerant to beta-blocker  CMR in 2010 showing fatty infiltration of the RV but no other evidence of RV dysplasia  Intermittent severe midsternal chest discomfort, likely due to esophageal spasm  Subclinical CAD with negative BECKY in 2016  Hypertension  Dyslipidemia  Mild TATI not currently on CPAP  Never smoker      It was a pleasure to meet with Tabby again in clinic today.  I am glad to hear that she is currently doing well from a cardiac standpoint.  Her blood pressure is a bit elevated above goal given her dilated ascending aorta.  She was recently taken off of HCTZ due to leg cramps, so we will potentially need to add an additional agent.  I will have her keep a home blood pressure log for 1 week and send us the results, and we can add additional antihypertensive medication if needed.  Otherwise, we will continue her current medical regimen unchanged.      -Home BP log x1 week  -Continue losartan 50 mg twice daily for now, will adjust as needed based on BP log results  -Continue Crestor 10 mg daily  -We will plan to alternate TTE and CTA surveillance (next imaging due is a TTE in 2024)      Follow-up: 1 year, with TTE and lipid panel beforehand, or sooner as needed          Antoine Ballesteros MD  Interventional Cardiology  October 18, 2023        Medications  Current Outpatient Medications   Medication    acetaminophen (TYLENOL) 325 MG tablet    cyclobenzaprine  (FLEXERIL) 5 MG tablet    diphenhydrAMINE-acetaminophen (TYLENOL PM)  MG tablet    gabapentin (NEURONTIN) 100 MG capsule    gabapentin (NEURONTIN) 300 MG capsule    lactobacillus rhamnosus (GG) (CULTURELL) capsule    Lidocaine (LIDOCARE) 4 % Patch    losartan (COZAAR) 50 MG tablet    melatonin 5 MG tablet    multivitamin w/minerals (THERA-VIT-M) tablet    omeprazole (PRILOSEC) 40 MG DR capsule    ondansetron (ZOFRAN ODT) 4 MG ODT tab    rosuvastatin (CRESTOR) 10 MG tablet    Vitamin D3 (CHOLECALCIFEROL) 25 mcg (1000 units) tablet     No current facility-administered medications for this visit.     Allergies  Allergies   Allergen Reactions    Ciprofloxacin Other (See Comments)     Pt has aortic aneurysm.  Increased risk of rupture or dissection with use of fluoroquinolones.      Food Anaphylaxis     EGGPLANT-->anaphylaxis      Venlafaxine Hcl Diarrhea, Shortness Of Breath and Nausea and Vomiting    Meperidine Rash               Adhesive Tape      blisters    Morphine Hives and Itching     Histamine reaction         Physical Exam      BP: (!) 148/85 Pulse: 67     SpO2: 100 %      Vital Signs with Ranges  Pulse:  [67] 67  BP: (148)/(85) 148/85  SpO2:  [100 %] 100 %  159 lbs 0 oz    Constitutional: Well-appearing, no acute distress  Respiratory: Normal respiratory effort, CTAB  Cardiovascular: RRR, 2/6 systolic murmur at the RUSB, no diastolic murmur appreciated.  JVP < 7 cm H2O.  There is no LE edema.  Normal carotid upstrokes, no carotid bruits.      Thank you for allowing me to participate in the care of your patient.      Sincerely,     Antoine Ballesteros MD     [facility]  cc:   Sierra Wilkes, JESUS CNP  [address] [place] [address]

## 2023-10-18 NOTE — PROGRESS NOTES
CARDIOLOGY CLINIC FOLLOW-UP NOTE      REASON FOR VISIT:   F/u thoracic aortic aneurysm    PRIMARY CARE PHYSICIAN:  Jaswinder Pal        History of Present Illness   Alexandra Thomas is an extremely pleasant 72 year old female here for routine follow-up.  She previously followed with [place] in [place] before moving to [place] to be closer to her family.  She has a past medical history significant for paroxysmal atrial fibrillation which developed after a chemical exposure on a cruise ship s/p successful ablation in  without documented recurrence, ascending thoracic aortic aneurysm, PVC/NSVT for which she is intolerant to beta-blocker, CMR in  showing fatty infiltration of the RV but no other evidence of RV dysplasia, subclinical CAD with negative BECKY in 2016, hypertension, dyslipidemia, esophageal spasm, and mild TATI not currently on CPAP.  She is a never smoker.  She has no family history of heart disease that she is aware of.  She did have a maternal uncle that  suddenly in his 60s, but she is unsure if this is related to an aneurysm or any other cardiac condition.    Earlier this year she had some recurrent severe episodes of severe chest discomfort similar to her previously diagnosed esophageal spasm, including one episode that led to an ER visit.  However, since her last visit with our clinic in 2023, she reports that she has been doing well with no recurrent chest pain issues or other cardiac symptoms.  Her mild palpitations are stable and unchanged.    Her most recent labs are from 3/29/2023, showing total cholesterol 192, HDL 74, , triglycerides 72, and a normal ALT.   Her most recent ischemic evaluation was the negative exercise stress echo from 2023.  This was also her most recent echocardiogram.  Her most recent cross-sectional aortic imaging was a CTA from 2023 showing maximal aortic diameter of 4.1 cm with no acute aortic emergency and mild-moderate  coronary calcification.      Assessment & Plan       Paroxysmal atrial fibrillation which developed after a chemical exposure on a cruise ship s/p successful ablation in 2011 without documented recurrence, currently off anticoagulation  Ascending aortic dilation (4.1 cm on 2/2023 CTA, 4.2 cm on 2/2022 TTE)  PVC/NSVT for which she is intolerant to beta-blocker  CMR in 2010 showing fatty infiltration of the RV but no other evidence of RV dysplasia  Intermittent severe midsternal chest discomfort, likely due to esophageal spasm  Subclinical CAD with negative BECKY in 2016  Hypertension  Dyslipidemia  Mild TATI not currently on CPAP  Never smoker      It was a pleasure to meet with Tabby again in clinic today.  I am glad to hear that she is currently doing well from a cardiac standpoint.  Her blood pressure is a bit elevated above goal given her dilated ascending aorta.  She was recently taken off of HCTZ due to leg cramps, so we will potentially need to add an additional agent.  I will have her keep a home blood pressure log for 1 week and send us the results, and we can add additional antihypertensive medication if needed.  Otherwise, we will continue her current medical regimen unchanged.      -Home BP log x1 week  -Continue losartan 50 mg twice daily for now, will adjust as needed based on BP log results  -Continue Crestor 10 mg daily  -We will plan to alternate TTE and CTA surveillance (next imaging due is a TTE in 2024)      Follow-up: 1 year, with TTE and lipid panel beforehand, or sooner as needed          Antoine Ballesteros MD  Interventional Cardiology  October 18, 2023        Medications   Current Outpatient Medications   Medication    acetaminophen (TYLENOL) 325 MG tablet    cyclobenzaprine (FLEXERIL) 5 MG tablet    diphenhydrAMINE-acetaminophen (TYLENOL PM)  MG tablet    gabapentin (NEURONTIN) 100 MG capsule    gabapentin (NEURONTIN) 300 MG capsule    lactobacillus rhamnosus (GG) (CULTURELL) capsule     Lidocaine (LIDOCARE) 4 % Patch    losartan (COZAAR) 50 MG tablet    melatonin 5 MG tablet    multivitamin w/minerals (THERA-VIT-M) tablet    omeprazole (PRILOSEC) 40 MG DR capsule    ondansetron (ZOFRAN ODT) 4 MG ODT tab    rosuvastatin (CRESTOR) 10 MG tablet    Vitamin D3 (CHOLECALCIFEROL) 25 mcg (1000 units) tablet     No current facility-administered medications for this visit.     Allergies   Allergies   Allergen Reactions    Ciprofloxacin Other (See Comments)     Pt has aortic aneurysm.  Increased risk of rupture or dissection with use of fluoroquinolones.      Food Anaphylaxis     EGGPLANT-->anaphylaxis      Venlafaxine Hcl Diarrhea, Shortness Of Breath and Nausea and Vomiting    Meperidine Rash               Adhesive Tape      blisters    Morphine Hives and Itching     Histamine reaction         Physical Exam       BP: (!) 148/85 Pulse: 67     SpO2: 100 %      Vital Signs with Ranges  Pulse:  [67] 67  BP: (148)/(85) 148/85  SpO2:  [100 %] 100 %  159 lbs 0 oz    Constitutional: Well-appearing, no acute distress  Respiratory: Normal respiratory effort, CTAB  Cardiovascular: RRR, 2/6 systolic murmur at the RUSB, no diastolic murmur appreciated.  JVP < 7 cm H2O.  There is no LE edema.  Normal carotid upstrokes, no carotid bruits.

## 2023-10-27 ENCOUNTER — MYC MEDICAL ADVICE (OUTPATIENT)
Dept: CARDIOLOGY | Facility: CLINIC | Age: 73
End: 2023-10-27
Payer: MEDICARE

## 2023-10-27 DIAGNOSIS — I10 BENIGN ESSENTIAL HYPERTENSION: Primary | ICD-10-CM

## 2023-10-30 ENCOUNTER — TRANSFERRED RECORDS (OUTPATIENT)
Dept: HEALTH INFORMATION MANAGEMENT | Facility: CLINIC | Age: 73
End: 2023-10-30
Payer: MEDICARE

## 2023-10-31 RX ORDER — CARVEDILOL 6.25 MG/1
6.25 TABLET ORAL 2 TIMES DAILY WITH MEALS
Qty: 60 TABLET | Refills: 0 | Status: SHIPPED | OUTPATIENT
Start: 2023-10-31 | End: 2023-11-20

## 2023-10-31 NOTE — TELEPHONE ENCOUNTER
Medication ordered as written below.  [facility] Cardiology Refill Guideline reviewed.  Medication meets criteria for refill.  30-day supply + 0 refills.  [employer] response sent to Martha communicating Dr Ballesteros's recommendations below.       Antoine Ballesteros MD   to [facility] Team 1   JK    10/30/23  3:57 PM  Thanks for sending!  Let's try adding carvedilol 6.25 mg BID, and have her repeat a home BP log (and also chart HR) for another week, and we can decide if additional titration is needed.    Thanks,  Neil

## 2023-11-14 ENCOUNTER — MYC MEDICAL ADVICE (OUTPATIENT)
Dept: CARDIOLOGY | Facility: CLINIC | Age: 73
End: 2023-11-14
Payer: MEDICARE

## 2023-11-14 DIAGNOSIS — I10 BENIGN ESSENTIAL HYPERTENSION: ICD-10-CM

## 2023-11-17 ENCOUNTER — TRANSFERRED RECORDS (OUTPATIENT)
Dept: HEALTH INFORMATION MANAGEMENT | Facility: CLINIC | Age: 73
End: 2023-11-17
Payer: MEDICARE

## 2023-11-20 RX ORDER — CARVEDILOL 6.25 MG/1
6.25 TABLET ORAL 2 TIMES DAILY WITH MEALS
Qty: 180 TABLET | Refills: 3 | Status: SHIPPED | OUTPATIENT
Start: 2023-11-20 | End: 2024-09-17

## 2023-11-20 NOTE — TELEPHONE ENCOUNTER
[employer] message with BP log reviewed by Dr. Ballesteros:     Antoine Ballesteros MD  [facility] Team 1  Phone Number: [phone]     Thanks for the info.  Numbers look good to me.  Could you send in a new Rx.    Thanks,  Neil     Prescription for carvedilol 6.25 mg BID sent to pharmacy. [employer] message sent to pt with recommendations.

## 2024-01-12 ENCOUNTER — OFFICE VISIT (OUTPATIENT)
Dept: INTERNAL MEDICINE | Facility: CLINIC | Age: 74
End: 2024-01-12
Payer: COMMERCIAL

## 2024-01-12 VITALS
HEIGHT: 67 IN | BODY MASS INDEX: 24.53 KG/M2 | SYSTOLIC BLOOD PRESSURE: 123 MMHG | RESPIRATION RATE: 14 BRPM | OXYGEN SATURATION: 97 % | TEMPERATURE: 96.9 F | WEIGHT: 156.3 LBS | HEART RATE: 59 BPM | DIASTOLIC BLOOD PRESSURE: 79 MMHG

## 2024-01-12 DIAGNOSIS — K21.9 GASTROESOPHAGEAL REFLUX DISEASE WITHOUT ESOPHAGITIS: ICD-10-CM

## 2024-01-12 DIAGNOSIS — I71.20 THORACIC AORTIC ANEURYSM WITHOUT RUPTURE, UNSPECIFIED PART (H): ICD-10-CM

## 2024-01-12 DIAGNOSIS — R07.0 THROAT PAIN: ICD-10-CM

## 2024-01-12 DIAGNOSIS — R09.81 NASAL CONGESTION: Primary | ICD-10-CM

## 2024-01-12 PROBLEM — G47.33 OSA (OBSTRUCTIVE SLEEP APNEA): Status: RESOLVED | Noted: 2022-05-29 | Resolved: 2024-01-12

## 2024-01-12 PROBLEM — I47.29 NSVT (NONSUSTAINED VENTRICULAR TACHYCARDIA) (H): Status: RESOLVED | Noted: 2022-05-29 | Resolved: 2024-01-12

## 2024-01-12 LAB
BASOPHILS # BLD AUTO: 0 10E3/UL (ref 0–0.2)
BASOPHILS NFR BLD AUTO: 1 %
CRP SERPL-MCNC: <3 MG/L
DEPRECATED S PYO AG THROAT QL EIA: NEGATIVE
EOSINOPHIL # BLD AUTO: 0.1 10E3/UL (ref 0–0.7)
EOSINOPHIL NFR BLD AUTO: 1 %
ERYTHROCYTE [DISTWIDTH] IN BLOOD BY AUTOMATED COUNT: 12.2 % (ref 10–15)
ERYTHROCYTE [SEDIMENTATION RATE] IN BLOOD BY WESTERGREN METHOD: 12 MM/HR (ref 0–30)
GROUP A STREP BY PCR: NOT DETECTED
HCT VFR BLD AUTO: 42.1 % (ref 35–47)
HGB BLD-MCNC: 13.8 G/DL (ref 11.7–15.7)
IMM GRANULOCYTES # BLD: 0 10E3/UL
IMM GRANULOCYTES NFR BLD: 0 %
LYMPHOCYTES # BLD AUTO: 2.4 10E3/UL (ref 0.8–5.3)
LYMPHOCYTES NFR BLD AUTO: 47 %
MCH RBC QN AUTO: 32.4 PG (ref 26.5–33)
MCHC RBC AUTO-ENTMCNC: 32.8 G/DL (ref 31.5–36.5)
MCV RBC AUTO: 99 FL (ref 78–100)
MONOCYTES # BLD AUTO: 0.4 10E3/UL (ref 0–1.3)
MONOCYTES NFR BLD AUTO: 7 %
NEUTROPHILS # BLD AUTO: 2.3 10E3/UL (ref 1.6–8.3)
NEUTROPHILS NFR BLD AUTO: 44 %
PLATELET # BLD AUTO: 209 10E3/UL (ref 150–450)
RBC # BLD AUTO: 4.26 10E6/UL (ref 3.8–5.2)
TSH SERPL DL<=0.005 MIU/L-ACNC: 3.93 UIU/ML (ref 0.3–4.2)
WBC # BLD AUTO: 5.1 10E3/UL (ref 4–11)

## 2024-01-12 PROCEDURE — 36415 COLL VENOUS BLD VENIPUNCTURE: CPT | Performed by: INTERNAL MEDICINE

## 2024-01-12 PROCEDURE — 84443 ASSAY THYROID STIM HORMONE: CPT | Performed by: INTERNAL MEDICINE

## 2024-01-12 PROCEDURE — 86140 C-REACTIVE PROTEIN: CPT | Performed by: INTERNAL MEDICINE

## 2024-01-12 PROCEDURE — 85025 COMPLETE CBC W/AUTO DIFF WBC: CPT | Performed by: INTERNAL MEDICINE

## 2024-01-12 PROCEDURE — 85652 RBC SED RATE AUTOMATED: CPT | Performed by: INTERNAL MEDICINE

## 2024-01-12 PROCEDURE — 87651 STREP A DNA AMP PROBE: CPT | Performed by: INTERNAL MEDICINE

## 2024-01-12 PROCEDURE — 99214 OFFICE O/P EST MOD 30 MIN: CPT | Performed by: INTERNAL MEDICINE

## 2024-01-12 RX ORDER — OMEPRAZOLE 40 MG/1
40 CAPSULE, DELAYED RELEASE ORAL DAILY
Qty: 90 CAPSULE | Refills: 3 | Status: SHIPPED | OUTPATIENT
Start: 2024-01-12 | End: 2024-02-21

## 2024-01-12 RX ORDER — AMOXICILLIN 500 MG/1
500 CAPSULE ORAL 2 TIMES DAILY
Qty: 20 CAPSULE | Refills: 0 | Status: SHIPPED | OUTPATIENT
Start: 2024-01-12 | End: 2024-03-13

## 2024-01-12 ASSESSMENT — ENCOUNTER SYMPTOMS
SORE THROAT: 1
NEUROLOGICAL NEGATIVE: 1
RESPIRATORY NEGATIVE: 1
ARTHRALGIAS: 1
CONSTITUTIONAL NEGATIVE: 1
GASTROINTESTINAL NEGATIVE: 1
CARDIOVASCULAR NEGATIVE: 1
RHINORRHEA: 1

## 2024-01-12 ASSESSMENT — PAIN SCALES - GENERAL: PAINLEVEL: MILD PAIN (2)

## 2024-01-12 NOTE — PROGRESS NOTES
Assessment & Plan     Nasal congestion  At this time, patient noted to have some rhinorrhea in her nares bilaterally.  Given the failure to respond to antihistamines and duration of her symptoms, we did elect to proceed with a full course of amoxicillin 500 mg by mouth twice per day for 10 days to see if this will help resolve some of her ongoing symptoms.  Side effects of amoxicillin use were reviewed.  Patient can continue to use Zyrtec and nasal sprays as previously recommended.  - amoxicillin (AMOXIL) 500 MG capsule; Take 1 capsule (500 mg) by mouth 2 times daily    Throat pain  Given her persistent issues with throat pain, we did elect to proceed with further evaluation.  Patient does have a rapid streptococcal swab with reflex PCR that is pending.  Patient did express some concerns about the possibility of lymphoma given her history of Sjogren's syndrome.  We will have her submit a blood sample today for a CBC, inflammatory markers, and thyroid studies.  Patient will be contacted with results of her labs once they are available for review.  Further recommendations will be made at that time.  - Streptococcus A Rapid Screen w/Reflex to PCR - Clinic Collect  - CBC with platelets and differential; Future  - TSH with free T4 reflex; Future  - ESR: Erythrocyte sedimentation rate; Future  - CRP, inflammation; Future    Gastroesophageal reflux disease without esophagitis  Patient will receive a refill of her omeprazole 40 mg with instructions take 1 capsule by mouth per day for ongoing management of her reflux.  Side effects of PPIs were reviewed.  Reflux precautions were discussed.  - omeprazole (PRILOSEC) 40 MG DR capsule; Take 1 capsule (40 mg) by mouth daily    Thoracic aortic aneurysm without rupture, unspecified part (H24)  Chronic condition.  Followed by cardiology.    Ordering of each unique test  Prescription drug management  30 minutes spent by me on the date of the encounter doing chart review, history  and exam, documentation and further activities per the note       See Patient Instructions    MD ELINOR Donald [facility] NEIL Morris is a 73 year old, presenting for the following health issues:  Nasal Congestion (Runny nose x6-8 weeks; had URI and all Sx cleared up except runny nose) and Throat Problem (Pt c/o pain in right side of throat/neck x1 month)        1/12/2024     7:00 AM   Additional Questions   Roomed by Anna ALDRICH   Accompanied by n/a       Patient is a 73-year-old  female with complicated past medical history who presents to the clinic with a chief complaint of persistent rhinorrhea/congestion and throat pain.  In regards to congestion, patient reports her symptoms have been present for approximately 6 weeks.  They did began after she had been experiencing some issues with an upper respiratory infection.  Patient states that her cough has not resolved, but her congestion and rhinorrhea have persisted.  She has tried oral antihistamines as well as nasal sprays to address this issue, but nothing is provide her with any substantial relief.  Patient denies any issues with fevers or chills.  She has also been dealing with a 1 month history of throat pain.  Patient states her pain seems to be more prominently noted on her right side.  She describes a sensation of tightness in her neck.  Patient does report occasionally that it is uncomfortable for her to swallow.  She does have a history of Sjogren's syndrome, and she does express some concerns about possible lymphoma.  Patient has not noted any enlarged glands in her neck.  She reports that although it is uncomfortable to do so, she can swallow liquids and solids.  Patient is also requesting refill of her omeprazole for reflux.  She does take 40 mg of omeprazole daily.    History of Present Illness       Reason for visit:  Allergy   Constant running nose  Symptom onset:  More than a month  Symptoms  "include:  Running nose  Symptom intensity:  Moderate  Symptom progression:  Staying the same  Had these symptoms before:  No    She eats 4 or more servings of fruits and vegetables daily.She consumes 0 sweetened beverage(s) daily.She exercises with enough effort to increase her heart rate 9 or less minutes per day.  She exercises with enough effort to increase her heart rate 3 or less days per week.   She is taking medications regularly.       Review of Systems   Constitutional: Negative.    HENT:  Positive for congestion, rhinorrhea and sore throat.    Respiratory: Negative.     Cardiovascular: Negative.    Gastrointestinal: Negative.    Genitourinary: Negative.    Musculoskeletal:  Positive for arthralgias.   Neurological: Negative.           Objective    /79 (BP Location: Right arm, Cuff Size: Adult Regular)   Pulse 59   Temp 96.9  F (36.1  C) (Tympanic)   Resp 14   Ht 1.702 m (5' 7\")   Wt 70.9 kg (156 lb 4.8 oz)   SpO2 97%   BMI 24.48 kg/m    Body mass index is 24.48 kg/m .  Physical Exam  Vitals reviewed.   HENT:      Head: Normocephalic and atraumatic.      Right Ear: Tympanic membrane, ear canal and external ear normal.      Left Ear: Tympanic membrane, ear canal and external ear normal.      Nose: Rhinorrhea present.      Mouth/Throat:      Mouth: Mucous membranes are moist.      Pharynx: Oropharynx is clear.   Eyes:      Extraocular Movements: Extraocular movements intact.      Conjunctiva/sclera: Conjunctivae normal.      Pupils: Pupils are equal, round, and reactive to light.   Cardiovascular:      Rate and Rhythm: Normal rate and regular rhythm.      Pulses: Normal pulses.      Heart sounds: Normal heart sounds.   Pulmonary:      Effort: Pulmonary effort is normal.      Breath sounds: Normal breath sounds.   Musculoskeletal:      Cervical back: Normal range of motion and neck supple. Tenderness (Right sided TTP) present. No rigidity.   Lymphadenopathy:      Cervical: No cervical adenopathy. "   Skin:     General: Skin is warm and dry.   Neurological:      Mental Status: She is alert.          Diagnostic testing: CBC, ESR, CRP TSH, and rapid strep swab with culture are pending.

## 2024-02-15 ENCOUNTER — APPOINTMENT (OUTPATIENT)
Dept: CT IMAGING | Facility: CLINIC | Age: 74
End: 2024-02-15
Attending: STUDENT IN AN ORGANIZED HEALTH CARE EDUCATION/TRAINING PROGRAM
Payer: COMMERCIAL

## 2024-02-15 ENCOUNTER — HOSPITAL ENCOUNTER (EMERGENCY)
Facility: CLINIC | Age: 74
Discharge: HOME | End: 2024-02-15
Attending: STUDENT IN AN ORGANIZED HEALTH CARE EDUCATION/TRAINING PROGRAM | Admitting: STUDENT IN AN ORGANIZED HEALTH CARE EDUCATION/TRAINING PROGRAM
Payer: COMMERCIAL

## 2024-02-15 VITALS
TEMPERATURE: 97.2 F | SYSTOLIC BLOOD PRESSURE: 145 MMHG | DIASTOLIC BLOOD PRESSURE: 95 MMHG | OXYGEN SATURATION: 100 % | BODY MASS INDEX: 24.64 KG/M2 | HEIGHT: 67 IN | HEART RATE: 64 BPM | RESPIRATION RATE: 8 BRPM | WEIGHT: 157 LBS

## 2024-02-15 DIAGNOSIS — R06.02 SHORTNESS OF BREATH: ICD-10-CM

## 2024-02-15 DIAGNOSIS — J40 BRONCHITIS: Primary | ICD-10-CM

## 2024-02-15 DIAGNOSIS — M54.6 ACUTE LEFT-SIDED THORACIC BACK PAIN: ICD-10-CM

## 2024-02-15 PROBLEM — M19.012 PRIMARY OSTEOARTHRITIS, LEFT SHOULDER: Status: ACTIVE | Noted: 2020-02-11

## 2024-02-15 PROBLEM — M17.11 ARTHROPATHY OF RIGHT KNEE: Status: ACTIVE | Noted: 2018-11-15

## 2024-02-15 PROBLEM — M47.22 CERVICAL SPONDYLOSIS WITH RADICULOPATHY: Status: ACTIVE | Noted: 2020-10-21

## 2024-02-15 PROBLEM — M76.891 TENDONITIS OF RIGHT HIP FLEXOR: Status: ACTIVE | Noted: 2017-10-03

## 2024-02-15 PROBLEM — K52.9 CHRONIC DIARRHEA: Status: ACTIVE | Noted: 2019-09-10

## 2024-02-15 PROBLEM — Z86.0100 HISTORY OF COLONIC POLYPS: Status: ACTIVE | Noted: 2018-12-03

## 2024-02-15 PROBLEM — E78.00 PURE HYPERCHOLESTEROLEMIA: Status: ACTIVE | Noted: 2020-06-01

## 2024-02-15 PROBLEM — F32.A DEPRESSION: Status: ACTIVE | Noted: 2019-08-06

## 2024-02-15 PROBLEM — Z86.19 HX OF COLD SORES: Status: ACTIVE | Noted: 2017-09-29

## 2024-02-15 PROBLEM — I49.9 ARRHYTHMIA: Status: ACTIVE | Noted: 2019-10-01

## 2024-02-15 PROBLEM — E03.9 ACQUIRED HYPOTHYROIDISM: Status: ACTIVE | Noted: 2018-02-09

## 2024-02-15 PROBLEM — S46.111A RUPTURE OF RIGHT LONG HEAD BICEPS TENDON: Status: ACTIVE | Noted: 2020-04-22

## 2024-02-15 LAB
ANION GAP SERPL CALCULATED.3IONS-SCNC: 9 MMOL/L (ref 7–15)
BASOPHILS # BLD AUTO: 0 10E3/UL (ref 0–0.2)
BASOPHILS NFR BLD AUTO: 1 %
BUN SERPL-MCNC: 22.5 MG/DL (ref 8–23)
CALCIUM SERPL-MCNC: 9.1 MG/DL (ref 8.8–10.2)
CHLORIDE SERPL-SCNC: 106 MMOL/L (ref 98–107)
CREAT BLD-MCNC: 1 MG/DL (ref 0.5–1)
CREAT SERPL-MCNC: 0.86 MG/DL (ref 0.51–0.95)
D DIMER PPP FEU-MCNC: 0.91 UG/ML FEU (ref 0–0.5)
DEPRECATED HCO3 PLAS-SCNC: 28 MMOL/L (ref 22–29)
EGFRCR SERPLBLD CKD-EPI 2021: 58 ML/MIN/1.73M2
EGFRCR SERPLBLD CKD-EPI 2021: 70 ML/MIN/1.73M2
EOSINOPHIL # BLD AUTO: 0.1 10E3/UL (ref 0–0.7)
EOSINOPHIL NFR BLD AUTO: 1 %
ERYTHROCYTE [DISTWIDTH] IN BLOOD BY AUTOMATED COUNT: 12 % (ref 10–15)
GLUCOSE SERPL-MCNC: 115 MG/DL (ref 70–99)
HCT VFR BLD AUTO: 40.5 % (ref 35–47)
HGB BLD-MCNC: 13.1 G/DL (ref 11.7–15.7)
HOLD SPECIMEN: NORMAL
IMM GRANULOCYTES # BLD: 0 10E3/UL
IMM GRANULOCYTES NFR BLD: 0 %
LYMPHOCYTES # BLD AUTO: 2 10E3/UL (ref 0.8–5.3)
LYMPHOCYTES NFR BLD AUTO: 28 %
MCH RBC QN AUTO: 32.1 PG (ref 26.5–33)
MCHC RBC AUTO-ENTMCNC: 32.3 G/DL (ref 31.5–36.5)
MCV RBC AUTO: 99 FL (ref 78–100)
MONOCYTES # BLD AUTO: 0.5 10E3/UL (ref 0–1.3)
MONOCYTES NFR BLD AUTO: 7 %
NEUTROPHILS # BLD AUTO: 4.3 10E3/UL (ref 1.6–8.3)
NEUTROPHILS NFR BLD AUTO: 63 %
NRBC # BLD AUTO: 0 10E3/UL
NRBC BLD AUTO-RTO: 0 /100
PLATELET # BLD AUTO: 211 10E3/UL (ref 150–450)
POTASSIUM SERPL-SCNC: 4.1 MMOL/L (ref 3.4–5.3)
RBC # BLD AUTO: 4.08 10E6/UL (ref 3.8–5.2)
SODIUM SERPL-SCNC: 143 MMOL/L (ref 135–145)
TROPONIN T SERPL HS-MCNC: 11 NG/L
TROPONIN T SERPL HS-MCNC: 13 NG/L
WBC # BLD AUTO: 6.9 10E3/UL (ref 4–11)

## 2024-02-15 PROCEDURE — 82565 ASSAY OF CREATININE: CPT | Mod: 91

## 2024-02-15 PROCEDURE — 93005 ELECTROCARDIOGRAM TRACING: CPT

## 2024-02-15 PROCEDURE — 84484 ASSAY OF TROPONIN QUANT: CPT | Performed by: STUDENT IN AN ORGANIZED HEALTH CARE EDUCATION/TRAINING PROGRAM

## 2024-02-15 PROCEDURE — 36415 COLL VENOUS BLD VENIPUNCTURE: CPT | Performed by: STUDENT IN AN ORGANIZED HEALTH CARE EDUCATION/TRAINING PROGRAM

## 2024-02-15 PROCEDURE — 71275 CT ANGIOGRAPHY CHEST: CPT

## 2024-02-15 PROCEDURE — 85379 FIBRIN DEGRADATION QUANT: CPT | Performed by: STUDENT IN AN ORGANIZED HEALTH CARE EDUCATION/TRAINING PROGRAM

## 2024-02-15 PROCEDURE — 36415 COLL VENOUS BLD VENIPUNCTURE: CPT | Performed by: PHYSICIAN ASSISTANT

## 2024-02-15 PROCEDURE — 99291 CRITICAL CARE FIRST HOUR: CPT | Mod: 25

## 2024-02-15 PROCEDURE — 250N000011 HC RX IP 250 OP 636: Performed by: STUDENT IN AN ORGANIZED HEALTH CARE EDUCATION/TRAINING PROGRAM

## 2024-02-15 PROCEDURE — 85004 AUTOMATED DIFF WBC COUNT: CPT | Performed by: STUDENT IN AN ORGANIZED HEALTH CARE EDUCATION/TRAINING PROGRAM

## 2024-02-15 PROCEDURE — 250N000009 HC RX 250: Performed by: STUDENT IN AN ORGANIZED HEALTH CARE EDUCATION/TRAINING PROGRAM

## 2024-02-15 PROCEDURE — 80048 BASIC METABOLIC PNL TOTAL CA: CPT | Performed by: STUDENT IN AN ORGANIZED HEALTH CARE EDUCATION/TRAINING PROGRAM

## 2024-02-15 RX ORDER — IOPAMIDOL 755 MG/ML
500 INJECTION, SOLUTION INTRAVASCULAR ONCE
Status: COMPLETED | OUTPATIENT
Start: 2024-02-15 | End: 2024-02-15

## 2024-02-15 RX ADMIN — IOPAMIDOL 57 ML: 755 INJECTION, SOLUTION INTRAVENOUS at 19:11

## 2024-02-15 RX ADMIN — SODIUM CHLORIDE 77 ML: 9 INJECTION, SOLUTION INTRAVENOUS at 19:11

## 2024-02-15 ASSESSMENT — ACTIVITIES OF DAILY LIVING (ADL): ADLS_ACUITY_SCORE: 37

## 2024-02-15 NOTE — ED TRIAGE NOTES
Pt arrives for dyspnea and dizziness. Pain to the upper back when breathing, started around 1500. Denies heart or lung history. Pt recently started new BP med. Denies chest pain, headache or vision changes. Hypertensive in triage. Pt also states she has a 4.2cm ascending aortic aneurysm.

## 2024-02-16 ENCOUNTER — PATIENT OUTREACH (OUTPATIENT)
Dept: GASTROENTEROLOGY | Facility: CLINIC | Age: 74
End: 2024-02-16
Payer: COMMERCIAL

## 2024-02-16 LAB
ATRIAL RATE - MUSE: 63 BPM
DIASTOLIC BLOOD PRESSURE - MUSE: NORMAL MMHG
INTERPRETATION ECG - MUSE: NORMAL
P AXIS - MUSE: 57 DEGREES
PR INTERVAL - MUSE: 180 MS
QRS DURATION - MUSE: 74 MS
QT - MUSE: 426 MS
QTC - MUSE: 435 MS
R AXIS - MUSE: 22 DEGREES
SYSTOLIC BLOOD PRESSURE - MUSE: NORMAL MMHG
T AXIS - MUSE: 48 DEGREES
VENTRICULAR RATE- MUSE: 63 BPM

## 2024-02-16 NOTE — ED PROVIDER NOTES
"History   Chief Complaint:  Dizziness and Shortness of Breath       HPI:  Alexandra Thomas is a very pleasant 73 year old female with history of hypertension, paroxysmal atrial fibrillation, hyperlipidemia, GERD, thoracic aortic aneurysm, presenting with shortness of breath and lightheadedness.  Patient was out shopping when she had some shortness of breath and left-sided thoracic back pain worse with inspiration.  No chest pain.  No abdominal pain.  No headache.  No vision changes.  No nausea or vomiting or diaphoresis.  After over the emergency department, the patient's symptoms did resolve.    Independent Historian:  None. Only the patient provided history.    Review of External Notes:  I personally reviewed notes from the patient's clinic visit dated  1/12/2024 . This provided me with information regarding patient's baseline medical problems.     I personally reviewed the patient's chart, including available medication list and available past medical history, past surgical history, family history, and social history.    Physical Exam   Patient Vitals for the past 24 hrs:   BP Temp Pulse Resp SpO2 Height Weight   02/15/24 2016 -- -- -- (!) 8 100 % -- --   02/15/24 2015 (!) 145/95 -- 64 -- -- -- --   02/15/24 2000 127/69 -- 64 17 99 % -- --   02/15/24 1945 139/65 -- 63 11 98 % -- --   02/15/24 1930 (!) 142/74 -- 65 (!) 7 100 % -- --   02/15/24 1903 -- -- 72 16 100 % -- --   02/15/24 1901 (!) 164/91 -- 72 -- -- -- --   02/15/24 1713 -- 97.2  F (36.2  C) -- -- -- -- --   02/15/24 1711 (!) 202/104 -- 69 20 99 % 1.702 m (5' 7\") 71.2 kg (157 lb)      Physical Exam  General: Alert, female patient appearing stated age  HENT: Moist mucous membranes  Eyes: No scleral icterus, no pallor  Neck: No jugular venous distension  Cardiovascular: Regular rate and rhythm, S1, S2, no murmurs, gallops or rubs  Pulmonary: Easy work of breathing, lungs clear to auscultation bilaterally  Abdominal: Soft, non-tender, no guarding or " rebound  Skin: Warm and dry, no pallor, no rash  Neuro: Alert and oriented    Emergency Department Course     ECG:   ECG results from 02/15/24   EKG 12-lead, tracing only     Value    Systolic Blood Pressure     Diastolic Blood Pressure     Ventricular Rate 63    Atrial Rate 63    LA Interval 180    QRS Duration 74        QTc 435    P Axis 57    R AXIS 22    T Axis 48    Interpretation ECG      Sinus rhythm  Normal ECG  When compared with ECG of 20-FEB-2023 07:17,  No significant change was found    My interpretation           Imaging: Laboratory:   CT Chest Pulmonary Embolism w Contrast   Final Result   IMPRESSION:   1.  No acute pulmonary embolism.      2.  Unchanged borderline ascending thoracic aortic aneurysm measuring 4.1 cm. Nondiagnostic evaluation for dissection.      3.  Mild bronchitis with scattered mucous plugging. Mosaic lung attenuation, likely due to air trapping.         Report(s) per radiology. Labs Ordered and Resulted from Time of ED Arrival to Time of ED Departure   BASIC METABOLIC PANEL - Abnormal       Result Value    Sodium 143      Potassium 4.1      Chloride 106      Carbon Dioxide (CO2) 28      Anion Gap 9      Urea Nitrogen 22.5      Creatinine 0.86      GFR Estimate 70      Calcium 9.1      Glucose 115 (*)    ISTAT CREATININE POCT - Abnormal    Creatinine POCT 1.0      GFR, ESTIMATED POCT 58 (*)    D DIMER QUANTITATIVE - Abnormal    D-Dimer Quantitative 0.91 (*)    TROPONIN T, HIGH SENSITIVITY - Normal    Troponin T, High Sensitivity 11     TROPONIN T, HIGH SENSITIVITY - Normal    Troponin T, High Sensitivity 13     CBC WITH PLATELETS AND DIFFERENTIAL    WBC Count 6.9      RBC Count 4.08      Hemoglobin 13.1      Hematocrit 40.5      MCV 99      MCH 32.1      MCHC 32.3      RDW 12.0      Platelet Count 211      % Neutrophils 63      % Lymphocytes 28      % Monocytes 7      % Eosinophils 1      % Basophils 1      % Immature Granulocytes 0      NRBCs per 100 WBC 0      Absolute  Neutrophils 4.3      Absolute Lymphocytes 2.0      Absolute Monocytes 0.5      Absolute Eosinophils 0.1      Absolute Basophils 0.0      Absolute Immature Granulocytes 0.0      Absolute NRBCs 0.0             Procedures  None performed    Interventions & Assessments:           Interventions:  Medications   iopamidol (ISOVUE-370) solution 500 mL (57 mLs Intravenous $Given 2/15/24 1911)   CT Scan Flush (77 mLs Intravenous $Given 2/15/24 1911)        Assessments  Independent Interpretations  Consultations/Discussion of Management or Tests       Social Determinants of Health affecting care:   None.      Disposition:  The patient was discharged to home.     Impression & Plan        Medical Decision Making:  Patient presenting with shortness of breath, lightheadedness and pleuritic back pain.  Vital signs on arrival notable for elevated blood pressure but otherwise reassuring.  Considered differential including acute coronary syndrome, pulmonary embolism, acute aortic syndrome, pneumonia, among others.  Workup here is reassuring.  Patient did have an elevated D-dimer and thus CTA was obtained to evaluate for pulmonary embolism.  This was reassuring against PE or any acute aortic injury.  It did show possible bronchitis.  No evidence of pneumonia.  Patient's initial EKG was reassuring with no acute appearing ischemic changes.  Initial troponin was within normal limits and repeat 2 hours later was unchanged, reassuring against acute coronary syndrome.  Patient with no recurrence of symptoms while in the emergency department.  Feel patient is appropriate for discharge with primary care follow-up as needed for reevaluation. Findings were discussed.  Additional verbal instructions were provided.  I discussed specific warning signs and instructed the patient to return to the emergency department if there are any concerns. Understanding of instructions was voiced, questions were answered and the patient was discharged.      Diagnosis:    ICD-10-CM    1. Bronchitis  J40       2. Acute left-sided thoracic back pain  M54.6       3. Shortness of breath  R06.02            Discharge Medications:  Discharge Medication List as of 2/15/2024  8:24 PM           Mark West MD  02/15/24 2053

## 2024-02-19 DIAGNOSIS — E78.5 HYPERLIPIDEMIA, ACQUIRED: ICD-10-CM

## 2024-02-19 DIAGNOSIS — M79.2 NERVE PAIN: ICD-10-CM

## 2024-02-19 DIAGNOSIS — I10 BENIGN ESSENTIAL HYPERTENSION: ICD-10-CM

## 2024-02-19 RX ORDER — GABAPENTIN 300 MG/1
600 CAPSULE ORAL AT BEDTIME
Qty: 180 CAPSULE | Refills: 1 | Status: SHIPPED | OUTPATIENT
Start: 2024-02-19 | End: 2024-05-20

## 2024-02-19 RX ORDER — LOSARTAN POTASSIUM 50 MG/1
50 TABLET ORAL 2 TIMES DAILY
Qty: 180 TABLET | Refills: 2 | Status: SHIPPED | OUTPATIENT
Start: 2024-02-19 | End: 2024-08-16

## 2024-02-19 RX ORDER — ROSUVASTATIN CALCIUM 10 MG/1
10 TABLET, COATED ORAL DAILY
Qty: 90 TABLET | Refills: 2 | Status: SHIPPED | OUTPATIENT
Start: 2024-02-19 | End: 2024-08-16

## 2024-02-19 RX ORDER — GABAPENTIN 100 MG/1
CAPSULE ORAL
Qty: 270 CAPSULE | Refills: 1 | Status: SHIPPED | OUTPATIENT
Start: 2024-02-19 | End: 2024-05-20

## 2024-02-21 DIAGNOSIS — K21.9 GASTROESOPHAGEAL REFLUX DISEASE WITHOUT ESOPHAGITIS: ICD-10-CM

## 2024-02-21 RX ORDER — OMEPRAZOLE 40 MG/1
40 CAPSULE, DELAYED RELEASE ORAL DAILY
Qty: 90 CAPSULE | Refills: 2 | Status: ON HOLD | OUTPATIENT
Start: 2024-02-21 | End: 2024-04-02

## 2024-02-21 NOTE — TELEPHONE ENCOUNTER
Prescription approved per [facility] Refill Protocol.  Loreta Long, RN  [facility] Triage Nurse

## 2024-03-07 ENCOUNTER — OFFICE VISIT (OUTPATIENT)
Dept: OPTOMETRY | Facility: CLINIC | Age: 74
End: 2024-03-07
Payer: COMMERCIAL

## 2024-03-07 DIAGNOSIS — H16.221 KERATITIS SICCA, RIGHT EYE: Primary | ICD-10-CM

## 2024-03-07 DIAGNOSIS — M35.01 SJOGREN'S SYNDROME WITH KERATOCONJUNCTIVITIS SICCA (H): ICD-10-CM

## 2024-03-07 PROCEDURE — 92012 INTRM OPH EXAM EST PATIENT: CPT | Performed by: OPTOMETRIST

## 2024-03-07 RX ORDER — CYCLOSPORINE 0.5 MG/ML
1 EMULSION OPHTHALMIC 2 TIMES DAILY
Qty: 90 EACH | Refills: 3 | Status: SHIPPED | OUTPATIENT
Start: 2024-03-07 | End: 2024-03-11

## 2024-03-07 ASSESSMENT — EXTERNAL EXAM - RIGHT EYE: OD_EXAM: NORMAL

## 2024-03-07 ASSESSMENT — EXTERNAL EXAM - LEFT EYE: OS_EXAM: NORMAL

## 2024-03-07 ASSESSMENT — VISUAL ACUITY
OD_SC+: -1
OD_SC: 20/40
METHOD: SNELLEN - LINEAR
OD_PH_SC+: -1
OD_PH_SC: 20/30

## 2024-03-07 ASSESSMENT — SLIT LAMP EXAM - LIDS: COMMENTS: MEIBOMIAN GLAND DYSFUNCTION

## 2024-03-07 NOTE — PATIENT INSTRUCTIONS
Use ocusoft lid wipes over baby shampoo at bedtime   Add pf only drops systane as needed   Add restasis two times daily   Genteal gel at bedtime can be added

## 2024-03-07 NOTE — PROGRESS NOTES
Chief Complaint   Patient presents with    Eye Pain Right Eye   Patient has been experience eye pain in the right eye. She says it has been ongoing for months. Constantly feels like some thing is in that eye.     She uses Refresh tears 3-4 times a day and PF tears she says up to 10 times a day - not helping with the foreign body sensation         Do you wear contact lenses? No        Gricelda Will - Optometric Assistant      See Review Of Systems       Medical, surgical and family histories reviewed and updated 3/7/2024.   Currently on baby shampoo lid scrubs       History of bilateral lasik  Sjogrens   Blepharitis   Did not tolerate xiidra did not remember if on Restasis  On allergy meds intermittently     OBJECTIVE: See Ophthalmology exam    ASSESSMENT:    ICD-10-CM    1. Keratitis sicca, both eyes (H24)  M35.01       2. Sjogren's syndrome with keratoconjunctivitis sicca (H24)  M35.01 cycloSPORINE (RESTASIS) 0.05 % ophthalmic emulsion           PLAN:  Discontinue any drops in a bottle/ switch from Refresh to optive or systane  Add ocusoft instead of shampoo  Add restasis two times daily     Genet Dahl OD

## 2024-03-07 NOTE — LETTER
3/7/2024         RE: Alexandra Thomas  [address] [facility] [address]        Dear Colleague,    Thank you for referring your patient, Alexandra Thomas, to the [facility]. Please see a copy of my visit note below.    Chief Complaint   Patient presents with     Eye Pain Right Eye   Patient has been experience eye pain in the right eye. She says it has been ongoing for months. Constantly feels like some thing is in that eye.     She uses Refresh tears 3-4 times a day and PF tears she says up to 10 times a day - not helping with the foreign body sensation         Do you wear contact lenses? No        Gricelda Will - Optometric Assistant      See Review Of Systems       Medical, surgical and family histories reviewed and updated 3/7/2024.   Currently on baby shampoo lid scrubs       History of bilateral lasik  Sjogrens   Blepharitis   Did not tolerate xiidra did not remember if on Restasis  On allergy meds intermittently     OBJECTIVE: See Ophthalmology exam    ASSESSMENT:    ICD-10-CM    1. Keratitis sicca, both eyes (H24)  M35.01       2. Sjogren's syndrome with keratoconjunctivitis sicca (H24)  M35.01 cycloSPORINE (RESTASIS) 0.05 % ophthalmic emulsion           PLAN:  Discontinue any drops in a bottle/ switch from Refresh to optive or systane  Add ocusoft instead of shampoo  Add restasis two times daily     Genet Dahl OD     Again, thank you for allowing me to participate in the care of your patient.        Sincerely,        Genet Dahl, OD

## 2024-03-07 NOTE — LETTER
"3/30/2023    Emeka Richardson, TONY  [address] [facility] [place] [address]    RE: Alexandra Thomas       Dear Colleague,     I had the pleasure of seeing Alexandra Thomas in the [facility].  Cardiology Clinic Progress Note  Alexandra Thomas MRN# [id number]   YOB: 1950 Age: 72 year old     Reason For Visit:  Follow up   Primary Cardiologist:   Dr. Ballesteros           History of Presenting Illness:      Alexandra Thomas is a pleasant 72 year old patient who carries a past medical history significant for hypertension, hyperlipidemia, thoracic aortic aneurysm, palpitations, remote history of paroxysmal A-fib s/p ablation and nonsustained VT, Sjogren's syndrome, diverticulitis, GI bleed, and COVID-19 (1/2023)    She was last seen on 11/17/2022 by Dr. Torres in the absence of Dr. Ballesteros for urgent cardiac clearance for foot surgery.  She had previously been seen by her PCP for preoperative clearance and reported reported episodes of hypotension accompanied by lightheadedness as well as a single episode of vertigo-like \" room spinning\" dizziness.  Hydrochlorothiazide was reduced to 12.5 mg daily with a complete resolution of symptoms.  She went on to have an uncomplicated orthopedic surgery.    On 2/19/2023, she presented to the emergency room with chest pain.  ER work-up showed no acute ischemic changes on EKG, negative troponin, CT of the aorta showed no dissection.  No improvement with GI cocktail.  A follow-up stress echocardiogram was negative for stress-induced ischemia.  EF is normal at 55 to 60%, mild mitral regurgitation and mild tricuspid regurgitation.  Ascending aortic dilation of 4.1 cm.    She presents to the office today for follow-up.  Her primary complaint is intermittent episodes of sharp and dull chest pain.  She is currently following with GI where she recently underwent an endoscopy for evaluation of esophageal spasm.  Unfortunately, I do not have " Hematology/Medical Oncology Follow-up Note    ADDENDUM: Called patient regarding biopsy revealing a high-grade malignancy that does NOT appear to be consistent with her neuroendocrine cancer, prior breast cancer or cervical cancer.    Will submit tissue for [facility] [facility] which is an outside specialty lab and also proceed with a PET scan and have her follow-up with Dr. Friedell after all test results are back.  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  Provider follow-up at [facility] to be determined based on her biopsy results and any further staging.  If her biopsy shows breast cancer, will obtain additional studies and probable a staging PET scan.    Time with patient including review, documentation, history, examination, coordination of care and counseling was 20 minutes.    Recent oncology review:  When last seen on 2/23/2024, new right supraclavicular lymphadenopathy was noted.  The CEA was 56.6 compared to a level 12.8 on 7/14/2023. The CA 27.29 was 57.7 compared to 22  access to these results.  She tells me she has been instructed to use nitroglycerin as needed for pain.  At present, she denies chest pain, shortness of breath, PND, orthopnea, presyncope, syncope, edema, heart racing, or palpitations.  She admits she is under a significant amount of personal stress.     Blood pressure is well controlled at 122/80, managed on hydrochlorothiazide and losartan  BMP and CBC are unremarkable  Lipid panel yesterday showed a total cholesterol 192, HDL 74, , triglycerides 72  BMI 25.36, 160 pounds    Remains compliant with all medications.                   Assessment and Plan:       1.  Atypical chest pain -intermittent episodes of dull and sharp chest pain.  Likely noncardiac in nature.  Stress echocardiogram was negative for stress-induced ischemia.  Echo showed a normal ejection fraction estimated at 55 to 60%, mild MR and TR.  CT showed mild to moderate coronary calcification.  We had a lengthy conversation regarding negative cardiac work-up not suggesting the need for invasive cardiac evaluation.  Continue with risk modification, exercise and heart healthy diet.  Follow-up with GI for further evaluation of possible esophageal spasm.    2.  Hypertension -well-controlled, continue hydrochlorothiazide and losartan  3.  Aortic aneurysm -CT shows 4.1 cm dilation   4.  Hyperlipidemia -on statin           Thank you for allowing me to participate in this delightful patient's care. I have recommended she follow up in 6 months with Dr. Ballesteros or sooner if needed.       JESUS Werner CNP         Review of Systems:     Review of Systems:  Skin:        Eyes:       ENT:  Negative    Respiratory:  Negative    Cardiovascular:    palpitations;Positive for;chest pain  Gastroenterology:      Genitourinary:       Musculoskeletal:       Neurologic:       Psychiatric:       Heme/Lymph/Imm:       Endocrine:                   Physical Exam:     GEN:  In general, this is a well nourished  when it was last obtained on 1/23/2022.  3/8/2024 CT soft tissue of neck and chest revealed multiple right cervical level 3-5 lymphadenopathy.  Chest imaging revealed mild enlarged mediastinal and hilar lymphadenopathy which could be related to her neuroendocrine tumor.    She underwent IR biopsy of her right cervical lymph nodes today which was well-tolerated.    History of present illness:  In 2003, she underwent a right colonic and terminal ileal resection for a well-differentiated, pT2, pN2 neuroendocrine tumor history of metastases causing left ureteral obstruction in 2020 with subsequent 2022 left nephrectomy and stable liver lesion previously followed by Dr. Elfego Lawrence and then Dr. Hosea King.    However, a follow-up 2/10/2022 Octreoscan scan confirmed evidence of progressive disease for which she was started on lanreotide every 4 weeks with a baseline chromogranin A level of 824.  The chromogranin A joslyn was 445 on 1/6/2023 and 562 on 7/28/2023.     Review of her 10/13/2023 DEXA scan revealed 14% decrease in bone density with left hip T-score -3.1.  Left femoral neck T-score was -2.6.  She is on annual zoledronic acid.    In December, 2023 she received 5000 cGy/5 fractions by 12/15/2023 with complete resolution of left sacral pain referable to a presumptive metastatic neuroendocrine metastasis.  A July, 2023 dotatate PET scan had revealed stable disease except for the increased size and gluco-avidity of this left sclerotic sacral lesion.  She continues on monthly lanreotide.    Past medical history:  History of 2016 bilateral ER positive breast cancer on exemestane.  Both tumors were 100% ER/VT+, HER2 neg and patient declined Oncotype DX testing since she indicated she would never want to receive adjuvant systemic chemotherapy.  She had been initially started on anastrozole but developed a skin rash and was switched to exemestane    Past Medical History:   Diagnosis Date    Arthritis     knee    Benign  breast biopsy     benign    Breast cancer (H)     Carcinoid tumor (H28) 12/2003    Cervical cancer (H) 2007    H/O colposcopy with cervical biopsy 12/23/2013    vaginal cuff biopsy- VAIN III. referred back to gyn/onc    HTN     Hyperlipidemia     Malignant neoplasm of ovary (H)     Obesity     Pap smear of vagina with ASC-H 11/01/2013    Post-polio syndromes     Trigeminal neuralgias         Family history:  I have reviewed this patient's family history and updated it with pertinent information if needed.  Family History   Problem Relation Age of Onset    Cancer Mother         bone / liver    Breast Cancer Mother     Cancer Sister         vulvar ca, cervical ca, squamous cell cancer    Breast Cancer Sister     Cancer Brother         Rectal- Stage 4    Rectal Cancer Brother     Cancer Maternal Grandmother     Cancer Maternal Grandfather     Cancer Paternal Grandmother     Cancer Paternal Grandfather     Breast Cancer Maternal Aunt     Breast Cancer Paternal Aunt     Colon Cancer Paternal Aunt     Pancreatic Cancer Nephew     Anesthesia Reaction No family hx of     Venous thrombosis No family hx of     Bleeding Disorder No family hx of          Medications:  Current Outpatient Medications   Medication    Calcium Carbonate (CALCIUM 500 PO)    exemestane (AROMASIN) 25 MG tablet    hydroCHLOROthiazide 12.5 MG tablet    bisacodyl (DULCOLAX) 5 MG EC tablet    polyethylene glycol (GOLYTELY) 236 g suspension    SENNA-docusate sodium (SENNA S) 8.6-50 MG tablet     No current facility-administered medications for this visit.     Facility-Administered Medications Ordered in Other Visits   Medication    lanreotide acetate (SOMATULINE) injection 120 mg       Allergies:  No Known Allergies    Review of systems:  Except as noted in the note above, the patient denies headaches, diplopia, hearing loss or dizziness; dyspnea, cough, hemoptysis, pleurisy; chest pain/pressure, palpitations, lightheadedness; anorexia, nausea, vomiting,  female in no acute distress.  HEENT:  Pupils equal, round. Sclerae nonicteric. Clear oropharynx. Mucous membranes moist.  NECK: Supple, no masses appreciated. Trachea midline.  No JVD   C/V:  Regular rate and rhythm, no murmur, rub or gallop. No S3 or RV heave.   RESP: Respirations are unlabored. No use of accessory muscles. Clear to auscultation bilaterally without wheezing, rales, or rhonchi.  GI: Not assessed  EXTREM: No LE edema. No cyanosis or clubbing.  NEURO: Alert and oriented, cooperative. No obvious focal deficits.   PSYCH: Normal affect.  SKIN: Warm and dry. No rashes or petechiae appreciated.          Past Medical History:     Past Medical History:   Diagnosis Date     Arthritis     hips     Ascending aortic aneurysm     4.2 cm   Ascending     Biceps tendon rupture      Diverticulitis of colon      Gastroesophageal reflux disease     Esophageal spasms.     Gastroparesis      Heart murmur      High cholesterol      History of GI bleed      Hypertension      Sjogren's syndrome (H)               Past Surgical History:     Past Surgical History:   Procedure Laterality Date     ABDOMEN SURGERY       APPENDECTOMY       ARTHROPLASTY HIP Left 07/27/2022    Procedure: Left total hip arthroplasty;  Surgeon: Berry Mcdonnell MD;  Location:  OR     BACK SURGERY       BIOPSY       BREAST SURGERY       CHOLECYSTECTOMY       COLECTOMY PARTIAL      Due to recurrent diverticulitis     COLONOSCOPY       ENT SURGERY      T & A     ESOPHAGOSCOPY, GASTROSCOPY, DUODENOSCOPY (EGD), COMBINED N/A 09/13/2022    Procedure: ESOPHAGOGASTRODUODENOSCOPY (EGD) (fv);  Surgeon: Seymour Chatterjee MD;  Location:  GI     GENITOURINARY SURGERY       HYSTERECTOMY TOTAL ABDOMINAL       LASIK       left midfoot fusion Left     2nd toe osteotomy     shouder replacement Right      TOTAL HIP ARTHROPLASTY Right 06/07/2009     TOTAL KNEE ARTHROPLASTY Right 07/12/2006     TOTAL KNEE ARTHROPLASTY Left 02/18/2009              Allergies:    Ciprofloxacin, Food, Venlafaxine hcl, Meperidine, Adhesive tape, and Morphine       Data:   All laboratory data reviewed:    LAST CHOLESTEROL:  Lab Results   Component Value Date    CHOL 192 03/29/2023     Lab Results   Component Value Date    HDL 74 03/29/2023     Lab Results   Component Value Date     03/29/2023     Lab Results   Component Value Date    TRIG 72 03/29/2023     No results found for: CHOLHDLRATIO    LAST BMP:  Lab Results   Component Value Date     02/19/2023      Lab Results   Component Value Date    POTASSIUM 4.0 02/19/2023    POTASSIUM 4.0 11/02/2022     Lab Results   Component Value Date    CHLORIDE 103 02/19/2023    CHLORIDE 102 11/02/2022     Lab Results   Component Value Date    CHESTER 9.6 02/19/2023     Lab Results   Component Value Date    CO2 27 02/19/2023    CO2 29 11/02/2022     Lab Results   Component Value Date    BUN 21.3 02/19/2023    BUN 32 11/02/2022     Lab Results   Component Value Date    CR 1.1 02/19/2023    CR 0.88 02/19/2023     Lab Results   Component Value Date     02/19/2023    GLC 86 11/02/2022       LAST CBC:  Lab Results   Component Value Date    WBC 5.8 02/19/2023     Lab Results   Component Value Date    RBC 4.19 02/19/2023     Lab Results   Component Value Date    HGB 13.4 02/19/2023     Lab Results   Component Value Date    HCT 41.1 02/19/2023     Lab Results   Component Value Date    MCV 98 02/19/2023     Lab Results   Component Value Date    MCH 32.0 02/19/2023     Lab Results   Component Value Date    MCHC 32.6 02/19/2023     Lab Results   Component Value Date    RDW 12.8 02/19/2023     Lab Results   Component Value Date     02/19/2023       Thank you for allowing me to participate in the care of your patient.      Sincerely,     Sierra Wilkes, JESUS [facility]  cc:   Antoine Ballesteros MD  [id number] [id number]         "abdominal pain, diarrhea, constipation, melena or rectal bleeding; dysuria, frequency,  blood in the urine; vaginal bleeding or discharge; fever, chills, sweats, hot flashes; tingling, numbness, loss of balance; insomnia, depression, anxiety.    Physical examination:  BP (!) 140/72 (BP Location: Right arm, Patient Position: Sitting, Cuff Size: Adult Regular)   Pulse 62   Temp 98.8  F (37.1  C) (Tympanic)   Resp 12   Ht 1.727 m (5' 8\")   Wt 95.7 kg (211 lb)   SpO2 95%   BMI 32.08 kg/m      She has apparent new right supraclavicular lymphadenopathy. There is a single 1 x 1 cm supraclavicular lymph node that is firm but movable.  There may be others that are further more caudal and perhaps smaller in size.    The patient is alert and oriented.         Josue Meredith MD  "

## 2024-03-11 RX ORDER — CYCLOSPORINE 0.5 MG/ML
1 EMULSION OPHTHALMIC 2 TIMES DAILY
Qty: 90 EACH | Refills: 3 | Status: SHIPPED | OUTPATIENT
Start: 2024-03-11 | End: 2024-06-12

## 2024-03-13 ENCOUNTER — OFFICE VISIT (OUTPATIENT)
Dept: INTERNAL MEDICINE | Facility: CLINIC | Age: 74
End: 2024-03-13
Payer: COMMERCIAL

## 2024-03-13 ENCOUNTER — TELEPHONE (OUTPATIENT)
Dept: INTERNAL MEDICINE | Facility: CLINIC | Age: 74
End: 2024-03-13

## 2024-03-13 VITALS
HEIGHT: 67 IN | DIASTOLIC BLOOD PRESSURE: 80 MMHG | RESPIRATION RATE: 14 BRPM | TEMPERATURE: 96.3 F | WEIGHT: 161.8 LBS | BODY MASS INDEX: 25.39 KG/M2 | HEART RATE: 66 BPM | SYSTOLIC BLOOD PRESSURE: 130 MMHG | OXYGEN SATURATION: 99 %

## 2024-03-13 DIAGNOSIS — K22.4 ESOPHAGEAL SPASM: ICD-10-CM

## 2024-03-13 DIAGNOSIS — M17.11 ARTHROPATHY OF RIGHT KNEE: ICD-10-CM

## 2024-03-13 DIAGNOSIS — I48.0 PAF (PAROXYSMAL ATRIAL FIBRILLATION) (H): ICD-10-CM

## 2024-03-13 DIAGNOSIS — R11.0 NAUSEA: ICD-10-CM

## 2024-03-13 DIAGNOSIS — Z01.818 PREOP GENERAL PHYSICAL EXAM: Primary | ICD-10-CM

## 2024-03-13 DIAGNOSIS — I10 BENIGN ESSENTIAL HYPERTENSION: ICD-10-CM

## 2024-03-13 DIAGNOSIS — E78.00 PURE HYPERCHOLESTEROLEMIA: ICD-10-CM

## 2024-03-13 DIAGNOSIS — K21.9 GASTROESOPHAGEAL REFLUX DISEASE WITHOUT ESOPHAGITIS: ICD-10-CM

## 2024-03-13 LAB
ALBUMIN SERPL BCG-MCNC: 4.4 G/DL (ref 3.5–5.2)
ALP SERPL-CCNC: 100 U/L (ref 40–150)
ALT SERPL W P-5'-P-CCNC: 16 U/L (ref 0–50)
ANION GAP SERPL CALCULATED.3IONS-SCNC: 9 MMOL/L (ref 7–15)
AST SERPL W P-5'-P-CCNC: 28 U/L (ref 0–45)
BILIRUB SERPL-MCNC: 0.4 MG/DL
BUN SERPL-MCNC: 19.7 MG/DL (ref 8–23)
CALCIUM SERPL-MCNC: 9.5 MG/DL (ref 8.8–10.2)
CHLORIDE SERPL-SCNC: 104 MMOL/L (ref 98–107)
CREAT SERPL-MCNC: 0.98 MG/DL (ref 0.51–0.95)
CRP SERPL-MCNC: <3 MG/L
DEPRECATED HCO3 PLAS-SCNC: 28 MMOL/L (ref 22–29)
EGFRCR SERPLBLD CKD-EPI 2021: 60 ML/MIN/1.73M2
ERYTHROCYTE [DISTWIDTH] IN BLOOD BY AUTOMATED COUNT: 12.1 % (ref 10–15)
ERYTHROCYTE [SEDIMENTATION RATE] IN BLOOD BY WESTERGREN METHOD: 17 MM/HR (ref 0–30)
GLUCOSE SERPL-MCNC: 83 MG/DL (ref 70–99)
HCT VFR BLD AUTO: 39.4 % (ref 35–47)
HGB BLD-MCNC: 12.8 G/DL (ref 11.7–15.7)
MCH RBC QN AUTO: 32 PG (ref 26.5–33)
MCHC RBC AUTO-ENTMCNC: 32.5 G/DL (ref 31.5–36.5)
MCV RBC AUTO: 99 FL (ref 78–100)
PLATELET # BLD AUTO: 227 10E3/UL (ref 150–450)
POTASSIUM SERPL-SCNC: 4.7 MMOL/L (ref 3.4–5.3)
PROT SERPL-MCNC: 6.2 G/DL (ref 6.4–8.3)
RBC # BLD AUTO: 4 10E6/UL (ref 3.8–5.2)
SODIUM SERPL-SCNC: 141 MMOL/L (ref 135–145)
WBC # BLD AUTO: 4.7 10E3/UL (ref 4–11)

## 2024-03-13 PROCEDURE — 36415 COLL VENOUS BLD VENIPUNCTURE: CPT | Performed by: INTERNAL MEDICINE

## 2024-03-13 PROCEDURE — 80053 COMPREHEN METABOLIC PANEL: CPT | Performed by: INTERNAL MEDICINE

## 2024-03-13 PROCEDURE — 85027 COMPLETE CBC AUTOMATED: CPT | Performed by: INTERNAL MEDICINE

## 2024-03-13 PROCEDURE — 85652 RBC SED RATE AUTOMATED: CPT | Performed by: INTERNAL MEDICINE

## 2024-03-13 PROCEDURE — 86140 C-REACTIVE PROTEIN: CPT | Performed by: INTERNAL MEDICINE

## 2024-03-13 PROCEDURE — 99214 OFFICE O/P EST MOD 30 MIN: CPT | Performed by: INTERNAL MEDICINE

## 2024-03-13 RX ORDER — NITROGLYCERIN 0.4 MG/1
0.4 TABLET SUBLINGUAL EVERY 5 MIN PRN
COMMUNITY
End: 2024-03-13

## 2024-03-13 RX ORDER — NITROGLYCERIN 0.4 MG/1
0.4 TABLET SUBLINGUAL EVERY 5 MIN PRN
Qty: 10 TABLET | Refills: 3 | Status: SHIPPED | OUTPATIENT
Start: 2024-03-13

## 2024-03-13 RX ORDER — ONDANSETRON 4 MG/1
4 TABLET, ORALLY DISINTEGRATING ORAL EVERY 8 HOURS PRN
Qty: 12 TABLET | Refills: 3 | Status: ON HOLD | OUTPATIENT
Start: 2024-03-13 | End: 2024-04-03

## 2024-03-13 ASSESSMENT — PAIN SCALES - GENERAL: PAINLEVEL: MODERATE PAIN (4)

## 2024-03-13 NOTE — H&P (VIEW-ONLY)
Preoperative Evaluation  [facility]  [address]  [address]  [facility] [address]  Phone: [phone]  Primary Provider: Jaswinder Pal  Pre-op Performing Provider: DARLENE CHERY  Mar 13, 2024       Alexandra is a 73 year old, presenting for the following:  Pre-Op Exam        3/13/2024     9:39 AM   Additional Questions   Roomed by Anna ALDRICH   Accompanied by n/a     Surgical Information  Surgery/Procedure: RIGHT REVISION TOTAL KNEE ARTHROPLASTY, POLYETHYLENE EXCHANGE   Surgery Location: [facility] [place] [facility] OR   Surgeon: Dr. Prieto  Surgery Date: 04/02/2024  Time of Surgery: 0720  Where patient plans to recover: At home with family  Fax number for surgical facility: [phone]    Assessment & Plan     The proposed surgical procedure is considered INTERMEDIATE risk.    Arthropathy of right knee  Planned revision of arthroplasty     Preop general physical exam  Cleared for surgery, pending lab work   - CBC with platelets  - Comprehensive metabolic panel (BMP + Alb, Alk Phos, ALT, AST, Total. Bili, TP)  - ESR: Erythrocyte sedimentation rate  - CRP, inflammation    Esophageal spasm  Continue PRN Nitroglycerin   - nitroGLYcerin (NITROSTAT) 0.4 MG sublingual tablet; Place 1 tablet (0.4 mg) under the tongue every 5 minutes as needed for chest pain For chest pain place 1 tablet under the tongue every 5 minutes for 3 doses. If symptoms persist 5 minutes after 1st dose call 911.    Nausea  Has h/o IBS, uses PRN Zofran   - ondansetron (ZOFRAN ODT) 4 MG ODT tab; Take 1 tablet (4 mg) by mouth every 8 hours as needed for nausea    Gastroesophageal reflux disease without esophagitis  On PPI     Pure hypercholesterolemia  On statin     Benign essential hypertension  Controlled on treatment     PAF (paroxysmal atrial fibrillation) (H)  No recurrence post ablation             - No identified additional risk factors other than previously addressed    Antiplatelet or Anticoagulation  Medication Instructions   - Patient is on no antiplatelet or anticoagulation medications.    Additional Medication Instructions  Hold fish oil supplement for 10 days     Recommendation  APPROVAL GIVEN to proceed with proposed procedure, without further diagnostic evaluation.          Subjective       HPI related to upcoming procedure: scheduled for right knee arthroplasty revision surgery. Has significant pain with ambulation, chronic swelling of the knee.   Has h/o HTN. on medical treatment. BP has been controlled. No side effects from medications. No CP, HA, dizziness. good compliance with medications and low salt diet.  Has H/O hyperlipidemia. On medical treatment and diet. No side effects. No muscle weakness, myalgias or upset stomach.   Has history of atrial fibrillation. Has had ablation, no recurrence.   Has h/o GERD on PPI treatment. symptoms are controlled. No nausea, vomiting, heartburns, bloating.            3/13/2024     9:33 AM   Preop Questions   1. Have you ever had a heart attack or stroke? No   2. Have you ever had surgery on your heart or blood vessels, such as a stent placement, a coronary artery bypass, or surgery on an artery in your head, neck, heart, or legs? No   3. Do you have chest pain with activity? No   4. Do you have a history of  heart failure? No   5. Do you currently have a cold, bronchitis or symptoms of other infection? No   6. Do you have a cough, shortness of breath, or wheezing? No   7. Do you or anyone in your family have previous history of blood clots? No   8. Do you or does anyone in your family have a serious bleeding problem such as prolonged bleeding following surgeries or cuts? No   9. Have you ever had problems with anemia or been told to take iron pills? No   10. Have you had any abnormal blood loss such as black, tarry or bloody stools, or abnormal vaginal bleeding? YES -    11. Have you ever had a blood transfusion? No   12. Are you willing to have a blood  transfusion if it is medically needed before, during, or after your surgery? Yes   13. Have you or any of your relatives ever had problems with anesthesia? No   14. Do you have sleep apnea, excessive snoring or daytime drowsiness? No   15. Do you have any artifical heart valves or other implanted medical devices like a pacemaker, defibrillator, or continuous glucose monitor? No   16. Do you have artificial joints? YES -    17. Are you allergic to latex? No       Health Care Directive  Patient has a Health Care Directive on file      Preoperative Review of    reviewed - no record of controlled substances prescribed.      Status of Chronic Conditions:  See problem list for active medical problems.  Problems all longstanding and stable, except as noted/documented.  See ROS for pertinent symptoms related to these conditions.    Patient Active Problem List    Diagnosis Date Noted    Other chest pain 03/30/2023     Priority: Medium    Lattice degeneration of right retina 08/23/2022     Priority: Medium    S/P total hip arthroplasty 07/27/2022     Priority: Medium    Benign essential hypertension 05/29/2022     Priority: Medium    Gastroparesis 05/29/2022     Priority: Medium    Thoracic aortic aneurysm without rupture (H24) 05/29/2022     Priority: Medium    Gastroesophageal reflux disease without esophagitis 05/29/2022     Priority: Medium    Pain syndrome, chronic 05/29/2022     Priority: Medium     Orthopedic pains: Hip pain, foot pain, shoulder pain, on gabapentin      Sicca syndrome (H24) 05/29/2022     Priority: Medium    Cervical spondylosis with radiculopathy 10/21/2020     Priority: Medium    Pure hypercholesterolemia 06/01/2020     Priority: Medium    Rupture of right long head biceps tendon 04/22/2020     Priority: Medium     Formatting of this note might be different from the original.   Occurred more than one year after 2017 TSR (had Long-head tenotomy / partial excision / sewn to pectoralis tendon as  part of TSR (Total Shoulder Replacement)      Primary osteoarthritis, left shoulder 02/11/2020     Priority: Medium    Arrhythmia 10/01/2019     Priority: Medium     Formatting of this note is different from the original.   ASSESSMENT/PLAN:   1. Atrial fibrillation, paroxysmal: Status-post PV ablation in 2011. She is now off anticoagulation and reports no recurrent symptoms. A recent ziopatch was unremarkable   --Continue to monitor for symptoms   --No indication for anticoagulation at this time      2. History of NSVT: An MRI of 2010 showed fatty infiltration of the right ventricle but overall lower suspicion for ARVD. Ziopatch was unremarkable. She denies palpitations   --Continue to monitor   --Periodic echocardiogram to screen for biventricular function. Will repeat echocardiogram in 1 year      8/6/18 Holter Monitor   CONCLUSIONS:   1)  Predominant underlying rhythm was sinus with an average rate of 60 bpm   2)  Rare premature atrial complexes and rare premature ventricular complexes.  There were 3 brief asymptomatic supraventricular runs.     3)  No significant pauses   4)  No sustained arrhythmias   5)  No clear arrhythmia correlation to account for patient's symptoms of dizziness.      Mixed incontinence 09/10/2019     Priority: Medium    Pelvic floor dysfunction 09/10/2019     Priority: Medium    Recurrent UTI 09/10/2019     Priority: Medium    Chronic diarrhea 09/10/2019     Priority: Medium    Depression 08/06/2019     Priority: Medium    History of colonic polyps 12/03/2018     Priority: Medium     Formatting of this note might be different from the original.   [id number] [facility] Non-Malignant Neoplastic Polyp Data   Region/  [1]: Latia Bass/ Paulina Esquivel MD   Date of Procedure [2]: 11/20/2018   Tubular adenoma(s) [3]: 2   Tubulovillous adenoma(s) [4]: 0   Villous adenoma(s) [5]: 0   Sessile serrated adenoma(s) [6]: 0   High risk hyperplastic polyp(s) [7]: 0   Other (specify) [8]: N/A    Colorectal cancer found (Yes/No) [9]: No   END [id number]      Arthropathy of right knee 11/15/2018     Priority: Medium    Acquired hypothyroidism 02/09/2018     Priority: Medium    Tendonitis of right hip flexor 10/03/2017     Priority: Medium    Hx of cold sores 09/29/2017     Priority: Medium    Migraine aura without headache 06/03/2016     Priority: Medium    PAF (paroxysmal atrial fibrillation) (H) 04/25/2016     Priority: Medium     Formatting of this note might be different from the original.   Had ablation      PVC's (premature ventricular contractions) 04/25/2016     Priority: Medium    Hyperlipidemia, acquired 12/09/2015     Priority: Medium    Hearing loss 12/09/2015     Priority: Medium    IBS (irritable bowel syndrome) 12/09/2015     Priority: Medium    Pulmonary nodule 12/09/2015     Priority: Medium     Formatting of this note might be different from the original.   8/16 no need for repeat   8/15 repeat one yr      Degeneration of lumbar or lumbosacral intervertebral disc 11/24/2015     Priority: Medium    DJD (degenerative joint disease) of thoracic spine 09/24/2015     Priority: Medium    Trigger ring finger of right hand 05/01/2013     Priority: Medium    Enthesopathy 09/08/2009     Priority: Medium    Ganglion of tendon sheath 01/09/2009     Priority: Medium    Primary localized osteoarthrosis, lower leg 01/09/2009     Priority: Medium      Past Medical History:   Diagnosis Date    Arrhythmia 10/1/2019    Arthritis     hips    Ascending aortic aneurysm (H24)     4.2 cm   Ascending    Biceps tendon rupture     Depression 8/6/2019    Diverticulitis of colon     Gastroesophageal reflux disease     Esophageal spasms.    Gastroparesis     Heart murmur     High cholesterol     History of GI bleed     Hypertension     Sjogren's syndrome (H24)      Past Surgical History:   Procedure Laterality Date    ABDOMEN SURGERY      APPENDECTOMY      ARTHROPLASTY HIP Left 07/27/2022    Procedure: Left total hip  arthroplasty;  Surgeon: Berry Mcdonnell MD;  Location: [facility] OR    BACK SURGERY      BIOPSY      BREAST SURGERY      CHOLECYSTECTOMY      COLECTOMY PARTIAL      Due to recurrent diverticulitis    COLONOSCOPY      ENT SURGERY      T & A    ESOPHAGOSCOPY, GASTROSCOPY, DUODENOSCOPY (EGD), COMBINED N/A 09/13/2022    Procedure: ESOPHAGOGASTRODUODENOSCOPY (EGD) (fv);  Surgeon: Seymour Chatterjee MD;  Location:  GI    GENITOURINARY SURGERY      HYSTERECTOMY TOTAL ABDOMINAL      LASIK      left midfoot fusion Left     2nd toe osteotomy    shouder replacement Right     TOTAL HIP ARTHROPLASTY Right 06/07/2009    TOTAL KNEE ARTHROPLASTY Right 07/12/2006    TOTAL KNEE ARTHROPLASTY Left 02/18/2009     Current Outpatient Medications   Medication Sig Dispense Refill    acetaminophen (TYLENOL) 325 MG tablet Take 2 tablets (650 mg) by mouth every 4 hours as needed for other (mild pain) 100 tablet 0    carvedilol (COREG) 6.25 MG tablet Take 1 tablet (6.25 mg) by mouth 2 times daily (with meals) 180 tablet 3    cycloSPORINE (RESTASIS) 0.05 % ophthalmic emulsion Place 1 drop into both eyes 2 times daily 90 each 3    gabapentin (NEURONTIN) 100 MG capsule Take 2 capsules (200 mg) in a.m. and 1 additional capsule (100 mg) at noon. 270 capsule 1    gabapentin (NEURONTIN) 300 MG capsule Take 2 capsules (600 mg) by mouth at bedtime 180 capsule 1    losartan (COZAAR) 50 MG tablet Take 1 tablet (50 mg) by mouth 2 times daily 180 tablet 2    multivitamin w/minerals (THERA-VIT-M) tablet Take 1 tablet by mouth daily      nitroGLYcerin (NITROSTAT) 0.4 MG sublingual tablet Place 0.4 mg under the tongue every 5 minutes as needed for chest pain For chest pain place 1 tablet under the tongue every 5 minutes for 3 doses. If symptoms persist 5 minutes after 1st dose call 911.      omeprazole (PRILOSEC) 40 MG DR capsule Take 1 capsule (40 mg) by mouth daily (Patient taking differently: Take 40 mg by mouth 2 times daily) 90 capsule 2    ondansetron (ZOFRAN  ODT) 4 MG ODT tab Take 1 tablet (4 mg) by mouth every 8 hours as needed for nausea 12 tablet 0    rosuvastatin (CRESTOR) 10 MG tablet Take 1 tablet (10 mg) by mouth daily 90 tablet 2    Vitamin D3 (CHOLECALCIFEROL) 25 mcg (1000 units) tablet Take 1 tablet by mouth daily      amoxicillin (AMOXIL) 500 MG capsule Take 1 capsule (500 mg) by mouth 2 times daily 20 capsule 0    diphenhydrAMINE-acetaminophen (TYLENOL PM)  MG tablet Take 1 tablet by mouth nightly as needed for sleep (Patient not taking: Reported on 3/13/2024)         Allergies   Allergen Reactions    Ciprofloxacin Other (See Comments)     Pt has aortic aneurysm.  Increased risk of rupture or dissection with use of fluoroquinolones.      Food Anaphylaxis     EGGPLANT-->anaphylaxis      Venlafaxine Hcl Diarrhea, Shortness Of Breath and Nausea and Vomiting    Meperidine Rash               Adhesive Tape      blisters    Morphine Hives and Itching     Histamine reaction        Social History     Tobacco Use    Smoking status: Never    Smokeless tobacco: Never   Substance Use Topics    Alcohol use: Not Currently     Family History   Problem Relation Age of Onset    Chronic Obstructive Pulmonary Disease Mother     Dementia Father     Chronic Obstructive Pulmonary Disease Father     Macular Degeneration Maternal Grandmother     Other Cancer Maternal Grandmother         Liver    Breast Cancer Paternal Cousin         before 50    Diabetes Paternal Grandfather     Diabetes Paternal Grandmother     Other Cancer Cousin         Breast/Mets    Substance Abuse Sister     Glaucoma No family hx of      History   Drug Use Unknown         Review of Systems    Review of Systems  Constitutional, HEENT, cardiovascular, pulmonary, GI, , musculoskeletal, neuro, skin, endocrine and psych systems are negative, except as otherwise noted.    Objective    BP (!) 141/88 (BP Location: Left arm, Cuff Size: Adult Regular)   Pulse 66   Temp (!) 96.3  F (35.7  C) (Tympanic)   Resp  "14   Ht 1.702 m (5' 7\")   Wt 73.4 kg (161 lb 12.8 oz)   SpO2 99%   BMI 25.34 kg/m     Estimated body mass index is 25.34 kg/m  as calculated from the following:    Height as of this encounter: 1.702 m (5' 7\").    Weight as of this encounter: 73.4 kg (161 lb 12.8 oz).  Physical Exam  GENERAL: alert and no distress  EYES: Eyes grossly normal to inspection, PERRL and conjunctivae and sclerae normal  HENT: ear canals and TM's normal, nose and mouth without ulcers or lesions  NECK: no adenopathy, no asymmetry, masses, or scars  RESP: lungs clear to auscultation - no rales, rhonchi or wheezes  CV: regular rate and rhythm, normal S1 S2, no S3 or S4, 2/6 systolic murmur, no click or rub, no peripheral edema  ABDOMEN: soft, nontender, no hepatosplenomegaly, no masses and bowel sounds normal  MS: no gross musculoskeletal defects noted, no edema, right knee edema, pain with ambulation   SKIN: no suspicious lesions or rashes  NEURO: Normal strength and tone, mentation intact and speech normal  PSYCH: mentation appears normal, affect normal/bright    Recent Labs   Lab Test 02/15/24  1728 02/15/24  1723 01/12/24  0719 02/19/23  1326 02/19/23  1315   HGB 13.1  --  13.8  --  13.4     --  209  --  224     --   --   --  140   POTASSIUM 4.1  --   --   --  4.0   CR 0.86 1.0  --    < > 0.88    < > = values in this interval not displayed.        Diagnostics  No labs were ordered during this visit.  Labs pending at this time.  Results will be reviewed when available.   No EKG this visit, completed in the last 90 days. Normal sinus rhythm     Revised Cardiac Risk Index (RCRI)  The patient has the following serious cardiovascular risks for perioperative complications:   - No serious cardiac risks = 0 points     RCRI Interpretation: 0 points: Class I (very low risk - 0.4% complication rate)         Signed Electronically by: Julio Cardona MD  Copy of this evaluation report is provided to requesting physician.         "

## 2024-03-13 NOTE — LETTER
March 14, 2024      Alexandra Thomas  [address] [address]        To Whom It May Concern,     Alexandra Thomas is a patient of the [facility] in [place], [place].  She does have a history of IBS, and she does struggle with frequent and recurrent nausea associated with this condition.  She would benefit greatly from the use of ondansetron 4 mg by mouth every 8 hours as needed for nausea.  She has benefited from the use of antinausea medications in the past.  Any consideration in this matter would be greatly appreciated.      Sincerely,        Nikos Pal MD

## 2024-03-13 NOTE — PROGRESS NOTES
Preoperative Evaluation  [facility]  [address]  [address]  [facility] [address]  Phone: [phone]  Primary Provider: Jaswinder Pal  Pre-op Performing Provider: DARLENE CHERY  Mar 13, 2024       Alexandra is a 73 year old, presenting for the following:  Pre-Op Exam        3/13/2024     9:39 AM   Additional Questions   Roomed by Anna ALDRICH   Accompanied by n/a     Surgical Information  Surgery/Procedure: RIGHT REVISION TOTAL KNEE ARTHROPLASTY, POLYETHYLENE EXCHANGE   Surgery Location: [facility] Main OR   Surgeon: Dr. Prieto  Surgery Date: 04/02/2024  Time of Surgery: 0720  Where patient plans to recover: At home with family  Fax number for surgical facility: [phone]    Assessment & Plan     The proposed surgical procedure is considered INTERMEDIATE risk.    Arthropathy of right knee  Planned revision of arthroplasty     Preop general physical exam  Cleared for surgery, pending lab work   - CBC with platelets  - Comprehensive metabolic panel (BMP + Alb, Alk Phos, ALT, AST, Total. Bili, TP)  - ESR: Erythrocyte sedimentation rate  - CRP, inflammation    Esophageal spasm  Continue PRN Nitroglycerin   - nitroGLYcerin (NITROSTAT) 0.4 MG sublingual tablet; Place 1 tablet (0.4 mg) under the tongue every 5 minutes as needed for chest pain For chest pain place 1 tablet under the tongue every 5 minutes for 3 doses. If symptoms persist 5 minutes after 1st dose call 911.    Nausea  Has h/o IBS, uses PRN Zofran   - ondansetron (ZOFRAN ODT) 4 MG ODT tab; Take 1 tablet (4 mg) by mouth every 8 hours as needed for nausea    Gastroesophageal reflux disease without esophagitis  On PPI     Pure hypercholesterolemia  On statin     Benign essential hypertension  Controlled on treatment     PAF (paroxysmal atrial fibrillation) (H)  No recurrence post ablation             - No identified additional risk factors other than previously addressed    Antiplatelet or Anticoagulation  Medication Instructions   - Patient is on no antiplatelet or anticoagulation medications.    Additional Medication Instructions  Hold fish oil supplement for 10 days     Recommendation  APPROVAL GIVEN to proceed with proposed procedure, without further diagnostic evaluation.          Subjective       HPI related to upcoming procedure: scheduled for right knee arthroplasty revision surgery. Has significant pain with ambulation, chronic swelling of the knee.   Has h/o HTN. on medical treatment. BP has been controlled. No side effects from medications. No CP, HA, dizziness. good compliance with medications and low salt diet.  Has H/O hyperlipidemia. On medical treatment and diet. No side effects. No muscle weakness, myalgias or upset stomach.   Has history of atrial fibrillation. Has had ablation, no recurrence.   Has h/o GERD on PPI treatment. symptoms are controlled. No nausea, vomiting, heartburns, bloating.            3/13/2024     9:33 AM   Preop Questions   1. Have you ever had a heart attack or stroke? No   2. Have you ever had surgery on your heart or blood vessels, such as a stent placement, a coronary artery bypass, or surgery on an artery in your head, neck, heart, or legs? No   3. Do you have chest pain with activity? No   4. Do you have a history of  heart failure? No   5. Do you currently have a cold, bronchitis or symptoms of other infection? No   6. Do you have a cough, shortness of breath, or wheezing? No   7. Do you or anyone in your family have previous history of blood clots? No   8. Do you or does anyone in your family have a serious bleeding problem such as prolonged bleeding following surgeries or cuts? No   9. Have you ever had problems with anemia or been told to take iron pills? No   10. Have you had any abnormal blood loss such as black, tarry or bloody stools, or abnormal vaginal bleeding? YES -    11. Have you ever had a blood transfusion? No   12. Are you willing to have a blood  transfusion if it is medically needed before, during, or after your surgery? Yes   13. Have you or any of your relatives ever had problems with anesthesia? No   14. Do you have sleep apnea, excessive snoring or daytime drowsiness? No   15. Do you have any artifical heart valves or other implanted medical devices like a pacemaker, defibrillator, or continuous glucose monitor? No   16. Do you have artificial joints? YES -    17. Are you allergic to latex? No       Health Care Directive  Patient has a Health Care Directive on file      Preoperative Review of    reviewed - no record of controlled substances prescribed.      Status of Chronic Conditions:  See problem list for active medical problems.  Problems all longstanding and stable, except as noted/documented.  See ROS for pertinent symptoms related to these conditions.    Patient Active Problem List    Diagnosis Date Noted    Other chest pain 03/30/2023     Priority: Medium    Lattice degeneration of right retina 08/23/2022     Priority: Medium    S/P total hip arthroplasty 07/27/2022     Priority: Medium    Benign essential hypertension 05/29/2022     Priority: Medium    Gastroparesis 05/29/2022     Priority: Medium    Thoracic aortic aneurysm without rupture (H24) 05/29/2022     Priority: Medium    Gastroesophageal reflux disease without esophagitis 05/29/2022     Priority: Medium    Pain syndrome, chronic 05/29/2022     Priority: Medium     Orthopedic pains: Hip pain, foot pain, shoulder pain, on gabapentin      Sicca syndrome (H24) 05/29/2022     Priority: Medium    Cervical spondylosis with radiculopathy 10/21/2020     Priority: Medium    Pure hypercholesterolemia 06/01/2020     Priority: Medium    Rupture of right long head biceps tendon 04/22/2020     Priority: Medium     Formatting of this note might be different from the original.   Occurred more than one year after 2017 TSR (had Long-head tenotomy / partial excision / sewn to pectoralis tendon as  part of TSR (Total Shoulder Replacement)      Primary osteoarthritis, left shoulder 02/11/2020     Priority: Medium    Arrhythmia 10/01/2019     Priority: Medium     Formatting of this note is different from the original.   ASSESSMENT/PLAN:   1. Atrial fibrillation, paroxysmal: Status-post PV ablation in 2011. She is now off anticoagulation and reports no recurrent symptoms. A recent ziopatch was unremarkable   --Continue to monitor for symptoms   --No indication for anticoagulation at this time      2. History of NSVT: An MRI of 2010 showed fatty infiltration of the right ventricle but overall lower suspicion for ARVD. Ziopatch was unremarkable. She denies palpitations   --Continue to monitor   --Periodic echocardiogram to screen for biventricular function. Will repeat echocardiogram in 1 year      8/6/18 Holter Monitor   CONCLUSIONS:   1)  Predominant underlying rhythm was sinus with an average rate of 60 bpm   2)  Rare premature atrial complexes and rare premature ventricular complexes.  There were 3 brief asymptomatic supraventricular runs.     3)  No significant pauses   4)  No sustained arrhythmias   5)  No clear arrhythmia correlation to account for patient's symptoms of dizziness.      Mixed incontinence 09/10/2019     Priority: Medium    Pelvic floor dysfunction 09/10/2019     Priority: Medium    Recurrent UTI 09/10/2019     Priority: Medium    Chronic diarrhea 09/10/2019     Priority: Medium    Depression 08/06/2019     Priority: Medium    History of colonic polyps 12/03/2018     Priority: Medium     Formatting of this note might be different from the original.   [id number] [facility] Non-Malignant Neoplastic Polyp Data   Region/  [1]: Latia Bass/ Paulina Esquivel MD   Date of Procedure [2]: 11/20/2018   Tubular adenoma(s) [3]: 2   Tubulovillous adenoma(s) [4]: 0   Villous adenoma(s) [5]: 0   Sessile serrated adenoma(s) [6]: 0   High risk hyperplastic polyp(s) [7]: 0   Other (specify) [8]: N/A    Colorectal cancer found (Yes/No) [9]: No   END [id number]      Arthropathy of right knee 11/15/2018     Priority: Medium    Acquired hypothyroidism 02/09/2018     Priority: Medium    Tendonitis of right hip flexor 10/03/2017     Priority: Medium    Hx of cold sores 09/29/2017     Priority: Medium    Migraine aura without headache 06/03/2016     Priority: Medium    PAF (paroxysmal atrial fibrillation) (H) 04/25/2016     Priority: Medium     Formatting of this note might be different from the original.   Had ablation      PVC's (premature ventricular contractions) 04/25/2016     Priority: Medium    Hyperlipidemia, acquired 12/09/2015     Priority: Medium    Hearing loss 12/09/2015     Priority: Medium    IBS (irritable bowel syndrome) 12/09/2015     Priority: Medium    Pulmonary nodule 12/09/2015     Priority: Medium     Formatting of this note might be different from the original.   8/16 no need for repeat   8/15 repeat one yr      Degeneration of lumbar or lumbosacral intervertebral disc 11/24/2015     Priority: Medium    DJD (degenerative joint disease) of thoracic spine 09/24/2015     Priority: Medium    Trigger ring finger of right hand 05/01/2013     Priority: Medium    Enthesopathy 09/08/2009     Priority: Medium    Ganglion of tendon sheath 01/09/2009     Priority: Medium    Primary localized osteoarthrosis, lower leg 01/09/2009     Priority: Medium      Past Medical History:   Diagnosis Date    Arrhythmia 10/1/2019    Arthritis     hips    Ascending aortic aneurysm (H24)     4.2 cm   Ascending    Biceps tendon rupture     Depression 8/6/2019    Diverticulitis of colon     Gastroesophageal reflux disease     Esophageal spasms.    Gastroparesis     Heart murmur     High cholesterol     History of GI bleed     Hypertension     Sjogren's syndrome (H24)      Past Surgical History:   Procedure Laterality Date    ABDOMEN SURGERY      APPENDECTOMY      ARTHROPLASTY HIP Left 07/27/2022    Procedure: Left total hip  arthroplasty;  Surgeon: Berry Mcdonnell MD;  Location: [facility] OR    BACK SURGERY      BIOPSY      BREAST SURGERY      CHOLECYSTECTOMY      COLECTOMY PARTIAL      Due to recurrent diverticulitis    COLONOSCOPY      ENT SURGERY      T & A    ESOPHAGOSCOPY, GASTROSCOPY, DUODENOSCOPY (EGD), COMBINED N/A 09/13/2022    Procedure: ESOPHAGOGASTRODUODENOSCOPY (EGD) (fv);  Surgeon: Seymour Chattrejee MD;  Location:  GI    GENITOURINARY SURGERY      HYSTERECTOMY TOTAL ABDOMINAL      LASIK      left midfoot fusion Left     2nd toe osteotomy    shouder replacement Right     TOTAL HIP ARTHROPLASTY Right 06/07/2009    TOTAL KNEE ARTHROPLASTY Right 07/12/2006    TOTAL KNEE ARTHROPLASTY Left 02/18/2009     Current Outpatient Medications   Medication Sig Dispense Refill    acetaminophen (TYLENOL) 325 MG tablet Take 2 tablets (650 mg) by mouth every 4 hours as needed for other (mild pain) 100 tablet 0    carvedilol (COREG) 6.25 MG tablet Take 1 tablet (6.25 mg) by mouth 2 times daily (with meals) 180 tablet 3    cycloSPORINE (RESTASIS) 0.05 % ophthalmic emulsion Place 1 drop into both eyes 2 times daily 90 each 3    gabapentin (NEURONTIN) 100 MG capsule Take 2 capsules (200 mg) in a.m. and 1 additional capsule (100 mg) at noon. 270 capsule 1    gabapentin (NEURONTIN) 300 MG capsule Take 2 capsules (600 mg) by mouth at bedtime 180 capsule 1    losartan (COZAAR) 50 MG tablet Take 1 tablet (50 mg) by mouth 2 times daily 180 tablet 2    multivitamin w/minerals (THERA-VIT-M) tablet Take 1 tablet by mouth daily      nitroGLYcerin (NITROSTAT) 0.4 MG sublingual tablet Place 0.4 mg under the tongue every 5 minutes as needed for chest pain For chest pain place 1 tablet under the tongue every 5 minutes for 3 doses. If symptoms persist 5 minutes after 1st dose call 911.      omeprazole (PRILOSEC) 40 MG DR capsule Take 1 capsule (40 mg) by mouth daily (Patient taking differently: Take 40 mg by mouth 2 times daily) 90 capsule 2    ondansetron (ZOFRAN  ODT) 4 MG ODT tab Take 1 tablet (4 mg) by mouth every 8 hours as needed for nausea 12 tablet 0    rosuvastatin (CRESTOR) 10 MG tablet Take 1 tablet (10 mg) by mouth daily 90 tablet 2    Vitamin D3 (CHOLECALCIFEROL) 25 mcg (1000 units) tablet Take 1 tablet by mouth daily      amoxicillin (AMOXIL) 500 MG capsule Take 1 capsule (500 mg) by mouth 2 times daily 20 capsule 0    diphenhydrAMINE-acetaminophen (TYLENOL PM)  MG tablet Take 1 tablet by mouth nightly as needed for sleep (Patient not taking: Reported on 3/13/2024)         Allergies   Allergen Reactions    Ciprofloxacin Other (See Comments)     Pt has aortic aneurysm.  Increased risk of rupture or dissection with use of fluoroquinolones.      Food Anaphylaxis     EGGPLANT-->anaphylaxis      Venlafaxine Hcl Diarrhea, Shortness Of Breath and Nausea and Vomiting    Meperidine Rash               Adhesive Tape      blisters    Morphine Hives and Itching     Histamine reaction        Social History     Tobacco Use    Smoking status: Never    Smokeless tobacco: Never   Substance Use Topics    Alcohol use: Not Currently     Family History   Problem Relation Age of Onset    Chronic Obstructive Pulmonary Disease Mother     Dementia Father     Chronic Obstructive Pulmonary Disease Father     Macular Degeneration Maternal Grandmother     Other Cancer Maternal Grandmother         Liver    Breast Cancer Paternal Cousin         before 50    Diabetes Paternal Grandfather     Diabetes Paternal Grandmother     Other Cancer Cousin         Breast/Mets    Substance Abuse Sister     Glaucoma No family hx of      History   Drug Use Unknown         Review of Systems    Review of Systems  Constitutional, HEENT, cardiovascular, pulmonary, GI, , musculoskeletal, neuro, skin, endocrine and psych systems are negative, except as otherwise noted.    Objective    BP (!) 141/88 (BP Location: Left arm, Cuff Size: Adult Regular)   Pulse 66   Temp (!) 96.3  F (35.7  C) (Tympanic)   Resp  "14   Ht 1.702 m (5' 7\")   Wt 73.4 kg (161 lb 12.8 oz)   SpO2 99%   BMI 25.34 kg/m     Estimated body mass index is 25.34 kg/m  as calculated from the following:    Height as of this encounter: 1.702 m (5' 7\").    Weight as of this encounter: 73.4 kg (161 lb 12.8 oz).  Physical Exam  GENERAL: alert and no distress  EYES: Eyes grossly normal to inspection, PERRL and conjunctivae and sclerae normal  HENT: ear canals and TM's normal, nose and mouth without ulcers or lesions  NECK: no adenopathy, no asymmetry, masses, or scars  RESP: lungs clear to auscultation - no rales, rhonchi or wheezes  CV: regular rate and rhythm, normal S1 S2, no S3 or S4, 2/6 systolic murmur, no click or rub, no peripheral edema  ABDOMEN: soft, nontender, no hepatosplenomegaly, no masses and bowel sounds normal  MS: no gross musculoskeletal defects noted, no edema, right knee edema, pain with ambulation   SKIN: no suspicious lesions or rashes  NEURO: Normal strength and tone, mentation intact and speech normal  PSYCH: mentation appears normal, affect normal/bright    Recent Labs   Lab Test 02/15/24  1728 02/15/24  1723 01/12/24  0719 02/19/23  1326 02/19/23  1315   HGB 13.1  --  13.8  --  13.4     --  209  --  224     --   --   --  140   POTASSIUM 4.1  --   --   --  4.0   CR 0.86 1.0  --    < > 0.88    < > = values in this interval not displayed.        Diagnostics  No labs were ordered during this visit.  Labs pending at this time.  Results will be reviewed when available.   No EKG this visit, completed in the last 90 days. Normal sinus rhythm     Revised Cardiac Risk Index (RCRI)  The patient has the following serious cardiovascular risks for perioperative complications:   - No serious cardiac risks = 0 points     RCRI Interpretation: 0 points: Class I (very low risk - 0.4% complication rate)         Signed Electronically by: Julio Cardona MD  Copy of this evaluation report is provided to requesting physician.         "

## 2024-03-13 NOTE — PROGRESS NOTES
Discharge plan according to [facility]:       03/01/24 4312   Discharge Planning   Patient/Family Anticipates Transition to home with family   Living Arrangements   People in Home child(margaret), adult   Type of Residence Private Residence   Is your private residence a single family home or apartment? Single family home   Once home, are you able to live on one level? Yes   Which level? Main Level   Which rooms are not on the main level? Bedroom;Bathroom   Bathroom Shower/Tub Walk-in shower   Equipment Currently Used at Home raised toilet seat   Support System   Support Systems Children   Do you have someone available to stay with you one or two nights once you are home? Yes  (Son - Elijah)   Medical Clearance   Clinic Name [facility]

## 2024-03-13 NOTE — LETTER
March 15, 2024      Alexandra Thomas  [address] [address]        Dear MsFadyMartha,    We are writing to inform you of your test results.    Your results are within normal limits.    Resulted Orders   CBC with platelets   Result Value Ref Range    WBC Count 4.7 4.0 - 11.0 10e3/uL    RBC Count 4.00 3.80 - 5.20 10e6/uL    Hemoglobin 12.8 11.7 - 15.7 g/dL    Hematocrit 39.4 35.0 - 47.0 %    MCV 99 78 - 100 fL    MCH 32.0 26.5 - 33.0 pg    MCHC 32.5 31.5 - 36.5 g/dL    RDW 12.1 10.0 - 15.0 %    Platelet Count 227 150 - 450 10e3/uL   Comprehensive metabolic panel (BMP + Alb, Alk Phos, ALT, AST, Total. Bili, TP)   Result Value Ref Range    Sodium 141 135 - 145 mmol/L      Comment:      Reference intervals for this test were updated on 09/26/2023 to more accurately reflect our healthy population. There may be differences in the flagging of prior results with similar values performed with this method. Interpretation of those prior results can be made in the context of the updated reference intervals.     Potassium 4.7 3.4 - 5.3 mmol/L    Carbon Dioxide (CO2) 28 22 - 29 mmol/L    Anion Gap 9 7 - 15 mmol/L    Urea Nitrogen 19.7 8.0 - 23.0 mg/dL    Creatinine 0.98 (H) 0.51 - 0.95 mg/dL    GFR Estimate 60 (L) >60 mL/min/1.73m2    Calcium 9.5 8.8 - 10.2 mg/dL    Chloride 104 98 - 107 mmol/L    Glucose 83 70 - 99 mg/dL    Alkaline Phosphatase 100 40 - 150 U/L      Comment:      Reference intervals for this test were updated on 11/14/2023 to more accurately reflect our healthy population. There may be differences in the flagging of prior results with similar values performed with this method. Interpretation of those prior results can be made in the context of the updated reference intervals.    AST 28 0 - 45 U/L      Comment:      Reference intervals for this test were updated on 6/12/2023 to more accurately reflect our healthy population. There may be differences in the flagging of prior results with  similar values performed with this method. Interpretation of those prior results can be made in the context of the updated reference intervals.    ALT 16 0 - 50 U/L      Comment:      Reference intervals for this test were updated on 6/12/2023 to more accurately reflect our healthy population. There may be differences in the flagging of prior results with similar values performed with this method. Interpretation of those prior results can be made in the context of the updated reference intervals.      Protein Total 6.2 (L) 6.4 - 8.3 g/dL    Albumin 4.4 3.5 - 5.2 g/dL    Bilirubin Total 0.4 <=1.2 mg/dL   ESR: Erythrocyte sedimentation rate   Result Value Ref Range    Erythrocyte Sedimentation Rate 17 0 - 30 mm/hr   CRP, inflammation   Result Value Ref Range    CRP Inflammation <3.00 <5.00 mg/L       If you have any questions or concerns, please call the clinic at the number listed above.       Sincerely,      Julio Cardona MD

## 2024-03-14 NOTE — TELEPHONE ENCOUNTER
Retail Pharmacy Prior Authorization Team   Phone: [phone]    PRIOR AUTHORIZATION DENIED    Medication: ONDANSETRON 4 MG PO TBDP  Insurance Company: [employer] ([facility]) - Phone [phone] Fax [phone]  Denial Date: 3/13/2024  Denial Reason(s): REQUIRES AN FDA APPROVED OR MEDICARE SUPPORTED INDICATION      Appeal Information: IF PROVIDER WOULD LIKE TO APPEAL THIS DECISION PLEASE PROVIDE THE PA TEAM WITH A LETTER OF MEDICAL NECESSITY      Patient Notified: No

## 2024-03-15 NOTE — TELEPHONE ENCOUNTER
Medication Appeal Initiation    Medication: ONDANSETRON 4 MG PO TBDP  Appeal Start Date:  3/15/2024  Insurance Company: [employer]  Insurance Phone: [phone]  Insurance Fax: [phone]  Comments:     FAXED LETTER OF MEDICAL NECESSITY AND CHART NOTES TO INSURANCE -

## 2024-03-20 NOTE — TELEPHONE ENCOUNTER
Retail Pharmacy Prior Authorization Team   Phone: [phone]      [facility] Appeals calling with additional questions on the appeal.    Direct number: [phone]    [place]

## 2024-03-27 NOTE — TELEPHONE ENCOUNTER
PRIOR AUTHORIZATION FOLLOW-UP  Called [facility] and spoke with Sienna - request is still pending complete determination.

## 2024-03-28 NOTE — TREATMENT PLAN
Orthopedic Surgery Pre-Op Plan: Alexandra Thomas  pre-op review. This is NOT an H&P   Surgeon: Dr. Prieto   [facility]: [facility]  Name of Surgery: Right Revision Total Knee Arthroplasty, Polyethylene Exchange  Date of Surgery: 4/2/24  H&P: Completed on 3/13/24 by Dr. Dulce Kim at [facility].   History of ASA, NSAIDS, vitamin and/or herbal supplements, GLP-1 Agonist or SGLT Inhibitor medication taken within 10 days?: Yes- Multivitamins-patient instructed to hold this for 7 days before surgery.  History of blood thinners?: No    Plan:   1) Discharge Plan: Home morning of POD 1 or POD 2 when medically stable for discharge with assist of son, Elijah.  Please see Discharge Planning section near bottom of this note for further details.     2) Coronary Artery Disease: severe coronary artery calcifications seen on recent CT chest 2/15/24. Patient denies any chest pain/chest pressure/chest discomfort. Stress Echo 2/19/23 was normal with no evidence of stress-induced ischemia. Cleared by PCP for surgery at very low risk for serious perioperative cardiovascular complications (0.4% per [facility]).  Continues medical management of CAD with statin.    3) Paroxysmal Atrial Fibrillation: S/P Ablation in 2011:  Reviewed most recent Cardiology visit note with Dr. Ballesteros, [facility], from 10/18/2023: patient doing well from cardiac standpoint. No evidence of atrial fibrillation recurrence since successful ablation in 2011 and has been off anticoagulation.  Did have some chest pain early in 2023 that was felt to be due to esophageal spasms. Denies any recent chest pain for the past several months.  As noted in #2 above, had negative Stress Echo on 2/19/23.     4) Ascending Aorta Dilatation: Stable. CT chest on 2/15/24 showed: Unchanged borderline ascending thoracic aortic aneurysm measuring 4.1 cm. Nondiagnostic evaluation for dissection.     Will continue yearly monitoring of  ascending aorta dilatation with CT and Echocardiogram per Cardiology.     5) History of PVC's/NSVT:  No PVC's or NSVT on recent EKG's on 2/15/24 and on 2/19/23. Tolerating lose dose carvedilol.     6) Esophageal Spasms: history of mid-sternal chest discomfort from esophageal spasms. Had some documented chest pain episodes early in 2023 but denies any chest pain for the past several months. Uses nitroglycerin as needed for this.     7) Hypercholesterolemia: On rosuvastatin.    8) Hypertension: BP slightly elevated on 141/88 at preop exam.  On carvedilol and losartan.  Patient was instructed to hold losartan on the morning of surgery but to continue taking carvedilol.     9) PONV: Reports history of some nausea/vomiting with past anesthesia.  I recommend patient discusses this with preop nursing and anesthesiologist on day of surgery.  Would likely benefit from antiemetics and other measures to prevent or minimize nausea/vomiting.     10) Nausea: Recently prescribed Zofran by PCP    11) GERD: On omeprazole.    Patient appears medically optimized for upcoming surgery. I would recommend Hospitalist Consult to assist with medical management. Please call me below with any questions on this patient.       Review of Systems Notable for: Coronary artery disease, paroxysmal atrial fibrillation-s/p ablation in 2011-no documented recurrence, no longer on anticoagulation, ascending aorta dilatation-stable per recent chest CT on 2/15/2024, history of PVCs and NSVT, esophageal spasms, hypercholesterolemia, hypertension, PONV, nausea, GERD.    Past Medical History:   Past Medical History:   Diagnosis Date    Arrhythmia 10/01/2019    Arthritis     hips    Ascending aortic aneurysm (H24)     4.2 cm   Ascending    Biceps tendon rupture     Depression 08/06/2019    Diverticulitis of colon     Gastroesophageal reflux disease     Esophageal spasms.    Gastroparesis     Heart murmur     High cholesterol     History of GI bleed      Hypertension     PONV (postoperative nausea and vomiting)     Sjogren's syndrome (H24)      Past Surgical History:   Procedure Laterality Date    ABDOMEN SURGERY      APPENDECTOMY      ARTHROPLASTY HIP Left 07/27/2022    Procedure: Left total hip arthroplasty;  Surgeon: Berry Mcdonnell MD;  Location: [facility] OR    BACK SURGERY      BIOPSY      BREAST SURGERY      CHOLECYSTECTOMY      COLECTOMY PARTIAL      Due to recurrent diverticulitis    COLONOSCOPY      ENT SURGERY      T & A    ESOPHAGOSCOPY, GASTROSCOPY, DUODENOSCOPY (EGD), COMBINED N/A 09/13/2022    Procedure: ESOPHAGOGASTRODUODENOSCOPY (EGD) (fv);  Surgeon: Seymour Chatterjee MD;  Location:  GI    GENITOURINARY SURGERY      HYSTERECTOMY TOTAL ABDOMINAL      LASIK      left midfoot fusion Left     2nd toe osteotomy    shouder replacement Right     TOTAL HIP ARTHROPLASTY Right 06/07/2009    TOTAL KNEE ARTHROPLASTY Right 07/12/2006    TOTAL KNEE ARTHROPLASTY Left 02/18/2009       Current Medications:  Patient's Medications   New Prescriptions    No medications on file   Previous Medications    ACETAMINOPHEN (TYLENOL) 325 MG TABLET    Take 2 tablets (650 mg) by mouth every 4 hours as needed for other (mild pain)    CARVEDILOL (COREG) 6.25 MG TABLET    Take 1 tablet (6.25 mg) by mouth 2 times daily (with meals)    CYCLOSPORINE (RESTASIS) 0.05 % OPHTHALMIC EMULSION    Place 1 drop into both eyes 2 times daily    GABAPENTIN (NEURONTIN) 100 MG CAPSULE    Take 2 capsules (200 mg) in a.m. and 1 additional capsule (100 mg) at noon.    GABAPENTIN (NEURONTIN) 300 MG CAPSULE    Take 2 capsules (600 mg) by mouth at bedtime    LOSARTAN (COZAAR) 50 MG TABLET    Take 1 tablet (50 mg) by mouth 2 times daily    MULTIVITAMIN W/MINERALS (THERA-VIT-M) TABLET    Take 1 tablet by mouth daily    NITROGLYCERIN (NITROSTAT) 0.4 MG SUBLINGUAL TABLET    Place 1 tablet (0.4 mg) under the tongue every 5 minutes as needed for chest pain For chest pain place 1 tablet under the tongue every 5  minutes for 3 doses. If symptoms persist 5 minutes after 1st dose call 911.    OMEPRAZOLE (PRILOSEC) 40 MG DR CAPSULE    Take 1 capsule (40 mg) by mouth daily    ONDANSETRON (ZOFRAN ODT) 4 MG ODT TAB    Take 1 tablet (4 mg) by mouth every 8 hours as needed for nausea    ROSUVASTATIN (CRESTOR) 10 MG TABLET    Take 1 tablet (10 mg) by mouth daily    VITAMIN D3 (CHOLECALCIFEROL) 25 MCG (1000 UNITS) TABLET    Take 1 tablet by mouth daily   Modified Medications    No medications on file   Discontinued Medications    No medications on file       ALLERGIES:  Allergies   Allergen Reactions    Ciprofloxacin Other (See Comments)     Pt has aortic aneurysm.  Increased risk of rupture or dissection with use of fluoroquinolones.      Food Anaphylaxis     EGGPLANT-->anaphylaxis      Venlafaxine Hcl Diarrhea, Shortness Of Breath and Nausea and Vomiting    Meperidine Rash               Adhesive Tape      blisters       Social History  Social History     Tobacco Use    Smoking status: Never    Smokeless tobacco: Never   Vaping Use    Vaping Use: Never used   Substance Use Topics    Alcohol use: Not Currently    Drug use: Never       Any Abnormal Recent Diagnostics? No    Discharge Planning:   Discharge plan according to [facility]:     Home morning of POD 1 or POD 2 when medically stable for discharge with assist of sonElijah.     03/01/24 8435   Discharge Planning   Patient/Family Anticipates Transition to home with family   Living Arrangements   People in Home child(margaret), adult   Type of Residence Private Residence   Is your private residence a single family home or apartment? Single family home   Once home, are you able to live on one level? Yes   Which level? Main Level   Which rooms are not on the main level? Bedroom;Bathroom   Bathroom Shower/Tub Walk-in shower   Equipment Currently Used at Home raised toilet seat   Support System   Support Systems Children   Do you have someone available to stay with you one or two  nights once you are home? Yes  (Son - Elijah)       JESUS Alvarado, CNP   Advanced Practice Nurse Navigator- Orthopedics  [facility]   Phone: [phone]

## 2024-04-02 ENCOUNTER — APPOINTMENT (OUTPATIENT)
Dept: PHYSICAL THERAPY | Facility: CLINIC | Age: 74
DRG: 465 | End: 2024-04-02
Attending: ORTHOPAEDIC SURGERY
Payer: COMMERCIAL

## 2024-04-02 ENCOUNTER — ANESTHESIA EVENT (OUTPATIENT)
Dept: SURGERY | Facility: CLINIC | Age: 74
DRG: 465 | End: 2024-04-02
Payer: COMMERCIAL

## 2024-04-02 ENCOUNTER — ANESTHESIA (OUTPATIENT)
Dept: SURGERY | Facility: CLINIC | Age: 74
DRG: 465 | End: 2024-04-02
Payer: COMMERCIAL

## 2024-04-02 ENCOUNTER — HOSPITAL ENCOUNTER (INPATIENT)
Facility: CLINIC | Age: 74
LOS: 1 days | Discharge: HOME HEALTH | DRG: 465 | End: 2024-04-03
Attending: ORTHOPAEDIC SURGERY | Admitting: ORTHOPAEDIC SURGERY
Payer: COMMERCIAL

## 2024-04-02 ENCOUNTER — APPOINTMENT (OUTPATIENT)
Dept: RADIOLOGY | Facility: CLINIC | Age: 74
DRG: 465 | End: 2024-04-02
Attending: PHYSICIAN ASSISTANT
Payer: COMMERCIAL

## 2024-04-02 DIAGNOSIS — Z96.659 HISTORY OF REVISION OF TOTAL KNEE ARTHROPLASTY: Primary | ICD-10-CM

## 2024-04-02 DIAGNOSIS — R11.0 NAUSEA: ICD-10-CM

## 2024-04-02 LAB — GLUCOSE BLDC GLUCOMTR-MCNC: 83 MG/DL (ref 70–99)

## 2024-04-02 PROCEDURE — 250N000011 HC RX IP 250 OP 636: Performed by: ANESTHESIOLOGY

## 2024-04-02 PROCEDURE — 97161 PT EVAL LOW COMPLEX 20 MIN: CPT | Mod: GP

## 2024-04-02 PROCEDURE — 0SUC09C SUPPLEMENT RIGHT KNEE JOINT WITH LINER, PATELLAR SURFACE, OPEN APPROACH: ICD-10-PCS | Performed by: ORTHOPAEDIC SURGERY

## 2024-04-02 PROCEDURE — 258N000003 HC RX IP 258 OP 636: Performed by: STUDENT IN AN ORGANIZED HEALTH CARE EDUCATION/TRAINING PROGRAM

## 2024-04-02 PROCEDURE — 250N000013 HC RX MED GY IP 250 OP 250 PS 637: Performed by: STUDENT IN AN ORGANIZED HEALTH CARE EDUCATION/TRAINING PROGRAM

## 2024-04-02 PROCEDURE — 370N000017 HC ANESTHESIA TECHNICAL FEE, PER MIN: Performed by: ORTHOPAEDIC SURGERY

## 2024-04-02 PROCEDURE — 258N000003 HC RX IP 258 OP 636: Performed by: PHYSICIAN ASSISTANT

## 2024-04-02 PROCEDURE — 250N000011 HC RX IP 250 OP 636: Performed by: STUDENT IN AN ORGANIZED HEALTH CARE EDUCATION/TRAINING PROGRAM

## 2024-04-02 PROCEDURE — 360N000078 HC SURGERY LEVEL 5, PER MIN: Performed by: ORTHOPAEDIC SURGERY

## 2024-04-02 PROCEDURE — 250N000009 HC RX 250: Performed by: STUDENT IN AN ORGANIZED HEALTH CARE EDUCATION/TRAINING PROGRAM

## 2024-04-02 PROCEDURE — 250N000009 HC RX 250: Performed by: NURSE ANESTHETIST, CERTIFIED REGISTERED

## 2024-04-02 PROCEDURE — 999N000141 HC STATISTIC PRE-PROCEDURE NURSING ASSESSMENT: Performed by: ORTHOPAEDIC SURGERY

## 2024-04-02 PROCEDURE — 250N000012 HC RX MED GY IP 250 OP 636 PS 637: Performed by: ANESTHESIOLOGY

## 2024-04-02 PROCEDURE — 250N000013 HC RX MED GY IP 250 OP 250 PS 637: Performed by: ORTHOPAEDIC SURGERY

## 2024-04-02 PROCEDURE — C1776 JOINT DEVICE (IMPLANTABLE): HCPCS | Performed by: ORTHOPAEDIC SURGERY

## 2024-04-02 PROCEDURE — 272N000001 HC OR GENERAL SUPPLY STERILE: Performed by: ORTHOPAEDIC SURGERY

## 2024-04-02 PROCEDURE — 0SPC09Z REMOVAL OF LINER FROM RIGHT KNEE JOINT, OPEN APPROACH: ICD-10-PCS | Performed by: ORTHOPAEDIC SURGERY

## 2024-04-02 PROCEDURE — 250N000011 HC RX IP 250 OP 636: Performed by: NURSE ANESTHETIST, CERTIFIED REGISTERED

## 2024-04-02 PROCEDURE — 97110 THERAPEUTIC EXERCISES: CPT | Mod: GP

## 2024-04-02 PROCEDURE — 99222 1ST HOSP IP/OBS MODERATE 55: CPT | Performed by: FAMILY MEDICINE

## 2024-04-02 PROCEDURE — 250N000009 HC RX 250: Performed by: ORTHOPAEDIC SURGERY

## 2024-04-02 PROCEDURE — 999N000065 XR KNEE PORT RIGHT 1/2 VIEWS: Mod: RT

## 2024-04-02 PROCEDURE — 120N000001 HC R&B MED SURG/OB

## 2024-04-02 PROCEDURE — 250N000011 HC RX IP 250 OP 636: Performed by: PHYSICIAN ASSISTANT

## 2024-04-02 PROCEDURE — 97116 GAIT TRAINING THERAPY: CPT | Mod: GP

## 2024-04-02 PROCEDURE — 250N000013 HC RX MED GY IP 250 OP 250 PS 637: Performed by: FAMILY MEDICINE

## 2024-04-02 PROCEDURE — 710N000010 HC RECOVERY PHASE 1, LEVEL 2, PER MIN: Performed by: ORTHOPAEDIC SURGERY

## 2024-04-02 PROCEDURE — 250N000013 HC RX MED GY IP 250 OP 250 PS 637: Performed by: PHYSICIAN ASSISTANT

## 2024-04-02 DEVICE — TIBIAL BEARING INSERT
Type: IMPLANTABLE DEVICE | Site: KNEE | Status: FUNCTIONAL
Brand: TRIATHLON

## 2024-04-02 RX ORDER — ONDANSETRON 2 MG/ML
4 INJECTION INTRAMUSCULAR; INTRAVENOUS EVERY 6 HOURS PRN
Status: DISCONTINUED | OUTPATIENT
Start: 2024-04-02 | End: 2024-04-03 | Stop reason: HOSPADM

## 2024-04-02 RX ORDER — GABAPENTIN 100 MG/1
100 CAPSULE ORAL DAILY
Status: DISCONTINUED | OUTPATIENT
Start: 2024-04-02 | End: 2024-04-03 | Stop reason: HOSPADM

## 2024-04-02 RX ORDER — ACETAMINOPHEN 325 MG/1
975 TABLET ORAL EVERY 8 HOURS
Qty: 27 TABLET | Refills: 0 | Status: DISCONTINUED | OUTPATIENT
Start: 2024-04-02 | End: 2024-04-03 | Stop reason: HOSPADM

## 2024-04-02 RX ORDER — HYDROMORPHONE HCL IN WATER/PF 6 MG/30 ML
0.4 PATIENT CONTROLLED ANALGESIA SYRINGE INTRAVENOUS EVERY 5 MIN PRN
Status: DISCONTINUED | OUTPATIENT
Start: 2024-04-02 | End: 2024-04-02 | Stop reason: HOSPADM

## 2024-04-02 RX ORDER — ONDANSETRON 4 MG/1
4 TABLET, ORALLY DISINTEGRATING ORAL EVERY 30 MIN PRN
Status: DISCONTINUED | OUTPATIENT
Start: 2024-04-02 | End: 2024-04-02 | Stop reason: HOSPADM

## 2024-04-02 RX ORDER — FENTANYL CITRATE 50 UG/ML
25-100 INJECTION, SOLUTION INTRAMUSCULAR; INTRAVENOUS
Status: DISCONTINUED | OUTPATIENT
Start: 2024-04-02 | End: 2024-04-02 | Stop reason: HOSPADM

## 2024-04-02 RX ORDER — NALOXONE HYDROCHLORIDE 0.4 MG/ML
0.4 INJECTION, SOLUTION INTRAMUSCULAR; INTRAVENOUS; SUBCUTANEOUS
Status: DISCONTINUED | OUTPATIENT
Start: 2024-04-02 | End: 2024-04-03 | Stop reason: HOSPADM

## 2024-04-02 RX ORDER — DEXAMETHASONE SODIUM PHOSPHATE 10 MG/ML
INJECTION, SOLUTION INTRAMUSCULAR; INTRAVENOUS PRN
Status: DISCONTINUED | OUTPATIENT
Start: 2024-04-02 | End: 2024-04-02

## 2024-04-02 RX ORDER — HYDROXYZINE HYDROCHLORIDE 10 MG/1
10 TABLET, FILM COATED ORAL EVERY 6 HOURS PRN
Status: DISCONTINUED | OUTPATIENT
Start: 2024-04-02 | End: 2024-04-03 | Stop reason: HOSPADM

## 2024-04-02 RX ORDER — CEFADROXIL 500 MG/1
500 CAPSULE ORAL 2 TIMES DAILY
Qty: 20 CAPSULE | Refills: 0 | Status: SHIPPED | OUTPATIENT
Start: 2024-04-02 | End: 2024-04-12

## 2024-04-02 RX ORDER — OMEPRAZOLE 40 MG/1
40 CAPSULE, DELAYED RELEASE ORAL 2 TIMES DAILY
COMMUNITY
End: 2024-08-14

## 2024-04-02 RX ORDER — ASPIRIN 81 MG/1
81 TABLET ORAL 2 TIMES DAILY
Status: DISCONTINUED | OUTPATIENT
Start: 2024-04-02 | End: 2024-04-03 | Stop reason: HOSPADM

## 2024-04-02 RX ORDER — PROPOFOL 10 MG/ML
INJECTION, EMULSION INTRAVENOUS PRN
Status: DISCONTINUED | OUTPATIENT
Start: 2024-04-02 | End: 2024-04-02

## 2024-04-02 RX ORDER — CEFAZOLIN SODIUM/WATER 2 G/20 ML
2 SYRINGE (ML) INTRAVENOUS
Status: COMPLETED | OUTPATIENT
Start: 2024-04-02 | End: 2024-04-02

## 2024-04-02 RX ORDER — POLYETHYLENE GLYCOL 3350 17 G/17G
17 POWDER, FOR SOLUTION ORAL DAILY
Status: DISCONTINUED | OUTPATIENT
Start: 2024-04-03 | End: 2024-04-03 | Stop reason: HOSPADM

## 2024-04-02 RX ORDER — AMOXICILLIN 250 MG
1-2 CAPSULE ORAL 2 TIMES DAILY PRN
Qty: 30 TABLET | Refills: 0 | Status: SHIPPED | OUTPATIENT
Start: 2024-04-02 | End: 2024-05-25

## 2024-04-02 RX ORDER — GABAPENTIN 300 MG/1
600 CAPSULE ORAL AT BEDTIME
Status: DISCONTINUED | OUTPATIENT
Start: 2024-04-02 | End: 2024-04-03 | Stop reason: HOSPADM

## 2024-04-02 RX ORDER — ASPIRIN 81 MG/1
81 TABLET ORAL 2 TIMES DAILY
Qty: 60 TABLET | Refills: 0 | Status: SHIPPED | OUTPATIENT
Start: 2024-04-02 | End: 2024-05-25

## 2024-04-02 RX ORDER — LIDOCAINE 40 MG/G
CREAM TOPICAL
Status: DISCONTINUED | OUTPATIENT
Start: 2024-04-02 | End: 2024-04-03 | Stop reason: HOSPADM

## 2024-04-02 RX ORDER — AMOXICILLIN 250 MG
1 CAPSULE ORAL 2 TIMES DAILY
Status: DISCONTINUED | OUTPATIENT
Start: 2024-04-02 | End: 2024-04-03 | Stop reason: HOSPADM

## 2024-04-02 RX ORDER — CEFAZOLIN SODIUM/WATER 2 G/20 ML
2 SYRINGE (ML) INTRAVENOUS SEE ADMIN INSTRUCTIONS
Status: DISCONTINUED | OUTPATIENT
Start: 2024-04-02 | End: 2024-04-02 | Stop reason: HOSPADM

## 2024-04-02 RX ORDER — ACETAMINOPHEN 325 MG/1
975 TABLET ORAL ONCE
Status: COMPLETED | OUTPATIENT
Start: 2024-04-02 | End: 2024-04-02

## 2024-04-02 RX ORDER — NALOXONE HYDROCHLORIDE 0.4 MG/ML
0.1 INJECTION, SOLUTION INTRAMUSCULAR; INTRAVENOUS; SUBCUTANEOUS
Status: DISCONTINUED | OUTPATIENT
Start: 2024-04-02 | End: 2024-04-02 | Stop reason: HOSPADM

## 2024-04-02 RX ORDER — HYDROMORPHONE HCL IN WATER/PF 6 MG/30 ML
0.4 PATIENT CONTROLLED ANALGESIA SYRINGE INTRAVENOUS
Status: DISCONTINUED | OUTPATIENT
Start: 2024-04-02 | End: 2024-04-03 | Stop reason: HOSPADM

## 2024-04-02 RX ORDER — CELECOXIB 100 MG/1
100 CAPSULE ORAL 2 TIMES DAILY
Status: DISCONTINUED | OUTPATIENT
Start: 2024-04-02 | End: 2024-04-03 | Stop reason: HOSPADM

## 2024-04-02 RX ORDER — BISACODYL 10 MG
10 SUPPOSITORY, RECTAL RECTAL DAILY PRN
Status: DISCONTINUED | OUTPATIENT
Start: 2024-04-02 | End: 2024-04-03 | Stop reason: HOSPADM

## 2024-04-02 RX ORDER — GABAPENTIN 100 MG/1
200 CAPSULE ORAL DAILY
Status: DISCONTINUED | OUTPATIENT
Start: 2024-04-03 | End: 2024-04-03 | Stop reason: HOSPADM

## 2024-04-02 RX ORDER — CALCIUM CARBONATE 500 MG/1
500 TABLET, CHEWABLE ORAL 4 TIMES DAILY PRN
Status: DISCONTINUED | OUTPATIENT
Start: 2024-04-02 | End: 2024-04-03 | Stop reason: HOSPADM

## 2024-04-02 RX ORDER — SODIUM CHLORIDE, SODIUM LACTATE, POTASSIUM CHLORIDE, CALCIUM CHLORIDE 600; 310; 30; 20 MG/100ML; MG/100ML; MG/100ML; MG/100ML
INJECTION, SOLUTION INTRAVENOUS CONTINUOUS
Status: DISCONTINUED | OUTPATIENT
Start: 2024-04-02 | End: 2024-04-02 | Stop reason: HOSPADM

## 2024-04-02 RX ORDER — SODIUM CHLORIDE, SODIUM LACTATE, POTASSIUM CHLORIDE, CALCIUM CHLORIDE 600; 310; 30; 20 MG/100ML; MG/100ML; MG/100ML; MG/100ML
INJECTION, SOLUTION INTRAVENOUS CONTINUOUS
Status: DISCONTINUED | OUTPATIENT
Start: 2024-04-02 | End: 2024-04-03 | Stop reason: HOSPADM

## 2024-04-02 RX ORDER — PROCHLORPERAZINE MALEATE 5 MG
5 TABLET ORAL EVERY 6 HOURS PRN
Status: DISCONTINUED | OUTPATIENT
Start: 2024-04-02 | End: 2024-04-03 | Stop reason: HOSPADM

## 2024-04-02 RX ORDER — ONDANSETRON 2 MG/ML
INJECTION INTRAMUSCULAR; INTRAVENOUS PRN
Status: DISCONTINUED | OUTPATIENT
Start: 2024-04-02 | End: 2024-04-02

## 2024-04-02 RX ORDER — CEFAZOLIN SODIUM 1 G/3ML
INJECTION, POWDER, FOR SOLUTION INTRAMUSCULAR; INTRAVENOUS
Status: DISCONTINUED
Start: 2024-04-02 | End: 2024-04-02 | Stop reason: HOSPADM

## 2024-04-02 RX ORDER — FENTANYL CITRATE 50 UG/ML
50 INJECTION, SOLUTION INTRAMUSCULAR; INTRAVENOUS EVERY 5 MIN PRN
Status: DISCONTINUED | OUTPATIENT
Start: 2024-04-02 | End: 2024-04-02 | Stop reason: HOSPADM

## 2024-04-02 RX ORDER — NALOXONE HYDROCHLORIDE 0.4 MG/ML
0.2 INJECTION, SOLUTION INTRAMUSCULAR; INTRAVENOUS; SUBCUTANEOUS
Status: DISCONTINUED | OUTPATIENT
Start: 2024-04-02 | End: 2024-04-03 | Stop reason: HOSPADM

## 2024-04-02 RX ORDER — HYDROXYZINE HYDROCHLORIDE 10 MG/1
10 TABLET, FILM COATED ORAL EVERY 6 HOURS PRN
Qty: 30 TABLET | Refills: 0 | Status: SHIPPED | OUTPATIENT
Start: 2024-04-02 | End: 2024-05-25

## 2024-04-02 RX ORDER — ONDANSETRON 4 MG/1
4 TABLET, ORALLY DISINTEGRATING ORAL EVERY 6 HOURS PRN
Status: DISCONTINUED | OUTPATIENT
Start: 2024-04-02 | End: 2024-04-03 | Stop reason: HOSPADM

## 2024-04-02 RX ORDER — HYDROMORPHONE HCL IN WATER/PF 6 MG/30 ML
0.2 PATIENT CONTROLLED ANALGESIA SYRINGE INTRAVENOUS EVERY 5 MIN PRN
Status: DISCONTINUED | OUTPATIENT
Start: 2024-04-02 | End: 2024-04-02 | Stop reason: HOSPADM

## 2024-04-02 RX ORDER — FENTANYL CITRATE 50 UG/ML
25 INJECTION, SOLUTION INTRAMUSCULAR; INTRAVENOUS EVERY 5 MIN PRN
Status: DISCONTINUED | OUTPATIENT
Start: 2024-04-02 | End: 2024-04-02 | Stop reason: HOSPADM

## 2024-04-02 RX ORDER — LOSARTAN POTASSIUM 50 MG/1
50 TABLET ORAL 2 TIMES DAILY
Status: DISCONTINUED | OUTPATIENT
Start: 2024-04-03 | End: 2024-04-03 | Stop reason: HOSPADM

## 2024-04-02 RX ORDER — OXYCODONE HYDROCHLORIDE 5 MG/1
5 TABLET ORAL EVERY 4 HOURS PRN
Status: DISCONTINUED | OUTPATIENT
Start: 2024-04-02 | End: 2024-04-03 | Stop reason: HOSPADM

## 2024-04-02 RX ORDER — ACETAMINOPHEN 325 MG/1
650 TABLET ORAL EVERY 4 HOURS PRN
Status: DISCONTINUED | OUTPATIENT
Start: 2024-04-05 | End: 2024-04-03 | Stop reason: HOSPADM

## 2024-04-02 RX ORDER — NITROGLYCERIN 0.4 MG/1
0.4 TABLET SUBLINGUAL EVERY 5 MIN PRN
Status: DISCONTINUED | OUTPATIENT
Start: 2024-04-02 | End: 2024-04-03 | Stop reason: HOSPADM

## 2024-04-02 RX ORDER — GABAPENTIN 300 MG/1
CAPSULE ORAL
Qty: 30 CAPSULE | Refills: 0 | Status: SHIPPED | OUTPATIENT
Start: 2024-04-02 | End: 2024-04-03

## 2024-04-02 RX ORDER — LIDOCAINE 40 MG/G
CREAM TOPICAL
Status: DISCONTINUED | OUTPATIENT
Start: 2024-04-02 | End: 2024-04-02 | Stop reason: HOSPADM

## 2024-04-02 RX ORDER — TRANEXAMIC ACID 650 MG/1
1950 TABLET ORAL ONCE
Status: COMPLETED | OUTPATIENT
Start: 2024-04-02 | End: 2024-04-02

## 2024-04-02 RX ORDER — CEFAZOLIN SODIUM 1 G/3ML
1 INJECTION, POWDER, FOR SOLUTION INTRAMUSCULAR; INTRAVENOUS EVERY 8 HOURS
Qty: 10 ML | Refills: 0 | Status: COMPLETED | OUTPATIENT
Start: 2024-04-02 | End: 2024-04-03

## 2024-04-02 RX ORDER — APREPITANT 40 MG/1
40 CAPSULE ORAL ONCE
Status: COMPLETED | OUTPATIENT
Start: 2024-04-02 | End: 2024-04-02

## 2024-04-02 RX ORDER — BUPIVACAINE HYDROCHLORIDE 5 MG/ML
INJECTION, SOLUTION EPIDURAL; INTRACAUDAL
Status: DISCONTINUED | OUTPATIENT
Start: 2024-04-02 | End: 2024-04-02

## 2024-04-02 RX ORDER — HYDROMORPHONE HCL IN WATER/PF 6 MG/30 ML
0.2 PATIENT CONTROLLED ANALGESIA SYRINGE INTRAVENOUS
Status: DISCONTINUED | OUTPATIENT
Start: 2024-04-02 | End: 2024-04-03 | Stop reason: HOSPADM

## 2024-04-02 RX ORDER — CELECOXIB 200 MG/1
200 CAPSULE ORAL DAILY
Qty: 14 CAPSULE | Refills: 0 | Status: SHIPPED | OUTPATIENT
Start: 2024-04-02 | End: 2024-05-25

## 2024-04-02 RX ORDER — CELECOXIB 200 MG/1
200 CAPSULE ORAL ONCE
Status: COMPLETED | OUTPATIENT
Start: 2024-04-02 | End: 2024-04-02

## 2024-04-02 RX ORDER — CARVEDILOL 6.25 MG/1
6.25 TABLET ORAL 2 TIMES DAILY WITH MEALS
Status: DISCONTINUED | OUTPATIENT
Start: 2024-04-03 | End: 2024-04-03 | Stop reason: HOSPADM

## 2024-04-02 RX ORDER — ROSUVASTATIN CALCIUM 10 MG/1
10 TABLET, COATED ORAL EVERY EVENING
Status: DISCONTINUED | OUTPATIENT
Start: 2024-04-02 | End: 2024-04-03 | Stop reason: HOSPADM

## 2024-04-02 RX ORDER — ONDANSETRON 2 MG/ML
4 INJECTION INTRAMUSCULAR; INTRAVENOUS EVERY 30 MIN PRN
Status: DISCONTINUED | OUTPATIENT
Start: 2024-04-02 | End: 2024-04-02 | Stop reason: HOSPADM

## 2024-04-02 RX ORDER — CYCLOSPORINE 0.5 MG/ML
1 EMULSION OPHTHALMIC 2 TIMES DAILY
Status: DISCONTINUED | OUTPATIENT
Start: 2024-04-02 | End: 2024-04-03 | Stop reason: HOSPADM

## 2024-04-02 RX ORDER — ACETAMINOPHEN 325 MG/1
650 TABLET ORAL EVERY 4 HOURS PRN
Qty: 100 TABLET | Refills: 0 | Status: SHIPPED | OUTPATIENT
Start: 2024-04-02

## 2024-04-02 RX ORDER — PANTOPRAZOLE SODIUM 40 MG/1
40 TABLET, DELAYED RELEASE ORAL
Status: DISCONTINUED | OUTPATIENT
Start: 2024-04-02 | End: 2024-04-03 | Stop reason: HOSPADM

## 2024-04-02 RX ORDER — DEXAMETHASONE SODIUM PHOSPHATE 4 MG/ML
INJECTION, SOLUTION INTRA-ARTICULAR; INTRALESIONAL; INTRAMUSCULAR; INTRAVENOUS; SOFT TISSUE PRN
Status: DISCONTINUED | OUTPATIENT
Start: 2024-04-02 | End: 2024-04-02

## 2024-04-02 RX ORDER — CEFAZOLIN SODIUM 1 G/3ML
INJECTION, POWDER, FOR SOLUTION INTRAMUSCULAR; INTRAVENOUS PRN
Status: DISCONTINUED | OUTPATIENT
Start: 2024-04-02 | End: 2024-04-02 | Stop reason: HOSPADM

## 2024-04-02 RX ORDER — OXYCODONE HYDROCHLORIDE 5 MG/1
10 TABLET ORAL EVERY 4 HOURS PRN
Status: DISCONTINUED | OUTPATIENT
Start: 2024-04-02 | End: 2024-04-03 | Stop reason: HOSPADM

## 2024-04-02 RX ADMIN — OXYCODONE 10 MG: 5 TABLET ORAL at 18:22

## 2024-04-02 RX ADMIN — Medication 200 MG: at 20:39

## 2024-04-02 RX ADMIN — Medication 2 G: at 10:00

## 2024-04-02 RX ADMIN — SODIUM CHLORIDE, POTASSIUM CHLORIDE, SODIUM LACTATE AND CALCIUM CHLORIDE: 600; 310; 30; 20 INJECTION, SOLUTION INTRAVENOUS at 13:51

## 2024-04-02 RX ADMIN — FENTANYL CITRATE 50 MCG: 50 INJECTION INTRAMUSCULAR; INTRAVENOUS at 10:09

## 2024-04-02 RX ADMIN — APREPITANT 40 MG: 40 CAPSULE ORAL at 08:51

## 2024-04-02 RX ADMIN — FENTANYL CITRATE 50 MCG: 50 INJECTION INTRAMUSCULAR; INTRAVENOUS at 08:51

## 2024-04-02 RX ADMIN — TRANEXAMIC ACID 1950 MG: 650 TABLET ORAL at 08:12

## 2024-04-02 RX ADMIN — SUGAMMADEX 200 MG: 100 INJECTION, SOLUTION INTRAVENOUS at 11:00

## 2024-04-02 RX ADMIN — SODIUM CHLORIDE, POTASSIUM CHLORIDE, SODIUM LACTATE AND CALCIUM CHLORIDE: 600; 310; 30; 20 INJECTION, SOLUTION INTRAVENOUS at 20:40

## 2024-04-02 RX ADMIN — CYCLOSPORINE 1 DROP: 0.5 EMULSION OPHTHALMIC at 20:39

## 2024-04-02 RX ADMIN — ROCURONIUM BROMIDE 40 MG: 10 INJECTION, SOLUTION INTRAVENOUS at 10:24

## 2024-04-02 RX ADMIN — FENTANYL CITRATE 50 MCG: 50 INJECTION INTRAMUSCULAR; INTRAVENOUS at 10:08

## 2024-04-02 RX ADMIN — CEFAZOLIN 1 G: 1 INJECTION, POWDER, FOR SOLUTION INTRAMUSCULAR; INTRAVENOUS at 17:40

## 2024-04-02 RX ADMIN — ACETAMINOPHEN 975 MG: 325 TABLET ORAL at 23:56

## 2024-04-02 RX ADMIN — LIDOCAINE HYDROCHLORIDE 50 MG: 10 INJECTION, SOLUTION EPIDURAL; INFILTRATION; INTRACAUDAL; PERINEURAL at 10:08

## 2024-04-02 RX ADMIN — DEXAMETHASONE SODIUM PHOSPHATE 10 MG: 10 INJECTION, SOLUTION INTRAMUSCULAR; INTRAVENOUS at 10:09

## 2024-04-02 RX ADMIN — HYDROMORPHONE HYDROCHLORIDE 0.5 MG: 1 INJECTION, SOLUTION INTRAMUSCULAR; INTRAVENOUS; SUBCUTANEOUS at 10:29

## 2024-04-02 RX ADMIN — DEXAMETHASONE SODIUM PHOSPHATE 10 MG: 4 INJECTION, SOLUTION INTRA-ARTICULAR; INTRALESIONAL; INTRAMUSCULAR; INTRAVENOUS; SOFT TISSUE at 10:11

## 2024-04-02 RX ADMIN — HYDROMORPHONE HYDROCHLORIDE 0.5 MG: 1 INJECTION, SOLUTION INTRAMUSCULAR; INTRAVENOUS; SUBCUTANEOUS at 11:09

## 2024-04-02 RX ADMIN — FENTANYL CITRATE 50 MCG: 50 INJECTION, SOLUTION INTRAMUSCULAR; INTRAVENOUS at 12:07

## 2024-04-02 RX ADMIN — BUPIVACAINE HYDROCHLORIDE 15 ML: 5 INJECTION, SOLUTION EPIDURAL; INTRACAUDAL; PERINEURAL at 08:52

## 2024-04-02 RX ADMIN — ROSUVASTATIN CALCIUM 10 MG: 10 TABLET, FILM COATED ORAL at 20:39

## 2024-04-02 RX ADMIN — ASPIRIN 81 MG: 81 TABLET, COATED ORAL at 20:39

## 2024-04-02 RX ADMIN — GABAPENTIN 100 MG: 100 CAPSULE ORAL at 13:56

## 2024-04-02 RX ADMIN — PROPOFOL 200 MCG/KG/MIN: 10 INJECTION, EMULSION INTRAVENOUS at 10:10

## 2024-04-02 RX ADMIN — GABAPENTIN 600 MG: 300 CAPSULE ORAL at 20:39

## 2024-04-02 RX ADMIN — ONDANSETRON 4 MG: 2 INJECTION INTRAMUSCULAR; INTRAVENOUS at 10:18

## 2024-04-02 RX ADMIN — CELECOXIB 200 MG: 200 CAPSULE ORAL at 08:13

## 2024-04-02 RX ADMIN — ACETAMINOPHEN 975 MG: 325 TABLET ORAL at 16:12

## 2024-04-02 RX ADMIN — SENNOSIDES AND DOCUSATE SODIUM 1 TABLET: 8.6; 5 TABLET ORAL at 20:39

## 2024-04-02 RX ADMIN — HYDROMORPHONE HYDROCHLORIDE 0.4 MG: 0.2 INJECTION, SOLUTION INTRAMUSCULAR; INTRAVENOUS; SUBCUTANEOUS at 20:39

## 2024-04-02 RX ADMIN — ONDANSETRON 4 MG: 2 INJECTION INTRAMUSCULAR; INTRAVENOUS at 20:38

## 2024-04-02 RX ADMIN — ACETAMINOPHEN 975 MG: 325 TABLET ORAL at 08:12

## 2024-04-02 RX ADMIN — PROPOFOL 150 MG: 10 INJECTION, EMULSION INTRAVENOUS at 10:09

## 2024-04-02 RX ADMIN — PANTOPRAZOLE SODIUM 40 MG: 40 TABLET, DELAYED RELEASE ORAL at 16:12

## 2024-04-02 RX ADMIN — SODIUM CHLORIDE, POTASSIUM CHLORIDE, SODIUM LACTATE AND CALCIUM CHLORIDE: 600; 310; 30; 20 INJECTION, SOLUTION INTRAVENOUS at 10:33

## 2024-04-02 RX ADMIN — CELECOXIB 100 MG: 100 CAPSULE ORAL at 20:39

## 2024-04-02 RX ADMIN — HYDROXYZINE HYDROCHLORIDE 10 MG: 10 TABLET, FILM COATED ORAL at 13:56

## 2024-04-02 RX ADMIN — SODIUM CHLORIDE, POTASSIUM CHLORIDE, SODIUM LACTATE AND CALCIUM CHLORIDE: 600; 310; 30; 20 INJECTION, SOLUTION INTRAVENOUS at 08:55

## 2024-04-02 RX ADMIN — MIDAZOLAM HYDROCHLORIDE 2 MG: 1 INJECTION, SOLUTION INTRAMUSCULAR; INTRAVENOUS at 08:51

## 2024-04-02 ASSESSMENT — ACTIVITIES OF DAILY LIVING (ADL)
ADLS_ACUITY_SCORE: 31
ADLS_ACUITY_SCORE: 31
ADLS_ACUITY_SCORE: 32
ADLS_ACUITY_SCORE: 28
ADLS_ACUITY_SCORE: 35
ADLS_ACUITY_SCORE: 31
ADLS_ACUITY_SCORE: 34
ADLS_ACUITY_SCORE: 28
ADLS_ACUITY_SCORE: 35
ADLS_ACUITY_SCORE: 31
ADLS_ACUITY_SCORE: 34
ADLS_ACUITY_SCORE: 34
ADLS_ACUITY_SCORE: 31

## 2024-04-02 NOTE — ANESTHESIA PROCEDURE NOTES
Adductor canal Procedure Note    Pre-Procedure   Staff -        Anesthesiologist:  Lex Dahl MD       Performed By: anesthesiologist       Location: pre-op       Procedure Start/Stop Times: 4/2/2024 8:52 AM and 4/2/2024 8:54 AM       Pre-Anesthestic Checklist: patient identified, IV checked, site marked, risks and benefits discussed, informed consent, monitors and equipment checked, pre-op evaluation, at physician/surgeon's request and post-op pain management  Timeout:       Correct Patient: Yes        Correct Procedure: Yes        Correct Site: Yes        Correct Position: Yes        Correct Laterality: Yes        Site Marked: Yes  Procedure Documentation  Procedure: Adductor canal       Laterality: right       Patient Position: supine       Patient Prep/Sterile Barriers: sterile gloves, mask       Skin prep: Chloraprep       Needle Type: short bevel       Needle Gauge: 20.        Needle Length (Inches): 4        Ultrasound guided       1. Ultrasound was used to identify targeted nerve, plexus, vascular marker, or fascial plane and place a needle adjacent to it in real-time.       2. Ultrasound was used to visualize the spread of anesthetic in close proximity to the above referenced structure.       3. A permanent image is entered into the patient's record.       4. The visualized anatomic structures appeared normal.       5. There were no apparent abnormal pathologic findings.    Assessment/Narrative         The placement was negative for: blood aspirated, painful injection and site bleeding       Paresthesias: No.       Bolus given via needle..        Secured via.        Insertion/Infusion Method: Single Shot       Complications: none       Injection made incrementally with aspirations every 5 mL.    Medication(s) Administered   Bupivacaine 0.5% PF (Infiltration) - Infiltration   15 mL - 4/2/2024 8:52:00 AM  Medication Administration Time: 4/2/2024 8:52 AM      FOR [facility] ([facility] [place], [place]) ONLY:   Pain  "Team Contact information: please page the Pain Team Via [facility]. Search \"Pain\". During daytime hours, please page the attending first. At night please page the resident first.      "

## 2024-04-02 NOTE — INTERVAL H&P NOTE
"I have reviewed the surgical (or preoperative) H&P that is linked to this encounter, and examined the patient. There are no significant changes    Clinical Conditions Present on Arrival:  Clinically Significant Risk Factors Present on Admission                  # Overweight: Estimated body mass index is 25.22 kg/m  as calculated from the following:    Height as of this encounter: 1.702 m (5' 7\").    Weight as of this encounter: 73 kg (161 lb).       "

## 2024-04-02 NOTE — ANESTHESIA PROCEDURE NOTES
Airway       Patient location during procedure: OR       Procedure Start/Stop Times: 4/2/2024 10:10 AM  Staff -        CRNA: Kevan Solorzano APRN CRNA       Performed By: CRNA  Consent for Airway        Urgency: elective  Indications and Patient Condition       Indications for airway management: soo-procedural       Induction type:intravenous       Mask difficulty assessment: 1 - vent by mask    Final Airway Details       Final airway type: endotracheal airway       Successful airway: ETT - single  Endotracheal Airway Details        ETT size (mm): 7.0       Cuffed: yes       Cuff volume (mL): 10       Successful intubation technique: direct laryngoscopy       DL Blade Type: Cortez 2       Grade View of Cords: 1       Adjucts: stylet       Position: Right       Measured from: lips       Secured at (cm): 21       Bite block used: None    Post intubation assessment        Placement verified by: capnometry, equal breath sounds and chest rise        Number of attempts at approach: 1       Number of other approaches attempted: 0       Secured with: tape       Ease of procedure: easy       Dentition: Unchanged       Dental guard used and removed. Dental Guard Type: Standard White.    Medication(s) Administered   Medication Administration Time: 4/2/2024 10:10 AM

## 2024-04-02 NOTE — PHARMACY-ADMISSION MEDICATION HISTORY
Pharmacist ONDINA Medication History    Admission medication history is complete. The information provided in this note is only as accurate as the sources available at the time of the update.    Medication reconciliation/reorder completed by provider prior to medication history? No    Information Source(s): Patient and Clinic records and Care Everywhere via in-person    Pertinent Information: N/A    Allergies reviewed with patient and updates made in EHR: yes    Medications available for use during hospital stay: Restasis    Medication History Completed By: Emeka Cisneros [facility] 4/2/2024 8:42 AM    PTA Med List   Medication Sig Last Dose    acetaminophen (TYLENOL) 325 MG tablet Take 2 tablets (650 mg) by mouth every 4 hours as needed for other (mild pain) Unknown    carvedilol (COREG) 6.25 MG tablet Take 1 tablet (6.25 mg) by mouth 2 times daily (with meals) 4/2/2024 at AM    cycloSPORINE (RESTASIS) 0.05 % ophthalmic emulsion Place 1 drop into both eyes 2 times daily 4/2/2024 at AM, has with    gabapentin (NEURONTIN) 100 MG capsule Take 2 capsules (200 mg) in a.m. and 1 additional capsule (100 mg) at noon. 4/2/2024 at AM    gabapentin (NEURONTIN) 300 MG capsule Take 2 capsules (600 mg) by mouth at bedtime 4/1/2024 at PM    losartan (COZAAR) 50 MG tablet Take 1 tablet (50 mg) by mouth 2 times daily 4/1/2024 at PM    Magnesium Oxide 250 MG TABS Take 1 tablet by mouth at bedtime 4/1/2024 at PM    multivitamin w/minerals (THERA-VIT-M) tablet Take 1 tablet by mouth daily 3/26/2024    nitroGLYcerin (NITROSTAT) 0.4 MG sublingual tablet Place 1 tablet (0.4 mg) under the tongue every 5 minutes as needed for chest pain For chest pain place 1 tablet under the tongue every 5 minutes for 3 doses. If symptoms persist 5 minutes after 1st dose call 911. Unknown    omeprazole (PRILOSEC) 40 MG DR capsule Take 40 mg by mouth 2 times daily 4/2/2024 at AM    ondansetron (ZOFRAN ODT) 4 MG ODT tab Take 1 tablet (4 mg) by mouth every 8  hours as needed for nausea Unknown    rosuvastatin (CRESTOR) 10 MG tablet Take 1 tablet (10 mg) by mouth daily 4/1/2024 at PM    Vitamin D3 (CHOLECALCIFEROL) 25 mcg (1000 units) tablet Take 1 tablet by mouth daily 4/2/2024 at AM

## 2024-04-02 NOTE — PROGRESS NOTES
OT: Orders received. Chart reviewed and discussed with care team.  OT not indicated due to having good home setup/support, has all necessary DME, has had previous surgeries, and is Mod I w/ ADLs, per PT.  Defer discharge recommendations to PT and rest of care team.  Will complete orders.

## 2024-04-02 NOTE — ANESTHESIA CARE TRANSFER NOTE
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    Anesthesia Type:   General     Note:    Oropharynx: oropharynx clear of all foreign objects and spontaneously breathing  Level of Consciousness: drowsy  Oxygen Supplementation: face mask  Level of Supplemental Oxygen (L/min / FiO2): 8  Independent Airway: airway patency satisfactory and stable  Dentition: dentition unchanged  Vital Signs Stable: post-procedure vital signs reviewed and stable  Report to RN Given: handoff report given  Patient transferred to: PACU    Handoff Report: Identifed the Patient, Identified the Reponsible Provider, Reviewed the pertinent medical history, Discussed the surgical course, Reviewed Intra-OP anesthesia mangement and issues during anesthesia, Set expectations for post-procedure period and Allowed opportunity for questions and acknowledgement of understanding      Vitals:  Vitals Value Taken Time   /57 04/02/24 1136   Temp 97.6 F    Pulse 56 04/02/24 1138   Resp 15 04/02/24 1138   SpO2 99 % 04/02/24 1138   Vitals shown include unfiled device data.    Electronically Signed By: JESUS Rico CRNA  April 2, 2024  11:39 AM

## 2024-04-02 NOTE — PLAN OF CARE
Problem: Adult Inpatient Plan of Care  Goal: Absence of Hospital-Acquired Illness or Injury  Intervention: Identify and Manage Fall Risk  Recent Flowsheet Documentation  Taken 4/2/2024 1300 by Nargis Salcedo RN  Safety Promotion/Fall Prevention:   activity supervised   assistive device/personal items within reach   clutter free environment maintained   lighting adjusted   nonskid shoes/slippers when out of bed   room near nurse's station   safety round/check completed     Problem: Adult Inpatient Plan of Care  Goal: Absence of Hospital-Acquired Illness or Injury  Intervention: Prevent and Manage VTE (Venous Thromboembolism) Risk  Recent Flowsheet Documentation  Taken 4/2/2024 1300 by Nargis Salcedo RN  VTE Prevention/Management: SCDs (sequential compression devices) on     Patient vital signs are at baseline: Yes  Patient able to ambulate as they were prior to admission or with assist devices provided by therapies during their stay:  Yes  Patient MUST void prior to discharge:  Yes  Patient able to tolerate oral intake:  Yes  Pain has adequate pain control using Oral analgesics:  Yes  Does patient have an identified :  Yes  Has goal D/C date and time been discussed with patient:  Yes    Patient A&O, VSS, and CMS intact. Painful, but managing with medications. Dressing CDI. Tolerating oral intake, ambulating, and voiding. Call light appropriate and all alarms in place.  Nargis Salcedo RN

## 2024-04-02 NOTE — ANESTHESIA PREPROCEDURE EVALUATION
Anesthesia Pre-Procedure Evaluation    Patient: Alexandra Thomas   MRN: [id number] : 1950        Procedure : Procedure(s):  RIGHT REVISION TOTAL KNEE ARTHROPLASTY, POLYETHYLENE EXCHANGE          Past Medical History:   Diagnosis Date    Arrhythmia 10/01/2019    Arthritis     hips    Ascending aortic aneurysm (H24)     4.2 cm   Ascending    Biceps tendon rupture     Depression 2019    Diverticulitis of colon     Gastroesophageal reflux disease     Esophageal spasms.    Gastroparesis     Heart murmur     High cholesterol     History of GI bleed     Hypertension     PONV (postoperative nausea and vomiting)     Sjogren's syndrome (H24)       Past Surgical History:   Procedure Laterality Date    ABDOMEN SURGERY      APPENDECTOMY      ARTHROPLASTY HIP Left 2022    Procedure: Left total hip arthroplasty;  Surgeon: Berry Mcdonnell MD;  Location: [facility] OR    BACK SURGERY      BIOPSY      BREAST SURGERY      CHOLECYSTECTOMY      COLECTOMY PARTIAL      Due to recurrent diverticulitis    COLONOSCOPY      ENT SURGERY      T & A    ESOPHAGOSCOPY, GASTROSCOPY, DUODENOSCOPY (EGD), COMBINED N/A 2022    Procedure: ESOPHAGOGASTRODUODENOSCOPY (EGD) (fv);  Surgeon: Seymour Chatterjee MD;  Location:  GI    GENITOURINARY SURGERY      HYSTERECTOMY TOTAL ABDOMINAL      LASIK      left midfoot fusion Left     2nd toe osteotomy    shouder replacement Right     TOTAL HIP ARTHROPLASTY Right 2009    TOTAL KNEE ARTHROPLASTY Right 2006    TOTAL KNEE ARTHROPLASTY Left 2009      Allergies   Allergen Reactions    Ciprofloxacin Other (See Comments)     Pt has aortic aneurysm.  Increased risk of rupture or dissection with use of fluoroquinolones.      Food Anaphylaxis     EGGPLANT-->anaphylaxis      Venlafaxine Hcl Diarrhea, Shortness Of Breath and Nausea and Vomiting    Meperidine Rash               Adhesive Tape      blisters      Social History     Tobacco Use    Smoking status: Never    Smokeless  "tobacco: Never   Substance Use Topics    Alcohol use: Not Currently      Wt Readings from Last 1 Encounters:   03/01/24 70.8 kg (156 lb)        Anesthesia Evaluation   Pt has had prior anesthetic.     History of anesthetic complications  - PONV.      ROS/MED HX  ENT/Pulmonary:       Neurologic:  - neg neurologic ROS     Cardiovascular: Comment: Stable ascending aortic aneurysm    (+)  hypertension- -   -  - -                                      METS/Exercise Tolerance:     Hematologic:  - neg hematologic  ROS     Musculoskeletal: Comment: Sjogren's syndrome      GI/Hepatic: Comment: Gastroparesis  Intermittent nausea, on Zofran    (+) GERD,                   Renal/Genitourinary:  - neg Renal ROS     Endo:     (+)          thyroid problem,            Psychiatric/Substance Use:       Infectious Disease:       Malignancy:       Other:            Physical Exam    Airway        Mallampati: II   TM distance: > 3 FB   Neck ROM: full   Mouth opening: > 3 cm    Respiratory Devices and Support         Dental       (+) Minor Abnormalities - some fillings, tiny chips      Cardiovascular          Rhythm and rate: regular and normal     Pulmonary           breath sounds clear to auscultation           OUTSIDE LABS:  CBC:   Lab Results   Component Value Date    WBC 4.7 03/13/2024    WBC 6.9 02/15/2024    HGB 12.8 03/13/2024    HGB 13.1 02/15/2024    HCT 39.4 03/13/2024    HCT 40.5 02/15/2024     03/13/2024     02/15/2024     BMP:   Lab Results   Component Value Date     03/13/2024     02/15/2024    POTASSIUM 4.7 03/13/2024    POTASSIUM 4.1 02/15/2024    CHLORIDE 104 03/13/2024    CHLORIDE 106 02/15/2024    CO2 28 03/13/2024    CO2 28 02/15/2024    BUN 19.7 03/13/2024    BUN 22.5 02/15/2024    CR 0.98 (H) 03/13/2024    CR 0.86 02/15/2024    GLC 83 04/02/2024    GLC 83 03/13/2024     COAGS: No results found for: \"PTT\", \"INR\", \"FIBR\"  POC: No results found for: \"BGM\", \"HCG\", \"HCGS\"  HEPATIC:   Lab Results " "  Component Value Date    ALBUMIN 4.4 03/13/2024    PROTTOTAL 6.2 (L) 03/13/2024    ALT 16 03/13/2024    AST 28 03/13/2024    ALKPHOS 100 03/13/2024    BILITOTAL 0.4 03/13/2024     OTHER:   Lab Results   Component Value Date    CHESTER 9.5 03/13/2024    TSH 3.93 01/12/2024    SED 17 03/13/2024       Anesthesia Plan    ASA Status:  3    NPO Status:  NPO Appropriate    Anesthesia Type: General.     - Airway: ETT   Induction: Intravenous, RSI.   Maintenance: TIVA.        Consents    Anesthesia Plan(s) and associated risks, benefits, and realistic alternatives discussed. Questions answered and patient/representative(s) expressed understanding.     - Discussed: Risks, Benefits and Alternatives for the PROCEDURE were discussed     - Discussed with:  Patient            Postoperative Care    Pain management: IV analgesics, Oral pain medications.   PONV prophylaxis: Ondansetron (or other 5HT-3), Dexamethasone or Solumedrol, Aprepitant     Comments:               Lex Dahl MD    I have reviewed the pertinent notes and labs in the chart from the past 30 days and (re)examined the patient.  Any updates or changes from those notes are reflected in this note.              # Overweight: Estimated body mass index is 24.43 kg/m  as calculated from the following:    Height as of this encounter: 1.702 m (5' 7\").    Weight as of this encounter: 70.8 kg (156 lb).      "

## 2024-04-02 NOTE — ANESTHESIA POSTPROCEDURE EVALUATION
Patient: Alexandra Thomas    Procedure: Procedure(s):  RIGHT REVISION TOTAL KNEE ARTHROPLASTY, POLYETHYLENE EXCHANGE       Anesthesia Type:  General    Note:     Postop Pain Control: Uneventful            Sign Out: Well controlled pain   PONV: No   Neuro/Psych: Uneventful            Sign Out: Acceptable/Baseline neuro status   Airway/Respiratory: Uneventful            Sign Out: Acceptable/Baseline resp. status   CV/Hemodynamics: Uneventful            Sign Out: Acceptable CV status; No obvious hypovolemia; No obvious fluid overload   Other NRE: NONE   DID A NON-ROUTINE EVENT OCCUR? No           Last vitals:  Vitals Value Taken Time   /63 04/02/24 1231   Temp 35.7  C (96.26  F) 04/02/24 1237   Pulse 58 04/02/24 1242   Resp 12 04/02/24 1238   SpO2 100 % 04/02/24 1243   Vitals shown include unfiled device data.    Electronically Signed By: Lex Dahl MD  April 2, 2024  1:07 PM

## 2024-04-02 NOTE — OP NOTE
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  Polyethylene wear medial and lateral.  Posterior lateral poly fractured.   There is ligamentous laxity in varus valgus stress testing at 0, 45 and 60 degrees of flexion.  There is also increased anterior posterior translation at 0 45 and 60 degrees of flexion.  There was hyperextension of the knee as well.  No signs of infection.    IMPLANTS: see below    INDICATION FOR PROCEDURE:    Alexandra is a pleasant 74 yo female who underwent a  total knee arthroplasty [id number] years ago.  The patient was doing well over the last 17 years, however has developed swelling and instability.  We tried physical therapy and bracing without relief of his symptoms.  Infection work-up was negative.  In my opinion the patient had global instability and I recommended a polyexchange and upsize his polythickness.  The risks including, but not limited to, bleeding, infection, nerve injury, dvt, implant failure, implant wear, fracture, anesthetic complications, heart attack, stroke, and even death were discussed.  Pt verbalized understanding.  Informed consent was obtained.    DESCRIPTION OF PROCEDURE: The patient was seen and evaluated in the preop area. Discussion of the surgery and any further questions were answered with the patient and family using easily  understandable non-medical terms. The correct site was identified with the patient, and I placed my initials at the surgical site. Pre-procedure verification was completed. Relevant information / documentation available, they were reviewed and properly matched to the patient.  I verified the consent was accurate and complete.  I verified that the proper surgical equipment and supplies were available. The patient was taken to the operating room and placed in a supine position according to the procedure in consideration with all extremities well padded. A Time Out was conducted just prior to starting procedure to verify the eight required elements: 1-patient identity, 2-consent accurate and complete, 3-position, 4-correct side/site marked (if applicable), 5-procedure, 6-relevant images / results properly labeled and displayed (if applicable), 7-antibiotics / irrigation fluids (if applicable), 8-safety precautions.      The patient was given successful general anesthesia.  The operative lower extremity was then prepped and draped in sterile fashion.   The leg was covered with a Ioban type drape.  I then made a straight longitudinal incision over the patella, starting 2 cm proximal to the patella extending to the tibial tubercle.   A median parapatellar arthrotomy was performed.  There was a minimal amount of clear yellow joint fluid.   The patella was translated and the knee was flexed.   Any hypertrophic/inflamed synovium and a portion of the infrapatella fat pad was excised.  Z retractors were placed medially and laterally.    The polyethylene was removed without complication.  The medial and lateral trays of the poly were worn approx 5 mm. The posterir lip of the lateral aspect of the poly was worn off.  The knee was irrigated with 3 L of antibiotic saline. I evaluated the femoral, tibial and patellar components.  They  appeared well fixed.  We then trialed various thicknesses of polyethylene.  With the thickness of  polyethylene stated below, I felt the knee came to full extension and flex to 125 degrees with proper patellar tracking.  The knee was very stable in varus valgus stress testing.  There was minimal anterior posterior translation as well.     The appropriate thickness of poly was determined which allowed for full extension and flexion to 125 degrees without any significant instability.  The knee was tested for varus/valgus stability at 0 and 20 degrees of flexion as well as anterior/posterior stability throughout the flexion arc.     There was unrestricted range of motion from 0 to 125 degrees with proper patella tracking. Then knee was stable to varus/valgus stress testing at 0 and 20 degrees.  There was no apparent anterior/posterior instability during flexion.       The knee was placed at 60 degrees of flexion.  The quadriceps mechanism and medial retinaculum were reapproximated  with number #1 FiberWire and #1 strata fix.. The knee was fully flexed to a confirm patella tracking and the integrity of the capsular closing.  The subcutaneous tissue was closed with 2-0 Vicryl, skin with 3-0 Monocryl and Dermabond..  A sterile dressing and Esa sock were placed on the leg.  The sponge and needle counts were correct at the end of the case.  The patient left the operating room in stable condition.      Post Op Plan:   Pain Control: IV Narcotics, appropriate antiinflammatories, Gabapentin  Infection prophylaxis: Appropriate IV antibiotics x24 hours then cefadroxil 500 mg p.o. twice daily x 10 days.  DVT Propylaxis: Aspirin 81 mg p.o. twice daily x 4 weeks, mechanical devices.  PT/OT: Weight Bearing as Tolerated with Walker.   Eval and treat as per TKA protocol  Medical management: Primary medicine service consult  Incision/Dressing Care: Keep on, clean. Reniforce prn.  OK to shower.  Reapply ACE wrap after shower/skin checks.    Disposition:  Consult if needed for disposition      Implant Name Type Inv. Item  Serial No.  Lot No. LRB No. Used Action   IMP INSERT TIBIAL STRK TRI 4X16MM POLY [id number] - [id number] Total Joint Component/Insert IMP INSERT TIBIAL STRK TRI 4X16MM POLY [id number]  [employer] [id number] 1 Implanted       Elías Prieto MD

## 2024-04-02 NOTE — CONSULTS
[facility] MEDICINE CONSULT NOTE   Physician requesting consult: Elías Prieto MD    Reason for consult: Postoperative medical management of medical co-morbidities as below    Assessment and Plan    Alexandra Thomas is a 73 year old female with a history of essential hypertension, dyslipidemia, paroxysmal atrial fibrillation no recurrence after ablation, GERD, peripheral neuropathy, underwent right total knee arthroplasty revision and polyethylene exchange today.   mL.  Moderate right knee pain present.  Hemodynamically stable postoperatively.    Procedure(s):  RIGHT REVISION TOTAL KNEE ARTHROPLASTY, POLYETHYLENE EXCHANGE  Post-operative Day: Day of Surgery  Code status:Full Code     Essential hypertension  Losartan 50 mg twice a day  Carvedilol 6.25 mg twice a day  Hold antihypertensive if systolic blood pressure is less than 110 as risk of postop hypotension    Dyslipidemia  Crestor 10 mg daily    Paroxysmal atrial fibrillation  No recurrence after ablation    GERD  Resume PPI    Esophageal spasm  Nitroglycerin as needed    Peripheral neuropathy  Gabapentin 200 mg in a.m. and 100 mg at noon    Status post right total knee arthroplasty revision  Resume routine postoperative care  Physical and Occupational Therapy  Use incentive spirometry frequently  DVT prophylaxis per orthopedics, aspirin 81 mg twice a day  Pain control with Tylenol 975 mg every 8 hours, 650 mg every 4 hours as needed, oxycodone 5 to 10 mg every 4 hours as needed, IV Dilaudid 0.2 to 0.4 mg every 2 hours as needed    Hospital Problem List   No problem-specific Assessment & Plan notes found for this encounter.    Principal Problem:    History of revision of total knee arthroplasty      -Reviewed the patient's preoperative H and P and updated missing elements.  -Home medication reconciliation has been reviewed.  Medications have been ordered as noted from the home list and changes are documented above      HISTORY     Alexandra Thomas is a 73 year old  female with a history of central hypertension, dyslipidemia, paroxysmal atrial fibrillation no recurrence after ablation, GERD, peripheral neuropathy, underwent right total knee arthroplasty revision and polyethylene exchange today.   mL.  Moderate right knee pain present.  Hemodynamically stable postoperatively.  On carvedilol 6.25 mg twice a day, losartan 50 mg twice a day for hypertension.  Heart rate is stable.  No further recurrence of A-fib after ablation.  On Crestor 10 mg daily for dyslipidemia.  On Prilosec 40 mg twice a day for GERD.  History of esophageal spasm on nitroglycerin as needed.  Denies headache, chest pain, breathing difficulty, palpitation, nausea, vomiting, abdominal pain or urinary symptoms.  History is provided by the patient.  Spoke with ED physician.    Past Medical History     Patient Active Problem List    Diagnosis Date Noted    History of revision of total knee arthroplasty 04/02/2024     Priority: Medium    Other chest pain 03/30/2023     Priority: Medium    Lattice degeneration of right retina 08/23/2022     Priority: Medium    S/P total hip arthroplasty 07/27/2022     Priority: Medium    Benign essential hypertension 05/29/2022     Priority: Medium    Gastroparesis 05/29/2022     Priority: Medium    Thoracic aortic aneurysm without rupture (H24) 05/29/2022     Priority: Medium    Gastroesophageal reflux disease without esophagitis 05/29/2022     Priority: Medium    Pain syndrome, chronic 05/29/2022     Priority: Medium     Orthopedic pains: Hip pain, foot pain, shoulder pain, on gabapentin      Sicca syndrome (H24) 05/29/2022     Priority: Medium    Cervical spondylosis with radiculopathy 10/21/2020     Priority: Medium    Pure hypercholesterolemia 06/01/2020     Priority: Medium    Rupture of right long head biceps tendon 04/22/2020     Priority: Medium     Formatting of this note might be different from the  original.   Occurred more than one year after 2017 TSR (had Long-head tenotomy / partial excision / sewn to pectoralis tendon as part of TSR (Total Shoulder Replacement)      Primary osteoarthritis, left shoulder 02/11/2020     Priority: Medium    Arrhythmia 10/01/2019     Priority: Medium     Formatting of this note is different from the original.   ASSESSMENT/PLAN:   1. Atrial fibrillation, paroxysmal: Status-post PV ablation in 2011. She is now off anticoagulation and reports no recurrent symptoms. A recent ziopatch was unremarkable   --Continue to monitor for symptoms   --No indication for anticoagulation at this time      2. History of NSVT: An MRI of 2010 showed fatty infiltration of the right ventricle but overall lower suspicion for ARVD. Ziopatch was unremarkable. She denies palpitations   --Continue to monitor   --Periodic echocardiogram to screen for biventricular function. Will repeat echocardiogram in 1 year      8/6/18 Holter Monitor   CONCLUSIONS:   1)  Predominant underlying rhythm was sinus with an average rate of 60 bpm   2)  Rare premature atrial complexes and rare premature ventricular complexes.  There were 3 brief asymptomatic supraventricular runs.     3)  No significant pauses   4)  No sustained arrhythmias   5)  No clear arrhythmia correlation to account for patient's symptoms of dizziness.      Mixed incontinence 09/10/2019     Priority: Medium    Pelvic floor dysfunction 09/10/2019     Priority: Medium    Recurrent UTI 09/10/2019     Priority: Medium    Chronic diarrhea 09/10/2019     Priority: Medium    Depression 08/06/2019     Priority: Medium    History of colonic polyps 12/03/2018     Priority: Medium     Formatting of this note might be different from the original.   [id number] [facility] Non-Malignant Neoplastic Polyp Data   Region/  [1]: Latia Bass/ Paulina Esquivel MD   Date of Procedure [2]: 11/20/2018   Tubular adenoma(s) [3]: 2   Tubulovillous adenoma(s) [4]: 0    Villous adenoma(s) [5]: 0   Sessile serrated adenoma(s) [6]: 0   High risk hyperplastic polyp(s) [7]: 0   Other (specify) [8]: N/A   Colorectal cancer found (Yes/No) [9]: No   END [id number]      Arthropathy of right knee 11/15/2018     Priority: Medium    Acquired hypothyroidism 02/09/2018     Priority: Medium    Tendonitis of right hip flexor 10/03/2017     Priority: Medium    Hx of cold sores 09/29/2017     Priority: Medium    Migraine aura without headache 06/03/2016     Priority: Medium    PAF (paroxysmal atrial fibrillation) (H) 04/25/2016     Priority: Medium     Formatting of this note might be different from the original.   Had ablation      PVC's (premature ventricular contractions) 04/25/2016     Priority: Medium    Hyperlipidemia, acquired 12/09/2015     Priority: Medium    Hearing loss 12/09/2015     Priority: Medium    IBS (irritable bowel syndrome) 12/09/2015     Priority: Medium    Pulmonary nodule 12/09/2015     Priority: Medium     Formatting of this note might be different from the original.   8/16 no need for repeat   8/15 repeat one yr      Degeneration of lumbar or lumbosacral intervertebral disc 11/24/2015     Priority: Medium    DJD (degenerative joint disease) of thoracic spine 09/24/2015     Priority: Medium    Trigger ring finger of right hand 05/01/2013     Priority: Medium    Enthesopathy 09/08/2009     Priority: Medium    Ganglion of tendon sheath 01/09/2009     Priority: Medium    Primary localized osteoarthrosis, lower leg 01/09/2009     Priority: Medium        Surgical History   She  has a past surgical history that includes Colectomy Partial; Total Knee Arthroplasty (Right, 07/12/2006); Total Knee Arthroplasty (Left, 02/18/2009); Total Hip Arthroplasty (Right, 06/07/2009); shouder replacement (Right); Hysterectomy total abdominal; Cholecystectomy; ENT surgery; left midfoot fusion (Left); Arthroplasty hip (Left, 07/27/2022); Lasik; Esophagoscopy, gastroscopy, duodenoscopy (EGD),  combined (N/A, 09/13/2022); Abdomen surgery; appendectomy; biopsy; Breast surgery; colonoscopy; Abdomen surgery; and back surgery.     Past Surgical History:   Procedure Laterality Date    ABDOMEN SURGERY      APPENDECTOMY      ARTHROPLASTY HIP Left 07/27/2022    Procedure: Left total hip arthroplasty;  Surgeon: Berry Mcdonnell MD;  Location: [facility] OR    BACK SURGERY      BIOPSY      BREAST SURGERY      CHOLECYSTECTOMY      COLECTOMY PARTIAL      Due to recurrent diverticulitis    COLONOSCOPY      ENT SURGERY      T & A    ESOPHAGOSCOPY, GASTROSCOPY, DUODENOSCOPY (EGD), COMBINED N/A 09/13/2022    Procedure: ESOPHAGOGASTRODUODENOSCOPY (EGD) (fv);  Surgeon: Seymour Chatterjee MD;  Location:  GI    GENITOURINARY SURGERY      HYSTERECTOMY TOTAL ABDOMINAL      LASIK      left midfoot fusion Left     2nd toe osteotomy    shouder replacement Right     TOTAL HIP ARTHROPLASTY Right 06/07/2009    TOTAL KNEE ARTHROPLASTY Right 07/12/2006    TOTAL KNEE ARTHROPLASTY Left 02/18/2009       Family History    Reviewed, and family history includes Breast Cancer in her paternal cousin; Chronic Obstructive Pulmonary Disease in her father and mother; Dementia in her father; Diabetes in her paternal grandfather and paternal grandmother; Macular Degeneration in her maternal grandmother; Other Cancer in her cousin and maternal grandmother; Substance Abuse in her sister.     Social History    Reviewed, and she  reports that she has never smoked. She has never used smokeless tobacco. She reports that she does not currently use alcohol. She reports that she does not use drugs.  Social History     Tobacco Use    Smoking status: Never    Smokeless tobacco: Never   Substance Use Topics    Alcohol use: Not Currently      Allergies     Allergies   Allergen Reactions    Ciprofloxacin Other (See Comments)     Pt has aortic aneurysm.  Increased risk of rupture or dissection with use of fluoroquinolones.      Food Anaphylaxis     EGGPLANT-->anaphylaxis       Venlafaxine Hcl Diarrhea, Shortness Of Breath and Nausea and Vomiting    Meperidine Rash               Adhesive Tape      blisters       Prior to Admission Medications      Medications Prior to Admission   Medication Sig Dispense Refill Last Dose    acetaminophen (TYLENOL) 325 MG tablet Take 2 tablets (650 mg) by mouth every 4 hours as needed for other (mild pain) 100 tablet 0 Unknown    carvedilol (COREG) 6.25 MG tablet Take 1 tablet (6.25 mg) by mouth 2 times daily (with meals) 180 tablet 3 4/2/2024 at AM    cycloSPORINE (RESTASIS) 0.05 % ophthalmic emulsion Place 1 drop into both eyes 2 times daily 90 each 3 4/2/2024 at AM, has with    gabapentin (NEURONTIN) 100 MG capsule Take 2 capsules (200 mg) in a.m. and 1 additional capsule (100 mg) at noon. 270 capsule 1 4/2/2024 at AM    gabapentin (NEURONTIN) 300 MG capsule Take 2 capsules (600 mg) by mouth at bedtime 180 capsule 1 4/1/2024 at PM    losartan (COZAAR) 50 MG tablet Take 1 tablet (50 mg) by mouth 2 times daily 180 tablet 2 4/1/2024 at PM    Magnesium Oxide 250 MG TABS Take 1 tablet by mouth at bedtime   4/1/2024 at PM    multivitamin w/minerals (THERA-VIT-M) tablet Take 1 tablet by mouth daily   3/26/2024    nitroGLYcerin (NITROSTAT) 0.4 MG sublingual tablet Place 1 tablet (0.4 mg) under the tongue every 5 minutes as needed for chest pain For chest pain place 1 tablet under the tongue every 5 minutes for 3 doses. If symptoms persist 5 minutes after 1st dose call 911. 10 tablet 3 Unknown    omeprazole (PRILOSEC) 40 MG DR capsule Take 40 mg by mouth 2 times daily   4/2/2024 at AM    ondansetron (ZOFRAN ODT) 4 MG ODT tab Take 1 tablet (4 mg) by mouth every 8 hours as needed for nausea 12 tablet 3 Unknown    rosuvastatin (CRESTOR) 10 MG tablet Take 1 tablet (10 mg) by mouth daily 90 tablet 2 4/1/2024 at PM    Vitamin D3 (CHOLECALCIFEROL) 25 mcg (1000 units) tablet Take 1 tablet by mouth daily   4/2/2024 at AM       Review of Systems   A 12 point  comprehensive review of systems was negative except as noted above.    OBJECTIVE         Physical Exam   Temp:  [95.9  F (35.5  C)-98.1  F (36.7  C)] 97.8  F (36.6  C)  Pulse:  [52-62] 60  Resp:  [7-28] 12  BP: (103-171)/(55-90) 136/75  SpO2:  [98 %-100 %] 100 %  Body mass index is 25.22 kg/m .    GENERAL:  Alert, appears comfortable, in no acute distress, appears stated age   HEAD:  Normocephalic, without obvious abnormality, atraumatic   EYES:  PERRL, conjunctiva  clear,  EOM's intact   NOSE: Nares normal,  mucosa normal, no drainage   THROAT: Lips, mucosa,  gums normal, mouth moist   NECK: Supple, symmetrical, trachea midline   BACK:   Symmetric, no curvature, ROM normal   LUNGS:   Clear to auscultation bilaterally, no rales, rhonchi, or wheezing, symmetric chest rise on inhalation, respirations unlabored   CHEST WALL:  No tenderness or deformity   HEART:  Regular rate and rhythm, S1 and S2 normal, no murmur, rub, or gallop    ABDOMEN:   Soft, non-tender, bowel sounds active, no masses, no organomegaly, no rebound or guarding   EXTREMITIES: Status post right total knee arthroplasty   SKIN: No rashes in the visualized areas   NEURO: Alert, oriented x 4     PSYCH: Cooperative, behavior is appropriate        Imaging Reviewed Personally By Myself    Right knee x-ray  Postoperative changes right total knee arthroplasty and patellar resurfacing. Components appear well seated. Postsurgical air within the joint.     Labs Reviewed Personally By Myself   WBC 4.7, hemoglobin 12.8, platelet 227  Sodium 141, potassium 4.7, creatinine 0.98    Preoperative Labs Reviewed Personally By Myself     Thank you for this consultation.  Appreciate the opportunity to participate in the care of Alexandra Thomas, please feel free to contact us for any questions or concerns.    Total time spent 70 min    Rosemary Mccabe MD  [facility]  Phone: #[phone]

## 2024-04-02 NOTE — PROGRESS NOTES
04/02/24 1530   Appointment Info   Signing Clinician's Name / Credentials (PT) Jerilyn Gomez, PT, DPT   Living Environment   People in Home child(margaret), adult   Current Living Arrangements house   Living Environment Comments Pt reports has stair lift for steps to enter home. Has no concerns for stairs at home   Self-Care   Equipment Currently Used at Home cane, straight;walker, rolling   General Information   Onset of Illness/Injury or Date of Surgery 04/02/24   Referring Physician Elías Prieto MD   Patient/Family Therapy Goals Statement (PT) to get better   Pertinent History of Current Problem (include personal factors and/or comorbidities that impact the POC) History of revision of total knee arthroplasty   Existing Precautions/Restrictions weight bearing   Weight-Bearing Status - LLE full weight-bearing   Weight-Bearing Status - RLE weight-bearing as tolerated   Transfers   Transfers sit-stand transfer   Comment, (Transfers) SBA with FWW for sit<>stand transfers. Verbal cues for safety and safe hand placement.   Sit-Stand Transfer   Sit-Stand [place] (Transfers) supervision;verbal cues   Assistive Device (Sit-Stand Transfers) walker, front-wheeled   Comment, (Sit-Stand Transfer) SBA with FWW for sit<>stand transfers. Verbal cues for safety and safe hand placement.   Gait/Stairs (Locomotion)   [place] Level (Gait) supervision;verbal cues   Assistive Device (Gait) walker, front-wheeled   Distance in Feet (Gait) 10'   Pattern (Gait) step-through   Deviations/Abnormal Patterns (Gait) seema decreased;gait speed decreased   Comment, (Gait/Stairs) Pt ambulates with SBA and FWW. Verbal cues for safety and posture.   Clinical Impression   Criteria for Skilled Therapeutic Intervention Yes, treatment indicated   PT Diagnosis (PT) impaired functional mobility   Influenced by the following impairments weakness, pain   Functional limitations due to impairments transfers, ambulation   Clinical Presentation (PT  Evaluation Complexity) stable   Clinical Presentation Rationale Pt presents as medically diagnosed   Clinical Decision Making (Complexity) low complexity   Planned Therapy Interventions (PT) balance training;bed mobility training;gait training;home exercise program;patient/family education;ROM (range of motion);strengthening;stretching;transfer training   Risk & Benefits of therapy have been explained evaluation/treatment results reviewed;care plan/treatment goals reviewed;patient;daughter   PT Total Evaluation Time   PT Eval, Low Complexity Minutes (89060) 10   Physical Therapy Goals   PT Frequency Daily   PT Predicted Duration/Target Date for Goal Attainment 04/04/24   PT Goals Transfers;Gait   PT: Transfers Modified independent;Sit to/from stand;Assistive device  (FWW)   PT: Gait Modified independent;Assistive device;Rolling walker;Greater than 200 feet  (FWW)   Interventions   Interventions Quick Adds Gait Training;Therapeutic Activity;Therapeutic Procedure   Therapeutic Procedure/Exercise   Ther. Procedure: strength, endurance, ROM, flexibillity Minutes (08501) 10   Treatment Detail/Skilled Intervention Education on exerrcises for HEP. Education on safely completing exercises and using CPM machine. Education to not place something beneath knee to prevent hamstring contractures. Pt and daughter verbalize understanding   Therapeutic Activity   Treatment Detail/Skilled Intervention Pt sitting up in chair. SBA with FWW for sit<>stand transfers. Verbal cues for safety and safe hadn placement. Pt sitting up in chair at end of session with chair alarm on and call light within reach.   Gait Training   Gait Training Minutes (87377) 15   Treatment Detail/Skilled Intervention SBA with FWW when ambulating in hallway. Verbal cues for safety and posture. Cues for safe weight bearing. Pt reports feels like putting too much pressure through arms. Pt reports lightheadedness when ambulating and unable to continue walk.    Distance in Feet 150'   [place] Level (Gait Training) stand-by assist   Physical Assistance Level (Gait Training) supervision;verbal cues   Weight Bearing (Gait Training) weight-bearing as tolerated   Assistive Device (Gait Training) rolling walker   Pattern Analysis (Gait Training) swing-to gait   Gait Analysis Deviations decreased seema;decreased step length   Impairments (Gait Analysis/Training) pain   PT Discharge Planning   PT Plan progress transfers and ambulation, review therex   PT Discharge Recommendation (DC Rec) (S)  other (see comments)  (defer to ortho)   PT Rationale for DC Rec defer to ortho   PT Brief overview of current status SBA with mobility, 150' with FWW   Total Session Time   Timed Code Treatment Minutes 25   Total Session Time (sum of timed and untimed services) 35     Jerilyn Gomez, PT, DPT

## 2024-04-03 ENCOUNTER — APPOINTMENT (OUTPATIENT)
Dept: PHYSICAL THERAPY | Facility: CLINIC | Age: 74
DRG: 465 | End: 2024-04-03
Attending: ORTHOPAEDIC SURGERY
Payer: COMMERCIAL

## 2024-04-03 VITALS
HEART RATE: 64 BPM | HEIGHT: 67 IN | WEIGHT: 161 LBS | RESPIRATION RATE: 18 BRPM | OXYGEN SATURATION: 99 % | SYSTOLIC BLOOD PRESSURE: 155 MMHG | DIASTOLIC BLOOD PRESSURE: 71 MMHG | TEMPERATURE: 98.1 F | BODY MASS INDEX: 25.27 KG/M2

## 2024-04-03 LAB
ANION GAP SERPL CALCULATED.3IONS-SCNC: 7 MMOL/L (ref 7–15)
BUN SERPL-MCNC: 17.7 MG/DL (ref 8–23)
CALCIUM SERPL-MCNC: 9.3 MG/DL (ref 8.8–10.2)
CHLORIDE SERPL-SCNC: 105 MMOL/L (ref 98–107)
CREAT SERPL-MCNC: 0.84 MG/DL (ref 0.51–0.95)
DEPRECATED HCO3 PLAS-SCNC: 30 MMOL/L (ref 22–29)
EGFRCR SERPLBLD CKD-EPI 2021: 72 ML/MIN/1.73M2
ERYTHROCYTE [DISTWIDTH] IN BLOOD BY AUTOMATED COUNT: 11.9 % (ref 10–15)
GLUCOSE SERPL-MCNC: 114 MG/DL (ref 70–99)
HCT VFR BLD AUTO: 35 % (ref 35–47)
HGB BLD-MCNC: 11.4 G/DL (ref 11.7–15.7)
MCH RBC QN AUTO: 31.8 PG (ref 26.5–33)
MCHC RBC AUTO-ENTMCNC: 32.6 G/DL (ref 31.5–36.5)
MCV RBC AUTO: 98 FL (ref 78–100)
PLATELET # BLD AUTO: 193 10E3/UL (ref 150–450)
POTASSIUM SERPL-SCNC: 4.1 MMOL/L (ref 3.4–5.3)
RBC # BLD AUTO: 3.58 10E6/UL (ref 3.8–5.2)
SODIUM SERPL-SCNC: 142 MMOL/L (ref 135–145)
WBC # BLD AUTO: 10.9 10E3/UL (ref 4–11)

## 2024-04-03 PROCEDURE — 250N000013 HC RX MED GY IP 250 OP 250 PS 637: Performed by: FAMILY MEDICINE

## 2024-04-03 PROCEDURE — 80048 BASIC METABOLIC PNL TOTAL CA: CPT | Performed by: FAMILY MEDICINE

## 2024-04-03 PROCEDURE — 36415 COLL VENOUS BLD VENIPUNCTURE: CPT | Performed by: FAMILY MEDICINE

## 2024-04-03 PROCEDURE — 97116 GAIT TRAINING THERAPY: CPT | Mod: GP

## 2024-04-03 PROCEDURE — 97110 THERAPEUTIC EXERCISES: CPT | Mod: GP

## 2024-04-03 PROCEDURE — 85014 HEMATOCRIT: CPT | Performed by: FAMILY MEDICINE

## 2024-04-03 PROCEDURE — 250N000011 HC RX IP 250 OP 636: Performed by: PHYSICIAN ASSISTANT

## 2024-04-03 PROCEDURE — 99232 SBSQ HOSP IP/OBS MODERATE 35: CPT | Performed by: FAMILY MEDICINE

## 2024-04-03 PROCEDURE — 250N000013 HC RX MED GY IP 250 OP 250 PS 637: Performed by: PHYSICIAN ASSISTANT

## 2024-04-03 RX ORDER — OXYCODONE HYDROCHLORIDE 5 MG/1
5 TABLET ORAL EVERY 4 HOURS PRN
Qty: 33 TABLET | Refills: 0 | Status: SHIPPED | OUTPATIENT
Start: 2024-04-03 | End: 2024-05-25

## 2024-04-03 RX ORDER — ONDANSETRON 4 MG/1
4 TABLET, ORALLY DISINTEGRATING ORAL EVERY 8 HOURS PRN
Qty: 30 TABLET | Refills: 3 | Status: SHIPPED | OUTPATIENT
Start: 2024-04-03 | End: 2024-05-25

## 2024-04-03 RX ORDER — ONDANSETRON 4 MG/1
4 TABLET, FILM COATED ORAL EVERY 6 HOURS PRN
Qty: 20 TABLET | Refills: 0 | Status: SHIPPED | OUTPATIENT
Start: 2024-04-03 | End: 2024-05-25

## 2024-04-03 RX ADMIN — POLYETHYLENE GLYCOL 3350 17 G: 17 POWDER, FOR SOLUTION ORAL at 09:06

## 2024-04-03 RX ADMIN — SENNOSIDES AND DOCUSATE SODIUM 1 TABLET: 8.6; 5 TABLET ORAL at 09:05

## 2024-04-03 RX ADMIN — ONDANSETRON 4 MG: 4 TABLET, ORALLY DISINTEGRATING ORAL at 01:59

## 2024-04-03 RX ADMIN — ASPIRIN 81 MG: 81 TABLET, COATED ORAL at 09:06

## 2024-04-03 RX ADMIN — CEFAZOLIN 1 G: 1 INJECTION, POWDER, FOR SOLUTION INTRAMUSCULAR; INTRAVENOUS at 01:59

## 2024-04-03 RX ADMIN — CYCLOSPORINE 1 DROP: 0.5 EMULSION OPHTHALMIC at 09:07

## 2024-04-03 RX ADMIN — GABAPENTIN 200 MG: 100 CAPSULE ORAL at 09:05

## 2024-04-03 RX ADMIN — LOSARTAN POTASSIUM 50 MG: 50 TABLET, FILM COATED ORAL at 09:06

## 2024-04-03 RX ADMIN — PANTOPRAZOLE SODIUM 40 MG: 40 TABLET, DELAYED RELEASE ORAL at 06:40

## 2024-04-03 RX ADMIN — ACETAMINOPHEN 975 MG: 325 TABLET ORAL at 09:06

## 2024-04-03 RX ADMIN — CELECOXIB 100 MG: 100 CAPSULE ORAL at 09:07

## 2024-04-03 RX ADMIN — CARVEDILOL 6.25 MG: 6.25 TABLET, FILM COATED ORAL at 09:06

## 2024-04-03 ASSESSMENT — ACTIVITIES OF DAILY LIVING (ADL)
ADLS_ACUITY_SCORE: 32

## 2024-04-03 NOTE — PLAN OF CARE
Goal Outcome Evaluation:  Patient alert and oriented, calling out appropriately, bed and chair alarms in use.  Per PT patient ok to discharge, no OT needed.  Dressing to right knee remains clean, dry, intact.  CMS intact.   Patient vital signs are at baseline: Yes  Patient able to ambulate as they were prior to admission or with assist devices provided by therapies during their stay:  Yes  Patient MUST void prior to discharge:  Yes  Patient able to tolerate oral intake:  Yes  Pain has adequate pain control using Oral analgesics:  Yes  Does patient have an identified :  Yes  Has goal D/C date and time been discussed with patient:  Yes, patient's daughter at bedside.  Ortho rounded and Dr. Mccabe rounded, patient ok to discharge.  Patient transported via wheelchair and nursing assistant to discharge lounge.  Tiffany RN in discharge lounge taking over patient cares.        Problem: Adult Inpatient Plan of Care  Goal: Optimal Comfort and Wellbeing  Intervention: Monitor Pain and Promote Comfort  Recent Flowsheet Documentation  Taken 4/3/2024 0906 by Fariba Andres RN  Pain Management Interventions: medication (see MAR)     Problem: Risk for Delirium  Goal: Improved Behavioral Control  Intervention: Minimize Safety Risk  Recent Flowsheet Documentation  Taken 4/3/2024 0906 by Fariba Andres RN  Enhanced Safety Measures: room near unit station  Taken 4/3/2024 0800 by Fariba Andres RN  Communication Enhancement Strategies:   call light answered in person   extra time allowed for response   one-step directions provided  Enhanced Safety Measures: room near unit station  Taken 4/3/2024 0730 by Fariba Andres RN  Enhanced Safety Measures: room near unit station

## 2024-04-03 NOTE — CONSULTS
Care Management Discharge Note    Discharge Date: 04/03/2024       Discharge Disposition: Home, Home Care       Discharge Transportation:  daughter    Private pay costs discussed: Not applicable    Does the patient's insurance plan have a 3 day qualifying hospital stay waiver?  Yes     Which insurance plan 3 day waiver is available? Alternative insurance waiver    Will the waiver be used for post-acute placement? No    PAS Confirmation Code:    Patient/family educated on Medicare website which has current facility and service quality ratings:      Education Provided on the Discharge Plan:    Persons Notified of Discharge Plans: Pt and daughter  Patient/Family in Agreement with the Plan:      Handoff Referral Completed: Yes    Additional Information:  Met with pt to discuss recommendation for home care PT.  Pt planning to discharge home today, daughter will transport.  Pt agreeable to home care and has no preference regarding agency.  Referral made to [facility].  10:01 AM       [facility] accepted pt for home PT.    EVERTON Pettit

## 2024-04-03 NOTE — PROGRESS NOTES
"[facility] MEDICINE PROGRESS NOTE      Identification/Summary: Alexandra Thomas is a 73 year old female with a past medical history of essential hypertension, dyslipidemia, paroxysmal atrial fibrillation, no recurrence after ablation, GERD, peripheral neuropathy, underwent right total knee arthroplasty revision and polyethylene exchange.  POD 1.  Right knee pain is stable.  Will be discharging home today.    Assessment and Plan:  Essential hypertension  Losartan 50 mg twice a day  Carvedilol 6.25 mg twice a day  Blood pressure is stable    Dyslipidemia  Crestor 10 mg daily    Paroxysmal atrial fibrillation  No recurrence after ablation    GERD  Resume PPI    Esophageal spasm  Nitroglycerin as needed    Peripheral neuropathy  Gabapentin 200 mg in the morning and 100 mg at noon    Acute blood loss anemia  Hemoglobin 11.4 from 12.8    Status post right total knee arthroplasty revision  Resume routine postoperative care  Physical and Occupational Therapy  Use incentive spirometry frequently  DVT prophylaxis per orthopedics, aspirin 81 mg twice a day  Pain control with Tylenol 975 mg every 8 hours, 650 mg every 4 hours as needed, oxycodone 5 to 10 mg every 4 hours as needed      Clinically Significant Risk Factors                  # Hypertension: Noted on problem list        # Overweight: Estimated body mass index is 25.22 kg/m  as calculated from the following:    Height as of this encounter: 1.702 m (5' 7\").    Weight as of this encounter: 73 kg (161 lb)., PRESENT ON ADMISSION            Diet: Advance Diet as Tolerated: Regular Diet Adult  Discharge Instruction - Regular Diet Adult  Marin Catheter: Not present  Lines: None       Rosemary Mccabe MD  [facility]ist  [facility]  [facility]  Phone: #[phone]  Securely message with the Vocera Web Console (learn more here)  Text page via [employer] Paging/Directory     Interval History/Subjective:  Resting " comfortably.  Right knee pain is stable.  Has mild nausea without vomiting.  No other concern.  Denies headache, chest pain, breathing difficulty, palpitation, nausea, vomiting, abdominal pain or urinary symptoms.    Physical Exam/Objective:  Temp:  [95.9  F (35.5  C)-98.1  F (36.7  C)] 98.1  F (36.7  C)  Pulse:  [52-68] 64  Resp:  [10-28] 18  BP: (103-189)/(55-90) 155/71  SpO2:  [97 %-100 %] 99 %  Body mass index is 25.22 kg/m .    GENERAL:  Alert, appears comfortable, in no acute distress, appears stated age   HEAD:  Normocephalic, without obvious abnormality, atraumatic   EYES:  PERRL, conjunctiva clear,  EOM's intact   NOSE: Nares normal,  mucosa normal, no drainage   THROAT: Lips, mucosa,  gums normal, mouth moist   NECK: Supple, symmetrical, trachea midline   BACK:   Symmetric, no curvature, ROM normal   LUNGS:   Clear to auscultation bilaterally, no rales, rhonchi, or wheezing, symmetric chest rise on inhalation, respirations unlabored   CHEST WALL:  No tenderness or deformity   HEART:  Regular rate and rhythm, S1 and S2 normal, no murmur, rub, or gallop    ABDOMEN:   Soft, non-tender, bowel sounds active , no masses, no organomegaly, no rebound or guarding   EXTREMITIES: Status post right knee arthroplasty   SKIN: No rashes in the visualized areas   NEURO: Alert, oriented x3    PSYCH: Cooperative, behavior is appropriate      Data reviewed today: I personally reviewed all new medications, labs, imaging/diagnostics reports over the past 24 hours. Pertinent findings include:    Imaging:   Right knee x-ray  Postoperative changes right total knee arthroplasty and patellar resurfacing. Components appear well seated. Postsurgical air within the joint.     Labs:  Most Recent 3 CBC's:  Recent Labs   Lab Test 04/03/24  0656 03/13/24  1037 02/15/24  1728   WBC 10.9 4.7 6.9   HGB 11.4* 12.8 13.1   MCV 98 99 99    227 211     Most Recent 3 BMP's:  Recent Labs   Lab Test 04/03/24  0656 04/02/24  0829  03/13/24  1037 02/15/24  1728     --  141 143   POTASSIUM 4.1  --  4.7 4.1   CHLORIDE 105  --  104 106   CO2 30*  --  28 28   BUN 17.7  --  19.7 22.5   CR 0.84  --  0.98* 0.86   ANIONGAP 7  --  9 9   CHESTER 9.3  --  9.5 9.1   * 83 83 115*       Medications:   Personally Reviewed.  Medications   Current Facility-Administered Medications   Medication Dose Route Frequency Provider Last Rate Last Admin    lactated ringers infusion   Intravenous Continuous Nicolasa Jiang PA-C   Stopped at 04/03/24 0648     Current Facility-Administered Medications   Medication Dose Route Frequency Provider Last Rate Last Admin    acetaminophen (TYLENOL) tablet 975 mg  975 mg Oral Q8H Nicolasa Jiang PA-C   975 mg at 04/03/24 0906    aspirin EC tablet 81 mg  81 mg Oral BID Nicolasa Jiang PA-C   81 mg at 04/03/24 0906    carvedilol (COREG) tablet 6.25 mg  6.25 mg Oral BID w/meals Rosemary Mccabe MD   6.25 mg at 04/03/24 0906    celecoxib (celeBREX) capsule 100 mg  100 mg Oral BID Nicolasa Jaing PA-C   100 mg at 04/03/24 0907    cycloSPORINE (RESTASIS) 0.05 % ophthalmic emulsion 1 drop  1 drop Both Eyes BID Rosemary Mccabe MD   1 drop at 04/03/24 0907    gabapentin (NEURONTIN) capsule 100 mg  100 mg Oral Daily Elías Prieto MD   100 mg at 04/02/24 1356    gabapentin (NEURONTIN) capsule 200 mg  200 mg Oral Daily Nicolasa Jiang PA-C   200 mg at 04/03/24 0905    gabapentin (NEURONTIN) capsule 600 mg  600 mg Oral At Bedtime Nicolasa Jiang PA-C   600 mg at 04/02/24 2039    losartan (COZAAR) tablet 50 mg  50 mg Oral BID Rosemary Mccabe MD   50 mg at 04/03/24 0906    magnesium oxide (MAG-OX) half-tab   Oral At Bedtime Rosemary Mccabe MD   200 mg at 04/02/24 2039    pantoprazole (PROTONIX) EC tablet 40 mg  40 mg Oral BID AC Rosemary Mccabe MD   40 mg at 04/03/24 0640    polyethylene glycol (MIRALAX) Packet 17 g  17 g Oral Daily Nicolasa Jiang PA-C   17 g at 04/03/24 0906    rosuvastatin (CRESTOR) tablet 10 mg  10 mg Oral  QPM Rosemary Mccabe MD   10 mg at 04/02/24 2039    senna-docusate (SENOKOT-S/PERICOLACE) 8.6-50 MG per tablet 1 tablet  1 tablet Oral BID Nicolasa Jiang PA-C   1 tablet at 04/03/24 0905    sodium chloride (PF) 0.9% PF flush 3 mL  3 mL Intracatheter Q8H Nicolasa Jiang PA-C   3 mL at 04/02/24 2040      Total time spent 40 min

## 2024-04-03 NOTE — DISCHARGE SUMMARY
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 Prieto on . There were no intraoperative complications and the patient was transferred to the recovery room and later the orthopedic unit in stable condition. Once the patient reached the orthopedic floor our orthopedic pain protocol was implemented along with the following:    Anticoagulation Medications: ASA  Therapy: PT and OT  Activity: WBAT  Bracing: None    Consultations during Admission: Hospitalist service for medical management     COMPLICATIONS/SIGNIFICANT FINDINGS    NONE    DISCHARGE INFORMATION   Condition at discharge: Good  Discharge destination: Home with home cares  Patient was seen by myself on the date of discharge.    FOLLOW UP CARE   Follow up with orthopedics in 2 weeks or sooner should the need arise. Ortho will continue to manage pain control, post op anticoagulation and incision care.     Follow up with your PCP for management of chronic medical problems and to evaluate for post op medical complications including constipation, nausea/vomiting, DVT/PE, anemia, changes in blood pressure, fevers/chills, urinary retention and atelectasis/pneumonia.     Subjective   Patient is doing well on POD #1. Pain is well controlled with oral medications. Ambulating. Tolerating oral intake.     Physical Exam   /71 (BP Location:  "Right arm, Patient Position: Per self)   Pulse 68   Temp 97.2  F (36.2  C) (Oral)   Resp 18   Ht 1.702 m (5' 7\")   Wt 73 kg (161 lb)   SpO2 98%   BMI 25.22 kg/m    The patient is A&Ox3. Appears comfortable, sitting up at bedside.  Sensation is intact to light touch & equal bilaterally in the L2 through S1 dermatomes.  Calves are soft and non-tender.  Dorsiflexion and plantar flexion is intact bilaterally.  Appropriate flexion and extension of the toes bilaterally.   Brisk capillary refill in the toes bilaterally.   Palpable right dorsalis pedis pulse.  right knee dressing C/D/I.      Pertinent Results at Discharge     Hemoglobin   Date/Time Value Ref Range Status   04/03/2024 06:56 AM 11.4 (L) 11.7 - 15.7 g/dL Final   03/13/2024 10:37 AM 12.8 11.7 - 15.7 g/dL Final   02/15/2024 05:28 PM 13.1 11.7 - 15.7 g/dL Final     Platelet Count   Date/Time Value Ref Range Status   04/03/2024 06:56  150 - 450 10e3/uL Final   03/13/2024 10:37  150 - 450 10e3/uL Final   02/15/2024 05:28  150 - 450 10e3/uL Final       Problem List   Principal Problem:    History of revision of total knee arthroplasty      Luz Maria Villalobos PA-C/Dr. Prieto  [facility]  [phone]  Date: 4/3/2024  Time: 8:45 AM   "

## 2024-04-03 NOTE — PLAN OF CARE
Goal Outcome Evaluation:  Pt and family understood discharge instructions as evidence by nodding and asking questions.   CM arranging home care, home care will call pt. Okay to discharge.

## 2024-04-03 NOTE — PROGRESS NOTES
Physical Therapy Discharge Summary    Reason for therapy discharge:    All goals and outcomes met, no further needs identified.    Progress towards therapy goal(s). See goals on Care Plan in [facility] electronic health record for goal details.  Goals met    Therapy recommendation(s):    Continue home exercise program.

## 2024-04-03 NOTE — PLAN OF CARE
Goal Outcome Evaluation:      Problem: Adult Inpatient Plan of Care  Goal: Absence of Hospital-Acquired Illness or Injury  Intervention: Prevent and Manage VTE (Venous Thromboembolism) Risk  Recent Flowsheet Documentation  Taken 4/3/2024 0000 by Alexandra Palmer RN  VTE Prevention/Management: SCDs (sequential compression devices) on     Problem: Adult Inpatient Plan of Care  Goal: Optimal Comfort and Wellbeing  Outcome: Progressing  Intervention: Monitor Pain and Promote Comfort  Recent Flowsheet Documentation  Taken 4/2/2024 2352 by Alexandra Palmer RN  Pain Management Interventions:   medication (see MAR)   quiet environment facilitated   rest   Patient vital signs are at baseline: Yes  Patient able to ambulate as they were prior to admission or with assist devices provided by therapies during their stay:  Yes  Patient MUST void prior to discharge:  Yes  Patient able to tolerate oral intake:  Yes  Pain has adequate pain control using Oral analgesics:  Yes  Does patient have an identified :  Yes  Has goal D/C date and time been discussed with patient:  Yes     Patient is alert & oriented x4. Vital signs are stable. Patient reports intermittent nausea with ambulation. PRN PO Zofran administered. Diet is regular. Assist of 1 with gait belt and walker. Patient is tolerating pain with scheduled Tylenol. Discharge home anticipated 4/3/24.

## 2024-04-05 ENCOUNTER — PATIENT OUTREACH (OUTPATIENT)
Dept: INTERNAL MEDICINE | Facility: CLINIC | Age: 74
End: 2024-04-05
Payer: COMMERCIAL

## 2024-04-05 NOTE — TELEPHONE ENCOUNTER
IP F/U     Date: 4/3/24  Diagnosis: Revision of total knee arthroplasty  Is patient active in care coordination? No  Was patient in TCU? No    ED / Discharge Outreach Protocol    Patient Contact    Attempt # 1    Was call answered?  No.  Left message on voicemail with information to call me back.    Anny GUILLERMO

## 2024-04-06 ENCOUNTER — MEDICAL CORRESPONDENCE (OUTPATIENT)
Dept: HEALTH INFORMATION MANAGEMENT | Facility: CLINIC | Age: 74
End: 2024-04-06

## 2024-04-10 ENCOUNTER — TELEPHONE (OUTPATIENT)
Dept: INTERNAL MEDICINE | Facility: CLINIC | Age: 74
End: 2024-04-10

## 2024-04-10 NOTE — TELEPHONE ENCOUNTER
MEDICATION APPEAL APPROVED    Medication: ONDANSETRON 4 MG PO TBDP  Authorization Effective Date: 3/15/2024  Authorization Expiration Date: 12/31/2024  Appeal Insurance Company: [facility]: [id number] - [place], [place] - [address] [place] [address] [facility]  Patient Notified: YES (faxed approval info to pharmacy and notified patient via mychart message)  Comments:       Called [facility] to follow up on appeal request - Rep stated it was approved from 3/15/24-12/31/24. Will be refaxing the approval letter.

## 2024-04-10 NOTE — TELEPHONE ENCOUNTER
Hannah with [facility] calls for new orders for  physical therapy 1x a week for one week, then 2x a week for 3 weeks, and then 1 x a week for 3 weeks. Start of care was 4/6. No medication discrepancy or wounds noted besides surgical site.     Call back number [phone], OK to leave a detailed message.

## 2024-04-11 NOTE — TELEPHONE ENCOUNTER
Called Hannah with [facility]. Relayed provider's approval for the requested verbal orders.

## 2024-04-17 ENCOUNTER — PATIENT OUTREACH (OUTPATIENT)
Dept: CARE COORDINATION | Facility: CLINIC | Age: 74
End: 2024-04-17
Payer: COMMERCIAL

## 2024-04-18 ENCOUNTER — TELEPHONE (OUTPATIENT)
Dept: INTERNAL MEDICINE | Facility: CLINIC | Age: 74
End: 2024-04-18
Payer: COMMERCIAL

## 2024-04-18 DIAGNOSIS — I10 BENIGN ESSENTIAL HYPERTENSION: Primary | ICD-10-CM

## 2024-04-18 RX ORDER — AMLODIPINE BESYLATE 2.5 MG/1
2.5 TABLET ORAL DAILY
Qty: 180 TABLET | Refills: 1 | Status: SHIPPED | OUTPATIENT
Start: 2024-04-18 | End: 2024-06-10

## 2024-04-18 NOTE — TELEPHONE ENCOUNTER
Rupert from [facility] [phone] calling to report     Hypertension with increasing trend  that is outside of their parameters    Thursday 4/18/2024 BP was 171/94    Tuesday 4/16/2024 BP was 160/90

## 2024-04-18 NOTE — TELEPHONE ENCOUNTER
Called Rupert and relayed message from provider. Rupert requested this rn to call patient with the information.    Called patient and left message to call the clinic back.    When patient calls back please relay message from provider as noted below:  Mary Alice Chadwick APRN CNP Nurse Practitioner Itmxab61:25 PM       Add amlodipine 2.5 mg twice daily and see how bp is

## 2024-04-19 ENCOUNTER — TELEPHONE (OUTPATIENT)
Dept: INTERNAL MEDICINE | Facility: CLINIC | Age: 74
End: 2024-04-19
Payer: COMMERCIAL

## 2024-04-19 NOTE — TELEPHONE ENCOUNTER
[facility] * plan of care order#[id number] received via fax     Forms in  mail box for review and signature.

## 2024-04-21 ENCOUNTER — MYC MEDICAL ADVICE (OUTPATIENT)
Dept: INTERNAL MEDICINE | Facility: CLINIC | Age: 74
End: 2024-04-21
Payer: COMMERCIAL

## 2024-04-22 NOTE — TELEPHONE ENCOUNTER
Please see my chart message and advise.  Thanks      This message was sent to Mary Alice in primary care providers absence.  Does primary care provider want to respond to message.

## 2024-04-23 ENCOUNTER — MYC MEDICAL ADVICE (OUTPATIENT)
Dept: CARDIOLOGY | Facility: CLINIC | Age: 74
End: 2024-04-23
Payer: COMMERCIAL

## 2024-04-23 ENCOUNTER — TRANSCRIBE ORDERS (OUTPATIENT)
Dept: OTHER | Age: 74
End: 2024-04-23

## 2024-04-23 ENCOUNTER — MYC MEDICAL ADVICE (OUTPATIENT)
Dept: INTERNAL MEDICINE | Facility: CLINIC | Age: 74
End: 2024-04-23
Payer: COMMERCIAL

## 2024-04-23 DIAGNOSIS — M25.561 RIGHT KNEE PAIN: Primary | ICD-10-CM

## 2024-04-23 DIAGNOSIS — Z96.659 S/P REVISION OF TOTAL KNEE: ICD-10-CM

## 2024-04-23 NOTE — TELEPHONE ENCOUNTER
Suzi message reviewed by Dr. Ballesteros:     Antoine Ballesteros MD [facility] Heart Team 4  Phone Number: [phone]     Thanks for the message.  Given the dilated aorta, we do want to be a little more strict in our BP control.  Even though the pain may be contributing, I think that upping her BP regimen is probably needed, even if it's just temporary until her knee pain improves.  The amlodipine 2.5 mg BID is a good idea.  We could alternatively try increasing her losartan or carvedilol rather than adding an additional med.  However, given she has had high-edward potassium in the past and borderline low HR's, which gets in the way of higher losartan and carvedilol respectively, I think the amlodipine 2.5 BID is probably the best idea.    Thanks!  Neil    Sent pt message with recommendations.

## 2024-04-23 NOTE — TELEPHONE ENCOUNTER
Attempt # 2  Called Phone # [phone]      Was Call answered? No     Non-detailed voicemail left on April 23, 2024 11:41 AM to call clinic at: [phone].    Sent [employer] message to patient.    On Call Back:     Please relay provider wanted to add on amlodipine 2.5 mg twice daily and see how bp is.      Thank you,  James Boudreaux, Triage RN [facility]  11:41 AM 4/23/2024

## 2024-04-23 NOTE — TELEPHONE ENCOUNTER
Sent [employer] message to patient.    James Boudreaux, Triage RN [facility]  4:26 PM 4/23/2024

## 2024-04-24 NOTE — TELEPHONE ENCOUNTER
Patient can hold off on starting the amlodipine at this time.  If her cardiologist is managing her blood pressure medications, we will let them make any adjustments or changes to her medication regimen.

## 2024-04-24 NOTE — TELEPHONE ENCOUNTER
Patient aware of message from provider.  Please see most recent myc messages where cardiologist agrees with starting blood pressure medication.  Will close encounter.

## 2024-04-24 NOTE — TELEPHONE ENCOUNTER
Spoke with patient who states she wanted to discuss this medication with her cardiologist.  Cardiologist gave ok for patient to take.  Patient has started taking this medication as of 4/23/2024.

## 2024-05-01 ENCOUNTER — PATIENT OUTREACH (OUTPATIENT)
Dept: CARE COORDINATION | Facility: CLINIC | Age: 74
End: 2024-05-01
Payer: COMMERCIAL

## 2024-05-06 ASSESSMENT — ACTIVITIES OF DAILY LIVING (ADL)
KNEE_ACTIVITY_OF_DAILY_LIVING_SUM: 25
WALK: ACTIVITY IS FAIRLY DIFFICULT
PAIN: THE SYMPTOM AFFECTS MY ACTIVITY MODERATELY
GO DOWN STAIRS: ACTIVITY IS FAIRLY DIFFICULT
LIMPING: THE SYMPTOM AFFECTS MY ACTIVITY MODERATELY
STIFFNESS: THE SYMPTOM AFFECTS MY ACTIVITY SEVERELY
GO UP STAIRS: ACTIVITY IS FAIRLY DIFFICULT
STAND: ACTIVITY IS FAIRLY DIFFICULT
RAW_SCORE: 26.92
HOW_WOULD_YOU_RATE_THE_OVERALL_FUNCTION_OF_YOUR_KNEE_DURING_YOUR_USUAL_DAILY_ACTIVITIES?: ABNORMAL
SIT WITH YOUR KNEE BENT: ACTIVITY IS VERY DIFFICULT
KNEEL ON THE FRONT OF YOUR KNEE: I AM UNABLE TO DO THE ACTIVITY
AS_A_RESULT_OF_YOUR_KNEE_INJURY,_HOW_WOULD_YOU_RATE_YOUR_CURRENT_LEVEL_OF_DAILY_ACTIVITY?: ABNORMAL
STAND: ACTIVITY IS FAIRLY DIFFICULT
PAIN: THE SYMPTOM AFFECTS MY ACTIVITY MODERATELY
HOW_WOULD_YOU_RATE_THE_OVERALL_FUNCTION_OF_YOUR_KNEE_DURING_YOUR_USUAL_DAILY_ACTIVITIES?: ABNORMAL
RISE FROM A CHAIR: ACTIVITY IS MINIMALLY DIFFICULT
GIVING WAY, BUCKLING OR SHIFTING OF KNEE: I DO NOT HAVE THE SYMPTOM
SWELLING: THE SYMPTOM AFFECTS MY ACTIVITY MODERATELY
KNEE_ACTIVITY_OF_DAILY_LIVING_SCORE: 38.46
SQUAT: I AM UNABLE TO DO THE ACTIVITY
AS_A_RESULT_OF_YOUR_KNEE_INJURY,_HOW_WOULD_YOU_RATE_YOUR_CURRENT_LEVEL_OF_DAILY_ACTIVITY?: ABNORMAL
SQUAT: I AM UNABLE TO DO THE ACTIVITY
PLEASE_INDICATE_YOR_PRIMARY_REASON_FOR_REFERRAL_TO_THERAPY:: KNEE
WALK: ACTIVITY IS FAIRLY DIFFICULT
RISE FROM A CHAIR: ACTIVITY IS MINIMALLY DIFFICULT
LIMPING: THE SYMPTOM AFFECTS MY ACTIVITY MODERATELY
STIFFNESS: THE SYMPTOM AFFECTS MY ACTIVITY SEVERELY
SIT WITH YOUR KNEE BENT: ACTIVITY IS VERY DIFFICULT
GIVING WAY, BUCKLING OR SHIFTING OF KNEE: I DO NOT HAVE THE SYMPTOM
KNEEL ON THE FRONT OF YOUR KNEE: I AM UNABLE TO DO THE ACTIVITY
SWELLING: THE SYMPTOM AFFECTS MY ACTIVITY MODERATELY
GO UP STAIRS: ACTIVITY IS FAIRLY DIFFICULT
GO DOWN STAIRS: ACTIVITY IS FAIRLY DIFFICULT

## 2024-05-08 ENCOUNTER — TELEPHONE (OUTPATIENT)
Dept: INTERNAL MEDICINE | Facility: CLINIC | Age: 74
End: 2024-05-08
Payer: COMMERCIAL

## 2024-05-08 NOTE — TELEPHONE ENCOUNTER
Heather PT with [facility] is calling regarding an established patient with [facility].     Requesting orders from: Jaswinder Pal  Provider is following patient: Yes  Is this a 60-day recertification request?  No    Orders Requested    Physical Therapy  Request for early discontinuation of care   Goals have been met/progressing.      Verbal orders given.  Home Care will send orders for provider to sign.    Bren Cardona RN

## 2024-05-09 ENCOUNTER — TELEPHONE (OUTPATIENT)
Dept: INTERNAL MEDICINE | Facility: CLINIC | Age: 74
End: 2024-05-09
Payer: COMMERCIAL

## 2024-05-09 NOTE — TELEPHONE ENCOUNTER
[facility] * discharge summary received via fax     Forms in [facility] mail box for review and signature.     5

## 2024-05-09 NOTE — TELEPHONE ENCOUNTER
[facility] * verbal order  order# [id number] received via fax     Forms in  mail box for review and signature.

## 2024-05-10 ENCOUNTER — MEDICAL CORRESPONDENCE (OUTPATIENT)
Dept: HEALTH INFORMATION MANAGEMENT | Facility: CLINIC | Age: 74
End: 2024-05-10

## 2024-05-13 ENCOUNTER — THERAPY VISIT (OUTPATIENT)
Dept: PHYSICAL THERAPY | Facility: CLINIC | Age: 74
End: 2024-05-13
Attending: ORTHOPAEDIC SURGERY
Payer: COMMERCIAL

## 2024-05-13 DIAGNOSIS — M25.561 RIGHT KNEE PAIN: Primary | ICD-10-CM

## 2024-05-13 PROCEDURE — 97161 PT EVAL LOW COMPLEX 20 MIN: CPT | Mod: GP | Performed by: PHYSICAL THERAPIST

## 2024-05-13 PROCEDURE — 97110 THERAPEUTIC EXERCISES: CPT | Mod: GP | Performed by: PHYSICAL THERAPIST

## 2024-05-13 NOTE — PROGRESS NOTES
PHYSICAL THERAPY EVALUATION  Type of Visit: Evaluation    See electronic medical record for Abuse and Falls Screening details.    Subjective       Presenting condition or subjective complaint: R TKA revision (only replaced to spacer- kept the metal)  Date of onset: 04/02/24    Relevant medical history:     Dates & types of surgery: Many. (See history)  mist recent  r polyethylene exchange)    Prior diagnostic imaging/testing results: MRI; X-ray     Prior therapy history for the same diagnosis, illness or injury: Yes home care PT after this surgery        Living Environment  Social support: With family members   Type of home: House   Stairs to enter the home: No       Ramp: Yes   Stairs inside the home: No       Help at home: Home management tasks (cooking, cleaning); Home and Yard maintenance tasks  Equipment owned: Straight Cane; Walker; Grab bars; Raised toilet seat     Employment: No retired  Hobbies/Interests: shop, thifting, crafts, cookings, music events    Patient goals for therapy: driving, stairs, squatting, sleeping    Pain assessment: Pain present  Location: R knee /Rating: rest= 1/10   Worst=  6/10     Objective   KNEE:    Standing Posture: good, slightly more swollen on R    Gait: good heel to toe gait, no limp noted, cane for community, no assistive device in home    Functional:   -sit to stand: uses arms to help boost up          AROM: (* indicates patient's pain)   PROM:(over pressure)   L  R L R   Hyperextension     10 0   Extension   0 0 0 0   Flexion   132 90 132 90*     Strength:   L R   HIP     Flex 3+/5 3+/5   Ext 3+/5 3+/5   ABd 3+/5 3+/5   KNEE     QS: poor on right      Palpation: incision well healed     Swelling: mid patella: R=38cm; L 35cm;   joint line: R=35cm  L=34cm    Assessment & Plan   CLINICAL IMPRESSIONS  Medical Diagnosis: Right knee pain (M25.561)  - Primary    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  The following significant findings have been identified: Pain, Decreased ROM/flexibility, Decreased joint mobility, Decreased strength, Impaired balance, Decreased proprioception, Inflammation, Edema, Impaired gait, Impaired muscle performance, Decreased activity tolerance, and Instability. These impairments interfere with their ability to perform self care tasks, recreational activities, household chores, driving , household mobility, and community mobility as compared to previous level of function.     Clinical Decision Making (Complexity):  Clinical Presentation: Stable/Uncomplicated  Clinical Presentation Rationale: based on medical and personal factors listed in PT evaluation  Clinical Decision Making (Complexity): Low complexity    PLAN OF CARE  Treatment Interventions:  Interventions: Gait Training, Manual Therapy, Neuromuscular Re-education, Therapeutic Activity, Therapeutic Exercise    Long Term Goals     PT Goal 1  Goal Identifier: Goal 1  Goal Description: Pt will be able to go up and down stairs, without a railing, painfree  Rationale: to maximize safety and independence within the home  Target Date: 08/05/24      Frequency of Treatment: 1X/week  Duration of Treatment: 12 weeks      Education Assessment:   Learner/Method: Patient;Pictures/Video    Risks and benefits of evaluation/treatment have been explained.   Patient/Family/caregiver agrees with Plan of Care.     Evaluation Time:     PT Eval, Low Complexity Minutes (51343): 15    Signing Clinician: Lala Blood, PT      [facility]                                                                                   OUTPATIENT PHYSICAL THERAPY      PLAN OF TREATMENT FOR OUTPATIENT REHABILITATION   Patient's Last Name, First Name, Alexandra Livingston YOB: 1950   Provider's Name   [facility]   Medical Record No.  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             Physician Signature               Date    X_____________________________________________________                  Referring Provider:  Elías Prieto    Initial Assessment  See Epic Evaluation- Start of Care Date: 05/13/24

## 2024-05-19 DIAGNOSIS — M79.2 NERVE PAIN: ICD-10-CM

## 2024-05-20 ENCOUNTER — THERAPY VISIT (OUTPATIENT)
Dept: PHYSICAL THERAPY | Facility: CLINIC | Age: 74
End: 2024-05-20
Attending: ORTHOPAEDIC SURGERY
Payer: COMMERCIAL

## 2024-05-20 DIAGNOSIS — M25.561 RIGHT KNEE PAIN: Primary | ICD-10-CM

## 2024-05-20 PROCEDURE — 97140 MANUAL THERAPY 1/> REGIONS: CPT | Mod: GP | Performed by: PHYSICAL THERAPIST

## 2024-05-20 PROCEDURE — 97110 THERAPEUTIC EXERCISES: CPT | Mod: GP | Performed by: PHYSICAL THERAPIST

## 2024-05-20 RX ORDER — GABAPENTIN 100 MG/1
CAPSULE ORAL
Qty: 300 CAPSULE | Refills: 2 | Status: SHIPPED | OUTPATIENT
Start: 2024-05-20

## 2024-05-20 RX ORDER — GABAPENTIN 300 MG/1
600 CAPSULE ORAL AT BEDTIME
Qty: 200 CAPSULE | Refills: 2 | Status: SHIPPED | OUTPATIENT
Start: 2024-05-20

## 2024-05-25 ENCOUNTER — OFFICE VISIT (OUTPATIENT)
Dept: URGENT CARE | Facility: URGENT CARE | Age: 74
End: 2024-05-25
Payer: COMMERCIAL

## 2024-05-25 VITALS
SYSTOLIC BLOOD PRESSURE: 148 MMHG | HEART RATE: 62 BPM | TEMPERATURE: 97.5 F | OXYGEN SATURATION: 99 % | BODY MASS INDEX: 24.75 KG/M2 | DIASTOLIC BLOOD PRESSURE: 79 MMHG | WEIGHT: 158 LBS

## 2024-05-25 DIAGNOSIS — N39.0 RECURRENT UTI: Primary | ICD-10-CM

## 2024-05-25 DIAGNOSIS — R30.0 DYSURIA: ICD-10-CM

## 2024-05-25 LAB
ALBUMIN UR-MCNC: NEGATIVE MG/DL
APPEARANCE UR: ABNORMAL
BACTERIA #/AREA URNS HPF: ABNORMAL /HPF
BILIRUB UR QL STRIP: NEGATIVE
COLOR UR AUTO: YELLOW
GLUCOSE UR STRIP-MCNC: NEGATIVE MG/DL
HGB UR QL STRIP: ABNORMAL
KETONES UR STRIP-MCNC: NEGATIVE MG/DL
LEUKOCYTE ESTERASE UR QL STRIP: ABNORMAL
NITRATE UR QL: NEGATIVE
PH UR STRIP: 7 [PH] (ref 5–7)
RBC #/AREA URNS AUTO: ABNORMAL /HPF
SP GR UR STRIP: 1.01 (ref 1–1.03)
SQUAMOUS #/AREA URNS AUTO: ABNORMAL /LPF
UROBILINOGEN UR STRIP-ACNC: 0.2 E.U./DL
WBC #/AREA URNS AUTO: ABNORMAL /HPF
WBC CLUMPS #/AREA URNS HPF: PRESENT /HPF

## 2024-05-25 PROCEDURE — 87086 URINE CULTURE/COLONY COUNT: CPT | Performed by: PHYSICIAN ASSISTANT

## 2024-05-25 PROCEDURE — 87186 SC STD MICRODIL/AGAR DIL: CPT | Performed by: PHYSICIAN ASSISTANT

## 2024-05-25 PROCEDURE — 81001 URINALYSIS AUTO W/SCOPE: CPT

## 2024-05-25 PROCEDURE — 87088 URINE BACTERIA CULTURE: CPT | Performed by: PHYSICIAN ASSISTANT

## 2024-05-25 PROCEDURE — 99214 OFFICE O/P EST MOD 30 MIN: CPT | Performed by: PHYSICIAN ASSISTANT

## 2024-05-25 RX ORDER — CEPHALEXIN 500 MG/1
500 CAPSULE ORAL 2 TIMES DAILY
Qty: 14 CAPSULE | Refills: 0 | Status: SHIPPED | OUTPATIENT
Start: 2024-05-25 | End: 2024-05-31

## 2024-05-25 NOTE — PROGRESS NOTES
SUBJECTIVE:  Alexandra Thomas is a 73 year old female who comes in with concerns for UTI.  Patient states that last night she started to develop some pressure with urination.  She does have a history of urinary tract infections with similar symptoms.  Last week she had some pressure with urination but push fluids and symptoms resolved and now have returned since last night.  She denies any nausea, vomiting, fevers or flank pain.  She has not tried any medications.  She is otherwise at baseline health.  Patient does have a history of underlying Sjogren's    Past Medical History:   Diagnosis Date    Arrhythmia 10/01/2019    Arthritis     hips    Ascending aortic aneurysm (H24)     4.2 cm   Ascending    Biceps tendon rupture     Depression 08/06/2019    Diverticulitis of colon     Gastroesophageal reflux disease     Esophageal spasms.    Gastroparesis     Heart murmur     High cholesterol     History of GI bleed     Hypertension     PONV (postoperative nausea and vomiting)     Sjogren's syndrome (H24)      Patient Active Problem List   Diagnosis    Benign essential hypertension    Gastroparesis    Thoracic aortic aneurysm without rupture (H24)    Gastroesophageal reflux disease without esophagitis    Pain syndrome, chronic    Sicca syndrome (H24)    S/P total hip arthroplasty    Lattice degeneration of right retina    Hyperlipidemia, acquired    Mixed incontinence    Pelvic floor dysfunction    Recurrent UTI    Other chest pain    Acquired hypothyroidism    Arrhythmia    Chronic diarrhea    Cervical spondylosis with radiculopathy    Degeneration of lumbar or lumbosacral intervertebral disc    Depression    DJD (degenerative joint disease) of thoracic spine    Arthropathy of right knee    Enthesopathy    Ganglion of tendon sheath    Hearing loss    History of colonic polyps    Hx of cold sores    IBS (irritable bowel syndrome)    Migraine aura without headache    PAF (paroxysmal atrial fibrillation) (H)    Primary  localized osteoarthrosis, lower leg    Primary osteoarthritis, left shoulder    Pulmonary nodule    Pure hypercholesterolemia    PVC's (premature ventricular contractions)    Rupture of right long head biceps tendon    Trigger ring finger of right hand    Tendonitis of right hip flexor    History of revision of total knee arthroplasty     Current Outpatient Medications   Medication Sig Dispense Refill    acetaminophen (TYLENOL) 325 MG tablet Take 2 tablets (650 mg) by mouth every 4 hours as needed for other (mild pain) 100 tablet 0    amLODIPine (NORVASC) 2.5 MG tablet Take 1 tablet (2.5 mg) by mouth daily 180 tablet 1    carvedilol (COREG) 6.25 MG tablet Take 1 tablet (6.25 mg) by mouth 2 times daily (with meals) 180 tablet 3    cycloSPORINE (RESTASIS) 0.05 % ophthalmic emulsion Place 1 drop into both eyes 2 times daily 90 each 3    gabapentin (NEURONTIN) 100 MG capsule TAKE 2 CAPSULES BY MOUTH IN THE  MORNING AND 1 ADDITIONAL CAPSULE AT NOON 300 capsule 2    gabapentin (NEURONTIN) 300 MG capsule TAKE 2 CAPSULES BY MOUTH AT  BEDTIME 200 capsule 2    losartan (COZAAR) 50 MG tablet Take 1 tablet (50 mg) by mouth 2 times daily 180 tablet 2    Magnesium Oxide 250 MG TABS Take 1 tablet by mouth at bedtime      multivitamin w/minerals (THERA-VIT-M) tablet Take 1 tablet by mouth daily      nitroGLYcerin (NITROSTAT) 0.4 MG sublingual tablet Place 1 tablet (0.4 mg) under the tongue every 5 minutes as needed for chest pain For chest pain place 1 tablet under the tongue every 5 minutes for 3 doses. If symptoms persist 5 minutes after 1st dose call 911. 10 tablet 3    omeprazole (PRILOSEC) 40 MG DR capsule Take 40 mg by mouth 2 times daily      rosuvastatin (CRESTOR) 10 MG tablet Take 1 tablet (10 mg) by mouth daily 90 tablet 2    Vitamin D3 (CHOLECALCIFEROL) 25 mcg (1000 units) tablet Take 1 tablet by mouth daily       No current facility-administered medications for this visit.     Social History     Socioeconomic History     Marital status:      Spouse name: Not on file    Number of children: Not on file    Years of education: Not on file    Highest education level: Not on file   Occupational History    Not on file   Tobacco Use    Smoking status: Never    Smokeless tobacco: Never   Vaping Use    Vaping status: Never Used   Substance and Sexual Activity    Alcohol use: Not Currently    Drug use: Never    Sexual activity: Not Currently     Birth control/protection: Post-menopausal, Female Surgical, None   Other Topics Concern    Parent/sibling w/ CABG, MI or angioplasty before 65F 55M? No   Social History Narrative    Not on file     Social Determinants of Health     Financial Resource Strain: Low Risk  (1/7/2024)    Financial Resource Strain     Within the past 12 months, have you or your family members you live with been unable to get utilities (heat, electricity) when it was really needed?: No   Food Insecurity: Low Risk  (1/7/2024)    Food Insecurity     Within the past 12 months, did you worry that your food would run out before you got money to buy more?: No     Within the past 12 months, did the food you bought just not last and you didn t have money to get more?: No   Transportation Needs: Low Risk  (1/7/2024)    Transportation Needs     Within the past 12 months, has lack of transportation kept you from medical appointments, getting your medicines, non-medical meetings or appointments, work, or from getting things that you need?: No   Physical Activity: Insufficiently Active (11/29/2022)    Exercise Vital Sign     Days of Exercise per Week: 1 day     Minutes of Exercise per Session: 10 min   Stress: No Stress Concern Present (11/29/2022)    [employer] - Occupational Stress Questionnaire     Feeling of Stress : Not at all   Social Connections: Moderately Isolated (11/29/2022)    Social Connection and Isolation Panel [NHANES]     Frequency of Communication with Friends and Family: More than  three times a week     Frequency of Social Gatherings with Friends and Family: More than three times a week     Attends Buddhism Services: More than 4 times per year     Active Member of Clubs or Organizations: No     Attends Club or Organization Meetings: Not on file     Marital Status:    Interpersonal Safety: Low Risk  (1/12/2024)    Interpersonal Safety     Do you feel physically and emotionally safe where you currently live?: Yes     Within the past 12 months, have you been hit, slapped, kicked or otherwise physically hurt by someone?: No     Within the past 12 months, have you been humiliated or emotionally abused in other ways by your partner or ex-partner?: No   Housing Stability: Low Risk  (1/7/2024)    Housing Stability     Do you have housing? : Yes     Are you worried about losing your housing?: No     ROS  negative other than stated above    Exam:  GENERAL APPEARANCE: healthy, alert and no distress  EYES: EOMI,  PERRL  RESP: lungs clear to auscultation - no rales, rhonchi or wheezes  CV: regular rates and rhythm, normal S1 S2, no S3 or S4 and no murmur, click or rub -  ABDOMEN: Soft and nontender.  No CVA tenderness noted    Results for orders placed or performed in visit on 05/25/24   UA Macroscopic with reflex to Microscopic and Culture - Clinic Collect     Status: Abnormal    Specimen: Urine, Clean Catch   Result Value Ref Range    Color Urine Yellow Colorless, Straw, Light Yellow, Yellow    Appearance Urine Slightly Cloudy (A) Clear    Glucose Urine Negative Negative mg/dL    Bilirubin Urine Negative Negative    Ketones Urine Negative Negative mg/dL    Specific Gravity Urine 1.010 1.003 - 1.035    Blood Urine Trace (A) Negative    pH Urine 7.0 5.0 - 7.0    Protein Albumin Urine Negative Negative mg/dL    Urobilinogen Urine 0.2 0.2, 1.0 E.U./dL    Nitrite Urine Negative Negative    Leukocyte Esterase Urine Moderate (A) Negative   Urine Microscopic Exam     Status: Abnormal   Result Value Ref  Range    Bacteria Urine Moderate (A) None Seen /HPF    RBC Urine 0-2 0-2 /HPF /HPF    WBC Urine 10-25 (A) 0-5 /HPF /HPF    Squamous Epithelials Urine Moderate (A) None Seen /LPF    WBC Clumps Urine Present (A) None Seen /HPF     assessment/plan:  (N39.0) Recurrent UTI  (primary encounter diagnosis)  Comment:   Plan: cephALEXin (KEFLEX) 500 MG capsule          Patient with UTI related symptoms showing evidence for urinary tract infection but no signs of pyelonephritis.  Does have history of recurrent UTIs.  Past cultures and sensitivities were reviewed.  Will treat with Keflex as directed due to allergies and sensitivities.  Continue to push fluids.  Red flag signs were reviewed and will follow-up with primary if symptoms worsen or new symptoms develop.    (R30.0) Dysuria  Comment:   Plan: UA Macroscopic with reflex to Microscopic and         Culture - Clinic Collect, Urine Microscopic         Exam, Urine Culture

## 2024-05-28 ENCOUNTER — THERAPY VISIT (OUTPATIENT)
Dept: PHYSICAL THERAPY | Facility: CLINIC | Age: 74
End: 2024-05-28
Attending: ORTHOPAEDIC SURGERY
Payer: COMMERCIAL

## 2024-05-28 DIAGNOSIS — M25.561 RIGHT KNEE PAIN: Primary | ICD-10-CM

## 2024-05-28 LAB — BACTERIA UR CULT: ABNORMAL

## 2024-05-28 PROCEDURE — 97110 THERAPEUTIC EXERCISES: CPT | Mod: GP | Performed by: PHYSICAL THERAPIST

## 2024-05-28 NOTE — RESULT ENCOUNTER NOTE
Taking keflex. Preliminary Klebsiella species. Sensitivities pending. Please call to see symptoms are improving

## 2024-05-30 ENCOUNTER — MYC MEDICAL ADVICE (OUTPATIENT)
Dept: INTERNAL MEDICINE | Facility: CLINIC | Age: 74
End: 2024-05-30
Payer: COMMERCIAL

## 2024-05-30 ENCOUNTER — TELEPHONE (OUTPATIENT)
Dept: NURSING | Facility: CLINIC | Age: 74
End: 2024-05-30
Payer: COMMERCIAL

## 2024-05-30 DIAGNOSIS — B37.0 THRUSH: ICD-10-CM

## 2024-05-30 DIAGNOSIS — N39.0 RECURRENT UTI: Primary | ICD-10-CM

## 2024-05-30 NOTE — TELEPHONE ENCOUNTER
Urine culture grew a Klebsiella species of bacteria.  It does appear to be sensitive to cephalosporins.  I would recommend that she continue with the cephalexin as currently prescribed.

## 2024-05-30 NOTE — TELEPHONE ENCOUNTER
"Pt. Returning call to clinic, has not heard back from [facility] this am at 0948, transferred caller to [facility] nurse line \"Santy\",     Vandana Dahl RN, Triage Nurse Advisor, 5/30/2024 5:02 PM  "

## 2024-05-30 NOTE — TELEPHONE ENCOUNTER
Patient is calling waiting to hear back about the results of her UA. Sensitivities are back, waiting on provider notes. Will route to PCP and UC where patient was seen.

## 2024-05-30 NOTE — TELEPHONE ENCOUNTER
Attempt # 1  Called Phone # [phone]      Was Call answered? No    Called and left patient a detailed voice mail message (indicated okay to do so in initial message)  on May 30, 2024 5:33 PM to relay Dr. Pal's message. Instructed to call back with any further questions/concerns.    Thank you,  James Boudreaux, Triage RN [facility]  5:33 PM 5/30/2024

## 2024-05-30 NOTE — TELEPHONE ENCOUNTER
Patient returning missed call from . Patient seen on 5/25 and treated for UTI with Cephalexin. Sensitivities have resulted.   Routing to [facility] for call back.   Taylor Whitmore RN   05/30/24 8:02 AM  [facility] Nurse Advisor

## 2024-05-31 RX ORDER — SULFAMETHOXAZOLE/TRIMETHOPRIM 800-160 MG
1 TABLET ORAL 2 TIMES DAILY
Qty: 14 TABLET | Refills: 0 | Status: SHIPPED | OUTPATIENT
Start: 2024-05-31 | End: 2024-07-11

## 2024-05-31 NOTE — TELEPHONE ENCOUNTER
In telephone encounter from 05/30/2024 patient instructed to continue on Cephalexin.     Patient responds via MyChart she is still symptomatic.     Please advise, change to antibiotic?    Thank you,  James Boudreaux, Triage RN [facility]  8:13 AM 5/31/2024

## 2024-05-31 NOTE — TELEPHONE ENCOUNTER
Given that she is still reporting symptoms after almost a week of antibiotic therapy, I will submit a prescription for Bactrim to replace her current antibiotic.

## 2024-05-31 NOTE — TELEPHONE ENCOUNTER
Sent [facility]US message to patient.    Thank you,  James Boudreaux, Triage RN [facility]it  8:20 AM 5/31/2024

## 2024-06-03 RX ORDER — NYSTATIN 100000/ML
500000 SUSPENSION, ORAL (FINAL DOSE FORM) ORAL 4 TIMES DAILY
Qty: 60 ML | Refills: 0 | Status: SHIPPED | OUTPATIENT
Start: 2024-06-03

## 2024-06-03 NOTE — TELEPHONE ENCOUNTER
Per Dr Solomon- labs are within normal range, pt should come in for follow up if naproxen is not helping. Pt notified and agrees with plan. Added pt to 1/3/19 at 8:40am.   Rx for nystatin suspension submitted to pharmacy for suspected thrush.

## 2024-06-06 ENCOUNTER — THERAPY VISIT (OUTPATIENT)
Dept: PHYSICAL THERAPY | Facility: CLINIC | Age: 74
End: 2024-06-06
Payer: COMMERCIAL

## 2024-06-06 DIAGNOSIS — M25.561 RIGHT KNEE PAIN: Primary | ICD-10-CM

## 2024-06-06 PROCEDURE — 97530 THERAPEUTIC ACTIVITIES: CPT | Mod: GP | Performed by: PHYSICAL THERAPIST

## 2024-06-06 PROCEDURE — 97110 THERAPEUTIC EXERCISES: CPT | Mod: GP | Performed by: PHYSICAL THERAPIST

## 2024-06-10 DIAGNOSIS — I10 BENIGN ESSENTIAL HYPERTENSION: ICD-10-CM

## 2024-06-10 RX ORDER — AMLODIPINE BESYLATE 2.5 MG/1
2.5 TABLET ORAL DAILY
Qty: 180 TABLET | Refills: 1 | Status: SHIPPED | OUTPATIENT
Start: 2024-06-10 | End: 2024-06-13

## 2024-06-10 NOTE — TELEPHONE ENCOUNTER
Pt is asking for this to be sent to mail order -   Please advise   Thank you     Pema Palmer RN, BSN  [facility]

## 2024-06-12 DIAGNOSIS — H16.221 KERATITIS SICCA, RIGHT EYE: ICD-10-CM

## 2024-06-12 DIAGNOSIS — M35.01 SJOGREN'S SYNDROME WITH KERATOCONJUNCTIVITIS SICCA (H): ICD-10-CM

## 2024-06-13 ENCOUNTER — OFFICE VISIT (OUTPATIENT)
Dept: INTERNAL MEDICINE | Facility: CLINIC | Age: 74
End: 2024-06-13
Payer: COMMERCIAL

## 2024-06-13 ENCOUNTER — MYC MEDICAL ADVICE (OUTPATIENT)
Dept: OPTOMETRY | Facility: CLINIC | Age: 74
End: 2024-06-13

## 2024-06-13 VITALS
OXYGEN SATURATION: 99 % | SYSTOLIC BLOOD PRESSURE: 114 MMHG | TEMPERATURE: 97.6 F | HEIGHT: 67 IN | HEART RATE: 68 BPM | RESPIRATION RATE: 16 BRPM | WEIGHT: 157 LBS | BODY MASS INDEX: 24.64 KG/M2 | DIASTOLIC BLOOD PRESSURE: 74 MMHG

## 2024-06-13 DIAGNOSIS — B37.0 THRUSH: ICD-10-CM

## 2024-06-13 DIAGNOSIS — K31.84 GASTROPARESIS: ICD-10-CM

## 2024-06-13 DIAGNOSIS — F32.A DEPRESSION, UNSPECIFIED DEPRESSION TYPE: ICD-10-CM

## 2024-06-13 DIAGNOSIS — R29.898 LEG WEAKNESS, BILATERAL: Primary | ICD-10-CM

## 2024-06-13 DIAGNOSIS — I10 BENIGN ESSENTIAL HYPERTENSION: ICD-10-CM

## 2024-06-13 DIAGNOSIS — R53.83 FATIGUE, UNSPECIFIED TYPE: ICD-10-CM

## 2024-06-13 PROBLEM — E78.00 PURE HYPERCHOLESTEROLEMIA: Status: RESOLVED | Noted: 2020-06-01 | Resolved: 2024-06-13

## 2024-06-13 PROBLEM — R07.89 OTHER CHEST PAIN: Status: RESOLVED | Noted: 2023-03-30 | Resolved: 2024-06-13

## 2024-06-13 PROBLEM — I49.9 ARRHYTHMIA: Status: RESOLVED | Noted: 2019-10-01 | Resolved: 2024-06-13

## 2024-06-13 LAB
ALBUMIN SERPL BCG-MCNC: 4.4 G/DL (ref 3.5–5.2)
ALP SERPL-CCNC: 94 U/L (ref 40–150)
ALT SERPL W P-5'-P-CCNC: 18 U/L (ref 0–50)
ANION GAP SERPL CALCULATED.3IONS-SCNC: 10 MMOL/L (ref 7–15)
AST SERPL W P-5'-P-CCNC: 31 U/L (ref 0–45)
BASOPHILS # BLD AUTO: 0 10E3/UL (ref 0–0.2)
BASOPHILS NFR BLD AUTO: 1 %
BILIRUB SERPL-MCNC: 0.4 MG/DL
BUN SERPL-MCNC: 20.8 MG/DL (ref 8–23)
CALCIUM SERPL-MCNC: 9.6 MG/DL (ref 8.8–10.2)
CHLORIDE SERPL-SCNC: 104 MMOL/L (ref 98–107)
CK SERPL-CCNC: 40 U/L (ref 26–192)
CREAT SERPL-MCNC: 0.98 MG/DL (ref 0.51–0.95)
DEPRECATED HCO3 PLAS-SCNC: 27 MMOL/L (ref 22–29)
EGFRCR SERPLBLD CKD-EPI 2021: 60 ML/MIN/1.73M2
EOSINOPHIL # BLD AUTO: 0.1 10E3/UL (ref 0–0.7)
EOSINOPHIL NFR BLD AUTO: 3 %
ERYTHROCYTE [DISTWIDTH] IN BLOOD BY AUTOMATED COUNT: 11.9 % (ref 10–15)
GLUCOSE SERPL-MCNC: 93 MG/DL (ref 70–99)
HCT VFR BLD AUTO: 40.6 % (ref 35–47)
HGB BLD-MCNC: 13.6 G/DL (ref 11.7–15.7)
IMM GRANULOCYTES # BLD: 0 10E3/UL
IMM GRANULOCYTES NFR BLD: 0 %
LYMPHOCYTES # BLD AUTO: 2.2 10E3/UL (ref 0.8–5.3)
LYMPHOCYTES NFR BLD AUTO: 50 %
MCH RBC QN AUTO: 32 PG (ref 26.5–33)
MCHC RBC AUTO-ENTMCNC: 33.5 G/DL (ref 31.5–36.5)
MCV RBC AUTO: 96 FL (ref 78–100)
MONOCYTES # BLD AUTO: 0.4 10E3/UL (ref 0–1.3)
MONOCYTES NFR BLD AUTO: 9 %
NEUTROPHILS # BLD AUTO: 1.7 10E3/UL (ref 1.6–8.3)
NEUTROPHILS NFR BLD AUTO: 38 %
PLATELET # BLD AUTO: 213 10E3/UL (ref 150–450)
POTASSIUM SERPL-SCNC: 4.2 MMOL/L (ref 3.4–5.3)
PROT SERPL-MCNC: 6.6 G/DL (ref 6.4–8.3)
RBC # BLD AUTO: 4.25 10E6/UL (ref 3.8–5.2)
SODIUM SERPL-SCNC: 141 MMOL/L (ref 135–145)
T4 FREE SERPL-MCNC: 1.11 NG/DL (ref 0.9–1.7)
TSH SERPL DL<=0.005 MIU/L-ACNC: 5.95 UIU/ML (ref 0.3–4.2)
WBC # BLD AUTO: 4.4 10E3/UL (ref 4–11)

## 2024-06-13 PROCEDURE — 82550 ASSAY OF CK (CPK): CPT | Performed by: INTERNAL MEDICINE

## 2024-06-13 PROCEDURE — 80053 COMPREHEN METABOLIC PANEL: CPT | Performed by: INTERNAL MEDICINE

## 2024-06-13 PROCEDURE — 84439 ASSAY OF FREE THYROXINE: CPT | Performed by: INTERNAL MEDICINE

## 2024-06-13 PROCEDURE — 36415 COLL VENOUS BLD VENIPUNCTURE: CPT | Performed by: INTERNAL MEDICINE

## 2024-06-13 PROCEDURE — 99214 OFFICE O/P EST MOD 30 MIN: CPT | Performed by: INTERNAL MEDICINE

## 2024-06-13 PROCEDURE — 84443 ASSAY THYROID STIM HORMONE: CPT | Performed by: INTERNAL MEDICINE

## 2024-06-13 PROCEDURE — 85025 COMPLETE CBC W/AUTO DIFF WBC: CPT | Performed by: INTERNAL MEDICINE

## 2024-06-13 RX ORDER — CYCLOSPORINE 0.5 MG/ML
1 EMULSION OPHTHALMIC 2 TIMES DAILY
Qty: 90 EACH | Refills: 3 | Status: SHIPPED | OUTPATIENT
Start: 2024-06-13 | End: 2024-07-11

## 2024-06-13 RX ORDER — FLUCONAZOLE 200 MG/1
200 TABLET ORAL DAILY
Qty: 14 TABLET | Refills: 0 | Status: SHIPPED | OUTPATIENT
Start: 2024-06-13

## 2024-06-13 RX ORDER — AMLODIPINE BESYLATE 2.5 MG/1
2.5 TABLET ORAL DAILY
Qty: 180 TABLET | Refills: 1 | Status: SHIPPED | OUTPATIENT
Start: 2024-06-13 | End: 2024-07-15

## 2024-06-13 NOTE — PATIENT INSTRUCTIONS
Labs for evaluation of leg weakness and fatigue are pending.    Therapy referral for depression.    GI referral for gastroparesis.    Will proceed with course of fluconazole for thrush.

## 2024-06-13 NOTE — PROGRESS NOTES
Assessment & Plan     Leg weakness, bilateral and fatigue, unspecified type  At this time, we did discuss potential etiologies of the intermittent symptoms she has been experiencing in her lower extremities.  We did discuss possibility of anemia, electrolyte abnormalities, glucose abnormalities, and thyroid disease causing her symptoms.  I also briefly discussed with her that given that she has had some issues with lumbar degenerative disc disease, it is possible that her symptoms could be related to those issues in her lower back patient did wish to proceed with the lab test for the initial step in evaluation of this issue.  She will be contacted once results are available for review.  - Comprehensive metabolic panel (BMP + Alb, Alk Phos, ALT, AST, Total. Bili, TP); Future  - CBC with platelets and differential; Future  - CK total; Future  - TSH with free T4 reflex    Depression, unspecified depression type  Patient was reluctant to try any antidepressant as she did report feeling unusual when taking medication for mood previously.  We did discuss possibility of her establishing a relationship with a therapist to discuss her issues with her mood.  Patient was in agreement, and a referral was placed for her today.  She did not complete any PHQ-9 score sheets today.  - Adult Mental Health  Referral; Future    Gastroparesis  Given her chronic and multiple gastrointestinal conditions, a referral to gastroenterology was placed for continued evaluation of her chronic conditions.  - Adult GI  Referral - Consult Only; Future    Thrush  Given that she has not responded to nystatin solution for treatment of thrush, we did elect proceed with a short course of systemic therapy.  Patient will be placed on fluconazole 200 mg by mouth once per day for 14 days.  Side effects of fluconazole use were reviewed.  We will monitor response to treatment.  - fluconazole (DIFLUCAN) 200 MG tablet; Take 1 tablet (200 mg)  by mouth daily    Benign essential hypertension  Patient blood pressure is currently under good control.  She will continue her amlodipine at 2.5 mg daily for the time being.  Patient will also continue her losartan at 50 mg daily and carvedilol 6.25 mg twice per day.  She was encouraged to monitor blood pressure closely outside the clinic setting.  - amLODIPine (NORVASC) 2.5 MG tablet; Take 1 tablet (2.5 mg) by mouth daily      See Patient Instructions    Julia Morris is a 73 year old, presenting for the following health issues:  Referral and Follow Up (Thrush)        6/13/2024     6:52 AM   Additional Questions   Roomed by TESS Acuña     Patient is a 73-year-old  female with a complicated past medical history who presents to the clinic with multiple concerns.  Over the past few weeks patient has been dealing with a case of thrush.  She was previously prescribed a course of nystatin solution for treatment.  Unfortunately, patient has continued to have difficulties with a white film in the back of her throat.  She is wondering what else can be done to help resolve this issue.  Patient does have a complicated gastrointestinal history including GERD, gastroparesis, and esophageal spasm.  Patient did recently relocated to [place] approximately 2 years ago, and she would like to establish with a local gastroenterologist for further evaluation and monitoring of her chronic conditions.  Patient goes on to report that she has been dealing with episodes of intermittent weakness and fatigue involving her lower extremities.  Patient states that she will have random episodes where her lower portion of her legs feel quite tired and very weak.  Patient states that she has had issues with this in the past, but it seems to be occurring much more frequently.  She has been unable to identify any trigger factors for these episodes.  Patient states that she has not been following secondary to the weakness and  fatigue in her legs.  She does have a history of lumbar degenerative disc disease status postlaminectomy several years ago.  Patient does report chronic low back pain, but she does not feel that it is changed in character.  Patient has not had any change in her bowel or bladder habits.  Patient does not report any issues with numbness or tingling in her lower extremities.  She did recently have right knee surgery in April 2024, but her symptoms did begin prior to that procedure.  She goes on to report that she has been having some difficulties with depression since relocating to [place] approximately 2 years ago.  Patient states that she is also noted that her mood has gotten substantially worse over the past 2 months while dealing with her persistent knee pain.  She reports that her mood is down and she has no motivation to do anything.  Patient states that she is sleeping well.  She reports that her appetite has remained stable.  Patient does report bouts of anhedonia, but she denies any thoughts of self-harm or harm to others.  Patient states that she was previously on an antidepressant a few years ago after the passing of a close family member.  She does feel that her mood has gotten to the point where she should try to take some steps to correct the issue.    History of Present Illness       Reason for visit:  Tongue coating and lower leg weakness  Symptom onset:  1-2 weeks ago  Symptoms include:  Uncomfortable Coating on tongue.    Lower leg/feel intermittent weakness. Both l&r.  This has happened in the past but much more frequent.  Sometimes have to sit cheryl as it feels like i cant stand and occasionally feel a little faint  Symptom intensity:  Moderate  Symptom progression:  Staying the same  Had these symptoms before:  Yes  Has tried/received treatment for these symptoms:  No  What makes it worse:  No  What makes it better:  No    She eats 4 or more servings of fruits and vegetables daily.She consumes 0  "sweetened beverage(s) daily.She exercises with enough effort to increase her heart rate 10 to 19 minutes per day.  She exercises with enough effort to increase her heart rate 3 or less days per week.   She is taking medications regularly.        Review of Systems  CONSTITUTIONAL: NEGATIVE for fever, chills, change in weight  EYES: NEGATIVE for vision changes or irritation  ENT/MOUTH: NEGATIVE for ear, mouth and throat problems  RESP: NEGATIVE for significant cough or SOB  CV: NEGATIVE for chest pain, palpitations or peripheral edema  GI: NEGATIVE for nausea, abdominal pain, heartburn, or change in bowel habits  : NEGATIVE for frequency, dysuria, or hematuria  MUSCULOSKELETAL: Positive for chronic low back and knee pain.  NEURO: Positive for weakness in the lower extremities.  PSYCHIATRIC: Positive for depressed mood, lack of motivation, and anhedonia.      Objective    /74   Pulse 68   Temp 97.6  F (36.4  C)   Resp 16   Ht 1.702 m (5' 7\")   Wt 71.2 kg (157 lb)   SpO2 99%   Breastfeeding No   BMI 24.59 kg/m    Body mass index is 24.59 kg/m .  Physical Exam  Vitals reviewed.   Constitutional:       Appearance: Normal appearance.   HENT:      Head: Normocephalic and atraumatic.      Right Ear: Tympanic membrane, ear canal and external ear normal.      Left Ear: Tympanic membrane, ear canal and external ear normal.      Mouth/Throat:      Mouth: Mucous membranes are moist.      Comments: Whitish material noted along posterior buccal mucosa bilaterally.  Eyes:      Extraocular Movements: Extraocular movements intact.      Conjunctiva/sclera: Conjunctivae normal.      Pupils: Pupils are equal, round, and reactive to light.   Cardiovascular:      Rate and Rhythm: Normal rate and regular rhythm.      Pulses: Normal pulses.      Heart sounds: Normal heart sounds.   Pulmonary:      Effort: Pulmonary effort is normal.      Breath sounds: Normal breath sounds.   Musculoskeletal:      Cervical back: Normal, " normal range of motion and neck supple.      Thoracic back: Normal.      Lumbar back: Tenderness present. Negative right straight leg raise test and negative left straight leg raise test.        Back:       Right lower leg: Normal. No swelling or tenderness. No edema.      Left lower leg: No swelling or tenderness. No edema.      Right ankle: Normal.      Left ankle: Normal.   Skin:     General: Skin is warm and dry.      Capillary Refill: Capillary refill takes less than 2 seconds.   Neurological:      General: No focal deficit present.      Mental Status: She is alert and oriented to person, place, and time.   Psychiatric:         Attention and Perception: Attention and perception normal.         Mood and Affect: Mood is depressed. Affect is flat.         Speech: Speech normal.      Comments: No mental health score sheets completed today.     Diagnostic testing: CMP, CBC, CK, and TSH are pending.        Signed Electronically by: Jaswinder Pal MD

## 2024-06-19 ENCOUNTER — TELEPHONE (OUTPATIENT)
Dept: CARDIOLOGY | Facility: CLINIC | Age: 74
End: 2024-06-19
Payer: COMMERCIAL

## 2024-06-19 DIAGNOSIS — R06.09 DOE (DYSPNEA ON EXERTION): ICD-10-CM

## 2024-06-19 DIAGNOSIS — I48.0 PAF (PAROXYSMAL ATRIAL FIBRILLATION) (H): ICD-10-CM

## 2024-06-19 DIAGNOSIS — I71.21 ANEURYSM OF ASCENDING AORTA WITHOUT RUPTURE (H): Primary | ICD-10-CM

## 2024-06-19 DIAGNOSIS — I77.810 ASCENDING AORTA DILATION (H): ICD-10-CM

## 2024-06-19 NOTE — TELEPHONE ENCOUNTER
Spoke with patient and she said she has noticed some SOB with exertion over the last 3-4 weeks. Patient stated it has been mild but happens with even minimal exertion like stairs. It has stayed the same, not gotten worse. Pt has been helping move her sister and noticed with more intense exertion the SOB worsens. The SOB resolves quickly at rest. Denies chest pain, LE swelling, SOB at rest. Patient states that sometimes her lower legs and feet feel weak and she mentioned that to her PCP who recommended a lower back MRI but she has not had that done yet. Pt states very occasionally she has gotten a little lightheaded with standing quickly.  She gets her BP at home and does not have the recordings with her but says they are good. Pt is due for Echo, labs and visit in October and she is wondering if she should move those up or have any other testing done. Will route to Dr. Ballesteros to review.

## 2024-06-19 NOTE — TELEPHONE ENCOUNTER
Health Call Center    Phone Message    May a detailed message be left on voicemail: yes     Reason for Call: Symptoms or Concerns     If patient has red-flag symptoms, warm transfer to triage line    Current symptom or concern: Pt states over the last 3-4 weeks she has noticed herself having more shortness of breath while exerting than usual for her. She is wondering if Dr. Ballesteros would recommend doing testing for October such as echo and labs sooner or seeing her sooner because of this. Pt is out of town at the moment and will not return until 06/26/24    Symptoms have been present for:  3-4 week(s)    Has patient previously been seen for this? No    Are there any new or worsening symptoms? Yes:     Action Taken: Other: Cardiology    Travel Screening: Not Applicable     Date of Service:             Thank you!  [facility]

## 2024-06-20 NOTE — TELEPHONE ENCOUNTER
Antoine Ballesteros MD  You; [facility] Heart Team 416 hours ago (4:57 PM)       Yes, let's see if we can move all of her planned testing / visit up as soon as possible, and also let's change her TTE to an exercise stress echo.  And of course usual ER precautions if symptoms continue to worsen.    Thanks,  Neil     Spoke with patient and she would like to have the exercise stress echo and move  up labs and OV. Message sent to scheduling to help set up.

## 2024-06-27 ENCOUNTER — THERAPY VISIT (OUTPATIENT)
Dept: PHYSICAL THERAPY | Facility: CLINIC | Age: 74
End: 2024-06-27
Payer: COMMERCIAL

## 2024-06-27 DIAGNOSIS — M25.561 RIGHT KNEE PAIN: Primary | ICD-10-CM

## 2024-06-27 PROCEDURE — 97110 THERAPEUTIC EXERCISES: CPT | Mod: GP | Performed by: PHYSICAL THERAPIST

## 2024-06-28 ENCOUNTER — HOSPITAL ENCOUNTER (OUTPATIENT)
Dept: CARDIOLOGY | Facility: CLINIC | Age: 74
Discharge: HOME | End: 2024-06-28
Attending: INTERNAL MEDICINE | Admitting: INTERNAL MEDICINE
Payer: COMMERCIAL

## 2024-06-28 DIAGNOSIS — I48.0 PAF (PAROXYSMAL ATRIAL FIBRILLATION) (H): ICD-10-CM

## 2024-06-28 DIAGNOSIS — I71.21 ANEURYSM OF ASCENDING AORTA WITHOUT RUPTURE (H): ICD-10-CM

## 2024-06-28 DIAGNOSIS — R06.09 DOE (DYSPNEA ON EXERTION): ICD-10-CM

## 2024-06-28 DIAGNOSIS — I77.810 ASCENDING AORTA DILATION (H): ICD-10-CM

## 2024-06-28 PROCEDURE — 93016 CV STRESS TEST SUPVJ ONLY: CPT | Performed by: INTERNAL MEDICINE

## 2024-06-28 PROCEDURE — 93325 DOPPLER ECHO COLOR FLOW MAPG: CPT | Mod: TC

## 2024-06-28 PROCEDURE — 93350 STRESS TTE ONLY: CPT | Mod: TC

## 2024-06-28 PROCEDURE — 93018 CV STRESS TEST I&R ONLY: CPT | Performed by: INTERNAL MEDICINE

## 2024-06-28 PROCEDURE — 93325 DOPPLER ECHO COLOR FLOW MAPG: CPT | Mod: 26 | Performed by: INTERNAL MEDICINE

## 2024-06-28 PROCEDURE — 93321 DOPPLER ECHO F-UP/LMTD STD: CPT | Mod: 26 | Performed by: INTERNAL MEDICINE

## 2024-06-28 PROCEDURE — 93350 STRESS TTE ONLY: CPT | Mod: 26 | Performed by: INTERNAL MEDICINE

## 2024-07-01 ENCOUNTER — MYC MEDICAL ADVICE (OUTPATIENT)
Dept: CARDIOLOGY | Facility: CLINIC | Age: 74
End: 2024-07-01

## 2024-07-01 ENCOUNTER — MYC MEDICAL ADVICE (OUTPATIENT)
Dept: INTERNAL MEDICINE | Facility: CLINIC | Age: 74
End: 2024-07-01

## 2024-07-01 DIAGNOSIS — R29.898 LEG WEAKNESS, BILATERAL: Primary | ICD-10-CM

## 2024-07-01 NOTE — TELEPHONE ENCOUNTER
Sent [place] message to patient.    Thank you,  James Boudreaux, Triage RN Mauricio Griffith  3:27 PM 7/1/2024

## 2024-07-01 NOTE — TELEPHONE ENCOUNTER
Patient looking for an order/referral for MRI of her spine due to leg weakness. States this was discussed at appointment on 6/13/2024.    Please place referral if appropriate    Please send mychart message to patient once referral has been placed

## 2024-07-06 ASSESSMENT — ANXIETY QUESTIONNAIRES
4. TROUBLE RELAXING: MORE THAN HALF THE DAYS
8. IF YOU CHECKED OFF ANY PROBLEMS, HOW DIFFICULT HAVE THESE MADE IT FOR YOU TO DO YOUR WORK, TAKE CARE OF THINGS AT HOME, OR GET ALONG WITH OTHER PEOPLE?: VERY DIFFICULT
7. FEELING AFRAID AS IF SOMETHING AWFUL MIGHT HAPPEN: NOT AT ALL
GAD7 TOTAL SCORE: 11
GAD7 TOTAL SCORE: 11
6. BECOMING EASILY ANNOYED OR IRRITABLE: NEARLY EVERY DAY
1. FEELING NERVOUS, ANXIOUS, OR ON EDGE: NEARLY EVERY DAY
3. WORRYING TOO MUCH ABOUT DIFFERENT THINGS: MORE THAN HALF THE DAYS
2. NOT BEING ABLE TO STOP OR CONTROL WORRYING: SEVERAL DAYS
5. BEING SO RESTLESS THAT IT IS HARD TO SIT STILL: NOT AT ALL
IF YOU CHECKED OFF ANY PROBLEMS ON THIS QUESTIONNAIRE, HOW DIFFICULT HAVE THESE PROBLEMS MADE IT FOR YOU TO DO YOUR WORK, TAKE CARE OF THINGS AT HOME, OR GET ALONG WITH OTHER PEOPLE: VERY DIFFICULT
7. FEELING AFRAID AS IF SOMETHING AWFUL MIGHT HAPPEN: NOT AT ALL

## 2024-07-08 ENCOUNTER — THERAPY VISIT (OUTPATIENT)
Dept: PHYSICAL THERAPY | Facility: CLINIC | Age: 74
End: 2024-07-08
Payer: COMMERCIAL

## 2024-07-08 DIAGNOSIS — M25.561 RIGHT KNEE PAIN: Primary | ICD-10-CM

## 2024-07-08 PROCEDURE — 97110 THERAPEUTIC EXERCISES: CPT | Mod: GP | Performed by: PHYSICAL THERAPIST

## 2024-07-08 ASSESSMENT — ACTIVITIES OF DAILY LIVING (ADL)
STIFFNESS: I HAVE THE SYMPTOM BUT IT DOES NOT AFFECT MY ACTIVITY
SQUAT: ACTIVITY IS MINIMALLY DIFFICULT
KNEE_ACTIVITY_OF_DAILY_LIVING_SUM: 58
HOW_WOULD_YOU_RATE_THE_OVERALL_FUNCTION_OF_YOUR_KNEE_DURING_YOUR_USUAL_DAILY_ACTIVITIES?: NEARLY NORMAL
STAND: ACTIVITY IS NOT DIFFICULT
SIT WITH YOUR KNEE BENT: ACTIVITY IS MINIMALLY DIFFICULT
KNEE_ACTIVITY_OF_DAILY_LIVING_SCORE: 82.86
WALK: ACTIVITY IS NOT DIFFICULT
LIMPING: I DO NOT HAVE THE SYMPTOM
WEAKNESS: I DO NOT HAVE THE SYMPTOM
AS_A_RESULT_OF_YOUR_KNEE_INJURY,_HOW_WOULD_YOU_RATE_YOUR_CURRENT_LEVEL_OF_DAILY_ACTIVITY?: NEARLY NORMAL
PAIN: I HAVE THE SYMPTOM BUT IT DOES NOT AFFECT MY ACTIVITY
GO UP STAIRS: ACTIVITY IS NOT DIFFICULT
GIVING WAY, BUCKLING OR SHIFTING OF KNEE: I DO NOT HAVE THE SYMPTOM
GO DOWN STAIRS: ACTIVITY IS MINIMALLY DIFFICULT
RISE FROM A CHAIR: ACTIVITY IS SOMEWHAT DIFFICULT
HOW_WOULD_YOU_RATE_THE_CURRENT_FUNCTION_OF_YOUR_KNEE_DURING_YOUR_USUAL_DAILY_ACTIVITIES_ON_A_SCALE_FROM_0_TO_100_WITH_100_BEING_YOUR_LEVEL_OF_KNEE_FUNCTION_PRIOR_TO_YOUR_INJURY_AND_0_BEING_THE_INABILITY_TO_PERFORM_ANY_OF_YOUR_USUAL_DAILY_ACTIVITIES?: 90
SWELLING: I DO NOT HAVE THE SYMPTOM
KNEEL ON THE FRONT OF YOUR KNEE: I AM UNABLE TO DO THE ACTIVITY
RAW_SCORE: 58

## 2024-07-10 ENCOUNTER — OFFICE VISIT (OUTPATIENT)
Dept: DERMATOLOGY | Facility: CLINIC | Age: 74
End: 2024-07-10
Payer: COMMERCIAL

## 2024-07-10 DIAGNOSIS — L81.4 LENTIGINES: ICD-10-CM

## 2024-07-10 DIAGNOSIS — L57.8 ACTINIC SKIN DAMAGE: ICD-10-CM

## 2024-07-10 DIAGNOSIS — L82.0 INFLAMED SEBORRHEIC KERATOSIS: Primary | ICD-10-CM

## 2024-07-10 PROCEDURE — 17110 DESTRUCTION B9 LES UP TO 14: CPT | Performed by: STUDENT IN AN ORGANIZED HEALTH CARE EDUCATION/TRAINING PROGRAM

## 2024-07-10 PROCEDURE — 99213 OFFICE O/P EST LOW 20 MIN: CPT | Mod: 25 | Performed by: STUDENT IN AN ORGANIZED HEALTH CARE EDUCATION/TRAINING PROGRAM

## 2024-07-10 NOTE — PROGRESS NOTES
[facility] Dermatology Note    Encounter Date: Jul 10, 2024    Dermatology Problem List:  - ***    Major PMHx  -   ______________________________________    Impression/Plan:  There are no diagnoses linked to this encounter.    {Procedure:272838}    Follow-up in ***.       Staff Involved:  Staff Only    Phoenix Maldonado MD   of Dermatology  Department of Dermatology  [facility]      CC:   Chief Complaint   Patient presents with    Derm Problem     Skin check        History of Present Illness:  Ms. Alexandra Thomas is a 73 year old female who presents as a return patient.    Pt presents today for concerns about spots on trunk and extremities     Spot on scalp and R knee     Labs:      Physical exam:  Vitals: There were no vitals taken for this visit.  GEN: well developed, well-nourished, in no acute distress, in a pleasant mood.     SKIN: Archer phototype 1  - Full skin, which includes the head/face, both arms, chest, back, abdomen,both legs, genitalia and/or groin buttocks, digits and/or nails, was examined.  - Stuck on brown papules on trunk and extremities   - soft subcutaneous mobile nodule on scalp   - No other lesions of concern on areas examined.     Past Medical History:   Past Medical History:   Diagnosis Date    Arrhythmia 10/01/2019    Arthritis     hips    Ascending aortic aneurysm (H24)     4.2 cm   Ascending    Biceps tendon rupture     Depression 08/06/2019    Diverticulitis of colon     Gastroesophageal reflux disease     Esophageal spasms.    Gastroparesis     Heart murmur     High cholesterol     History of GI bleed     Hypertension     PONV (postoperative nausea and vomiting)     Sjogren's syndrome (H24)      Past Surgical History:   Procedure Laterality Date    ABDOMEN SURGERY      APPENDECTOMY      ARTHROPLASTY HIP Left 07/27/2022    Procedure: Left total hip arthroplasty;  Surgeon: Berry Mcdonnell MD;  Location:  OR     BACK SURGERY      BIOPSY      BREAST SURGERY      CHOLECYSTECTOMY      COLECTOMY PARTIAL      Due to recurrent diverticulitis    COLONOSCOPY      ENT SURGERY      T & A    ESOPHAGOSCOPY, GASTROSCOPY, DUODENOSCOPY (EGD), COMBINED N/A 09/13/2022    Procedure: ESOPHAGOGASTRODUODENOSCOPY (EGD) (fv);  Surgeon: Seymour Chatterjee MD;  Location: [facility]    EXCHANGE POLY COMPONENT ARTHROPLASTY KNEE Right 4/2/2024    Procedure: RIGHT REVISION TOTAL KNEE ARTHROPLASTY, POLYETHYLENE EXCHANGE;  Surgeon: Elías Prieto MD;  Location: [facility] Main OR    GENITOURINARY SURGERY      HYSTERECTOMY TOTAL ABDOMINAL      LASIK      left midfoot fusion Left     2nd toe osteotomy    shouder replacement Right     TOTAL HIP ARTHROPLASTY Right 06/07/2009    TOTAL KNEE ARTHROPLASTY Right 07/12/2006    TOTAL KNEE ARTHROPLASTY Left 02/18/2009       Social History:   reports that she has never smoked. She has never used smokeless tobacco. She reports that she does not currently use alcohol. She reports that she does not use drugs.    Family History:  Family History   Problem Relation Age of Onset    Chronic Obstructive Pulmonary Disease Mother     Dementia Father     Chronic Obstructive Pulmonary Disease Father     Macular Degeneration Maternal Grandmother     Other Cancer Maternal Grandmother         Liver    Breast Cancer Paternal Cousin         before 50    Diabetes Paternal Grandfather     Diabetes Paternal Grandmother     Other Cancer Cousin         Breast/Mets    Substance Abuse Sister     Glaucoma No family hx of        Medications:  Current Outpatient Medications   Medication Sig Dispense Refill    acetaminophen (TYLENOL) 325 MG tablet Take 2 tablets (650 mg) by mouth every 4 hours as needed for other (mild pain) 100 tablet 0    amLODIPine (NORVASC) 2.5 MG tablet Take 1 tablet (2.5 mg) by mouth daily 180 tablet 1    carvedilol (COREG) 6.25 MG tablet Take 1 tablet (6.25 mg) by mouth 2 times daily (with meals) 180 tablet 3    cycloSPORINE  (RESTASIS) 0.05 % ophthalmic emulsion Place 1 drop into both eyes 2 times daily 90 each 3    fluconazole (DIFLUCAN) 200 MG tablet Take 1 tablet (200 mg) by mouth daily 14 tablet 0    gabapentin (NEURONTIN) 100 MG capsule TAKE 2 CAPSULES BY MOUTH IN THE  MORNING AND 1 ADDITIONAL CAPSULE AT NOON 300 capsule 2    gabapentin (NEURONTIN) 300 MG capsule TAKE 2 CAPSULES BY MOUTH AT  BEDTIME 200 capsule 2    losartan (COZAAR) 50 MG tablet Take 1 tablet (50 mg) by mouth 2 times daily 180 tablet 2    Magnesium Oxide 250 MG TABS Take 1 tablet by mouth at bedtime      multivitamin w/minerals (THERA-VIT-M) tablet Take 1 tablet by mouth daily      nitroGLYcerin (NITROSTAT) 0.4 MG sublingual tablet Place 1 tablet (0.4 mg) under the tongue every 5 minutes as needed for chest pain For chest pain place 1 tablet under the tongue every 5 minutes for 3 doses. If symptoms persist 5 minutes after 1st dose call 911. 10 tablet 3    nystatin (MYCOSTATIN) 627041 UNIT/ML suspension Take 5 mLs (500,000 Units) by mouth 4 times daily 60 mL 0    omeprazole (PRILOSEC) 40 MG DR capsule Take 40 mg by mouth 2 times daily      rosuvastatin (CRESTOR) 10 MG tablet Take 1 tablet (10 mg) by mouth daily 90 tablet 2    sulfamethoxazole-trimethoprim (BACTRIM DS) 800-160 MG tablet Take 1 tablet by mouth 2 times daily 14 tablet 0    Vitamin D3 (CHOLECALCIFEROL) 25 mcg (1000 units) tablet Take 1 tablet by mouth daily       Allergies   Allergen Reactions    Ciprofloxacin Other (See Comments)     Pt has aortic aneurysm.  Increased risk of rupture or dissection with use of fluoroquinolones.      Food Anaphylaxis     EGGPLANT-->anaphylaxis      Venlafaxine Hcl Diarrhea, Shortness Of Breath and Nausea and Vomiting    Meperidine Rash               Adhesive Tape      blisters

## 2024-07-10 NOTE — LETTER
7/10/2024      Alexandra Thomas  [address] [facility] [address]      Dear Colleague,    Thank you for referring your patient, Alexandra hTomas, to the [facility]. Please see a copy of my visit note below.    [facility] Dermatology Note    Encounter Date: Jul 10, 2024    Dermatology Problem List:    ______________________________________    Impression/Plan:  Alexandra was seen today for derm problem.    Diagnoses and all orders for this visit:    Inflamed seborrheic keratosis  -     DESTRUCT BENIGN LESION, UP TO 14  - R knee x1   Actinic skin damage  Lentigines  - Reviewed the compounding benefits of incremental changes to sun protective clothing behaviors including increased frequency of sunscreen and sun protective clothing like broad brimmed hats and longsleeved UPF containing clothing        Cryotherapy procedure note: After verbal consent and discussion of risks and benefits including but no limited to dyspigmentation/scar, blister, and pain, K right knee was(were) treated with 1-2mm freeze border for 2 cycles with liquid nitrogen. Post cryotherapy instructions were provided.     Follow-up in as needed.       Staff Involved:  Staff Only    Phoenix Maldonado MD   of Dermatology  Department of Dermatology  [facility]      CC:   Chief Complaint   Patient presents with     Derm Problem     Skin check        History of Present Illness:  Ms. Alexandra Thomas is a 73 year old female who presents as a return patient.    Pt presents today for concerns about spots on trunk and extremities.  She is particularly bothered by a lesion on her right knee that is been present for years not enlarging, sometimes gets nicked when she shaves and she often finds herself absentmindedly picking at the area.      Labs:      Physical exam:  Vitals: There were no vitals taken for this visit.  GEN: well developed,  well-nourished, in no acute distress, in a pleasant mood.     SKIN: Archer phototype 1  - Full skin, which includes the head/face, both arms, chest, back, abdomen,both legs, genitalia and/or groin buttocks, digits and/or nails, was examined.  - Stuck on brown papules on trunk and extremities   - Flat brown macules and patches in a sun exposed areas on face and extremities  - No other lesions of concern on areas examined.     Past Medical History:   Past Medical History:   Diagnosis Date     Arrhythmia 10/01/2019     Arthritis     hips     Ascending aortic aneurysm (H24)     4.2 cm   Ascending     Biceps tendon rupture      Depression 08/06/2019     Diverticulitis of colon      Gastroesophageal reflux disease     Esophageal spasms.     Gastroparesis      Heart murmur      High cholesterol      History of GI bleed      Hypertension      PONV (postoperative nausea and vomiting)      Sjogren's syndrome (H24)      Past Surgical History:   Procedure Laterality Date     ABDOMEN SURGERY       APPENDECTOMY       ARTHROPLASTY HIP Left 07/27/2022    Procedure: Left total hip arthroplasty;  Surgeon: Berry Mcdonnell MD;  Location:  OR     BACK SURGERY       BIOPSY       BREAST SURGERY       CHOLECYSTECTOMY       COLECTOMY PARTIAL      Due to recurrent diverticulitis     COLONOSCOPY       ENT SURGERY      T & A     ESOPHAGOSCOPY, GASTROSCOPY, DUODENOSCOPY (EGD), COMBINED N/A 09/13/2022    Procedure: ESOPHAGOGASTRODUODENOSCOPY (EGD) (fv);  Surgeon: Seymour Chatterjee MD;  Location:  GI     EXCHANGE POLY COMPONENT ARTHROPLASTY KNEE Right 4/2/2024    Procedure: RIGHT REVISION TOTAL KNEE ARTHROPLASTY, POLYETHYLENE EXCHANGE;  Surgeon: Elías Prieto MD;  Location: [facility] Main OR     GENITOURINARY SURGERY       HYSTERECTOMY TOTAL ABDOMINAL       LASIK       left midfoot fusion Left     2nd toe osteotomy     shouder replacement Right      TOTAL HIP ARTHROPLASTY Right 06/07/2009     TOTAL KNEE ARTHROPLASTY Right 07/12/2006      TOTAL KNEE ARTHROPLASTY Left 02/18/2009       Social History:   reports that she has never smoked. She has never used smokeless tobacco. She reports that she does not currently use alcohol. She reports that she does not use drugs.    Family History:  Family History   Problem Relation Age of Onset     Chronic Obstructive Pulmonary Disease Mother      Dementia Father      Chronic Obstructive Pulmonary Disease Father      Macular Degeneration Maternal Grandmother      Other Cancer Maternal Grandmother         Liver     Breast Cancer Paternal Cousin         before 50     Diabetes Paternal Grandfather      Diabetes Paternal Grandmother      Other Cancer Cousin         Breast/Mets     Substance Abuse Sister      Glaucoma No family hx of        Medications:  Current Outpatient Medications   Medication Sig Dispense Refill     acetaminophen (TYLENOL) 325 MG tablet Take 2 tablets (650 mg) by mouth every 4 hours as needed for other (mild pain) 100 tablet 0     amLODIPine (NORVASC) 2.5 MG tablet Take 1 tablet (2.5 mg) by mouth daily 180 tablet 1     carvedilol (COREG) 6.25 MG tablet Take 1 tablet (6.25 mg) by mouth 2 times daily (with meals) 180 tablet 3     cycloSPORINE (RESTASIS) 0.05 % ophthalmic emulsion Place 1 drop into both eyes 2 times daily 90 each 3     fluconazole (DIFLUCAN) 200 MG tablet Take 1 tablet (200 mg) by mouth daily 14 tablet 0     gabapentin (NEURONTIN) 100 MG capsule TAKE 2 CAPSULES BY MOUTH IN THE  MORNING AND 1 ADDITIONAL CAPSULE AT NOON 300 capsule 2     gabapentin (NEURONTIN) 300 MG capsule TAKE 2 CAPSULES BY MOUTH AT  BEDTIME 200 capsule 2     losartan (COZAAR) 50 MG tablet Take 1 tablet (50 mg) by mouth 2 times daily 180 tablet 2     Magnesium Oxide 250 MG TABS Take 1 tablet by mouth at bedtime       multivitamin w/minerals (THERA-VIT-M) tablet Take 1 tablet by mouth daily       nitroGLYcerin (NITROSTAT) 0.4 MG sublingual tablet Place 1 tablet (0.4 mg) under the tongue every 5 minutes as needed  for chest pain For chest pain place 1 tablet under the tongue every 5 minutes for 3 doses. If symptoms persist 5 minutes after 1st dose call 911. 10 tablet 3     nystatin (MYCOSTATIN) 423093 UNIT/ML suspension Take 5 mLs (500,000 Units) by mouth 4 times daily 60 mL 0     omeprazole (PRILOSEC) 40 MG DR capsule Take 40 mg by mouth 2 times daily       rosuvastatin (CRESTOR) 10 MG tablet Take 1 tablet (10 mg) by mouth daily 90 tablet 2     sulfamethoxazole-trimethoprim (BACTRIM DS) 800-160 MG tablet Take 1 tablet by mouth 2 times daily 14 tablet 0     Vitamin D3 (CHOLECALCIFEROL) 25 mcg (1000 units) tablet Take 1 tablet by mouth daily       Allergies   Allergen Reactions     Ciprofloxacin Other (See Comments)     Pt has aortic aneurysm.  Increased risk of rupture or dissection with use of fluoroquinolones.       Food Anaphylaxis     EGGPLANT-->anaphylaxis       Venlafaxine Hcl Diarrhea, Shortness Of Breath and Nausea and Vomiting     Meperidine Rash                Adhesive Tape      blisters               Again, thank you for allowing me to participate in the care of your patient.        Sincerely,        Phoenix Maldonado MD

## 2024-07-10 NOTE — PROGRESS NOTES
[facility] Dermatology Note    Encounter Date: Jul 10, 2024    Dermatology Problem List:    ______________________________________    Impression/Plan:  Alexandra was seen today for derm problem.    Diagnoses and all orders for this visit:    Inflamed seborrheic keratosis  -     DESTRUCT BENIGN LESION, UP TO 14  - R knee x1   Actinic skin damage  Lentigines  - Reviewed the compounding benefits of incremental changes to sun protective clothing behaviors including increased frequency of sunscreen and sun protective clothing like broad brimmed hats and longsleeved UPF containing clothing        Cryotherapy procedure note: After verbal consent and discussion of risks and benefits including but no limited to dyspigmentation/scar, blister, and pain, K right knee was(were) treated with 1-2mm freeze border for 2 cycles with liquid nitrogen. Post cryotherapy instructions were provided.     Follow-up in as needed.       Staff Involved:  Staff Only    Phoenix Maldonado MD   of Dermatology  Department of Dermatology  [facility]      CC:   Chief Complaint   Patient presents with    Derm Problem     Skin check        History of Present Illness:  Ms. Alexandra Thomas is a 73 year old female who presents as a return patient.    Pt presents today for concerns about spots on trunk and extremities.  She is particularly bothered by a lesion on her right knee that is been present for years not enlarging, sometimes gets nicked when she shaves and she often finds herself absentmindedly picking at the area.      Labs:      Physical exam:  Vitals: There were no vitals taken for this visit.  GEN: well developed, well-nourished, in no acute distress, in a pleasant mood.     SKIN: Archer phototype 1  - Full skin, which includes the head/face, both arms, chest, back, abdomen,both legs, genitalia and/or groin buttocks, digits and/or nails, was examined.  - Stuck on  brown papules on trunk and extremities   - Flat brown macules and patches in a sun exposed areas on face and extremities  - No other lesions of concern on areas examined.     Past Medical History:   Past Medical History:   Diagnosis Date    Arrhythmia 10/01/2019    Arthritis     hips    Ascending aortic aneurysm (H24)     4.2 cm   Ascending    Biceps tendon rupture     Depression 08/06/2019    Diverticulitis of colon     Gastroesophageal reflux disease     Esophageal spasms.    Gastroparesis     Heart murmur     High cholesterol     History of GI bleed     Hypertension     PONV (postoperative nausea and vomiting)     Sjogren's syndrome (H24)      Past Surgical History:   Procedure Laterality Date    ABDOMEN SURGERY      APPENDECTOMY      ARTHROPLASTY HIP Left 07/27/2022    Procedure: Left total hip arthroplasty;  Surgeon: Berry Mcdonnell MD;  Location: [facility] OR    BACK SURGERY      BIOPSY      BREAST SURGERY      CHOLECYSTECTOMY      COLECTOMY PARTIAL      Due to recurrent diverticulitis    COLONOSCOPY      ENT SURGERY      T & A    ESOPHAGOSCOPY, GASTROSCOPY, DUODENOSCOPY (EGD), COMBINED N/A 09/13/2022    Procedure: ESOPHAGOGASTRODUODENOSCOPY (EGD) (fv);  Surgeon: Seymour Chatterjee MD;  Location:  GI    EXCHANGE POLY COMPONENT ARTHROPLASTY KNEE Right 4/2/2024    Procedure: RIGHT REVISION TOTAL KNEE ARTHROPLASTY, POLYETHYLENE EXCHANGE;  Surgeon: Elías Prieto MD;  Location: [facility] Main OR    GENITOURINARY SURGERY      HYSTERECTOMY TOTAL ABDOMINAL      LASIK      left midfoot fusion Left     2nd toe osteotomy    shouder replacement Right     TOTAL HIP ARTHROPLASTY Right 06/07/2009    TOTAL KNEE ARTHROPLASTY Right 07/12/2006    TOTAL KNEE ARTHROPLASTY Left 02/18/2009       Social History:   reports that she has never smoked. She has never used smokeless tobacco. She reports that she does not currently use alcohol. She reports that she does not use drugs.    Family History:  Family History   Problem Relation Age  of Onset    Chronic Obstructive Pulmonary Disease Mother     Dementia Father     Chronic Obstructive Pulmonary Disease Father     Macular Degeneration Maternal Grandmother     Other Cancer Maternal Grandmother         Liver    Breast Cancer Paternal Cousin         before 50    Diabetes Paternal Grandfather     Diabetes Paternal Grandmother     Other Cancer Cousin         Breast/Mets    Substance Abuse Sister     Glaucoma No family hx of        Medications:  Current Outpatient Medications   Medication Sig Dispense Refill    acetaminophen (TYLENOL) 325 MG tablet Take 2 tablets (650 mg) by mouth every 4 hours as needed for other (mild pain) 100 tablet 0    amLODIPine (NORVASC) 2.5 MG tablet Take 1 tablet (2.5 mg) by mouth daily 180 tablet 1    carvedilol (COREG) 6.25 MG tablet Take 1 tablet (6.25 mg) by mouth 2 times daily (with meals) 180 tablet 3    cycloSPORINE (RESTASIS) 0.05 % ophthalmic emulsion Place 1 drop into both eyes 2 times daily 90 each 3    fluconazole (DIFLUCAN) 200 MG tablet Take 1 tablet (200 mg) by mouth daily 14 tablet 0    gabapentin (NEURONTIN) 100 MG capsule TAKE 2 CAPSULES BY MOUTH IN THE  MORNING AND 1 ADDITIONAL CAPSULE AT NOON 300 capsule 2    gabapentin (NEURONTIN) 300 MG capsule TAKE 2 CAPSULES BY MOUTH AT  BEDTIME 200 capsule 2    losartan (COZAAR) 50 MG tablet Take 1 tablet (50 mg) by mouth 2 times daily 180 tablet 2    Magnesium Oxide 250 MG TABS Take 1 tablet by mouth at bedtime      multivitamin w/minerals (THERA-VIT-M) tablet Take 1 tablet by mouth daily      nitroGLYcerin (NITROSTAT) 0.4 MG sublingual tablet Place 1 tablet (0.4 mg) under the tongue every 5 minutes as needed for chest pain For chest pain place 1 tablet under the tongue every 5 minutes for 3 doses. If symptoms persist 5 minutes after 1st dose call 911. 10 tablet 3    nystatin (MYCOSTATIN) 640953 UNIT/ML suspension Take 5 mLs (500,000 Units) by mouth 4 times daily 60 mL 0    omeprazole (PRILOSEC) 40 MG DR capsule Take  40 mg by mouth 2 times daily      rosuvastatin (CRESTOR) 10 MG tablet Take 1 tablet (10 mg) by mouth daily 90 tablet 2    sulfamethoxazole-trimethoprim (BACTRIM DS) 800-160 MG tablet Take 1 tablet by mouth 2 times daily 14 tablet 0    Vitamin D3 (CHOLECALCIFEROL) 25 mcg (1000 units) tablet Take 1 tablet by mouth daily       Allergies   Allergen Reactions    Ciprofloxacin Other (See Comments)     Pt has aortic aneurysm.  Increased risk of rupture or dissection with use of fluoroquinolones.      Food Anaphylaxis     EGGPLANT-->anaphylaxis      Venlafaxine Hcl Diarrhea, Shortness Of Breath and Nausea and Vomiting    Meperidine Rash               Adhesive Tape      blisters

## 2024-07-11 ENCOUNTER — VIRTUAL VISIT (OUTPATIENT)
Dept: INTERNAL MEDICINE | Facility: CLINIC | Age: 74
End: 2024-07-11
Payer: COMMERCIAL

## 2024-07-11 DIAGNOSIS — R42 DIZZINESS: ICD-10-CM

## 2024-07-11 DIAGNOSIS — M35.01 SJOGREN'S SYNDROME WITH KERATOCONJUNCTIVITIS SICCA (H): ICD-10-CM

## 2024-07-11 DIAGNOSIS — F32.A DEPRESSION, UNSPECIFIED DEPRESSION TYPE: Primary | ICD-10-CM

## 2024-07-11 DIAGNOSIS — F41.9 ANXIETY: ICD-10-CM

## 2024-07-11 DIAGNOSIS — H16.221 KERATITIS SICCA, RIGHT EYE: ICD-10-CM

## 2024-07-11 PROCEDURE — 99213 OFFICE O/P EST LOW 20 MIN: CPT | Mod: 95 | Performed by: INTERNAL MEDICINE

## 2024-07-11 RX ORDER — CYCLOSPORINE 0.5 MG/ML
1 EMULSION OPHTHALMIC 2 TIMES DAILY
Qty: 90 EACH | Refills: 3 | Status: SHIPPED | OUTPATIENT
Start: 2024-07-11

## 2024-07-11 RX ORDER — FLUOXETINE 10 MG/1
10 CAPSULE ORAL DAILY
Qty: 30 CAPSULE | Refills: 1 | Status: SHIPPED | OUTPATIENT
Start: 2024-07-11

## 2024-07-11 NOTE — PATIENT INSTRUCTIONS
Will proceed with trial of fluoxetine 10 mg by mouth per day for management of anxiety and depression.

## 2024-07-11 NOTE — PROGRESS NOTES
Alexandra is a 73 year old who is being evaluated via a billable video visit.    How would you like to obtain your AVS? MyChart  If the video visit is dropped, the invitation should be resent by: Send to e-mail at: [email or web address]  Will anyone else be joining your video visit? No      Assessment & Plan     Depression, unspecified depression type and anxiety  At this time, patient does express an interest in proceeding with a trial of medication to help manage her anxiety and depression.  After review of medications, she did elect to proceed with a trial of fluoxetine 10 mg by mouth per day to see if this would help provide any improvement in her anxiety and depression.  Side effects of SSRI use were reviewed.  Patient was encouraged to keep her upcoming therapist appointment as scheduled.  We will reassess her response to this medication in approximately 4 to 6 weeks.  Patient is aware that she can contact the clinic should she have any issues with worsening mood or difficulties with her medication.  - FLUoxetine (PROZAC) 10 MG capsule; Take 1 capsule (10 mg) by mouth daily    Dizziness  Patient did recently have a stress echocardiogram that did not reveal any obvious abnormalities that would explain her ongoing issues with intermittent episodes of dizziness.  She does have a history of paroxysmal atrial fibrillation, and I did recommend that we may want to consider a Zio patch monitor given the intermittent nature of her symptoms.  Patient states that she would consider this matter further, but she would like to have her upcoming MRI for evaluation of back pain and lower extremity weakness completed before having any monitors placed.            See Patient Instructions    Subjective   Alexandra is a 73 year old, presenting for the following health issues:  Follow Up      Video Start Time:  11:20 AM    Patient is a 73-year-old  female with a Cockett past medical history who participates in a [facility]  visit to discuss anxiety and depression.  Patient was last seen in June 2024 at which time she was reporting some issues with depressed mood and increased episodes of anxiety.  Patient does feel that she has been worrying excessively, and is always worried about something bad happening.  He does report some issues with anhedonia and a persistently depressed mood.  She does have an upcoming visit with a therapist, but she has decided that she would like to try a medication for her mood.  Patient does report that she had previously been on medication for removed several years ago, but she cannot recall the names of the medication.  She also reports that she has been having some issues with feeling very weak and dizzy over the past few weeks.  She did contact her cardiologist, who did arrange a stress echocardiogram.  Patient did have some difficulty completing the test due to shortness of breath, but the information obtained from the study was nondiagnostic.  Her cardiologist does not feel that her symptoms are related to coronary artery disease.  Patient does have a prior history of paroxysmal atrial fibrillation status post an ablation several years ago.  She has not noted any issues with chest pain or palpitations.    History of Present Illness       Mental Health Follow-up:  Patient presents to follow-up on Depression & Anxiety.Patient's depression since last visit has been:  No change  The patient is having other symptoms associated with depression.  Patient's anxiety since last visit has been:  Worse  The patient is not having other symptoms associated with anxiety.  Any significant life events: financial concerns, health concerns and other  Patient is feeling anxious or having panic attacks.  Patient has no concerns about alcohol or drug use.    She eats 4 or more servings of fruits and vegetables daily.She consumes 0 sweetened beverage(s) daily.She exercises with enough effort to increase her heart rate 9 or  less minutes per day.  She exercises with enough effort to increase her heart rate 3 or less days per week.   She is taking medications regularly.         Review of Systems  CONSTITUTIONAL: NEGATIVE for fever, chills, change in weight  ENT/MOUTH: NEGATIVE for ear, mouth and throat problems  RESP: NEGATIVE for significant cough or SOB  CV: NEGATIVE for chest pain, palpitations or peripheral edema  GI: NEGATIVE for nausea, abdominal pain, heartburn, or change in bowel habits  : NEGATIVE for frequency, dysuria, or hematuria  MUSCULOSKELETAL: Positive for back pain.  NEURO: Positive for weakness and dizziness.  PSYCHIATRIC: Positive for increased anxiety and depression.      Objective       Vitals:  No vitals were obtained today due to virtual visit.    Physical Exam   GENERAL: alert and no distress  EYES: Eyes grossly normal to inspection.  No discharge or erythema, or obvious scleral/conjunctival abnormalities.  RESP: No audible wheeze, cough, or visible cyanosis.    SKIN: Visible skin clear. No significant rash, abnormal pigmentation or lesions.  NEURO: Cranial nerves grossly intact.  Mentation and speech appropriate for age.  PSYCH: Appropriate affect, tone, and pace of words        Video-Visit Details    Type of service:  Video Visit   Video End Time:11:42 AM  Originating Location (pt. Location): Home  Distant Location (provider location):  On-site  Platform used for Video Visit: Lauryn  Signed Electronically by: Jaswinder Pal MD

## 2024-07-13 ENCOUNTER — MYC REFILL (OUTPATIENT)
Dept: INTERNAL MEDICINE | Facility: CLINIC | Age: 74
End: 2024-07-13
Payer: COMMERCIAL

## 2024-07-13 DIAGNOSIS — I10 BENIGN ESSENTIAL HYPERTENSION: ICD-10-CM

## 2024-07-15 ENCOUNTER — MYC MEDICAL ADVICE (OUTPATIENT)
Dept: INTERNAL MEDICINE | Facility: CLINIC | Age: 74
End: 2024-07-15
Payer: COMMERCIAL

## 2024-07-15 RX ORDER — AMLODIPINE BESYLATE 2.5 MG/1
2.5 TABLET ORAL DAILY
Qty: 180 TABLET | Refills: 1 | OUTPATIENT
Start: 2024-07-15

## 2024-07-15 RX ORDER — AMLODIPINE BESYLATE 2.5 MG/1
2.5 TABLET ORAL DAILY
Qty: 180 TABLET | Refills: 1 | Status: SHIPPED | OUTPATIENT
Start: 2024-07-15

## 2024-07-15 NOTE — TELEPHONE ENCOUNTER
Patient sent a Mygisticshart message.     This writer called [facility] pharmacy and [facility] and confirmed that neither have active script. Per the [facility] pharmacist, the Rx was sent in June but got cancelled by the provider 3 days after it was sent.     Medication and pharmacy pended if appropriate.

## 2024-07-15 NOTE — TELEPHONE ENCOUNTER
Patient sent a mychart message.     This writer called [facility] and [facility] and confirmed that neither have active script. Per the [facility] pharmacist, the Rx was sent in June but got cancelled 3 days after it was sent. Patient did not get any supply from them.

## 2024-07-20 ENCOUNTER — HOSPITAL ENCOUNTER (EMERGENCY)
Facility: CLINIC | Age: 74
Discharge: HOME | End: 2024-07-21
Attending: EMERGENCY MEDICINE | Admitting: EMERGENCY MEDICINE
Payer: COMMERCIAL

## 2024-07-20 DIAGNOSIS — R07.9 CHEST PAIN, UNSPECIFIED TYPE: ICD-10-CM

## 2024-07-20 DIAGNOSIS — K22.4 ESOPHAGEAL SPASM: ICD-10-CM

## 2024-07-20 LAB
ALBUMIN SERPL BCG-MCNC: 4.2 G/DL (ref 3.5–5.2)
ALP SERPL-CCNC: 103 U/L (ref 40–150)
ALT SERPL W P-5'-P-CCNC: 16 U/L (ref 0–50)
ANION GAP SERPL CALCULATED.3IONS-SCNC: 12 MMOL/L (ref 7–15)
AST SERPL W P-5'-P-CCNC: 21 U/L (ref 0–45)
BASOPHILS # BLD AUTO: 0.1 10E3/UL (ref 0–0.2)
BASOPHILS NFR BLD AUTO: 1 %
BILIRUB SERPL-MCNC: 0.3 MG/DL
BUN SERPL-MCNC: 27.9 MG/DL (ref 8–23)
CALCIUM SERPL-MCNC: 9.7 MG/DL (ref 8.8–10.4)
CHLORIDE SERPL-SCNC: 101 MMOL/L (ref 98–107)
CREAT SERPL-MCNC: 0.78 MG/DL (ref 0.51–0.95)
EGFRCR SERPLBLD CKD-EPI 2021: 79 ML/MIN/1.73M2
EOSINOPHIL # BLD AUTO: 0.1 10E3/UL (ref 0–0.7)
EOSINOPHIL NFR BLD AUTO: 1 %
ERYTHROCYTE [DISTWIDTH] IN BLOOD BY AUTOMATED COUNT: 12.1 % (ref 10–15)
GLUCOSE SERPL-MCNC: 112 MG/DL (ref 70–99)
HCO3 SERPL-SCNC: 26 MMOL/L (ref 22–29)
HCT VFR BLD AUTO: 38.8 % (ref 35–47)
HGB BLD-MCNC: 12.9 G/DL (ref 11.7–15.7)
IMM GRANULOCYTES # BLD: 0 10E3/UL
IMM GRANULOCYTES NFR BLD: 0 %
LIPASE SERPL-CCNC: 50 U/L (ref 13–60)
LYMPHOCYTES # BLD AUTO: 2.6 10E3/UL (ref 0.8–5.3)
LYMPHOCYTES NFR BLD AUTO: 33 %
MCH RBC QN AUTO: 31.8 PG (ref 26.5–33)
MCHC RBC AUTO-ENTMCNC: 33.2 G/DL (ref 31.5–36.5)
MCV RBC AUTO: 96 FL (ref 78–100)
MONOCYTES # BLD AUTO: 0.8 10E3/UL (ref 0–1.3)
MONOCYTES NFR BLD AUTO: 11 %
NEUTROPHILS # BLD AUTO: 4.3 10E3/UL (ref 1.6–8.3)
NEUTROPHILS NFR BLD AUTO: 54 %
NRBC # BLD AUTO: 0 10E3/UL
NRBC BLD AUTO-RTO: 0 /100
NT-PROBNP SERPL-MCNC: 100 PG/ML (ref 0–900)
PLATELET # BLD AUTO: 220 10E3/UL (ref 150–450)
POTASSIUM SERPL-SCNC: 4 MMOL/L (ref 3.4–5.3)
PROT SERPL-MCNC: 6.5 G/DL (ref 6.4–8.3)
RBC # BLD AUTO: 4.06 10E6/UL (ref 3.8–5.2)
SODIUM SERPL-SCNC: 139 MMOL/L (ref 135–145)
TROPONIN T SERPL HS-MCNC: 10 NG/L
WBC # BLD AUTO: 7.8 10E3/UL (ref 4–11)

## 2024-07-20 PROCEDURE — 96374 THER/PROPH/DIAG INJ IV PUSH: CPT | Mod: 59

## 2024-07-20 PROCEDURE — 85025 COMPLETE CBC W/AUTO DIFF WBC: CPT | Performed by: EMERGENCY MEDICINE

## 2024-07-20 PROCEDURE — 87637 SARSCOV2&INF A&B&RSV AMP PRB: CPT | Performed by: EMERGENCY MEDICINE

## 2024-07-20 PROCEDURE — 99285 EMERGENCY DEPT VISIT HI MDM: CPT | Mod: 25

## 2024-07-20 PROCEDURE — 93005 ELECTROCARDIOGRAM TRACING: CPT

## 2024-07-20 PROCEDURE — 83690 ASSAY OF LIPASE: CPT | Performed by: EMERGENCY MEDICINE

## 2024-07-20 PROCEDURE — 83880 ASSAY OF NATRIURETIC PEPTIDE: CPT | Performed by: EMERGENCY MEDICINE

## 2024-07-20 PROCEDURE — 250N000011 HC RX IP 250 OP 636: Performed by: EMERGENCY MEDICINE

## 2024-07-20 PROCEDURE — 82040 ASSAY OF SERUM ALBUMIN: CPT | Performed by: EMERGENCY MEDICINE

## 2024-07-20 PROCEDURE — 84484 ASSAY OF TROPONIN QUANT: CPT | Performed by: EMERGENCY MEDICINE

## 2024-07-20 PROCEDURE — 36415 COLL VENOUS BLD VENIPUNCTURE: CPT | Performed by: EMERGENCY MEDICINE

## 2024-07-20 RX ORDER — ONDANSETRON 2 MG/ML
4 INJECTION INTRAMUSCULAR; INTRAVENOUS EVERY 30 MIN PRN
Status: DISCONTINUED | OUTPATIENT
Start: 2024-07-20 | End: 2024-07-21 | Stop reason: HOSPADM

## 2024-07-20 RX ADMIN — ONDANSETRON 4 MG: 2 INJECTION INTRAMUSCULAR; INTRAVENOUS at 23:26

## 2024-07-20 ASSESSMENT — COLUMBIA-SUICIDE SEVERITY RATING SCALE - C-SSRS
3. HAVE YOU BEEN THINKING ABOUT HOW YOU MIGHT KILL YOURSELF?: NO
6. HAVE YOU EVER DONE ANYTHING, STARTED TO DO ANYTHING, OR PREPARED TO DO ANYTHING TO END YOUR LIFE?: NO
2. HAVE YOU ACTUALLY HAD ANY THOUGHTS OF KILLING YOURSELF IN THE PAST MONTH?: NO
1. IN THE PAST MONTH, HAVE YOU WISHED YOU WERE DEAD OR WISHED YOU COULD GO TO SLEEP AND NOT WAKE UP?: NO

## 2024-07-20 ASSESSMENT — ACTIVITIES OF DAILY LIVING (ADL): ADLS_ACUITY_SCORE: 38

## 2024-07-21 ENCOUNTER — HOSPITAL ENCOUNTER (OUTPATIENT)
Dept: MRI IMAGING | Facility: CLINIC | Age: 74
Discharge: HOME | End: 2024-07-21
Attending: INTERNAL MEDICINE | Admitting: INTERNAL MEDICINE
Payer: COMMERCIAL

## 2024-07-21 ENCOUNTER — APPOINTMENT (OUTPATIENT)
Dept: CT IMAGING | Facility: CLINIC | Age: 74
End: 2024-07-21
Attending: EMERGENCY MEDICINE
Payer: COMMERCIAL

## 2024-07-21 VITALS
DIASTOLIC BLOOD PRESSURE: 85 MMHG | TEMPERATURE: 97.9 F | HEART RATE: 60 BPM | SYSTOLIC BLOOD PRESSURE: 146 MMHG | RESPIRATION RATE: 24 BRPM | OXYGEN SATURATION: 97 %

## 2024-07-21 DIAGNOSIS — R29.898 LEG WEAKNESS, BILATERAL: ICD-10-CM

## 2024-07-21 LAB
FLUAV RNA SPEC QL NAA+PROBE: NEGATIVE
FLUBV RNA RESP QL NAA+PROBE: NEGATIVE
HOLD SPECIMEN: NORMAL
RSV RNA SPEC NAA+PROBE: NEGATIVE
SARS-COV-2 RNA RESP QL NAA+PROBE: NEGATIVE
TROPONIN T SERPL HS-MCNC: 10 NG/L

## 2024-07-21 PROCEDURE — 36415 COLL VENOUS BLD VENIPUNCTURE: CPT | Performed by: EMERGENCY MEDICINE

## 2024-07-21 PROCEDURE — 72148 MRI LUMBAR SPINE W/O DYE: CPT

## 2024-07-21 PROCEDURE — 71260 CT THORAX DX C+: CPT

## 2024-07-21 PROCEDURE — 250N000009 HC RX 250: Performed by: EMERGENCY MEDICINE

## 2024-07-21 PROCEDURE — 84484 ASSAY OF TROPONIN QUANT: CPT | Performed by: EMERGENCY MEDICINE

## 2024-07-21 PROCEDURE — 250N000011 HC RX IP 250 OP 636: Performed by: EMERGENCY MEDICINE

## 2024-07-21 RX ORDER — IOPAMIDOL 755 MG/ML
120 INJECTION, SOLUTION INTRAVASCULAR ONCE
Status: COMPLETED | OUTPATIENT
Start: 2024-07-21 | End: 2024-07-21

## 2024-07-21 RX ADMIN — SODIUM CHLORIDE 60 ML: 9 INJECTION, SOLUTION INTRAVENOUS at 00:21

## 2024-07-21 RX ADMIN — IOPAMIDOL 72 ML: 755 INJECTION, SOLUTION INTRAVENOUS at 00:18

## 2024-07-21 ASSESSMENT — ACTIVITIES OF DAILY LIVING (ADL): ADLS_ACUITY_SCORE: 38

## 2024-07-21 NOTE — ED PROVIDER NOTES
Emergency Department Note      History of Present Illness     Chief Complaint   Chest Pain      HPI   Alexandra Thomas is a 73 year old female with past medical history of paroxysmal A-fib status post ablation not currently on anticoagulation, thoracic aortic aneurysm, esophageal spasm, chronic pain, gastroparesis, IBS, GERD and Sjogren's syndrome who presents to the emergency department with crushing chest pain to the middle of her chest that began approximately hour prior to presentation.  Symptoms are similar to when she has had esophageal spasm before in the past.  She received nitro by EMS and had complete resolution of her symptoms.  This is typical for her esophageal spasm.  She noted that she did have some radiating pain into her jaw and some dizziness at that time.  Still feels somewhat weak following nitro administration.  She notes some ongoing nausea but no vomiting.  Denies any new numbness, tingling or weakness in arms or legs.  Denies any abdominal pain.  Denies any fever, chills, cough or difficulty breathing.  However over the last month she has noted some increasing fatigue and exercise tolerance and generally feeling unwell.      Review of External Notes   Reviewed discharge summary from 2/20/2023 in which the patient was admitted for chest pain and was found to have esophageal spasms.  Patient had negative troponins, normal EKG and negative stress testing.  She was seen by GI for esophageal spasms and was prescribed PPI and told to take nitroglycerin for spasms.    Past Medical History     Medical History and Problem List   Past Medical History:   Diagnosis Date    Arrhythmia 10/01/2019    Arthritis     Ascending aortic aneurysm (H24)     Biceps tendon rupture     Depression 08/06/2019    Diverticulitis of colon     Gastroesophageal reflux disease     Gastroparesis     Heart murmur     High cholesterol     History of GI bleed     Hypertension     PONV (postoperative nausea and vomiting)      Sjogren's syndrome (H24)        Medications   acetaminophen (TYLENOL) 325 MG tablet  amLODIPine (NORVASC) 2.5 MG tablet  carvedilol (COREG) 6.25 MG tablet  cycloSPORINE (RESTASIS) 0.05 % ophthalmic emulsion  fluconazole (DIFLUCAN) 200 MG tablet  FLUoxetine (PROZAC) 10 MG capsule  gabapentin (NEURONTIN) 100 MG capsule  gabapentin (NEURONTIN) 300 MG capsule  losartan (COZAAR) 50 MG tablet  Magnesium Oxide 250 MG TABS  multivitamin w/minerals (THERA-VIT-M) tablet  nitroGLYcerin (NITROSTAT) 0.4 MG sublingual tablet  nystatin (MYCOSTATIN) 209221 UNIT/ML suspension  omeprazole (PRILOSEC) 40 MG DR capsule  rosuvastatin (CRESTOR) 10 MG tablet  Vitamin D3 (CHOLECALCIFEROL) 25 mcg (1000 units) tablet        Surgical History   Past Surgical History:   Procedure Laterality Date    ABDOMEN SURGERY      APPENDECTOMY      ARTHROPLASTY HIP Left 07/27/2022    Procedure: Left total hip arthroplasty;  Surgeon: Berry Mcdonnell MD;  Location:  OR    BACK SURGERY      BIOPSY      BREAST SURGERY      CHOLECYSTECTOMY      COLECTOMY PARTIAL      Due to recurrent diverticulitis    COLONOSCOPY      ENT SURGERY      T & A    ESOPHAGOSCOPY, GASTROSCOPY, DUODENOSCOPY (EGD), COMBINED N/A 09/13/2022    Procedure: ESOPHAGOGASTRODUODENOSCOPY (EGD) (fv);  Surgeon: Seymour Chatterjee MD;  Location:  GI    EXCHANGE POLY COMPONENT ARTHROPLASTY KNEE Right 4/2/2024    Procedure: RIGHT REVISION TOTAL KNEE ARTHROPLASTY, POLYETHYLENE EXCHANGE;  Surgeon: Elías Prieto MD;  Location: [facility] Main OR    GENITOURINARY SURGERY      HYSTERECTOMY TOTAL ABDOMINAL      LASIK      left midfoot fusion Left     2nd toe osteotomy    shouder replacement Right     TOTAL HIP ARTHROPLASTY Right 06/07/2009    TOTAL KNEE ARTHROPLASTY Right 07/12/2006    TOTAL KNEE ARTHROPLASTY Left 02/18/2009       Physical Exam     Patient Vitals for the past 24 hrs:   BP Temp Temp src Pulse Resp SpO2   07/21/24 0130 (!) 146/85 -- -- 60 24 97 %   07/21/24 0100 (!) 156/90 -- -- 63  (!) 9 96 %   07/21/24 0003 (!) 149/86 -- -- 67 24 100 %   07/20/24 2334 (!) 169/97 -- -- 66 12 98 %   07/20/24 2313 (!) 154/96 -- -- 70 17 97 %   07/20/24 2308 (!) 157/126 97.9  F (36.6  C) Oral 70 16 98 %     Physical Exam  General: Patient is awake, alert and interactive  Head: The scalp, face, and head appear normal  Eyes: The pupils are equal, round, and reactive to light. Conjunctivae and sclerae are normal  Neck: Normal range of motion.  CV: Regular rate and rhythm. Peripheral pulses including radial pulses are symmetric.   Resp: Lungs are clear without wheezes or rales. No respiratory distress.   GI: Abdomen is soft, no rigidity, guarding, or rebound. No distension. No tenderness to palpation in any quadrant.    MS: Chest wall is non tender to palpation. No asymmetric leg swelling, calf or thigh tenderness.    Skin: No rash or lesions noted. Normal capillary refill noted  Neuro: Speech is normal and fluent. Face is symmetric. Moving all extremities.   Psych:  Normal affect.  Appropriate interactions.      Diagnostics     Lab Results   Labs Ordered and Resulted from Time of ED Arrival to Time of ED Departure   COMPREHENSIVE METABOLIC PANEL - Abnormal       Result Value    Sodium 139      Potassium 4.0      Carbon Dioxide (CO2) 26      Anion Gap 12      Urea Nitrogen 27.9 (*)     Creatinine 0.78      GFR Estimate 79      Calcium 9.7      Chloride 101      Glucose 112 (*)     Alkaline Phosphatase 103      AST 21      ALT 16      Protein Total 6.5      Albumin 4.2      Bilirubin Total 0.3     LIPASE - Normal    Lipase 50     TROPONIN T, HIGH SENSITIVITY - Normal    Troponin T, High Sensitivity 10     NT PROBNP INPATIENT - Normal    N terminal Pro BNP Inpatient 100     INFLUENZA A/B, RSV, & SARS-COV2 PCR - Normal    Influenza A PCR Negative      Influenza B PCR Negative      RSV PCR Negative      SARS CoV2 PCR Negative     TROPONIN T, HIGH SENSITIVITY - Normal    Troponin T, High Sensitivity 10     CBC WITH  PLATELETS AND DIFFERENTIAL    WBC Count 7.8      RBC Count 4.06      Hemoglobin 12.9      Hematocrit 38.8      MCV 96      MCH 31.8      MCHC 33.2      RDW 12.1      Platelet Count 220      % Neutrophils 54      % Lymphocytes 33      % Monocytes 11      % Eosinophils 1      % Basophils 1      % Immature Granulocytes 0      NRBCs per 100 WBC 0      Absolute Neutrophils 4.3      Absolute Lymphocytes 2.6      Absolute Monocytes 0.8      Absolute Eosinophils 0.1      Absolute Basophils 0.1      Absolute Immature Granulocytes 0.0      Absolute NRBCs 0.0         Imaging   CT Aortic Survey w Contrast   Final Result   IMPRESSION:      1.  Stable 4.1 cm ascending thoracic aortic aneurysm. No acute vascular findings including aortic wall hematoma or dissection.      2.  Moderate three-vessel coronary artery atherosclerotic calcifications.      3.  Stable 2 mm right lower lobe nodule. Per Fleischner Society 2017 guideline, a one-year follow-up chest CT could be considered if patient is at high risk for lung cancer. Without lung cancer risk factor, no follow-up is necessary.      4.  Colonic diverticulosis without acute diverticulitis.              EKG   ECG results from 07/20/24   EKG 12-lead, tracing only     Value    Systolic Blood Pressure     Diastolic Blood Pressure     Ventricular Rate 64    Atrial Rate 64    OR Interval 162    QRS Duration 74        QTc 435    P Axis 58    R AXIS 49    T Axis 61    Interpretation ECG      Sinus rhythm with sinus arrhythmia  Normal ECG  When compared with ECG of 28-JUN-2024 07:57,  No significant change was found          ED Course      Medications Administered   Medications   Saline CT scan flush (60 mLs Intravenous $Given 7/21/24 0021)   iopamidol (ISOVUE-370) solution 120 mL (72 mLs Intravenous $Given 7/21/24 0018)           Medical Decision Making / Diagnosis       TRISTAN Thomas is a 73 year old female with past medical history of paroxysmal A-fib status post  ablation not currently on anticoagulation, thoracic aortic aneurysm, esophageal spasm, chronic pain, gastroparesis, IBS, GERD and Sjogren's syndrome who presents to the emergency department with crushing chest pain to the middle of her chest that began approximately hour prior to presentation.  Symptoms are similar to when she has had esophageal spasm before in the past.  She received nitro by EMS and had complete resolution of her symptoms.  This is typical for her esophageal spasm.  Upon initial evaluation she is mildly hypertensive but otherwise hemodynamically stable, vital signs.  She is afebrile and oxygenating well on room air.  She does not appear to be in acute distress.  EKG does not show any acute signs of ischemia.  Troponin x 2 was negative.  Patient with recent nondiagnostic but nonconcerning stress testing.  Has followed with cardiology and felt that her symptoms are more consistent with esophageal spasm and recommended follow-up with GI.  Patient also has history of thoracic aorta.  Fortunately CT scan today shows no signs of dissection or increasing diameter of the aneurysm.  The remainder of her CT scan of the chest was negative.  On reevaluation she remained pain-free after nitro.  We discussed symptoms and follow-up with cardiology and gastroenterology.  Patient feels comfortable with this plan.  She denies return to the emergency room with any new or worsening symptoms.    Disposition   The patient was discharged.     Diagnosis     ICD-10-CM    1. Chest pain, unspecified type  R07.9       2. Esophageal spasm  K22.4            Discharge Medications   Discharge Medication List as of 7/21/2024  1:33 AM          MD Jenn Gonzales Christopher Joseph, MD  07/21/24 0455

## 2024-07-21 NOTE — ED TRIAGE NOTES
Pt arrives from home via EMS. Pt started having chest pain today. Has been feeling ill/off for a few days. Pt took nitroglycerin at home that seemed to help EMS gave another dose of nitroglycerin en route along with 4mg zofran. BG 95 Aox4      Triage Assessment (Adult)       Row Name 07/20/24 3806          Triage Assessment    Airway WDL WDL        Respiratory WDL    Respiratory WDL WDL        Skin Circulation/Temperature WDL    Skin Circulation/Temperature WDL WDL        Cardiac WDL    Cardiac WDL X;chest pain        Chest Pain Assessment    Chest Pain Location midsternal     Chest Pain Radiation jaw        Peripheral/Neurovascular WDL    Peripheral Neurovascular WDL WDL        Cognitive/Neuro/Behavioral WDL    Cognitive/Neuro/Behavioral WDL WDL

## 2024-07-22 ENCOUNTER — PATIENT OUTREACH (OUTPATIENT)
Dept: INTERNAL MEDICINE | Facility: CLINIC | Age: 74
End: 2024-07-22
Payer: COMMERCIAL

## 2024-07-22 LAB
ATRIAL RATE - MUSE: 64 BPM
DIASTOLIC BLOOD PRESSURE - MUSE: NORMAL MMHG
INTERPRETATION ECG - MUSE: NORMAL
P AXIS - MUSE: 58 DEGREES
PR INTERVAL - MUSE: 162 MS
QRS DURATION - MUSE: 74 MS
QT - MUSE: 422 MS
QTC - MUSE: 435 MS
R AXIS - MUSE: 49 DEGREES
SYSTOLIC BLOOD PRESSURE - MUSE: NORMAL MMHG
T AXIS - MUSE: 61 DEGREES
VENTRICULAR RATE- MUSE: 64 BPM

## 2024-07-22 NOTE — TELEPHONE ENCOUNTER
ED / Discharge Outreach Protocol    Patient Contact    Attempt # 1    Was call answered?  No.  Left message on voicemail with information to call me back.    On Call Back:     Please relay triage RN was attempting to contact patient to follow up with them regarding their most recent hospital visit. If patient calls back, please route call to triage RN for hospital follow up assessment.     Thank you,  James Boudreaux, Triage RN [place]  10:44 AM 7/22/2024

## 2024-07-23 NOTE — TELEPHONE ENCOUNTER
ED / Discharge Outreach Protocol    Patient Contact    Attempt # 2    Was call answered?  No.  Left message on voicemail with information to call me back.    Please transfer call to RN if patient calls back.    Summer RN 2:24 PM July 23, 2024   [facility]

## 2024-07-24 ENCOUNTER — MYC MEDICAL ADVICE (OUTPATIENT)
Dept: DERMATOLOGY | Facility: CLINIC | Age: 74
End: 2024-07-24
Payer: COMMERCIAL

## 2024-07-25 NOTE — TELEPHONE ENCOUNTER
Please review my chart message and advise.  Pt was seen on 7/10/24.    Treat again?    Jody AGRAWAL RN  [facility] [place]  [phone]

## 2024-07-28 ENCOUNTER — HEALTH MAINTENANCE LETTER (OUTPATIENT)
Age: 74
End: 2024-07-28

## 2024-07-29 ENCOUNTER — TRANSFERRED RECORDS (OUTPATIENT)
Dept: HEALTH INFORMATION MANAGEMENT | Facility: CLINIC | Age: 74
End: 2024-07-29

## 2024-08-02 ENCOUNTER — OFFICE VISIT (OUTPATIENT)
Dept: INTERNAL MEDICINE | Facility: CLINIC | Age: 74
End: 2024-08-02
Payer: COMMERCIAL

## 2024-08-02 VITALS
WEIGHT: 155 LBS | TEMPERATURE: 98.7 F | HEART RATE: 72 BPM | HEIGHT: 67 IN | DIASTOLIC BLOOD PRESSURE: 68 MMHG | BODY MASS INDEX: 24.33 KG/M2 | OXYGEN SATURATION: 98 % | RESPIRATION RATE: 18 BRPM | SYSTOLIC BLOOD PRESSURE: 109 MMHG

## 2024-08-02 DIAGNOSIS — R20.0 FACIAL NUMBNESS: ICD-10-CM

## 2024-08-02 DIAGNOSIS — M51.379 DEGENERATION OF LUMBAR OR LUMBOSACRAL INTERVERTEBRAL DISC: ICD-10-CM

## 2024-08-02 DIAGNOSIS — R29.898 LEG WEAKNESS, BILATERAL: Primary | ICD-10-CM

## 2024-08-02 PROCEDURE — 99214 OFFICE O/P EST MOD 30 MIN: CPT | Performed by: INTERNAL MEDICINE

## 2024-08-02 NOTE — PROGRESS NOTES
"  Assessment & Plan     Leg weakness, bilateral and degeneration of lumbar or lumbosacral intervertebral disc  Based upon her history of leg weakness and recent MRI findings, I do think it would be worthwhile for the patient to be evaluated further in the spinal clinic.  Patient was in agreement.  A referral was placed for her today.  In the meantime, she will continue her gabapentin 200 mg in the morning, 100 mg at noon, and 600 mg in the evening.  We will follow-up on the recommendations put forth by the spinal clinic once their evaluation has been completed.  - Spine  Referral; Future    Facial numbness  Patient does give a history of \"spells\" that did involve the left side of her face that has been present for quite some time.  Patient would like to establish with a neurologist to review her history for further evaluation.  A referral was placed for her today.  - Adult Neurology  Referral; Future          See Patient Instructions    Julia Morris is a 73 year old, presenting for the following health issues:  Follow Up    Patient is a 73-year-old  female who presents to the clinic to follow-up on some recent concerns she has had.  Patient was previously seen in June 2024 with reports of worsening weakness in her legs bilaterally.  She does have a history of lumbar degenerative disc disease.  Patient did undergo an MRI of her lumbar spine on July 21, 2024.  The imaging study did reveal moderate lumbar spondylosis with shallow disc herniations noted throughout the course of her thoracic or lumbar spine.  She was also noted to have neuroforaminal stenosis noted at multiple levels of her lumbar spine as well.  Patient had previously been placed on fluoxetine for depression, but she did not feel that it was very helpful.  Patient reports that she has had some issues with numbness involving her face, most predominantly on her left side.  She did feel that the fluoxetine made that " "symptom worse.  Patient did stop the medication a few weeks ago, but her symptoms have persisted.  Patient reports that as a child she had recurrent \"spells.\"  Patient reports that she is not entirely certain as to what these pills were as she never heard the exact diagnosis.  There was some concerns about possible seizures, but patient is not certain.  She states that this did involve left-sided facial twitching and numbness.  She has previously been established with a neurologist prior to relocating to [place], she does not have one now.  Patient would like a referral to review for discussion and review of her neurologic history.    History of Present Illness       Reason for visit:  Lower leg weakness and f/u MRI    She eats 4 or more servings of fruits and vegetables daily.She consumes 0 sweetened beverage(s) daily.She exercises with enough effort to increase her heart rate 9 or less minutes per day.  She exercises with enough effort to increase her heart rate 3 or less days per week.   She is taking medications regularly.         Hypertension Follow-up    Do you check your blood pressure regularly outside of the clinic? Yes   Are you following a low salt diet? Yes  Are your blood pressures ever more than 140 on the top number (systolic) OR more   than 90 on the bottom number (diastolic), for example 140/90? No  How many servings of fruits and vegetables do you eat daily?  2-3  On average, how many sweetened beverages do you drink each day (Examples: soda, juice, sweet tea, etc.  Do NOT count diet or artificially sweetened beverages)?   0  How many days per week do you exercise enough to make your heart beat faster? 3 or less  How many minutes a day do you exercise enough to make your heart beat faster? 9 or less  How many days per week do you miss taking your medication? 0        Review of Systems  CONSTITUTIONAL: NEGATIVE for fever, chills, change in weight  INTEGUMENTARY/SKIN: NEGATIVE for worrisome rashes, " "moles or lesions  ENT/MOUTH: NEGATIVE for ear, mouth and throat problems  RESP: NEGATIVE for significant cough or SOB  CV: NEGATIVE for chest pain, palpitations or peripheral edema  GI: NEGATIVE for nausea, abdominal pain, heartburn, or change in bowel habits  : NEGATIVE for frequency, dysuria, or hematuria  MUSCULOSKELETAL: Positive for back pain.  NEURO: Positive for leg weakness and facial numbness.      Objective    /68   Pulse 72   Temp 98.7  F (37.1  C)   Resp 18   Ht 1.702 m (5' 7\")   Wt 70.3 kg (155 lb)   SpO2 98%   Breastfeeding No   BMI 24.28 kg/m    Body mass index is 24.28 kg/m .  Physical Exam  Vitals reviewed.   Constitutional:       Appearance: Normal appearance.   HENT:      Head: Normocephalic and atraumatic.      Right Ear: Tympanic membrane, ear canal and external ear normal.      Left Ear: Tympanic membrane, ear canal and external ear normal.      Mouth/Throat:      Mouth: Mucous membranes are moist.      Pharynx: Oropharynx is clear.   Eyes:      Extraocular Movements: Extraocular movements intact.      Conjunctiva/sclera: Conjunctivae normal.      Pupils: Pupils are equal, round, and reactive to light.   Cardiovascular:      Rate and Rhythm: Normal rate and regular rhythm.      Pulses: Normal pulses.      Heart sounds: Normal heart sounds.   Pulmonary:      Effort: Pulmonary effort is normal.      Breath sounds: Normal breath sounds.   Musculoskeletal:      Cervical back: Normal.      Thoracic back: Normal.      Lumbar back: Tenderness present. Negative right straight leg raise test and negative left straight leg raise test.        Back:    Skin:     General: Skin is warm and dry.      Capillary Refill: Capillary refill takes less than 2 seconds.   Neurological:      Mental Status: She is alert and oriented to person, place, and time. Mental status is at baseline.                Signed Electronically by: Jaswinder Pal MD    "

## 2024-08-12 DIAGNOSIS — K21.9 GASTROESOPHAGEAL REFLUX DISEASE WITHOUT ESOPHAGITIS: Primary | ICD-10-CM

## 2024-08-13 ENCOUNTER — ANCILLARY PROCEDURE (OUTPATIENT)
Dept: GENERAL RADIOLOGY | Facility: CLINIC | Age: 74
End: 2024-08-13
Attending: PHYSICIAN ASSISTANT
Payer: COMMERCIAL

## 2024-08-13 DIAGNOSIS — R07.89 ATYPICAL CHEST PAIN: ICD-10-CM

## 2024-08-13 DIAGNOSIS — R13.10 DYSPHAGIA, UNSPECIFIED: ICD-10-CM

## 2024-08-13 PROCEDURE — 74220 X-RAY XM ESOPHAGUS 1CNTRST: CPT | Mod: GC | Performed by: STUDENT IN AN ORGANIZED HEALTH CARE EDUCATION/TRAINING PROGRAM

## 2024-08-14 ENCOUNTER — TRANSFERRED RECORDS (OUTPATIENT)
Dept: HEALTH INFORMATION MANAGEMENT | Facility: CLINIC | Age: 74
End: 2024-08-14

## 2024-08-14 ENCOUNTER — OFFICE VISIT (OUTPATIENT)
Dept: URGENT CARE | Facility: URGENT CARE | Age: 74
End: 2024-08-14
Payer: COMMERCIAL

## 2024-08-14 VITALS
BODY MASS INDEX: 24.59 KG/M2 | HEART RATE: 59 BPM | DIASTOLIC BLOOD PRESSURE: 80 MMHG | RESPIRATION RATE: 18 BRPM | SYSTOLIC BLOOD PRESSURE: 145 MMHG | OXYGEN SATURATION: 98 % | WEIGHT: 157 LBS | TEMPERATURE: 97.3 F

## 2024-08-14 DIAGNOSIS — N30.00 ACUTE CYSTITIS WITHOUT HEMATURIA: Primary | ICD-10-CM

## 2024-08-14 LAB
ALBUMIN UR-MCNC: 30 MG/DL
APPEARANCE UR: ABNORMAL
BACTERIA #/AREA URNS HPF: ABNORMAL /HPF
BILIRUB UR QL STRIP: NEGATIVE
COLOR UR AUTO: YELLOW
GLUCOSE UR STRIP-MCNC: NEGATIVE MG/DL
HGB UR QL STRIP: ABNORMAL
KETONES UR STRIP-MCNC: NEGATIVE MG/DL
LEUKOCYTE ESTERASE UR QL STRIP: ABNORMAL
NITRATE UR QL: POSITIVE
PH UR STRIP: 6 [PH] (ref 5–7)
RBC #/AREA URNS AUTO: ABNORMAL /HPF
SP GR UR STRIP: 1.01 (ref 1–1.03)
SQUAMOUS #/AREA URNS AUTO: ABNORMAL /LPF
UROBILINOGEN UR STRIP-ACNC: 0.2 E.U./DL
WBC #/AREA URNS AUTO: >100 /HPF

## 2024-08-14 PROCEDURE — 87186 SC STD MICRODIL/AGAR DIL: CPT | Performed by: PHYSICIAN ASSISTANT

## 2024-08-14 PROCEDURE — 81001 URINALYSIS AUTO W/SCOPE: CPT | Performed by: PHYSICIAN ASSISTANT

## 2024-08-14 PROCEDURE — 87086 URINE CULTURE/COLONY COUNT: CPT | Performed by: PHYSICIAN ASSISTANT

## 2024-08-14 PROCEDURE — 99214 OFFICE O/P EST MOD 30 MIN: CPT | Performed by: PHYSICIAN ASSISTANT

## 2024-08-14 PROCEDURE — 87088 URINE BACTERIA CULTURE: CPT | Performed by: PHYSICIAN ASSISTANT

## 2024-08-14 RX ORDER — OMEPRAZOLE 40 MG/1
40 CAPSULE, DELAYED RELEASE ORAL DAILY
Qty: 100 CAPSULE | Refills: 2 | Status: SHIPPED | OUTPATIENT
Start: 2024-08-14

## 2024-08-14 RX ORDER — SULFAMETHOXAZOLE/TRIMETHOPRIM 800-160 MG
1 TABLET ORAL 2 TIMES DAILY
Qty: 14 TABLET | Refills: 0 | Status: SHIPPED | OUTPATIENT
Start: 2024-08-14 | End: 2024-08-21

## 2024-08-14 RX ORDER — AMOXICILLIN 500 MG/1
CAPSULE ORAL
COMMUNITY
Start: 2024-04-15

## 2024-08-14 NOTE — PROGRESS NOTES
Assessment & Plan     1. Acute cystitis without hematuria  - UA with Microscopic reflex to Culture  - Urine Microscopic Exam  - Urine Culture  - sulfamethoxazole-trimethoprim (BACTRIM DS) 800-160 MG tablet; Take 1 tablet by mouth 2 times daily for 7 days  Dispense: 14 tablet; Refill: 0      Urine is grossly positive today.  No evidence of pyelonephritis, urosepsis or nephrolithiasis.  Treatment with medication as listed above, urine culture is pending  Push fluids  Discussed with patient if development of fever, chills, vomiting unable to hold down fluids, flank pain or weakness/dizziness/ lightheadedness to follow-up in clinic or ER right away.     Return in about 2 days (around 8/16/2024), or if symptoms worsen or fail to improve.    Diagnosis and treatment plan was reviewed with patient and/or family.   We went over any labs or imaging. Discussed worsening symptoms or little to no relief despite treatment plan to follow-up with PCP or return to clinic.  Patient verbalizes understanding. All questions were addressed and answered.     Selena Buitrago PA-C  [facility]    CHIEF COMPLAINT:   Chief Complaint   Patient presents with    Urinary Problem     Lower abdominal pressure, burning w/urination and urgency x1 day     Subjective     Alexandra is a 73 year old female who presents to clinic today for evaluation of dysuria, urinary frequency and voiding in small amounts, Symptoms started today. Denies having fever, chills, flank pain, nausea, vomiting, hematuria or weakness.  Vaginal discharge, itching or pain are not present.     Past Medical History:   Diagnosis Date    Arrhythmia 10/01/2019    Arthritis     hips    Ascending aortic aneurysm (H24)     4.2 cm   Ascending    Biceps tendon rupture     Depression 08/06/2019    Diverticulitis of colon     Gastroesophageal reflux disease     Esophageal spasms.    Gastroparesis     Heart murmur     High cholesterol     History of GI bleed      Hypertension     PONV (postoperative nausea and vomiting)     Sjogren's syndrome (H24)      Past Surgical History:   Procedure Laterality Date    ABDOMEN SURGERY      APPENDECTOMY      ARTHROPLASTY HIP Left 07/27/2022    Procedure: Left total hip arthroplasty;  Surgeon: Berry Mcdonnell MD;  Location: [facility] OR    BACK SURGERY      BIOPSY      BREAST SURGERY      CHOLECYSTECTOMY      COLECTOMY PARTIAL      Due to recurrent diverticulitis    COLONOSCOPY      ENT SURGERY      T & A    ESOPHAGOSCOPY, GASTROSCOPY, DUODENOSCOPY (EGD), COMBINED N/A 09/13/2022    Procedure: ESOPHAGOGASTRODUODENOSCOPY (EGD) (fv);  Surgeon: Seymour Chatterjee MD;  Location:  GI    EXCHANGE POLY COMPONENT ARTHROPLASTY KNEE Right 4/2/2024    Procedure: RIGHT REVISION TOTAL KNEE ARTHROPLASTY, POLYETHYLENE EXCHANGE;  Surgeon: Elías Prieto MD;  Location: [facility] Main OR    GENITOURINARY SURGERY      HYSTERECTOMY TOTAL ABDOMINAL      LASIK      left midfoot fusion Left     2nd toe osteotomy    shouder replacement Right     TOTAL HIP ARTHROPLASTY Right 06/07/2009    TOTAL KNEE ARTHROPLASTY Right 07/12/2006    TOTAL KNEE ARTHROPLASTY Left 02/18/2009     Social History     Tobacco Use    Smoking status: Never    Smokeless tobacco: Never   Substance Use Topics    Alcohol use: Not Currently     Current Outpatient Medications   Medication Sig Dispense Refill    acetaminophen (TYLENOL) 325 MG tablet Take 2 tablets (650 mg) by mouth every 4 hours as needed for other (mild pain) 100 tablet 0    amLODIPine (NORVASC) 2.5 MG tablet Take 1 tablet (2.5 mg) by mouth daily 180 tablet 1    carvedilol (COREG) 6.25 MG tablet Take 1 tablet (6.25 mg) by mouth 2 times daily (with meals) 180 tablet 3    cycloSPORINE (RESTASIS) 0.05 % ophthalmic emulsion Place 1 drop into both eyes 2 times daily 90 each 3    gabapentin (NEURONTIN) 100 MG capsule TAKE 2 CAPSULES BY MOUTH IN THE  MORNING AND 1 ADDITIONAL CAPSULE AT NOON 300 capsule 2    gabapentin (NEURONTIN) 300 MG  capsule TAKE 2 CAPSULES BY MOUTH AT  BEDTIME 200 capsule 2    losartan (COZAAR) 50 MG tablet Take 1 tablet (50 mg) by mouth 2 times daily 180 tablet 2    Magnesium Oxide 250 MG TABS Take 1 tablet by mouth at bedtime      multivitamin w/minerals (THERA-VIT-M) tablet Take 1 tablet by mouth daily      nitroGLYcerin (NITROSTAT) 0.4 MG sublingual tablet Place 1 tablet (0.4 mg) under the tongue every 5 minutes as needed for chest pain For chest pain place 1 tablet under the tongue every 5 minutes for 3 doses. If symptoms persist 5 minutes after 1st dose call 911. 10 tablet 3    omeprazole (PRILOSEC) 40 MG DR capsule TAKE 1 CAPSULE BY MOUTH DAILY 100 capsule 2    rosuvastatin (CRESTOR) 10 MG tablet Take 1 tablet (10 mg) by mouth daily 90 tablet 2    sulfamethoxazole-trimethoprim (BACTRIM DS) 800-160 MG tablet Take 1 tablet by mouth 2 times daily for 7 days 14 tablet 0    Vitamin D3 (CHOLECALCIFEROL) 25 mcg (1000 units) tablet Take 1 tablet by mouth daily      fluconazole (DIFLUCAN) 200 MG tablet Take 1 tablet (200 mg) by mouth daily (Patient not taking: Reported on 8/14/2024) 14 tablet 0    FLUoxetine (PROZAC) 10 MG capsule Take 1 capsule (10 mg) by mouth daily (Patient not taking: Reported on 8/2/2024) 30 capsule 1    nystatin (MYCOSTATIN) 500066 UNIT/ML suspension Take 5 mLs (500,000 Units) by mouth 4 times daily (Patient not taking: Reported on 8/14/2024) 60 mL 0     No current facility-administered medications for this visit.     Allergies   Allergen Reactions    Ciprofloxacin Other (See Comments)     Pt has aortic aneurysm.  Increased risk of rupture or dissection with use of fluoroquinolones.      Food Anaphylaxis     EGGPLANT-->anaphylaxis      Venlafaxine Hcl Diarrhea, Shortness Of Breath and Nausea and Vomiting    Meperidine Rash               Adhesive Tape      blisters       10 point ROS of systems were all negative except for pertinent positives noted in my HPI.      Exam:   BP (!) 145/80 (BP Location: Right  arm, Patient Position: Sitting, Cuff Size: Adult Regular)   Pulse 59   Temp 97.3  F (36.3  C) (Temporal)   Resp 18   Wt 71.2 kg (157 lb)   SpO2 98%   BMI 24.59 kg/m    Constitutional: healthy, alert and no distress  ENT: MMM  Cardiovascular: RRR  Respiratory: CTA bilaterally, no rhonchi or rales  Gastrointestinal: soft and nontender  Back: No CVA tenderness B/L  Skin: no rashes      Results for orders placed or performed in visit on 08/14/24   UA with Microscopic reflex to Culture     Status: Abnormal    Specimen: Urine, Clean Catch   Result Value Ref Range    Color Urine Yellow Colorless, Straw, Light Yellow, Yellow    Appearance Urine Cloudy (A) Clear    Glucose Urine Negative Negative mg/dL    Bilirubin Urine Negative Negative    Ketones Urine Negative Negative mg/dL    Specific Gravity Urine 1.010 1.003 - 1.035    Blood Urine Large (A) Negative    pH Urine 6.0 5.0 - 7.0    Protein Albumin Urine 30 (A) Negative mg/dL    Urobilinogen Urine 0.2 0.2, 1.0 E.U./dL    Nitrite Urine Positive (A) Negative    Leukocyte Esterase Urine Large (A) Negative   Urine Microscopic Exam     Status: Abnormal   Result Value Ref Range    Bacteria Urine Moderate (A) None Seen /HPF    RBC Urine 5-10 (A) 0-2 /HPF /HPF    WBC Urine >100 (A) 0-5 /HPF /HPF    Squamous Epithelials Urine Few (A) None Seen /LPF

## 2024-08-14 NOTE — PATIENT INSTRUCTIONS
Take antibiotics as prescribed    If development of fever, chills, vomiting unable to hold down fluids, flank pain or weakness/dizziness/ lightheadedness to follow-up in clinic or ER right away.   
Detail Level: Detailed
Depth Of Biopsy: dermis
Was A Bandage Applied: Yes
Size Of Lesion In Cm: 0
Biopsy Type: H and E
Biopsy Method: Personna blade
Anesthesia Type: 1% lidocaine with epinephrine
Anesthesia Volume In Cc (Will Not Render If 0): 0.5
Hemostasis: Electrocautery
Wound Care: Petrolatum
Dressing: bandage
Destruction After The Procedure: No
Type Of Destruction Used: Curettage
Curettage Text: The wound bed was treated with curettage after the biopsy was performed.
Cryotherapy Text: The wound bed was treated with cryotherapy after the biopsy was performed.
Electrodesiccation Text: The wound bed was treated with electrodesiccation after the biopsy was performed.
Electrodesiccation And Curettage Text: The wound bed was treated with electrodesiccation and curettage after the biopsy was performed.
Silver Nitrate Text: The wound bed was treated with silver nitrate after the biopsy was performed.
Lab: [id number]
Lab Facility: [id number]
Consent: Written consent was obtained and risks were reviewed including but not limited to scarring, infection, bleeding, scabbing, incomplete removal, nerve damage and allergy to anesthesia.
Post-Care Instructions: I reviewed with the patient in detail post-care instructions. Patient is to keep the biopsy site dry overnight, and then apply bacitracin twice daily until healed.
Notification Instructions: Patient will be notified of biopsy results. However, patient instructed to call the office if not contacted within 2 weeks.
Billing Type: Third-Party Bill

## 2024-08-16 ENCOUNTER — MYC REFILL (OUTPATIENT)
Dept: CARDIOLOGY | Facility: CLINIC | Age: 74
End: 2024-08-16
Payer: COMMERCIAL

## 2024-08-16 DIAGNOSIS — E78.5 HYPERLIPIDEMIA, ACQUIRED: ICD-10-CM

## 2024-08-16 DIAGNOSIS — I10 BENIGN ESSENTIAL HYPERTENSION: ICD-10-CM

## 2024-08-17 LAB
BACTERIA UR CULT: ABNORMAL
BACTERIA UR CULT: ABNORMAL

## 2024-08-19 RX ORDER — ROSUVASTATIN CALCIUM 10 MG/1
10 TABLET, COATED ORAL DAILY
Qty: 90 TABLET | Refills: 1 | Status: SHIPPED | OUTPATIENT
Start: 2024-08-19

## 2024-08-19 RX ORDER — LOSARTAN POTASSIUM 50 MG/1
50 TABLET ORAL 2 TIMES DAILY
Qty: 180 TABLET | Refills: 1 | Status: SHIPPED | OUTPATIENT
Start: 2024-08-19

## 2024-08-27 ENCOUNTER — HOSPITAL ENCOUNTER (OUTPATIENT)
Dept: NUCLEAR MEDICINE | Facility: CLINIC | Age: 74
Setting detail: NUCLEAR MEDICINE
Discharge: HOME | End: 2024-08-27
Attending: PHYSICIAN ASSISTANT | Admitting: PHYSICIAN ASSISTANT
Payer: COMMERCIAL

## 2024-08-27 DIAGNOSIS — R07.89 ATYPICAL CHEST PAIN: ICD-10-CM

## 2024-08-27 PROCEDURE — A9541 TC99M SULFUR COLLOID: HCPCS | Performed by: PHYSICIAN ASSISTANT

## 2024-08-27 PROCEDURE — 343N000001 HC RX 343: Performed by: PHYSICIAN ASSISTANT

## 2024-08-27 PROCEDURE — 78264 GASTRIC EMPTYING IMG STUDY: CPT

## 2024-08-27 RX ADMIN — Medication 2 MILLICURIE: at 08:16

## 2024-08-28 ENCOUNTER — TRANSFERRED RECORDS (OUTPATIENT)
Dept: HEALTH INFORMATION MANAGEMENT | Facility: CLINIC | Age: 74
End: 2024-08-28
Payer: COMMERCIAL

## 2024-09-03 ENCOUNTER — TRANSFERRED RECORDS (OUTPATIENT)
Dept: HEALTH INFORMATION MANAGEMENT | Facility: CLINIC | Age: 74
End: 2024-09-03

## 2024-09-12 NOTE — PROGRESS NOTES
Patient did not return for further treatment and no additional progress was noted.  Please refer to the progress note and goal flowsheet  for discharge information.

## 2024-09-17 ENCOUNTER — MEDICAL CORRESPONDENCE (OUTPATIENT)
Dept: HEALTH INFORMATION MANAGEMENT | Facility: CLINIC | Age: 74
End: 2024-09-17
Payer: COMMERCIAL

## 2024-09-17 ENCOUNTER — TRANSFERRED RECORDS (OUTPATIENT)
Dept: HEALTH INFORMATION MANAGEMENT | Facility: CLINIC | Age: 74
End: 2024-09-17
Payer: COMMERCIAL

## 2024-09-17 DIAGNOSIS — I10 BENIGN ESSENTIAL HYPERTENSION: ICD-10-CM

## 2024-09-17 RX ORDER — CARVEDILOL 6.25 MG/1
6.25 TABLET ORAL 2 TIMES DAILY WITH MEALS
Qty: 180 TABLET | Refills: 0 | Status: SHIPPED | OUTPATIENT
Start: 2024-09-17

## 2024-09-17 ASSESSMENT — PATIENT HEALTH QUESTIONNAIRE - PHQ9
SUM OF ALL RESPONSES TO PHQ QUESTIONS 1-9: 4
SUM OF ALL RESPONSES TO PHQ QUESTIONS 1-9: 4
10. IF YOU CHECKED OFF ANY PROBLEMS, HOW DIFFICULT HAVE THESE PROBLEMS MADE IT FOR YOU TO DO YOUR WORK, TAKE CARE OF THINGS AT HOME, OR GET ALONG WITH OTHER PEOPLE: SOMEWHAT DIFFICULT

## 2024-09-18 ENCOUNTER — VIRTUAL VISIT (OUTPATIENT)
Dept: PSYCHOLOGY | Facility: CLINIC | Age: 74
End: 2024-09-18
Attending: INTERNAL MEDICINE
Payer: COMMERCIAL

## 2024-09-18 DIAGNOSIS — F43.23 ADJUSTMENT DISORDER WITH MIXED ANXIETY AND DEPRESSED MOOD: Primary | ICD-10-CM

## 2024-09-18 PROCEDURE — 90791 PSYCH DIAGNOSTIC EVALUATION: CPT | Mod: 95 | Performed by: MARRIAGE & FAMILY THERAPIST

## 2024-09-18 ASSESSMENT — ANXIETY QUESTIONNAIRES
2. NOT BEING ABLE TO STOP OR CONTROL WORRYING: SEVERAL DAYS
6. BECOMING EASILY ANNOYED OR IRRITABLE: SEVERAL DAYS
GAD7 TOTAL SCORE: 4
1. FEELING NERVOUS, ANXIOUS, OR ON EDGE: SEVERAL DAYS
5. BEING SO RESTLESS THAT IT IS HARD TO SIT STILL: NOT AT ALL
GAD7 TOTAL SCORE: 4
4. TROUBLE RELAXING: NOT AT ALL
3. WORRYING TOO MUCH ABOUT DIFFERENT THINGS: SEVERAL DAYS
7. FEELING AFRAID AS IF SOMETHING AWFUL MIGHT HAPPEN: NOT AT ALL

## 2024-09-18 ASSESSMENT — COLUMBIA-SUICIDE SEVERITY RATING SCALE - C-SSRS
TOTAL  NUMBER OF ABORTED OR SELF INTERRUPTED ATTEMPTS LIFETIME: NO
TOTAL  NUMBER OF INTERRUPTED ATTEMPTS LIFETIME: NO
6. HAVE YOU EVER DONE ANYTHING, STARTED TO DO ANYTHING, OR PREPARED TO DO ANYTHING TO END YOUR LIFE?: NO
1. HAVE YOU WISHED YOU WERE DEAD OR WISHED YOU COULD GO TO SLEEP AND NOT WAKE UP?: NO
ATTEMPT LIFETIME: NO
2. HAVE YOU ACTUALLY HAD ANY THOUGHTS OF KILLING YOURSELF?: NO
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
## 2024-09-19 ENCOUNTER — TRANSCRIBE ORDERS (OUTPATIENT)
Dept: OTHER | Age: 74
End: 2024-09-19

## 2024-09-19 DIAGNOSIS — R07.89 ATYPICAL CHEST PAIN: Primary | ICD-10-CM

## 2024-09-19 DIAGNOSIS — K59.00 CONSTIPATION, UNSPECIFIED CONSTIPATION TYPE: ICD-10-CM

## 2024-09-19 DIAGNOSIS — K21.9 GASTROESOPHAGEAL REFLUX DISEASE WITHOUT ESOPHAGITIS: ICD-10-CM

## 2024-10-08 ENCOUNTER — VIRTUAL VISIT (OUTPATIENT)
Dept: PSYCHOLOGY | Facility: CLINIC | Age: 74
End: 2024-10-08
Payer: COMMERCIAL

## 2024-10-08 DIAGNOSIS — F43.23 ADJUSTMENT DISORDER WITH MIXED ANXIETY AND DEPRESSED MOOD: Primary | ICD-10-CM

## 2024-10-08 PROCEDURE — 90834 PSYTX W PT 45 MINUTES: CPT | Mod: 95 | Performed by: MARRIAGE & FAMILY THERAPIST

## 2024-10-08 PROCEDURE — 90785 PSYTX COMPLEX INTERACTIVE: CPT | Mod: 95 | Performed by: MARRIAGE & FAMILY THERAPIST

## 2024-10-08 NOTE — PROGRESS NOTES
[facility]                                     Progress Note    Patient Name: Alexandra Thomas  Date: 10/8/2024         Service Type: Individual      Session Start Time: 2:30  Session End Time: 3:13     Session Length: 38-52    Session #: 1    Attendees: Client attended alone    Service Modality:  Video Visit:      Provider verified identity through the following two step process.  Patient provided:  Patient  and Patient is known previously to provider    Telemedicine Visit: The patient's condition can be safely assessed and treated via synchronous audio and visual telemedicine encounter.      Reason for Telemedicine Visit: Patient has requested telehealth visit    Originating Site (Patient Location): Patient's home    Distant Site (Provider Location[facility]    Consent:  The patient/guardian has verbally consented to: the potential risks and benefits of telemedicine (video visit) versus in person care; bill my insurance or make self-payment for services provided; and responsibility for payment of non-covered services.     Patient would like the video invitation sent by:  My Chart    Mode of Communication:  [facility]    Distant Location (Provider):  On-site    As the provider I attest to compliance with applicable laws and regulations related to telemedicine.    DATA  Interactive Complexity: Yes, visit entailed Interactive Complexity evidenced by:  -Use of play equipment or physical devices to overcome barriers to diagnostic or therapeutic interaction with a patient who is not fluent in the same language or who has not developed or lost expressive or receptive language skills to use or understand typical language  Crisis: No        Progress Since Last Session (Related to Symptoms / Goals / Homework):   Symptoms: Improving      Homework:  n/a      Episode of Care Goals: Minimal progress - ACTION (Actively  working towards change); Intervened by reinforcing change plan / affirming steps taken     Current / Ongoing Stressors and Concerns:   Sadness about missing friends since moving.  Noticed distress after an association meeting recently during which she felt left out and/or disconnected from the others.  Activation noticed in face and throat as she thought about missing friends.      Treatment Objective(s) Addressed in This Session:   Relationship building, brainspotting     Intervention:   Brainspotting using Rolling frame.    Assessments completed prior to visit:  The following assessments were completed by patient for this visit:  PHQ9:       9/17/2024     8:15 PM   PHQ-9 SCORE   PHQ-9 Total Score MyChart 4 (Minimal depression)   PHQ-9 Total Score 4     GAD7:       7/6/2024    10:18 AM 9/18/2024    11:00 AM   TWIN-7 SCORE   Total Score 11 (moderate anxiety)    Total Score 11 4     PROMIS 10-Global Health (all questions and answers displayed):       9/18/2024    10:02 AM   PROMIS 10   In general, would you say your health is: Very good   In general, would you say your quality of life is: Very good   In general, how would you rate your physical health? Very good   In general, how would you rate your mental health, including your mood and your ability to think? Very good   In general, how would you rate your satisfaction with your social activities and relationships? Fair   In general, please rate how well you carry out your usual social activities and roles Good   To what extent are you able to carry out your everyday physical activities such as walking, climbing stairs, carrying groceries, or moving a chair? Moderately   In the past 7 days, how often have you been bothered by emotional problems such as feeling anxious, depressed, or irritable? Sometimes   In the past 7 days, how would you rate your fatigue on average? Moderate   In the past 7 days, how would you rate your pain on average, where 0 means no pain, and  10 means worst imaginable pain? 4   In general, would you say your health is: 4   In general, would you say your quality of life is: 4   In general, how would you rate your physical health? 4   In general, how would you rate your mental health, including your mood and your ability to think? 4   In general, how would you rate your satisfaction with your social activities and relationships? 2   In general, please rate how well you carry out your usual social activities and roles. (This includes activities at home, at work and in your community, and responsibilities as a parent, child, spouse, employee, friend, etc.) 3   To what extent are you able to carry out your everyday physical activities such as walking, climbing stairs, carrying groceries, or moving a chair? 3   In the past 7 days, how often have you been bothered by emotional problems such as feeling anxious, depressed, or irritable? 3   In the past 7 days, how would you rate your fatigue on average? 3   In the past 7 days, how would you rate your pain on average, where 0 means no pain, and 10 means worst imaginable pain? 4   Global Mental Health Score 13   Global Physical Health Score 13   PROMIS TOTAL - SUBSCORES 26         ASSESSMENT: Current Emotional / Mental Status (status of significant symptoms):   Risk status (Self / Other harm or suicidal ideation)   Patient denies current fears or concerns for personal safety.   Patient denies current or recent suicidal ideation or behaviors.   Patient denies current or recent homicidal ideation or behaviors.   Patient denies current or recent self injurious behavior or ideation.   Patient denies other safety concerns.   Patient reports there has been no change in risk factors since their last session.     Patient reports there has been no change in protective factors since their last session.     Recommended that patient call 911 or go to the local ED should there be a change in any of these risk  factors.     Appearance:   Appropriate    Eye Contact:   Good    Psychomotor Behavior: Normal    Attitude:   Cooperative  Pleasant   Orientation:   All   Speech    Rate / Production: Normal     Volume:  Normal    Mood:    Anxious    Affect:    Appropriate    Thought Content:  Clear    Thought Form:  Coherent  Logical    Insight:    Good      Medication Review:   No current psychiatric medications prescribed     Medication Compliance:   NA     Changes in Health Issues:   None reported     Chemical Use Review:   Substance Use: Chemical use reviewed, no active concerns identified      Tobacco Use: No current tobacco use.      Diagnosis:  1. Adjustment disorder with mixed anxiety and depressed mood        Collateral Reports Completed:   Not Applicable    PLAN: (Patient Tasks / Therapist Tasks / Other)  Continue Brainspotting.  Notice changes in physiological experiences and any new thoughts/feelings.        Gricelda Davenport, [facility]                                                         ______________________________________________________________________    Individual Treatment Plan    Patient's Name: Alexandra Thomas  YOB: 1950    Date of Creation: 10/8/2024    Date Treatment Plan Last Reviewed/Revised: 10/8/2024    DSM5 Diagnoses: Adjustment Disorders  309.28 (F43.23) With mixed anxiety and depressed mood  Psychosocial / Contextual Factors: history of trauma    PROMIS (reviewed every 90 days):     Referral / Collaboration:  Referral to another professional/service is not indicated at this time..    Anticipated number of session for this episode of care: 9-12 sessions  Anticipation frequency of session: Biweekly  Anticipated Duration of each session: 38-52 minutes  Treatment plan will be reviewed in 90 days or when goals have been changed.       MeasurableTreatment Goal(s) related to diagnosis / functional impairment(s)  Goal 1: Client will report reduction in anxiety also as evidenced by  reduction of TWIN-7 score below 6 points within the next 12 weeks.    Objective #A (Client Action)    Client will identify at least three triggers for anxiety.  Status: New - Date: 10/8/24      Intervention(s)  Therapist will provide educational materials on common triggers and signs of anxiety.    Objective #B  Client will use relaxation strategies at least two times per day to reduce the physical symptoms of anxiety.  Status:  New - Date: 10/8/24       Intervention(s)  Therapist will teach relaxation strategies such as mindfulness, deep breathing, muscle relaxation, and sensory activities.    Objective #C  Client will use cognitive strategies identified in therapy to challenge anxious thoughts.  Status:  New - Date: 10/8/24      Intervention(s)  Therapist will teach strategies for cognitive modification using REBT model.    Goal 2: Client will report improved mood as indicated by PHQ-9 score below 6 for consistent 8 weeks.    Objective #A (Client Action)    Status: New - Date: 10/8/24     Client will Increase interest, engagement, and pleasure in doing things.    Intervention(s)  Therapist will assign homework for daily involvement in pleasant activities.    Objective #B  Client will Identify negative self-talk and behaviors: challenge core beliefs, myths, and actions.    Status:  New: 10/8/24      Intervention(s)  Therapist will teach strategies for cognitive modification using REBT models.    Objective #C  Client will Improve quantity and quality of night time sleep / decrease daytime naps.  Status: New - Date: 10/8/24      Intervention(s)  Therapist will teach sleep hygiene strategies.  Assign homework for daily practice.    Goal 3: Client will report reduced or eliminated physiological activation when recalling a distressing event including decreased re-experiencing, decreased avoidance, and decreased hyperarousal.    Objective #A (Client Action)    Status: New - Date: 10/8/24      Client will acquire knowledge  of trauma, common symptoms post-trauma, emotion regulation strategies, stress management strategies, and cognitive coping strategies.    Intervention(s)  Therapist will teach about effects of trauma on mind and body; encourage emotion identification and expression; practice with client the stress management strategies; and teach cognitive modification.    Objective #B  Client will use Brainspotting while focusing on physiological sensations related to distressing events.  Client will assess and rate level of physiological activation.  Status: New - Date: 10/8/24      Intervention(s)  Therapist will provide use a pointer or other materials to assist client in holding a brainspot in their visual field.  Therapist might also provide biolateral music through headphones to deepen the experience.      Patient has reviewed and agreed to the above plan.      rGicelda Davenport, LMFT  October 8, 2024

## 2024-10-10 SDOH — HEALTH STABILITY: PHYSICAL HEALTH: ON AVERAGE, HOW MANY MINUTES DO YOU ENGAGE IN EXERCISE AT THIS LEVEL?: 30 MIN

## 2024-10-10 SDOH — HEALTH STABILITY: PHYSICAL HEALTH: ON AVERAGE, HOW MANY DAYS PER WEEK DO YOU ENGAGE IN MODERATE TO STRENUOUS EXERCISE (LIKE A BRISK WALK)?: 5 DAYS

## 2024-10-10 ASSESSMENT — SOCIAL DETERMINANTS OF HEALTH (SDOH): HOW OFTEN DO YOU GET TOGETHER WITH FRIENDS OR RELATIVES?: ONCE A WEEK

## 2024-10-11 ENCOUNTER — LAB (OUTPATIENT)
Dept: LAB | Facility: CLINIC | Age: 74
End: 2024-10-11
Payer: COMMERCIAL

## 2024-10-11 DIAGNOSIS — I10 BENIGN ESSENTIAL HYPERTENSION: ICD-10-CM

## 2024-10-11 DIAGNOSIS — I77.810 ASCENDING AORTA DILATION (H): ICD-10-CM

## 2024-10-11 DIAGNOSIS — G47.33 OSA (OBSTRUCTIVE SLEEP APNEA): ICD-10-CM

## 2024-10-11 DIAGNOSIS — I48.0 PAF (PAROXYSMAL ATRIAL FIBRILLATION) (H): ICD-10-CM

## 2024-10-11 LAB
ALT SERPL W P-5'-P-CCNC: 17 U/L (ref 0–50)
ANION GAP SERPL CALCULATED.3IONS-SCNC: 10 MMOL/L (ref 7–15)
BUN SERPL-MCNC: 17.4 MG/DL (ref 8–23)
CALCIUM SERPL-MCNC: 9.3 MG/DL (ref 8.8–10.4)
CHLORIDE SERPL-SCNC: 106 MMOL/L (ref 98–107)
CHOLEST SERPL-MCNC: 169 MG/DL
CREAT SERPL-MCNC: 0.91 MG/DL (ref 0.51–0.95)
EGFRCR SERPLBLD CKD-EPI 2021: 65 ML/MIN/1.73M2
ERYTHROCYTE [DISTWIDTH] IN BLOOD BY AUTOMATED COUNT: 11.9 % (ref 10–15)
FASTING STATUS PATIENT QL REPORTED: YES
GLUCOSE SERPL-MCNC: 83 MG/DL (ref 70–99)
HCO3 SERPL-SCNC: 26 MMOL/L (ref 22–29)
HCT VFR BLD AUTO: 41.2 % (ref 35–47)
HDLC SERPL-MCNC: 56 MG/DL
HGB BLD-MCNC: 13.2 G/DL (ref 11.7–15.7)
LDLC SERPL CALC-MCNC: 93 MG/DL
MCH RBC QN AUTO: 32 PG (ref 26.5–33)
MCHC RBC AUTO-ENTMCNC: 32 G/DL (ref 31.5–36.5)
MCV RBC AUTO: 100 FL (ref 78–100)
NONHDLC SERPL-MCNC: 113 MG/DL
PLATELET # BLD AUTO: 201 10E3/UL (ref 150–450)
POTASSIUM SERPL-SCNC: 4 MMOL/L (ref 3.4–5.3)
RBC # BLD AUTO: 4.13 10E6/UL (ref 3.8–5.2)
SODIUM SERPL-SCNC: 142 MMOL/L (ref 135–145)
TRIGL SERPL-MCNC: 102 MG/DL
WBC # BLD AUTO: 4.2 10E3/UL (ref 4–11)

## 2024-10-11 PROCEDURE — 85027 COMPLETE CBC AUTOMATED: CPT | Performed by: INTERNAL MEDICINE

## 2024-10-11 PROCEDURE — 80061 LIPID PANEL: CPT | Performed by: INTERNAL MEDICINE

## 2024-10-11 PROCEDURE — 36415 COLL VENOUS BLD VENIPUNCTURE: CPT | Performed by: INTERNAL MEDICINE

## 2024-10-11 PROCEDURE — 84443 ASSAY THYROID STIM HORMONE: CPT | Performed by: INTERNAL MEDICINE

## 2024-10-11 PROCEDURE — 80048 BASIC METABOLIC PNL TOTAL CA: CPT | Performed by: INTERNAL MEDICINE

## 2024-10-11 PROCEDURE — 84460 ALANINE AMINO (ALT) (SGPT): CPT | Performed by: INTERNAL MEDICINE

## 2024-10-12 SDOH — HEALTH STABILITY: PHYSICAL HEALTH: ON AVERAGE, HOW MANY MINUTES DO YOU ENGAGE IN EXERCISE AT THIS LEVEL?: 30 MIN

## 2024-10-12 SDOH — HEALTH STABILITY: PHYSICAL HEALTH: ON AVERAGE, HOW MANY DAYS PER WEEK DO YOU ENGAGE IN MODERATE TO STRENUOUS EXERCISE (LIKE A BRISK WALK)?: 5 DAYS

## 2024-10-12 ASSESSMENT — SOCIAL DETERMINANTS OF HEALTH (SDOH): HOW OFTEN DO YOU GET TOGETHER WITH FRIENDS OR RELATIVES?: ONCE A WEEK

## 2024-10-15 ENCOUNTER — TELEPHONE (OUTPATIENT)
Dept: INTERNAL MEDICINE | Facility: CLINIC | Age: 74
End: 2024-10-15
Payer: COMMERCIAL

## 2024-10-15 ENCOUNTER — OFFICE VISIT (OUTPATIENT)
Dept: INTERNAL MEDICINE | Facility: CLINIC | Age: 74
End: 2024-10-15
Payer: COMMERCIAL

## 2024-10-15 VITALS
BODY MASS INDEX: 23.65 KG/M2 | HEIGHT: 67 IN | DIASTOLIC BLOOD PRESSURE: 83 MMHG | RESPIRATION RATE: 16 BRPM | OXYGEN SATURATION: 99 % | HEART RATE: 67 BPM | WEIGHT: 150.7 LBS | TEMPERATURE: 96.6 F | SYSTOLIC BLOOD PRESSURE: 122 MMHG

## 2024-10-15 DIAGNOSIS — Z00.00 ENCOUNTER FOR PREVENTATIVE ADULT HEALTH CARE EXAMINATION: Primary | ICD-10-CM

## 2024-10-15 DIAGNOSIS — Z12.31 ENCOUNTER FOR SCREENING MAMMOGRAM FOR BREAST CANCER: ICD-10-CM

## 2024-10-15 DIAGNOSIS — R79.89 TSH ELEVATION: ICD-10-CM

## 2024-10-15 DIAGNOSIS — I10 BENIGN ESSENTIAL HYPERTENSION: ICD-10-CM

## 2024-10-15 DIAGNOSIS — Z78.0 MENOPAUSE: ICD-10-CM

## 2024-10-15 LAB — TSH SERPL DL<=0.005 MIU/L-ACNC: 4.03 UIU/ML (ref 0.3–4.2)

## 2024-10-15 PROCEDURE — 99213 OFFICE O/P EST LOW 20 MIN: CPT | Mod: 25 | Performed by: INTERNAL MEDICINE

## 2024-10-15 PROCEDURE — G0439 PPPS, SUBSEQ VISIT: HCPCS | Performed by: INTERNAL MEDICINE

## 2024-10-15 ASSESSMENT — PAIN SCALES - GENERAL: PAINLEVEL: NO PAIN (0)

## 2024-10-15 NOTE — TELEPHONE ENCOUNTER
----- Message from Julio Cardona sent at 10/15/2024  2:36 PM CDT -----  Normal result reviewed, please notify patient.

## 2024-10-15 NOTE — LETTER
October 18, 2024      Alexandra Thomas  [address] [place] [address]        Dear ,    We are writing to inform you of your test results.    Your TSH is normal.    Resulted Orders   TSH with free T4 reflex   Result Value Ref Range    TSH 4.03 0.30 - 4.20 uIU/mL       If you have any questions or concerns, please call the clinic at the number listed above.       Sincerely,      Julio Cardona MD

## 2024-10-15 NOTE — LETTER
October 18, 2024      Alexandra Thomas  [address] [address]        Dear ,    We are writing to inform you of your test results.    Your test results fall within the expected range(s) or remain unchanged from previous results.  Please continue with current treatment plan.    Resulted Orders   TSH with free T4 reflex   Result Value Ref Range    TSH 4.03 0.30 - 4.20 uIU/mL       If you have any questions or concerns, please call the clinic at the number listed above.       Sincerely,      Dr. Cardona

## 2024-10-15 NOTE — TELEPHONE ENCOUNTER
Patient was called and voice message left to return her our call regarding her latest lab results.

## 2024-10-15 NOTE — PROGRESS NOTES
Preventive Care Visit  [facility]  Julio Cardona MD, Internal Medicine  Oct 15, 2024      Assessment & Plan     Encounter for preventative adult health care examination  advised regular aerobic activity, low cholesterol, low salt diet, wearing seat belt,  self examinations, sunscreen protection.Obtain screening cholesterol, immunizations reviewed.    - MA Screen Bilateral w/Germán; Future    Encounter for screening mammogram for breast cancer    - MA Screen Bilateral w/Germán; Future  - DX Bone Density; Future  - TSH with free T4 reflex    Menopause    - DX Bone Density; Future    TSH elevation    - TSH with free T4 reflex    Benign essential hypertension  Controlled, on treatment   - TSH with free T4 reflex    Patient has been advised of split billing requirements and indicates understanding: Yes        Counseling  Appropriate preventive services were addressed with this patient via screening, questionnaire, or discussion as appropriate for fall prevention, nutrition, physical activity, Tobacco-use cessation, social engagement, weight loss and cognition.  Checklist reviewing preventive services available has been given to the patient.  Reviewed patient's diet, addressing concerns and/or questions.   The patient was provided with written information regarding signs of hearing loss.   Information on urinary incontinence and treatment options given to patient.       See Patient Instructions    Julia Morris is a 73 year old, presenting for the following:  Wellness Visit (Not fasting)        10/15/2024     9:21 AM   Additional Questions   Roomed by Anna ALDRICH   Accompanied by n/a     Health Care Directive  Patient has a Health Care Directive on file  Advance care planning document is on file and is current.    HPI  No acute complaints, no medication change or new medical conditions.  Has h/o HTN. on medical treatment. BP has been controlled. No side effects from medications. No CP, HA,  dizziness. good compliance with medications and low salt diet.  Has H/O hyperlipidemia. On medical treatment and diet. No side effects. No muscle weakness, myalgias or upset stomach.               10/12/2024   General Health   How would you rate your overall physical health? Good   Feel stress (tense, anxious, or unable to sleep) To some extent      (!) STRESS CONCERN      10/12/2024   Nutrition   Diet: Low fat/cholesterol            10/12/2024   Exercise   Days per week of moderate/strenous exercise 5 days   Average minutes spent exercising at this level 30 min            10/12/2024   Social Factors   Frequency of gathering with friends or relatives Once a week   Worry food won't last until get money to buy more No   Food not last or not have enough money for food? No   Do you have housing? (Housing is defined as stable permanent housing and does not include staying ouside in a car, in a tent, in an abandoned building, in an overnight shelter, or couch-surfing.) Yes   Are you worried about losing your housing? No   Lack of transportation? No   Unable to get utilities (heat,electricity)? No            10/12/2024   Fall Risk   Fallen 2 or more times in the past year? No   Trouble with walking or balance? No             10/12/2024   Activities of Daily Living- Home Safety   Needs help with the following daily activites None of the above   Safety concerns in the home None of the above            10/12/2024   Dental   Dentist two times every year? Yes            10/12/2024   Hearing Screening   Hearing concerns? (!) IT'S HARDER TO UNDERSTAND WOMEN'S VOICES THAN MEN'S VOICES.    (!) TROUBLE UNDERSTANDING SOFT OR WHISPERED SPEECH.       Multiple values from one day are sorted in reverse-chronological order         10/12/2024   Driving Risk Screening   Patient/family members have concerns about driving No            10/12/2024   General Alertness/Fatigue Screening   Have you been more tired than usual lately? No             10/12/2024   Urinary Incontinence Screening   Bothered by leaking urine in past 6 months Yes               Today's PHQ-2 Score:       10/15/2024     7:49 AM   PHQ-2 ( 1999 [employer])   Q1: Little interest or pleasure in doing things 0   Q2: Feeling down, depressed or hopeless 0   PHQ-2 Score 0   Q1: Little interest or pleasure in doing things Not at all   Q2: Feeling down, depressed or hopeless Not at all   PHQ-2 Score 0           10/12/2024   Substance Use   Alcohol more than 3/day or more than 7/wk Not Applicable   Do you have a current opioid prescription? No   How severe/bad is pain from 1 to 10? 3/10   Do you use any other substances recreationally? No        Social History     Tobacco Use    Smoking status: Never    Smokeless tobacco: Never   Vaping Use    Vaping status: Never Used   Substance Use Topics    Alcohol use: Not Currently    Drug use: Never           9/8/2022   LAST FHS-7 RESULTS   1st degree relative breast or ovarian cancer No   Any relative bilateral breast cancer No   Any male have breast cancer No   Any ONE woman have BOTH breast AND ovarian cancer No   Any woman with breast cancer before 50yrs Yes    No   2 or more relatives with breast AND/OR ovarian cancer No   2 or more relatives with breast AND/OR bowel cancer No       Multiple values from one day are sorted in reverse-chronological order        Mammogram Screening - After age 74- determine frequency with patient based on health status, life expectancy and patient goals    ASCVD Risk   The 10-year ASCVD risk score (Lyle DOUGLAS, et al., 2019) is: 19%    Values used to calculate the score:      Age: 73 years      Sex: Female      Is Non- : No      Diabetic: No      Tobacco smoker: No      Systolic Blood Pressure: 122 mmHg      Is BP treated: Yes      HDL Cholesterol: 56 mg/dL      Total Cholesterol: 169 mg/dL            Reviewed and updated as needed this visit by Provider                    Lab work is in  process  Labs reviewed in EPIC  Current providers sharing in care for this patient include:  Patient Care Team:  Julio Cardona MD as PCP - General (Internal Medicine)  Genet Dahl OD as MD (Ophthalmology)  Lex Veliz MD as MD (Gastroenterology)  Melissa Torres DO as MD (Cardiovascular Disease)  Ned Barlow MD as Assigned OBGYN Provider  Latia Srivastava Chi, OD as MD (Optometry)  Antoine Ballesteros MD as MD (Cardiovascular Disease)  Antoine Ballesteros MD as Assigned Heart and Vascular Provider  Emeka Harding DPM as Assigned Musculoskeletal Provider  Jaswinder Pal MD as Assigned PCP  Phoenix Maldonado MD as Assigned Surgical Provider  Gricelda Davenport LMFT as Assigned Behavioral Health Provider    The following health maintenance items are reviewed in Epic and correct as of today:  Health Maintenance   Topic Date Due    URINE DRUG SCREEN  Never done    MEDICARE ANNUAL WELLNESS VISIT  05/17/2024    ANNUAL REVIEW OF HM ORDERS  10/11/2024    TSH W/FREE T4 REFLEX  06/13/2025    BMP  10/11/2025    LIPID  10/11/2025    FALL RISK ASSESSMENT  10/15/2025    GLUCOSE  10/11/2027    ADVANCE CARE PLANNING  10/15/2029    DTAP/TDAP/TD IMMUNIZATION (5 - Td or Tdap) 05/17/2033    DEXA  10/19/2037    HEPATITIS C SCREENING  Completed    PHQ-2 (once per calendar year)  Completed    INFLUENZA VACCINE  Completed    Pneumococcal Vaccine: 65+ Years  Completed    ZOSTER IMMUNIZATION  Completed    RSV VACCINE  Completed    COVID-19 Vaccine  Completed    HPV IMMUNIZATION  Aged Out    MENINGITIS IMMUNIZATION  Aged Out    RSV MONOCLONAL ANTIBODY  Aged Out    MAMMO SCREENING  Discontinued    COLORECTAL CANCER SCREENING  Discontinued         Review of Systems  Constitutional, HEENT, cardiovascular, pulmonary, GI, , musculoskeletal, neuro, skin, endocrine and psych systems are negative, except as otherwise noted.     Objective    Exam  /83 (BP Location: Left arm,  "Cuff Size: Adult Regular)   Pulse 67   Temp (!) 96.6  F (35.9  C) (Tympanic)   Resp 16   Ht 1.702 m (5' 7\")   Wt 68.4 kg (150 lb 11.2 oz)   SpO2 99%   BMI 23.60 kg/m     Estimated body mass index is 23.6 kg/m  as calculated from the following:    Height as of this encounter: 1.702 m (5' 7\").    Weight as of this encounter: 68.4 kg (150 lb 11.2 oz).    Physical Exam  GENERAL: alert and no distress  EYES: Eyes grossly normal to inspection, PERRL and conjunctivae and sclerae normal  HENT: ear canals and TM's normal, nose and mouth without ulcers or lesions  NECK: no adenopathy, no asymmetry, masses, or scars  RESP: lungs clear to auscultation - no rales, rhonchi or wheezes  CV: regular rate and rhythm, normal S1 S2, no S3 or S4, no murmur, click or rub, no peripheral edema  ABDOMEN: soft, nontender, no hepatosplenomegaly, no masses and bowel sounds normal  MS: no gross musculoskeletal defects noted, no edema  SKIN: no suspicious lesions or rashes  NEURO: Normal strength and tone, mentation intact and speech normal  PSYCH: mentation appears normal, affect normal/bright         10/15/2024   Mini Cog   Clock Draw Score 2 Normal   3 Item Recall 2 objects recalled   Mini Cog Total Score 4                 Signed Electronically by: Julio Cardona MD    "

## 2024-10-16 NOTE — TELEPHONE ENCOUNTER
Attempt # 2  Called Phone # [phone]      Was Call answered? No.      Non-detailed voicemail left on October 16, 2024 10:19 AM to call clinic at: [phone] regarding lab results.     On Call Back:     Please relay labs/provider's message below.     Thank you,  James Boudreaux, Triage RN [facility]  10:19 AM 10/16/2024

## 2024-10-18 NOTE — TELEPHONE ENCOUNTER
3rd Attempt. Letter printed to be mailed to patient.    Thank you,  James Boudreaux, Triage RN [facility]  8:41 AM 10/18/2024

## 2024-10-21 ENCOUNTER — TELEPHONE (OUTPATIENT)
Dept: UROLOGY | Facility: CLINIC | Age: 74
End: 2024-10-21

## 2024-10-21 ENCOUNTER — LAB (OUTPATIENT)
Dept: LAB | Facility: CLINIC | Age: 74
End: 2024-10-21
Payer: COMMERCIAL

## 2024-10-21 DIAGNOSIS — N39.0 RECURRENT UTI: Primary | ICD-10-CM

## 2024-10-21 DIAGNOSIS — N39.0 RECURRENT UTI: ICD-10-CM

## 2024-10-21 LAB
ALBUMIN UR-MCNC: 30 MG/DL
APPEARANCE UR: ABNORMAL
BILIRUB UR QL STRIP: NEGATIVE
COLOR UR AUTO: YELLOW
GLUCOSE UR STRIP-MCNC: NEGATIVE MG/DL
HGB UR QL STRIP: ABNORMAL
KETONES UR STRIP-MCNC: NEGATIVE MG/DL
LEUKOCYTE ESTERASE UR QL STRIP: ABNORMAL
NITRATE UR QL: POSITIVE
PH UR STRIP: 7 [PH] (ref 5–7)
SP GR UR STRIP: 1.02 (ref 1–1.03)
UROBILINOGEN UR STRIP-ACNC: 0.2 E.U./DL

## 2024-10-21 PROCEDURE — 81003 URINALYSIS AUTO W/O SCOPE: CPT | Mod: QW

## 2024-10-21 PROCEDURE — 87186 SC STD MICRODIL/AGAR DIL: CPT

## 2024-10-21 PROCEDURE — 87086 URINE CULTURE/COLONY COUNT: CPT

## 2024-10-21 RX ORDER — CEFDINIR 300 MG/1
300 CAPSULE ORAL 2 TIMES DAILY
Qty: 14 CAPSULE | Refills: 0 | Status: SHIPPED | OUTPATIENT
Start: 2024-10-21 | End: 2024-10-28

## 2024-10-21 NOTE — TELEPHONE ENCOUNTER
[facility]    Phone Message    May a detailed message be left on voicemail: yes     Reason for Call: Medication Refill Request    Has the patient contacted the pharmacy for the refill? Yes   Name of medication being requested: An antibiotic for UTI   Provider who prescribed the medication: Santa Petit  Pharmacy: [facility] [address] [place] [address]  Date medication is needed: asap   Pt would like a call to discuss     Action Taken: Other: uro    Travel Screening: Not Applicable     Date of Service:

## 2024-10-22 LAB — BACTERIA UR CULT: ABNORMAL

## 2024-10-23 ENCOUNTER — TRANSFERRED RECORDS (OUTPATIENT)
Dept: HEALTH INFORMATION MANAGEMENT | Facility: CLINIC | Age: 74
End: 2024-10-23
Payer: COMMERCIAL

## 2024-10-24 ENCOUNTER — HOSPITAL ENCOUNTER (OUTPATIENT)
Dept: MAMMOGRAPHY | Facility: CLINIC | Age: 74
Discharge: HOME | End: 2024-10-24
Attending: INTERNAL MEDICINE | Admitting: INTERNAL MEDICINE
Payer: COMMERCIAL

## 2024-10-24 DIAGNOSIS — Z12.31 ENCOUNTER FOR SCREENING MAMMOGRAM FOR BREAST CANCER: ICD-10-CM

## 2024-10-24 DIAGNOSIS — Z00.00 ENCOUNTER FOR PREVENTATIVE ADULT HEALTH CARE EXAMINATION: ICD-10-CM

## 2024-10-24 PROCEDURE — 77063 BREAST TOMOSYNTHESIS BI: CPT

## 2024-10-28 ENCOUNTER — THERAPY VISIT (OUTPATIENT)
Dept: PHYSICAL THERAPY | Facility: CLINIC | Age: 74
End: 2024-10-28
Attending: PHYSICIAN ASSISTANT
Payer: COMMERCIAL

## 2024-10-28 DIAGNOSIS — K59.00 CONSTIPATION: ICD-10-CM

## 2024-10-28 DIAGNOSIS — N39.46 MIXED INCONTINENCE: Primary | ICD-10-CM

## 2024-10-28 PROCEDURE — 97110 THERAPEUTIC EXERCISES: CPT | Mod: GP | Performed by: PHYSICAL THERAPIST

## 2024-10-28 PROCEDURE — 97162 PT EVAL MOD COMPLEX 30 MIN: CPT | Mod: GP | Performed by: PHYSICAL THERAPIST

## 2024-10-28 PROCEDURE — 97530 THERAPEUTIC ACTIVITIES: CPT | Mod: GP | Performed by: PHYSICAL THERAPIST

## 2024-10-28 NOTE — PROGRESS NOTES
PHYSICAL THERAPY EVALUATION  Type of Visit: Evaluation       Fall Risk Screen:  Fall screen completed by: PT  Have you fallen 2 or more times in the past year?: No  Have you fallen and had an injury in the past year?: No  Is patient a fall risk?: No    Subjective      Condition type:  Chronic (continuous duration <3 months)  Cause of current episode:  Unspecified     Nature of treatment:  Rehabilitative  Functional ability:  Moderate functional limitations  Documented POC (choose all that apply):  Measurable short and long term/discharge treatment goals related to physical and functional deficits.;Frequency of treatment visits and treatment activities to address deficit areas.;Patient agrees to program participation including home program  Briefly describe symptoms:  Urinary mixed incontinence and bowel irregularity (constipation/diarrhea) ongoing for many years. Progressively worsening with time.  How did the symptoms start:  gradually over many years but especially worse after hysterectomy/joint replacements  Average pain/intensity last 24 hours:  0/10  Average pain/intensity past week:  0/10  Frequency of Symptoms:  Frequently (51-75% of the time)  Symptom impact on ADLs:  Quite a bit  Condition change since eval:  N/A (first visit)  General health reported by patient:  Fair      Presenting condition or subjective complaint: (Patient-Rptd) incontinence and constipation and feeling of not enough pressure for BM. Hx of re-current UTI's over the past year (3+). Also dealing with either constipation or diarrhea. Also feels weakness/less power evacuating. Will take occasionally Metamucil/Miralax but seems to be too much then has to take Immodium. Voiding a bit more frequently.   Date of onset: 09/19/24 (order date)    Relevant medical history: (Patient-Rptd) Bladder or bowel problems; Osteoarthritis   Dates & types of surgery: (Patient-Rptd) 5 joint replacements 2002 to 2024  hysterectomy  cololectomy    Prior  diagnostic imaging/testing results: (Patient-Rptd) Video fluoroscopic swallow study     Prior therapy history for the same diagnosis, illness or injury:          Living Environment  Social support: (Patient-Rptd) With family members   Type of home: (Patient-Rptd) House   Stairs to enter the home: (Patient-Rptd) Yes       Ramp: (Patient-Rptd) No   Stairs inside the home: (Patient-Rptd) Yes (Patient-Rptd) 10 Is there a railing: (Patient-Rptd) Yes     Help at home: (Patient-Rptd) None  Equipment owned:       Employment:      Hobbies/Interests:      Patient goals for therapy: (Patient-Rptd) improve the problems mentioned. Would like to have better control of urination and bowels.      Objective      PELVIC EVALUATION  ADDITIONAL HISTORY:  Sex assigned at birth: (Patient-Rptd) Female  Gender identity: (Patient-Rptd) Female    Pronouns: (Patient-Rptd) She/Her Hers      Bladder History:  Feels bladder filling: (Patient-Rptd) Yes  Triggers for feeling of inability to wait to go to the bathroom: (Patient-Rptd) No    How long can you wait to urinate: (Patient-Rptd) not long usually  Gets up at night to urinate: (Patient-Rptd) Yes (Patient-Rptd) 2  Can stop the flow of urine when urinating: (Patient-Rptd) Sometimes  Volume of urine usually released: (Patient-Rptd) Medium   Other issues: (Patient-Rptd) Slow or hesitant urine stream; Trouble emptying bladder completely; Dribbling after urinating; Bladder infections  Number of bladder infections in last 12 months: (Patient-Rptd) 3  Fluid intake per day: (Patient-Rptd) ?    Water, 2 cups decaf coffee in morning, decaf iced tea at night.  Medications taken for bladder: (Patient-Rptd) No     Activities causing urine leak: (Patient-Rptd) Cough; Sneeze; Hurrying to the bathroom due to a strong urge to urinate (pee)    Amount of urine typically leaked:  dribbles mostly.   Pads used to help with leaking:    Depends at night b/c when gets up 1st thing in morning will have hard time  getting to bathroom.       Bowel History:  Frequency of bowel movement:  some form everyday.   Consistency of stool: (Patient-Rptd) Other (Patient-Rptd) sometimes soft sometimes hard  Ignores the urge to defecate:    Other bowel issues: (Patient-Rptd) Loss of gas  Length of time spent trying to have a bowel movement:      Sexual Function History:  Sexual orientation: (Patient-Rptd) Straight    Sexually active:    Lubrication used:      Pelvic pain:      Pain or difficulty with orgasms/erection/ejaculation:      State of menopause:    Hormone medications:        Are you currently pregnant: (Patient-Rptd) No  Number of previous pregnancies: (Patient-Rptd) 2  Number of deliveries: (Patient-Rptd) 2  Have you been diagnosed with pelvic prolapse or abdominal separation: (Patient-Rptd) No  Do you get regular exercise: (Patient-Rptd) Yes  Have you tried pelvic floor strengthening exercises for 4 weeks: (Patient-Rptd) Yes  Do you have any history of trauma that is relevant to your care that you d like to share: (Patient-Rptd) No      Discussed reason for referral regarding pelvic health needs and external/internal pelvic floor muscle examination with patient/guardian.  Opportunity provided to ask questions and verbal consent for assessment and intervention was given.    BREATHING SYMMETRY: Decreased rib cage mobility    PELVIC EXAM  External Visual Inspection:  At rest: Normal  With voluntary pelvic floor contraction: Perineal elevation  Relaxation of PFM: Partial/delayed relaxation    Integumentary:   Slight atrophic around labia majora and minora    External Digital Palpation per Perineum:   Ischiocavernosis: Unremarkable  Bulbo cavernosis: Unremarkable  Transverse perineal: Unremarkable  Levator ani: Tenderness  Perineal body: Unremarkable    Internal Digital Palpation:  Per Vagina:  Myofascial Resistance to Palpation: Taut  Digital Muscle Performance: P (Power): Kegel strength 3  E (Endurance): 5 seconds  R  (Repetitions): 4 reps  Delayed relaxation after contraction    ABDOMINAL ASSESSMENT    Abdominal Activation/Strength:  fair TA set with exhale      Assessment & Plan   CLINICAL IMPRESSIONS  Medical Diagnosis: Constipation, unspecified constipation type (K59.00)    Treatment Diagnosis: Constipation, unspecified constipation type (K59.00), mixed incontinence   Impression/Assessment: Patient is a 73 year old female with mixed incontinence and bowel  complaints.  The following significant findings have been identified: Decreased ROM/flexibility, Decreased strength, Decreased proprioception, Impaired sensation, Inflammation, Impaired muscle performance, and Decreased activity tolerance. These impairments interfere with their ability to perform self care tasks, work tasks, recreational activities, household chores, driving , household mobility, and community mobility as compared to previous level of function.     Clinical Decision Making (Complexity):  Clinical Presentation: Evolving/Changing  Clinical Presentation Rationale: based on medical and personal factors listed in PT evaluation  Clinical Decision Making (Complexity): Moderate complexity    PLAN OF CARE  Treatment Interventions:  Interventions: Manual Therapy, Neuromuscular Re-education, Therapeutic Activity, Therapeutic Exercise, Self-Care/Home Management    Long Term Goals     PT Goal 1  Goal Identifier: Able to have normal BM ([place]ough 3/4) daily  Rationale: to maximize safety and independence with self cares;to maximize safety and independence with performance of ADLs and functional tasks  Target Date: 01/25/25  PT Goal 2  Goal Identifier: No urinary incontinence with urge  Rationale: to maximize safety and independence with performance of ADLs and functional tasks;to maximize safety and independence within the home;to maximize safety and independence within the community;to maximize safety and independence with transportation;to maximize safety and  independence with self cares  Target Date: 01/25/25      Frequency of Treatment: 1x week for 4 weeks  Duration of Treatment: then every other week for 8 weeks    Recommended Referrals to Other Professionals: Physical Therapy  Education Assessment:   Learner/Method: Patient;No Barriers to Learning    Risks and benefits of evaluation/treatment have been explained.   Patient/Family/caregiver agrees with Plan of Care.     Evaluation Time:     PT Eval, Moderate Complexity Minutes (85040): 40  Evaluation Only     Signing Clinician: Mehul Dong PT        [facility]                                                                                   OUTPATIENT PHYSICAL THERAPY      PLAN OF TREATMENT FOR OUTPATIENT REHABILITATION   Patient's Last Name, First Name, Barney Livingstonnicolas  LOUISE YOB: 1950   Provider's Name   [facility]   Medical Record No.  [id number]     Onset Date: 09/19/24 (order date)  Start of Care Date: 10/28/24     Medical Diagnosis:  Constipation, unspecified constipation type (K59.00)      PT Treatment Diagnosis:  Constipation, unspecified constipation type (K59.00), mixed incontinence Plan of Treatment  Frequency/Duration: 1x week for 4 weeks/ then every other week for 8 weeks    Certification date from 10/28/24 to 01/25/25         See note for plan of treatment details and functional goals     Mehul Dong, PT,OCS                         I CERTIFY THE NEED FOR THESE SERVICES FURNISHED UNDER        THIS PLAN OF TREATMENT AND WHILE UNDER MY CARE     (Physician attestation of this document indicates review and certification of the therapy plan).              Referring Provider:  Anna Carvajal    Initial Assessment  See Epic Evaluation- Start of Care Date: 10/28/24

## 2024-11-02 ENCOUNTER — MYC REFILL (OUTPATIENT)
Dept: CARDIOLOGY | Facility: CLINIC | Age: 74
End: 2024-11-02
Payer: COMMERCIAL

## 2024-11-02 DIAGNOSIS — I10 BENIGN ESSENTIAL HYPERTENSION: ICD-10-CM

## 2024-11-02 DIAGNOSIS — E78.5 HYPERLIPIDEMIA, ACQUIRED: ICD-10-CM

## 2024-11-04 ENCOUNTER — THERAPY VISIT (OUTPATIENT)
Dept: PHYSICAL THERAPY | Facility: CLINIC | Age: 74
End: 2024-11-04
Attending: PHYSICIAN ASSISTANT
Payer: COMMERCIAL

## 2024-11-04 DIAGNOSIS — K59.00 CONSTIPATION, UNSPECIFIED CONSTIPATION TYPE: Primary | ICD-10-CM

## 2024-11-04 PROCEDURE — 97530 THERAPEUTIC ACTIVITIES: CPT | Mod: GP | Performed by: PHYSICAL THERAPIST

## 2024-11-04 PROCEDURE — 97140 MANUAL THERAPY 1/> REGIONS: CPT | Mod: GP | Performed by: PHYSICAL THERAPIST

## 2024-11-04 RX ORDER — CARVEDILOL 6.25 MG/1
6.25 TABLET ORAL 2 TIMES DAILY WITH MEALS
Qty: 180 TABLET | Refills: 0 | Status: SHIPPED | OUTPATIENT
Start: 2024-11-04

## 2024-11-04 RX ORDER — ROSUVASTATIN CALCIUM 10 MG/1
10 TABLET, COATED ORAL DAILY
Qty: 90 TABLET | Refills: 0 | Status: SHIPPED | OUTPATIENT
Start: 2024-11-04

## 2024-11-04 RX ORDER — LOSARTAN POTASSIUM 50 MG/1
50 TABLET ORAL 2 TIMES DAILY
Qty: 180 TABLET | Refills: 0 | Status: SHIPPED | OUTPATIENT
Start: 2024-11-04

## 2024-11-11 ENCOUNTER — E-VISIT (OUTPATIENT)
Dept: URGENT CARE | Facility: CLINIC | Age: 74
End: 2024-11-11
Payer: COMMERCIAL

## 2024-11-11 DIAGNOSIS — R30.0 DYSURIA: Primary | ICD-10-CM

## 2024-11-12 ENCOUNTER — LAB (OUTPATIENT)
Dept: LAB | Facility: CLINIC | Age: 74
End: 2024-11-12
Payer: COMMERCIAL

## 2024-11-12 DIAGNOSIS — N39.0 RECURRENT UTI: ICD-10-CM

## 2024-11-12 DIAGNOSIS — R30.0 DYSURIA: ICD-10-CM

## 2024-11-12 LAB
ALBUMIN UR-MCNC: NEGATIVE MG/DL
APPEARANCE UR: CLEAR
BACTERIA #/AREA URNS HPF: ABNORMAL /HPF
BILIRUB UR QL STRIP: NEGATIVE
COLOR UR AUTO: YELLOW
GLUCOSE UR STRIP-MCNC: NEGATIVE MG/DL
HGB UR QL STRIP: NEGATIVE
KETONES UR STRIP-MCNC: NEGATIVE MG/DL
LEUKOCYTE ESTERASE UR QL STRIP: ABNORMAL
NITRATE UR QL: NEGATIVE
PH UR STRIP: 6 [PH] (ref 5–7)
RBC #/AREA URNS AUTO: ABNORMAL /HPF
SP GR UR STRIP: <=1.005 (ref 1–1.03)
SQUAMOUS #/AREA URNS AUTO: ABNORMAL /LPF
UROBILINOGEN UR STRIP-ACNC: 0.2 E.U./DL
WBC #/AREA URNS AUTO: ABNORMAL /HPF

## 2024-11-12 PROCEDURE — 81001 URINALYSIS AUTO W/SCOPE: CPT

## 2024-11-12 NOTE — PATIENT INSTRUCTIONS
Dear Alexandra Thomas,     After reviewing your responses, I would like you to come in for a urine test to make sure we treat you correctly. This urine test is to evaluate you for a possible urinary tract infection, and should be scheduled for today or tomorrow. Schedule a Lab Only appointment here.     Lab appointments are not available at most locations on the weekends. If no Lab Only appointment is available, you should be seen in any of our convenient Walk-in or Urgent Care Centers, which can be found on our website here.     You will receive instructions with your results in [employer] once they are available.     If your symptoms worsen, you develop pain in your back or stomach, develop fevers, or are not improving in 5 days, please contact your primary care provider for an appointment or visit a Walk-in or Urgent Care Center to be seen.     Thanks again for choosing us as your health care partner,     JESUS Nance CNP

## 2024-11-13 ASSESSMENT — PATIENT HEALTH QUESTIONNAIRE - PHQ9
SUM OF ALL RESPONSES TO PHQ QUESTIONS 1-9: 1
SUM OF ALL RESPONSES TO PHQ QUESTIONS 1-9: 1

## 2024-11-14 ENCOUNTER — VIRTUAL VISIT (OUTPATIENT)
Dept: PSYCHOLOGY | Facility: CLINIC | Age: 74
End: 2024-11-14
Payer: COMMERCIAL

## 2024-11-14 DIAGNOSIS — F43.23 ADJUSTMENT DISORDER WITH MIXED ANXIETY AND DEPRESSED MOOD: Primary | ICD-10-CM

## 2024-11-14 LAB — BACTERIA UR CULT: NORMAL

## 2024-11-14 PROCEDURE — 90832 PSYTX W PT 30 MINUTES: CPT | Mod: 95 | Performed by: MARRIAGE & FAMILY THERAPIST

## 2024-11-14 NOTE — PROGRESS NOTES
"                     Discharge Summary  Multiple Sessions    Client Name: Alexandra Thomas MRN#: [id number] YOB: 1950    Discharge Date:   2024    Service Modality: Video Visit:      Provider verified identity through the following two step process.  Patient provided:  Patient  and Patient is known previously to provider    Telemedicine Visit: The patient's condition can be safely assessed and treated via synchronous audio and visual telemedicine encounter.      Reason for Telemedicine Visit: Patient has requested telehealth visit    Originating Site (Patient Location): Patient's home    Distant Site (Provider Location): Provider Remote Setting- Home Office    Consent:  The patient/guardian has verbally consented to: the potential risks and benefits of telemedicine (video visit) versus in person care; bill my insurance or make self-payment for services provided; and responsibility for payment of non-covered services.     Patient would like the video invitation sent by:  My Chart    Mode of Communication:  Video Conference via [employer]    Distant Location (Provider):  Off-site    As the provider I attest to compliance with applicable laws and regulations related to telemedicine.    Service Type: Individual      Session Start Time: 12:30  Session End Time: 12:47      Session Length: 16-37     Session #: 3     Attendees: Client attended alone      Focus of Treatment Objective(s):  Client's presenting concerns included:   \"Difficulty with relocating from [place].  Really miss friends, familiar location and [place] in general.\"    Stage of Change at time of Discharge: ACTION (Actively working towards change)    Medication Adherence:  NA    Chemical Use:  No    Assessment: Current Emotional / Mental Status (status of significant symptoms):    Risk status (Self / Other harm or suicidal ideation)  Client denies current fears or concerns for personal safety.  Client denies current or " recent suicidal ideation or behaviors.  Client denies current or recent homicidal ideation or behaviors.  Client denies current or recent self injurious behavior or ideation.  Client denies other safety concerns.  A safety and risk management plan has not been developed at this time, however client was given the after-hours number should there be a change in any of these risk factors.    Appearance:   Appropriate   Eye Contact:   Good   Psychomotor Behavior: Normal   Attitude:   Cooperative  Friendly  Orientation:   All  Speech   Rate / Production: Normal    Volume:  Normal   Mood:    Normal  Affect:    Appropriate  Bright   Thought Content:  Clear   Thought Form:  Coherent  Logical   Insight:   Good     DSM5 Diagnoses: (Sustained by DSM5 Criteria Listed Above)  Diagnoses: Adjustment Disorders  309.4 (F43.25) With mixed disturbance of emotions and conduct  Psychosocial & Contextual Factors: n/a  Assessments Completed:  The following assessments were completed by patient for this visit:  PHQ9:       9/17/2024     8:15 PM 11/13/2024     1:47 PM   PHQ-9 SCORE   PHQ-9 Total Score MyChart 4 (Minimal depression) 1 (Minimal depression)   PHQ-9 Total Score 4 1        Patient-reported     GAD7:       7/6/2024    10:18 AM 9/18/2024    11:00 AM   TWIN-7 SCORE   Total Score 11 (moderate anxiety)    Total Score 11 4     PROMIS 10-Global Health (all questions and answers displayed):       9/18/2024    10:02 AM   PROMIS 10   In general, would you say your health is: Very good   In general, would you say your quality of life is: Very good   In general, how would you rate your physical health? Very good   In general, how would you rate your mental health, including your mood and your ability to think? Very good   In general, how would you rate your satisfaction with your social activities and relationships? Fair   In general, please rate how well you carry out your usual social activities and roles Good   To what extent are you able  to carry out your everyday physical activities such as walking, climbing stairs, carrying groceries, or moving a chair? Moderately   In the past 7 days, how often have you been bothered by emotional problems such as feeling anxious, depressed, or irritable? Sometimes   In the past 7 days, how would you rate your fatigue on average? Moderate   In the past 7 days, how would you rate your pain on average, where 0 means no pain, and 10 means worst imaginable pain? 4   In general, would you say your health is: 4    In general, would you say your quality of life is: 4    In general, how would you rate your physical health? 4    In general, how would you rate your mental health, including your mood and your ability to think? 4    In general, how would you rate your satisfaction with your social activities and relationships? 2    In general, please rate how well you carry out your usual social activities and roles. (This includes activities at home, at work and in your community, and responsibilities as a parent, child, spouse, employee, friend, etc.) 3    To what extent are you able to carry out your everyday physical activities such as walking, climbing stairs, carrying groceries, or moving a chair? 3    In the past 7 days, how often have you been bothered by emotional problems such as feeling anxious, depressed, or irritable? 3    In the past 7 days, how would you rate your fatigue on average? 3    In the past 7 days, how would you rate your pain on average, where 0 means no pain, and 10 means worst imaginable pain? 4    Global Mental Health Score 13   Global Physical Health Score 13   PROMIS TOTAL - SUBSCORES 26       Patient-reported       Reason for Discharge:  Client is satisfied with progress      Aftercare Plan:  Client may resume counseling services at any time in the future by calling the [facility] Intake Office, [phone].      Gricelda Davenport, LMFT  November 14, 2024

## 2024-11-15 ENCOUNTER — THERAPY VISIT (OUTPATIENT)
Dept: PHYSICAL THERAPY | Facility: CLINIC | Age: 74
End: 2024-11-15
Attending: PHYSICIAN ASSISTANT
Payer: COMMERCIAL

## 2024-11-15 DIAGNOSIS — K59.00 CONSTIPATION, UNSPECIFIED CONSTIPATION TYPE: Primary | ICD-10-CM

## 2024-11-15 PROCEDURE — 97530 THERAPEUTIC ACTIVITIES: CPT | Mod: GP | Performed by: PHYSICAL THERAPIST

## 2024-11-15 PROCEDURE — 97110 THERAPEUTIC EXERCISES: CPT | Mod: GP | Performed by: PHYSICAL THERAPIST

## 2024-11-26 ENCOUNTER — THERAPY VISIT (OUTPATIENT)
Dept: PHYSICAL THERAPY | Facility: CLINIC | Age: 74
End: 2024-11-26
Payer: COMMERCIAL

## 2024-11-26 DIAGNOSIS — K59.00 CONSTIPATION, UNSPECIFIED CONSTIPATION TYPE: Primary | ICD-10-CM

## 2024-11-26 PROCEDURE — 97530 THERAPEUTIC ACTIVITIES: CPT | Mod: GP | Performed by: PHYSICAL THERAPIST

## 2024-11-26 PROCEDURE — 97010 HOT OR COLD PACKS THERAPY: CPT | Mod: GP | Performed by: PHYSICAL THERAPIST

## 2024-11-26 PROCEDURE — 97140 MANUAL THERAPY 1/> REGIONS: CPT | Mod: GP | Performed by: PHYSICAL THERAPIST

## 2024-12-04 ENCOUNTER — OFFICE VISIT (OUTPATIENT)
Dept: OPTOMETRY | Facility: CLINIC | Age: 74
End: 2024-12-04
Payer: COMMERCIAL

## 2024-12-04 DIAGNOSIS — M35.01 SJOGREN'S SYNDROME WITH KERATOCONJUNCTIVITIS SICCA (H): ICD-10-CM

## 2024-12-04 DIAGNOSIS — H52.203 HYPEROPIA OF BOTH EYES WITH ASTIGMATISM: ICD-10-CM

## 2024-12-04 DIAGNOSIS — H26.9 BILATERAL INCIPIENT CATARACTS: ICD-10-CM

## 2024-12-04 DIAGNOSIS — H52.03 HYPEROPIA OF BOTH EYES WITH ASTIGMATISM: ICD-10-CM

## 2024-12-04 DIAGNOSIS — H04.129 DRY EYE: ICD-10-CM

## 2024-12-04 DIAGNOSIS — H16.221 KERATITIS SICCA, RIGHT EYE: ICD-10-CM

## 2024-12-04 DIAGNOSIS — H52.4 PRESBYOPIA: Primary | ICD-10-CM

## 2024-12-04 RX ORDER — CYCLOSPORINE 0.5 MG/ML
1 EMULSION OPHTHALMIC 2 TIMES DAILY
Qty: 90 EACH | Refills: 3 | Status: SHIPPED | OUTPATIENT
Start: 2024-12-04

## 2024-12-04 ASSESSMENT — TONOMETRY
IOP_METHOD: APPLANATION
OS_IOP_MMHG: 18
OD_IOP_MMHG: 17

## 2024-12-04 ASSESSMENT — CONF VISUAL FIELD
OS_SUPERIOR_TEMPORAL_RESTRICTION: 0
OS_INFERIOR_NASAL_RESTRICTION: 0
OD_SUPERIOR_TEMPORAL_RESTRICTION: 0
OS_INFERIOR_TEMPORAL_RESTRICTION: 0
OD_SUPERIOR_NASAL_RESTRICTION: 0
OS_NORMAL: 1
METHOD: COUNTING FINGERS
OD_INFERIOR_NASAL_RESTRICTION: 0
OS_SUPERIOR_NASAL_RESTRICTION: 0
OD_NORMAL: 1
OD_INFERIOR_TEMPORAL_RESTRICTION: 0

## 2024-12-04 ASSESSMENT — EXTERNAL EXAM - LEFT EYE: OS_EXAM: NORMAL

## 2024-12-04 ASSESSMENT — REFRACTION_MANIFEST
OD_CYLINDER: +0.50
OS_CYLINDER: +0.75
OS_CYLINDER: +0.75
OD_AXIS: 148
OD_SPHERE: +0.25
OD_CYLINDER: +0.25
OS_AXIS: 168
OS_SPHERE: -0.50
OD_ADD: +2.50
OD_SPHERE: PLANO
METHOD_AUTOREFRACTION: 1
OS_AXIS: 012
OS_SPHERE: -1.25
OD_AXIS: 155
OS_ADD: +2.50

## 2024-12-04 ASSESSMENT — VISUAL ACUITY
OS_SC: 20/30
OD_SC: 20/80
OS_SC: 20/40
METHOD: SNELLEN - LINEAR
OD_SC+: -2
OD_SC: 20/25

## 2024-12-04 ASSESSMENT — SLIT LAMP EXAM - LIDS: COMMENTS: MEIBOMIAN GLAND DYSFUNCTION

## 2024-12-04 ASSESSMENT — EXTERNAL EXAM - RIGHT EYE: OD_EXAM: NORMAL

## 2024-12-04 ASSESSMENT — CUP TO DISC RATIO
OS_RATIO: 0.35
OD_RATIO: 0.3

## 2024-12-04 NOTE — PATIENT INSTRUCTIONS
DRY EYE TREATMENT    I recommend using artificial tears for your dry eye. There are over the counter drops that work well and may be used up to 4x daily. ( systane balance, complete or ultra, blink, refresh optive, soothe xp, theratears) stay away from any red eye relief products such as visine and clear eyes. Lumify can help with redness used once daily.     If you need more than 4 drops daily, use a preservative free product which come in individual vials and may be used for up to 24 hours and then discarded.   The more severe the dry eye, the more frequent instillation of artificial tears that are needed to reduce irritation/ inflammation. (Sometimes every 1-2 hours for a couple of days).  You can also add a lubricating ointment in the lower lid at bedtime. ( over the counter refresh pm, genteal gel, lacrilube etc.)    Artificial tears work best as a preventative and not as well after your eyes are starting to bother you and/ or are red.  It may take 4- 6 weeks of using the drops before you notice improvement.  If after that time you are still having problems schedule an appointment for an evaluation to discuss different treatments.    Dry eyes are a chronic condition and you may have more symptoms at certain times of the year.      Additional recommended treatment:  Warm compresses once to twice daily for 5-10 minutes  Directions for warm soaks  There are few methods for hot compresses. Moisten a washcloth with hot water, or microwave for 10 seconds, being careful to not get the cloth too hot.   Then put the washcloth onto your eyelids for 5 minutes. It will cool quickly so a rice pack or eyemask that can be heated and laid on top of the washcloth will help retain the heat.   Lid cleansing    Overabundance of bacterial microorganisms along the eyelashes and lid margins induce stress on the tear film and promote inflammation.  Regular lid hygiene helps diminish the bacterial population to prevent inflammation  and infection.  Use a warm compress to loosen any crusts   Cleanse lids once daily with a lid cleansing product as directed such as Ocusoft or Sterilid which can be purchased at most pharmacies. Diluted baby shampoo will also work, but not as well as it dries the skin and can irritate eyelids.  Hypo chlor spray may also be used on closed lids.    Blink regularly  Reduce screen time if possible  Stay hydrated  Increase humidity  Wear sunglasses   No smoking/ vaping   Replace cosmetics every few months and remove daily  Avoid direct exposure to forced air--turn air vents in the car away and keep fans from blowing directly on your face     L eye slightly more dry, ocular health stable, vision corrects with very little help to 20/25  Very mild cataracts

## 2024-12-04 NOTE — PROGRESS NOTES
Chief Complaint   Patient presents with    Annual Eye Exam        Last Eye Exam: 08/2023  Dilated Previously: Yes, side effects of dilation explained today    What are you currently using to see?  glasses and readers - Sv distance        Distance Vision Acuity: Satisfied with vision    Near Vision Acuity: Satisfied with vision while reading and using computer with readers    Eye Comfort: dry  Do you use eye drops? : Yes: otc artificial tears throughout the day - stopped Restasis a month ago   Also using lid scrubs or foam    Gricelda Will - Optometric Assistant           Medical, surgical and family histories reviewed and updated 12/4/2024.       OBJECTIVE: See Ophthalmology exam    ASSESSMENT:    ICD-10-CM    1. Presbyopia  H52.4 EYE EXAM (SIMPLE-NONBILLABLE)     REFRACTION      2. Keratitis sicca, right eye  H16.221 cycloSPORINE (RESTASIS) 0.05 % ophthalmic emulsion      3. Sjogren's syndrome with keratoconjunctivitis sicca (H)  M35.01 cycloSPORINE (RESTASIS) 0.05 % ophthalmic emulsion      4. Dry eye  H04.129 EYE EXAM (SIMPLE-NONBILLABLE)      5. Bilateral incipient cataracts  H26.9       6. Hyperopia of both eyes with astigmatism  H52.03     H52.203           PLAN:   Optional distance prescription , may want trifocal so dash or intermediate distance is clear  Artificial tears  as needed restasis two times daily , reordered insurance covers branded    Genet Dahl OD

## 2024-12-04 NOTE — LETTER
12/4/2024      Alexandra Thomas  [address] [address]      Dear Colleague,    [facility]. Please see a copy of my visit note below.    Chief Complaint   Patient presents with     Annual Eye Exam        Last Eye Exam: 08/2023  Dilated Previously: Yes, side effects of dilation explained today    What are you currently using to see?  glasses and readers - Sv distance        Distance Vision Acuity: Satisfied with vision    Near Vision Acuity: Satisfied with vision while reading and using computer with readers    Eye Comfort: dry  Do you use eye drops? : Yes: otc artificial tears throughout the day - stopped Restasis a month ago   Also using lid scrubs or foam    Gricelda Will - Optometric Assistant           Medical, surgical and family histories reviewed and updated 12/4/2024.       OBJECTIVE: See Ophthalmology exam    ASSESSMENT:    ICD-10-CM    1. Presbyopia  H52.4 EYE EXAM (SIMPLE-NONBILLABLE)     REFRACTION      2. Keratitis sicca, right eye  H16.221 cycloSPORINE (RESTASIS) 0.05 % ophthalmic emulsion      3. Sjogren's syndrome with keratoconjunctivitis sicca (H)  M35.01 cycloSPORINE (RESTASIS) 0.05 % ophthalmic emulsion      4. Dry eye  H04.129 EYE EXAM (SIMPLE-NONBILLABLE)      5. Bilateral incipient cataracts  H26.9       6. Hyperopia of both eyes with astigmatism  H52.03     H52.203           PLAN:   Optional distance prescription , may want trifocal so dash or intermediate distance is clear  Artificial tears  as needed restasis two times daily , reordered insurance covers branded    Genet Dahl OD     Again, thank you for allowing me to participate in the care of your patient.        Sincerely,        Genet Dahl, OD

## 2024-12-09 ENCOUNTER — OFFICE VISIT (OUTPATIENT)
Dept: CARDIOLOGY | Facility: CLINIC | Age: 74
End: 2024-12-09
Payer: COMMERCIAL

## 2024-12-09 VITALS
BODY MASS INDEX: 24.47 KG/M2 | HEIGHT: 67 IN | HEART RATE: 57 BPM | SYSTOLIC BLOOD PRESSURE: 150 MMHG | WEIGHT: 155.9 LBS | DIASTOLIC BLOOD PRESSURE: 80 MMHG | OXYGEN SATURATION: 99 %

## 2024-12-09 DIAGNOSIS — E78.5 HYPERLIPIDEMIA, ACQUIRED: ICD-10-CM

## 2024-12-09 DIAGNOSIS — I77.810 ASCENDING AORTA DILATION (H): Primary | ICD-10-CM

## 2024-12-09 DIAGNOSIS — I10 BENIGN ESSENTIAL HYPERTENSION: ICD-10-CM

## 2024-12-09 DIAGNOSIS — K21.9 GASTROESOPHAGEAL REFLUX DISEASE WITHOUT ESOPHAGITIS: ICD-10-CM

## 2024-12-09 DIAGNOSIS — I48.0 PAF (PAROXYSMAL ATRIAL FIBRILLATION) (H): ICD-10-CM

## 2024-12-09 DIAGNOSIS — G47.33 OSA (OBSTRUCTIVE SLEEP APNEA): ICD-10-CM

## 2024-12-09 PROCEDURE — 99214 OFFICE O/P EST MOD 30 MIN: CPT | Performed by: INTERNAL MEDICINE

## 2024-12-09 PROCEDURE — G2211 COMPLEX E/M VISIT ADD ON: HCPCS | Performed by: INTERNAL MEDICINE

## 2024-12-09 RX ORDER — CARVEDILOL 6.25 MG/1
6.25 TABLET ORAL 2 TIMES DAILY WITH MEALS
Qty: 180 TABLET | Refills: 3 | Status: SHIPPED | OUTPATIENT
Start: 2024-12-09

## 2024-12-09 RX ORDER — ROSUVASTATIN CALCIUM 10 MG/1
10 TABLET, COATED ORAL DAILY
Qty: 90 TABLET | Refills: 3 | Status: SHIPPED | OUTPATIENT
Start: 2024-12-09

## 2024-12-09 RX ORDER — LOSARTAN POTASSIUM 50 MG/1
50 TABLET ORAL 2 TIMES DAILY
Qty: 180 TABLET | Refills: 3 | Status: SHIPPED | OUTPATIENT
Start: 2024-12-09

## 2024-12-09 NOTE — PROGRESS NOTES
CARDIOLOGY CLINIC FOLLOW-UP NOTE      REASON FOR VISIT:   F/u thoracic aortic aneurysm    PRIMARY CARE PHYSICIAN:  Julio Cardona        History of Present Illness   Alexandra Thomas is an extremely pleasant 74 year old female here for routine follow-up.  She previously followed with [facility] in [place] before moving to [place] to be closer to her family.  She has a past medical history significant for paroxysmal atrial fibrillation which developed after a chemical exposure on a cruise ship s/p successful ablation in  without documented recurrence, ascending thoracic aortic aneurysm, PVC/NSVT for which she is intolerant to beta-blocker, CMR in  showing fatty infiltration of the RV but no other evidence of RV dysplasia, subclinical CAD with negative BECKY in 2016, hypertension, dyslipidemia, esophageal spasm, and mild TATI not currently on CPAP.  She is a never smoker.  She has no family history of heart disease that she is aware of.  She did have a maternal uncle that  suddenly in his 60s, but she is unsure if this is related to an aneurysm or any other cardiac condition.    Since her last visit in 2023, she has overall been doing well from a cardiac standpoint.  She did undergo knee surgery last year, and she reports that this set her back quite a bit as far as activity, but she is slowly making progress.  She is up to 5000 steps per day and also exercises on a stationary bike.  No chest pains other than her typical esophageal spasm pains.  Given her initial difficulty in recovering her exercise tolerance from the surgery, we had her do an exercise stress echo in 2024, and she did not achieve target heart rate for this (79% maximum predicted), but at this subtarget heart rate she did not have any ECG or echo evidence of ischemia.  Her maximal aortic diameter was 4.0 cm).  BP today is 150/80, but she has not yet taken her morning medication.    As part of today's visit, I reviewed  her most recent CT aortic survey, exercise stress echo, and her labs from 10/2024.    Assessment & Plan       Paroxysmal atrial fibrillation which developed after a chemical exposure on a cruise ship s/p successful ablation in 2011 without documented recurrence, currently off anticoagulation  Ascending aortic dilation (4.1 cm on 2/2023 CTA, 4.2 cm on 2/2022 TTE, 4.0 cm on 6/2024 TTE)  PVC/NSVT for which she is relatively intolerant to beta-blocker (though has tolerated moderate dose carvedilol)  CMR in 2010 showing fatty infiltration of the RV but no other evidence of RV dysplasia  Intermittent severe midsternal chest discomfort, likely due to esophageal spasm  Subclinical CAD with negative BECKY in 2/2023, and negative BECKY in 6/2024 though did not achieve target HR)  Hypertension  Dyslipidemia  Mild TATI not currently on CPAP  Never smoker      It was a pleasure to meet with Tabby again in clinic today.  I am glad to hear that she is continuing to gradually improve her exercise tolerance following knee surgery.  I encouraged her to keep this up.  As far as her blood pressure, she is significantly above goal today, but she did not yet take her morning medication.  Again, we discussed the importance of tight blood pressure control given her aortic dilation, and she will send me a 1 week home BP log and we can decide on any additional medication needed at this time.  Of note, she is not currently taking the amlodipine, but open to taking it again in the future if needed.      -Home BP log x1 week  -Continue losartan 50 mg twice daily and carvedilol 6.25 mg twice daily for now, will adjust as needed based on home BP log  -Continue Crestor 10 mg daily  -We will plan to alternate TTE and CTA surveillance (next imaging due is a TTE in 2026)      Follow-up: 1 year, with labs beforehand, or sooner as needed          Antoine Ballesteros MD  Interventional Cardiology  December 9, 2024      The longitudinal plan of care for the  diagnosis(es)/condition(s) as documented were addressed during this visit. Due to the added complexity in care, I will continue to support Alexandra in the subsequent management and with ongoing continuity of care.          Medications   Current Outpatient Medications   Medication Sig Dispense Refill    acetaminophen (TYLENOL) 325 MG tablet Take 2 tablets (650 mg) by mouth every 4 hours as needed for other (mild pain) 100 tablet 0    amLODIPine (NORVASC) 2.5 MG tablet Take 1 tablet (2.5 mg) by mouth daily 180 tablet 1    amoxicillin (AMOXIL) 500 MG capsule TAKE 4 capsules by mouth 1 HOUR BEFORE DENTAL APPOINTMENT.*      carvedilol (COREG) 6.25 MG tablet Take 1 tablet (6.25 mg) by mouth 2 times daily (with meals). 180 tablet 0    cycloSPORINE (RESTASIS) 0.05 % ophthalmic emulsion Place 1 drop into both eyes 2 times daily. 90 each 3    fluconazole (DIFLUCAN) 200 MG tablet Take 1 tablet (200 mg) by mouth daily 14 tablet 0    gabapentin (NEURONTIN) 100 MG capsule TAKE 2 CAPSULES BY MOUTH IN THE  MORNING AND 1 ADDITIONAL CAPSULE AT NOON 300 capsule 2    gabapentin (NEURONTIN) 300 MG capsule TAKE 2 CAPSULES BY MOUTH AT  BEDTIME 200 capsule 2    losartan (COZAAR) 50 MG tablet Take 1 tablet (50 mg) by mouth 2 times daily. 180 tablet 0    Magnesium Oxide 250 MG TABS Take 1 tablet by mouth at bedtime      multivitamin w/minerals (THERA-VIT-M) tablet Take 1 tablet by mouth daily      nitroGLYcerin (NITROSTAT) 0.4 MG sublingual tablet Place 1 tablet (0.4 mg) under the tongue every 5 minutes as needed for chest pain For chest pain place 1 tablet under the tongue every 5 minutes for 3 doses. If symptoms persist 5 minutes after 1st dose call 911. 10 tablet 3    omeprazole (PRILOSEC) 40 MG DR capsule TAKE 1 CAPSULE BY MOUTH DAILY 100 capsule 2    rosuvastatin (CRESTOR) 10 MG tablet Take 1 tablet (10 mg) by mouth daily. 90 tablet 0    Vitamin D3 (CHOLECALCIFEROL) 25 mcg (1000 units) tablet Take 1 tablet by mouth daily       No  current facility-administered medications for this visit.     Allergies   Allergies   Allergen Reactions    Ciprofloxacin Other (See Comments)     Pt has aortic aneurysm.  Increased risk of rupture or dissection with use of fluoroquinolones.      Food Anaphylaxis     EGGPLANT-->anaphylaxis      Venlafaxine Hcl Diarrhea, Shortness Of Breath and Nausea and Vomiting    Meperidine Rash               Adhesive Tape      blisters         Physical Exam       BP: (!) 150/80 (No meds yet) Pulse: 57     SpO2: 99 %      Vital Signs with Ranges  Pulse:  [57] 57  BP: (150)/(80) 150/80  SpO2:  [99 %] 99 %  155 lbs 14.4 oz    Constitutional: Well-appearing, no acute distress  Respiratory: Normal respiratory effort, CTAB  Cardiovascular: RRR, 2/6 systolic murmur at the RUSB, no diastolic murmur appreciated.  JVP < 7 cm H2O.  There is no LE edema.  Normal carotid upstrokes, no carotid bruits.

## 2024-12-09 NOTE — LETTER
2024    Julio Cardona MD  [address] [facility] [address]    RE: Alexandra Thomas       Dear Colleague,     I had the pleasure of seeing Alexandra Thomas in the [facility].  CARDIOLOGY CLINIC FOLLOW-UP NOTE      REASON FOR VISIT:   F/u thoracic aortic aneurysm    PRIMARY CARE PHYSICIAN:  Julio Cardona        History of Present Illness  Alexandra Thomas is an extremely pleasant 74 year old female here for routine follow-up.  She previously followed with [facility] in [place] before moving to [place] to be closer to her family.  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  She is a never smoker.  She has no family history of heart disease that she is aware of.  She did have a maternal uncle that  suddenly in his 60s, but she is unsure if this is related to an aneurysm or any other cardiac condition.    Since her last visit in 2023, she has overall been doing well from a cardiac standpoint.  She did undergo knee surgery last year, and she reports that this set her back quite a bit as far as activity, but she is slowly making progress.  She is up to 5000 steps per day and also exercises on a stationary bike.  No chest pains other than her typical esophageal spasm pains.  Given her initial difficulty in recovering her exercise tolerance from the surgery, we had her do an exercise stress echo in 2024, and she did not achieve target heart rate for this (79% maximum predicted), but at this  subtarget heart rate she did not have any ECG or echo evidence of ischemia.  Her maximal aortic diameter was 4.0 cm).  BP today is 150/80, but she has not yet taken her morning medication.    As part of today's visit, I reviewed her most recent CT aortic survey, exercise stress echo, and her labs from 10/2024.    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  I am glad to hear that she is continuing to gradually improve her exercise tolerance following knee surgery.  I encouraged her to keep this up.  As far as her blood pressure, she is significantly above goal today, but she did not yet take her morning medication.  Again, we discussed the importance of tight blood pressure control given her aortic dilation, and she will send me a 1 week home BP log and we can decide on any additional medication needed at this time.  Of note, she is not currently taking the amlodipine, but open to taking it again in the future if needed.      -Home BP log x1 week  -Continue losartan 50 mg twice daily and carvedilol 6.25 mg twice daily for now, will adjust as needed based on home BP log  -Continue Crestor 10 mg daily  -We will plan to alternate TTE and CTA  surveillance (next imaging due is a TTE in 2026)      Follow-up: 1 year, with labs beforehand, or sooner as needed          Antoine Ballesteros MD  Interventional Cardiology  December 9, 2024      The longitudinal plan of care for the diagnosis(es)/condition(s) as documented were addressed during this visit. Due to the added complexity in care, I will continue to support Alexandra in the subsequent management and with ongoing continuity of care.          Medications  Current Outpatient Medications   Medication Sig Dispense Refill     acetaminophen (TYLENOL) 325 MG tablet Take 2 tablets (650 mg) by mouth every 4 hours as needed for other (mild pain) 100 tablet 0     amLODIPine (NORVASC) 2.5 MG tablet Take 1 tablet (2.5 mg) by mouth daily 180 tablet 1     amoxicillin (AMOXIL) 500 MG capsule TAKE 4 capsules by mouth 1 HOUR BEFORE DENTAL APPOINTMENT.*       carvedilol (COREG) 6.25 MG tablet Take 1 tablet (6.25 mg) by mouth 2 times daily (with meals). 180 tablet 0     cycloSPORINE (RESTASIS) 0.05 % ophthalmic emulsion Place 1 drop into both eyes 2 times daily. 90 each 3     fluconazole (DIFLUCAN) 200 MG tablet Take 1 tablet (200 mg) by mouth daily 14 tablet 0     gabapentin (NEURONTIN) 100 MG capsule TAKE 2 CAPSULES BY MOUTH IN THE  MORNING AND 1 ADDITIONAL CAPSULE AT NOON 300 capsule 2     gabapentin (NEURONTIN) 300 MG capsule TAKE 2 CAPSULES BY MOUTH AT  BEDTIME 200 capsule 2     losartan (COZAAR) 50 MG tablet Take 1 tablet (50 mg) by mouth 2 times daily. 180 tablet 0     Magnesium Oxide 250 MG TABS Take 1 tablet by mouth at bedtime       multivitamin w/minerals (THERA-VIT-M) tablet Take 1 tablet by mouth daily       nitroGLYcerin (NITROSTAT) 0.4 MG sublingual tablet Place 1 tablet (0.4 mg) under the tongue every 5 minutes as needed for chest pain For chest pain place 1 tablet under the tongue every 5 minutes for 3 doses. If symptoms persist 5 minutes after 1st dose call 911. 10 tablet 3     omeprazole (PRILOSEC) 40 MG  DR capsule TAKE 1 CAPSULE BY MOUTH DAILY 100 capsule 2     rosuvastatin (CRESTOR) 10 MG tablet Take 1 tablet (10 mg) by mouth daily. 90 tablet 0     Vitamin D3 (CHOLECALCIFEROL) 25 mcg (1000 units) tablet Take 1 tablet by mouth daily       No current facility-administered medications for this visit.     Allergies  Allergies   Allergen Reactions     Ciprofloxacin Other (See Comments)     Pt has aortic aneurysm.  Increased risk of rupture or dissection with use of fluoroquinolones.       Food Anaphylaxis     EGGPLANT-->anaphylaxis       Venlafaxine Hcl Diarrhea, Shortness Of Breath and Nausea and Vomiting     Meperidine Rash                Adhesive Tape      blisters         Physical Exam      BP: (!) 150/80 (No meds yet) Pulse: 57     SpO2: 99 %      Vital Signs with Ranges  Pulse:  [57] 57  BP: (150)/(80) 150/80  SpO2:  [99 %] 99 %  155 lbs 14.4 oz    Constitutional: Well-appearing, no acute distress  Respiratory: Normal respiratory effort, CTAB  Cardiovascular: RRR, 2/6 systolic murmur at the RUSB, no diastolic murmur appreciated.  JVP < 7 cm H2O.  There is no LE edema.  Normal carotid upstrokes, no carotid bruits.      Thank you for allowing me to participate in the care of your patient.      Sincerely,     Antoine Ballesteros MD     [facility]  cc:   Antoine Ballesteros MD  [id number] [id number]

## 2024-12-10 ENCOUNTER — THERAPY VISIT (OUTPATIENT)
Dept: PHYSICAL THERAPY | Facility: CLINIC | Age: 74
End: 2024-12-10
Payer: COMMERCIAL

## 2024-12-10 DIAGNOSIS — K59.00 CONSTIPATION, UNSPECIFIED CONSTIPATION TYPE: Primary | ICD-10-CM

## 2024-12-10 PROCEDURE — 97530 THERAPEUTIC ACTIVITIES: CPT | Mod: GP | Performed by: PHYSICAL THERAPIST

## 2024-12-10 PROCEDURE — 97140 MANUAL THERAPY 1/> REGIONS: CPT | Mod: GP | Performed by: PHYSICAL THERAPIST

## 2024-12-24 ENCOUNTER — THERAPY VISIT (OUTPATIENT)
Dept: PHYSICAL THERAPY | Facility: CLINIC | Age: 74
End: 2024-12-24
Payer: COMMERCIAL

## 2024-12-24 DIAGNOSIS — K59.00 CONSTIPATION, UNSPECIFIED CONSTIPATION TYPE: Primary | ICD-10-CM

## 2024-12-24 PROCEDURE — 97530 THERAPEUTIC ACTIVITIES: CPT | Mod: GP | Performed by: PHYSICAL THERAPIST

## 2024-12-24 PROCEDURE — 97110 THERAPEUTIC EXERCISES: CPT | Mod: GP | Performed by: PHYSICAL THERAPIST

## 2024-12-24 NOTE — PROGRESS NOTES
12/24/24 0500   Appointment Info   Signing clinician's name / credentials Rosibel Quiroz, PT, OCS   Total/Authorized Visits 09/19/24 EPIC   Visits Used 6   Medical Diagnosis Constipation, unspecified constipation type (K59.00)   PT Tx Diagnosis Constipation, unspecified constipation type (K59.00), mixed incontinence   Precautions/Limitations Discussed with patient/guardian reason for referral regarding pelvic health needs and external/internal pelvic floor muscle examination.  Opportunity provided to ask questions and verbal consent for assessment and intervention was given.   Other pertinent information hysterectomy, 5 joint replacements   Progress Note/Certification   Start of Care Date 10/28/24   Onset of illness/injury or Date of Surgery 09/19/24  (order date)   Therapy Frequency 1x week for 4 weeks   Predicted Duration then every other week for 8 weeks   Certification date from 10/28/24   Certification date to 01/25/25   Progress Note Due Date 12/28/24   Progress Note Completed Date 12/24/24   [employer] Authorization Information   Condition type Chronic (continuous duration <3 months)   Cause of current episode Unspecified   Nature of treatment Rehabilitative   Documented POC (choose all that apply) Measurable short and long term/discharge treatment goals related to physical and functional deficits.;Frequency of treatment visits and treatment activities to address deficit areas.;Patient agrees to program participation including home program   How did the symptoms start gradually over many years but especially worse after hysterectomy/joint replacements   General health reported by patient Fair   GOALS   PT Goals 2   PT Goal 1   Goal Identifier Able to have normal BM (Daniels 3/4) daily   Rationale to maximize safety and independence with self cares;to maximize safety and independence with performance of ADLs and functional tasks   Goal Progress loose, IBS sxs today (likely from food)   Target Date 01/25/25    PT Goal 2   Goal Identifier No urinary incontinence with urge   Rationale to maximize safety and independence with performance of ADLs and functional tasks;to maximize safety and independence within the home;to maximize safety and independence within the community;to maximize safety and independence with transportation;to maximize safety and independence with self cares   Target Date 01/25/25   Date Met 12/24/24   Subjective Report   Subjective Report Saw [facility] Rurufino and visit went well. Standing order for urinalysis placed. Is scheduled to see Dr. Campos for cystoscopy and CT. Also will discuss with Dr. Campos estrogen ring vs topical. Overall sxs are holding steady. Not leaking , minimal urge.   Objective Measures   Objective Measures Objective Measure 1   Objective Measure 1   Objective Measure No re-check today. Rev'd HEP to continue independently.   Treatment Interventions (PT)   Interventions Therapeutic Activity;Therapeutic Procedure/Exercise   Therapeutic Procedure/Exercise   Therapeutic Procedures: strength, endurance, ROM, flexibility minutes (45671) 10   Ther Proc 1 seated basic kegel 3 second hold, 6 sec relax   Ther Proc 1 - Details 15 x daily   PTRx Ther Proc 1 Supine Butterfly   PTRx Ther Proc 1 - Details rev'd   PTRx Ther Proc 2 Repeated Hip External Rotation with Overpressure in Sitting   PTRx Ther Proc 2 - Details rev'd   Skilled Intervention to decrease PF tone, emphasis on relaxation with pelvic floor   Patient Response/Progress excellent understanding   Therapeutic Activity   Therapeutic Activities: dynamic activities to improve functional performance minutes (19918) 15   Therapeutic Activities Ther Act 2   Ther Act 1 educated in POC and normal pelvic floor anatomy/function   Ther Act 1 - Details belly breathing 10 min daily   Ther Act 2 discussed: talk to MD in regards to resuming topical estrogen   Ther Act 2 - Details rev'd bladder irritants   PTRx Ther Act 1 Functions of Fiber   PTRx Ther Act  1 - Details cont   PTRx Ther Act 2 Toileting Position   PTRx Ther Act 2 - Details cont   PTRx Ther Act 3 Overactive Dysfunction and Suggestions to Reduce Overactive Dysfunction   PTRx Ther Act 3 - Details cont   PTRx Ther Act 4 Abdominal Massage  (rev'd)   PTRx Ther Act 4 - Details 5-10 reps, 2-3 x day   PTRx Ther Act 5 Bladder Diary   PTRx Ther Act 5 - Details 2 days, bladder/bowel, time and amount   Skilled Intervention to normalize pelvic floor function   Patient Response/Progress good understanding   Intervention (Other)   PTRx Other  1 Urge Incontinence Suppression Techniques   Education   Learner/Method Patient;No Barriers to Learning   Plan   Home program PTRX HEP   Plan for next session DC to HEP   Comments   Pelvic Health Informed Consent Statement Discussed with patient/guardian reason for referral regarding pelvic health needs and external/internal pelvic floor muscle examination.  Opportunity provided to ask questions and verbal consent for assessment and intervention was given.   Total Session Time   Timed Code Treatment Minutes 25   Total Treatment Time (sum of timed and untimed services) 25         DISCHARGE  Reason for Discharge: will continue on her own with HEP.    Discharge Plan: Patient to continue home program.    Referring Provider:  Anna Carvajal

## 2025-01-13 ENCOUNTER — TELEPHONE (OUTPATIENT)
Dept: INTERNAL MEDICINE | Facility: CLINIC | Age: 75
End: 2025-01-13
Payer: COMMERCIAL

## 2025-01-13 DIAGNOSIS — Z78.0 MENOPAUSE: Primary | ICD-10-CM

## 2025-01-13 NOTE — TELEPHONE ENCOUNTER
"  S-(situation): Patient states she took multivitamin with calcium today at 10am. Patient wants to know if she should reschedule her DEXA.    B-(background): Patient has DEXA tomorrow at 10am.     A-(assessment):  Patient called DEXA and they advised patient to call primary care provider office.Patient okay with rescheduling DEXA if needed.     Per Dexa scheduling instructions:  \"Please do not take any of the following 24 hours prior to the day of your exam: vitamins, calcium tablets, antacids.\"    R-(recommendations): Advised patient that should be okay to continue with her DEXA scan, but will advise primary care provider.  "

## 2025-01-13 NOTE — TELEPHONE ENCOUNTER
Patient wasn't asking for DEXA order, she was asking if it was okay to proceed with DEXA because she took a multivitamin with calcium in it prior to her exam tomorrow. DEXA says to not take any calcium containing meds within 24 hours. Patient's exam is at 12pm tomorrow, so it will be 26 hours, so patient should be okay to proceed with her DEXA. Huddled with Dr. Pal to verify. Dr. Pal says patient should be okay to proceed with DEXA tomorrow.    Attempt # 1  Called Phone # [phone]      Was Call answered? No    Called and left patient a detailed voice mail message on January 13, 2025 12:44 PM to relay okay to keep DEXA appointment tomorrow. Instructed to call back with any further questions/concerns.    Thank you,  James, Triage [facility] [place]    12:44 PM 1/13/2025

## 2025-01-14 ENCOUNTER — ANCILLARY PROCEDURE (OUTPATIENT)
Dept: BONE DENSITY | Facility: CLINIC | Age: 75
End: 2025-01-14
Attending: INTERNAL MEDICINE
Payer: COMMERCIAL

## 2025-01-14 DIAGNOSIS — Z12.31 ENCOUNTER FOR SCREENING MAMMOGRAM FOR BREAST CANCER: ICD-10-CM

## 2025-01-14 DIAGNOSIS — Z78.0 MENOPAUSE: ICD-10-CM

## 2025-01-14 PROCEDURE — 77080 DXA BONE DENSITY AXIAL: CPT | Mod: TC | Performed by: PHYSICIAN ASSISTANT

## 2025-01-14 PROCEDURE — 77081 DXA BONE DENSITY APPENDICULR: CPT | Mod: TC | Performed by: PHYSICIAN ASSISTANT

## 2025-01-15 ENCOUNTER — TELEPHONE (OUTPATIENT)
Dept: INTERNAL MEDICINE | Facility: CLINIC | Age: 75
End: 2025-01-15

## 2025-01-15 NOTE — TELEPHONE ENCOUNTER
Attempt #1  Left voicemail for patient to call clinic back. Upon call back please relay provider's message below.      ----- Message from Anna ALDRICH sent at 1/15/2025  3:35 PM CST -----    ----- Message -----  From: Julio Cardona MD  Sent: 1/15/2025   3:31 PM CST  To: [facility] Pool    Decreased bone density - osteoporosis.   Recommend treatment with Fosamax, in addition to calcium and vit D intake.

## 2025-01-16 ENCOUNTER — MYC MEDICAL ADVICE (OUTPATIENT)
Dept: INTERNAL MEDICINE | Facility: CLINIC | Age: 75
End: 2025-01-16
Payer: COMMERCIAL

## 2025-01-16 NOTE — TELEPHONE ENCOUNTER
Pt responded regarding the imaging results.   Pt scheduled VV.   May have to try for Prolia, other option, or see Endocrinology?       Julio Cardona MD  1/15/2025  3:31 PM CST       Decreased bone density - osteoporosis.  Recommend treatment with Fosamax, in addition to calcium and vit D intake.

## 2025-01-16 NOTE — TELEPHONE ENCOUNTER
Attempt #2.   Will also send [employer] message. Will need her pharmacy.     Per Dr Cardona:  Decreased bone density - osteoporosis.  Recommend treatment with Fosamax, in addition to calcium and vit D intake.

## 2025-01-16 NOTE — TELEPHONE ENCOUNTER
PT responded via mychart. She is unable to take fosamax medication due to GI issues. She scheduled VV with Dr Cardona.

## 2025-01-28 ENCOUNTER — VIRTUAL VISIT (OUTPATIENT)
Dept: INTERNAL MEDICINE | Facility: CLINIC | Age: 75
End: 2025-01-28
Payer: COMMERCIAL

## 2025-01-28 DIAGNOSIS — M81.0 AGE-RELATED OSTEOPOROSIS WITHOUT CURRENT PATHOLOGICAL FRACTURE: Primary | ICD-10-CM

## 2025-01-28 PROCEDURE — 98005 SYNCH AUDIO-VIDEO EST LOW 20: CPT | Performed by: INTERNAL MEDICINE

## 2025-01-28 RX ORDER — ALENDRONATE SODIUM 70 MG/1
70 TABLET ORAL
Qty: 12 TABLET | Refills: 3 | Status: SHIPPED | OUTPATIENT
Start: 2025-01-28

## 2025-01-28 NOTE — PROGRESS NOTES
Alexandra is a 74 year old who is being evaluated via a billable video visit.    How would you like to obtain your AVS? MyChart  If the video visit is dropped, the invitation should be resent by: Text to cell phone: [phone]  Will anyone else be joining your video visit? No      Assessment & Plan     Age-related osteoporosis without current pathological fracture  Discussed treatment,   Pt on vit D, diet supplied calcium, started on exercise  Will add Fosamax, advised for side effects   Follow up DEXA in 2 years   - alendronate (FOSAMAX) 70 MG tablet; Take 1 tablet (70 mg) by mouth every 7 days.            See Patient Instructions    Subjective   Alexandra is a 74 year old, presenting for the following health issues:  RECHECK      Video Start Time: 11:40 AM    History of Present Illness       Reason for visit:  Osteoporosis  Symptoms include:  None  Had these symptoms before:  No   She is taking medications regularly.     Patient is seen for a follow up visit.  Has had DEXA scan, showing osteoporosis. T score -3.1  Patient is taking vit D. Unable to take calcium, but is eating yogurt and cheese, juice with calcium.   No history of fractures. Has started exercise program.           Review of Systems  Constitutional, HEENT, cardiovascular, pulmonary, gi and gu systems are negative, except as otherwise noted.      Objective           Vitals:  No vitals were obtained today due to virtual visit.    Physical Exam   GENERAL: alert and no distress  EYES: Eyes grossly normal to inspection.  No discharge or erythema, or obvious scleral/conjunctival abnormalities.  RESP: No audible wheeze, cough, or visible cyanosis.    SKIN: Visible skin clear. No significant rash, abnormal pigmentation or lesions.  NEURO: Cranial nerves grossly intact.  Mentation and speech appropriate for age.  PSYCH: Appropriate affect, tone, and pace of words    Office Visit on 12/20/2024   Component Date Value Ref Range Status    Residual Volume (RV)  (External) 12/20/2024 14   Final    Color Urine 12/20/2024 Yellow  Colorless, Straw, Light Yellow, Yellow Final    Appearance Urine 12/20/2024 Clear  Clear Final    Glucose Urine 12/20/2024 Negative  Negative mg/dL Final    Bilirubin Urine 12/20/2024 Negative  Negative Final    Ketones Urine 12/20/2024 Negative  Negative mg/dL Final    Specific Gravity Urine 12/20/2024 1.010  1.003 - 1.035 Final    Blood Urine 12/20/2024 Negative  Negative Final    pH Urine 12/20/2024 6.0  5.0 - 7.0 Final    Protein Albumin Urine 12/20/2024 Negative  Negative mg/dL Final    Urobilinogen Urine 12/20/2024 0.2  0.2, 1.0 E.U./dL Final    Nitrite Urine 12/20/2024 Negative  Negative Final    Leukocyte Esterase Urine 12/20/2024 Trace (A)  Negative Final         Video-Visit Details    Type of service:  Video Visit   Video End Time:11:53 AM  Originating Location (pt. Location): Home    Distant Location (provider location):  On-site  Platform used for Video Visit: Lauryn  Signed Electronically by: Julio Cardona MD

## 2025-02-07 ENCOUNTER — TRANSFERRED RECORDS (OUTPATIENT)
Dept: HEALTH INFORMATION MANAGEMENT | Facility: CLINIC | Age: 75
End: 2025-02-07
Payer: COMMERCIAL

## 2025-02-25 ENCOUNTER — OFFICE VISIT (OUTPATIENT)
Dept: UROLOGY | Facility: CLINIC | Age: 75
End: 2025-02-25
Payer: COMMERCIAL

## 2025-02-25 VITALS — DIASTOLIC BLOOD PRESSURE: 88 MMHG | OXYGEN SATURATION: 98 % | HEART RATE: 63 BPM | SYSTOLIC BLOOD PRESSURE: 144 MMHG

## 2025-02-25 DIAGNOSIS — R10.2 PELVIC PRESSURE IN FEMALE: ICD-10-CM

## 2025-02-25 DIAGNOSIS — N39.46 MIXED INCONTINENCE: ICD-10-CM

## 2025-02-25 DIAGNOSIS — N39.0 RECURRENT UTI: Primary | ICD-10-CM

## 2025-02-25 DIAGNOSIS — N95.2 ATROPHIC VAGINITIS: ICD-10-CM

## 2025-02-25 LAB
ALBUMIN UR-MCNC: NEGATIVE MG/DL
APPEARANCE UR: CLEAR
BILIRUB UR QL STRIP: NEGATIVE
COLOR UR AUTO: YELLOW
GLUCOSE UR STRIP-MCNC: NEGATIVE MG/DL
HGB UR QL STRIP: NEGATIVE
KETONES UR STRIP-MCNC: ABNORMAL MG/DL
LEUKOCYTE ESTERASE UR QL STRIP: ABNORMAL
NITRATE UR QL: NEGATIVE
PH UR STRIP: 6.5 [PH] (ref 5–7)
SP GR UR STRIP: 1.02 (ref 1–1.03)
UROBILINOGEN UR STRIP-ACNC: 0.2 E.U./DL

## 2025-02-25 PROCEDURE — 3079F DIAST BP 80-89 MM HG: CPT | Performed by: UROLOGY

## 2025-02-25 PROCEDURE — 81003 URINALYSIS AUTO W/O SCOPE: CPT | Mod: QW | Performed by: UROLOGY

## 2025-02-25 PROCEDURE — 99212 OFFICE O/P EST SF 10 MIN: CPT | Mod: 25 | Performed by: UROLOGY

## 2025-02-25 PROCEDURE — 3077F SYST BP >= 140 MM HG: CPT | Performed by: UROLOGY

## 2025-02-25 PROCEDURE — 52000 CYSTOURETHROSCOPY: CPT | Performed by: UROLOGY

## 2025-02-25 NOTE — Clinical Note
2/25/2025       RE: Alexandra Thomas  [address] [address]     Dear Colleague,    Thank you for referring your patient, Alexandra Thomas, to the [facility] at [facility]. Please see a copy of my visit note below.    February 25, 2025    Return visit    Patient returns today for follow up of ***.  *** She denies any changes in her health since last visit.    There were no vitals taken for this visit.  She is comfortable, in no distress, non-labored breathing.  Abdomen is soft, non-tender, non-distended.  Normal external female genitalia.  Negative CST.  Pelvic exam is remarkable for ***.    Cystoscopy Note: After informed consent was obtained patient was prepped and draped in the standard fashion.  The flexible cystoscope was inserted into a normal appearing urethral meatus.  The urothelium was carefully examined and there were *** tumors, masses, stones, foreign bodies, or other urothelial abnormalities noted.  Bilateral ureteral orifices were *** noted in the normal orthotopic position and both effluxed *** urine.  The cystoscope was retroflexed and the bladder neck was ***.  The urethra was carefully examined upon removing the cystoscope and was ***.  Patient tolerated the procedure without complications noted.        A/P: 74 year old F with (N39.0) Recurrent UTI  (primary encounter diagnosis)  Comment: ***  Plan: UA without Microscopic [[id number]]        ***        ***    *** minutes were spent today on the date of the encounter in reviewing the EMR, direct patient care, coordination of care and documentation in addition to the cystoscopy procedure    Genet Campos MD MPH  (she/her/hers)   of Urology  [facility]    CC  Patient Care Team:  Julio Cardona MD as PCP - General (Internal Medicine)  Genet Dahl OD as MD (Ophthalmology)  Lex Veliz MD  as MD (Gastroenterology)  Melissa Torres DO as MD (Cardiovascular Disease)  Latia Srivastava Chi, OD as MD (Optometry)  Antoine Ballesteros MD as MD (Cardiovascular Disease)  Antoine Ballesteros MD as Assigned Heart and Vascular Provider  Emeka Harding DPM as Assigned Musculoskeletal Provider  Jaswinder Pal MD as Assigned PCP  Gricelda Davenport LMFT as Assigned Behavioral Health Provider  Genet Dahl OD as Assigned Surgical Provider                    Again, thank you for allowing me to participate in the care of your patient.      Sincerely,    Genet Campos MD

## 2025-02-25 NOTE — PATIENT INSTRUCTIONS
"Websites with free information:    [employer] patient website: www.voicesforpfd.org    Total Control Program: www.totalcontrolprogram.com    Continue your pelvic floor exercises    Supplements to prevent UTI to consider  -Probiotics  -Cranberry (for these products let them know a doctor is recommending them)   Jace: https://Impulsiv/   Theracran HP by [employer] [facility] [address]  -d-mannose 2gm daily  -Vitamin C 500-1000mg twice a day    Return to see Santa in 6 months, sooner if needed    It was a pleasure meeting with you today.  Thank you for allowing me and my team the privilege of caring for you today.  YOU are the reason we are here, and I truly hope we provided you with the excellent service you deserve.  Please let us know if there is anything else we can do for you so that we can be sure you are leaving completely satisfied with your care experience.    AFTER YOUR CYSTOSCOPY  ?  ?  You have just completed a cystoscopy, or \"cysto\", which allowed your physician to learn more about your bladder (or to remove a stent placed after surgery). We suggest that you continue to avoid caffeine, fruit juice, and alcohol for the next 24 hours, however, you are encouraged to return to your normal activities.  ?  ?  A few things that are considered normal after your cystoscopy:  ?  * small amount of bleeding (or spotting) that clears within the next 24 hours  ?  * slight burning sensation with urination  ?  * sensation of needing to void (urinate) more frequently  ?  * the feeling of \"air\" in your urine  ?  * mild discomfort that is relieved with Tylenol    * bladder spasms  ?  ?  ?  Please contact our office promptly if you:  ?  * develop a fever above 101 degrees  ?  * are unable to urinate  ?  * develop bright red blood that does not stop  ?  * experience severe pain or swelling  ?  ?  ?  And of course, please contact our office with any concerns or questions [phone].  ?   "

## 2025-02-25 NOTE — PROGRESS NOTES
February 25, 2025    Return visit    Patient returns today for follow up of ***.  *** She denies any changes in her health since last visit.    BP (!) 144/88   Pulse 63   SpO2 98%   She is comfortable, in no distress, non-labored breathing.  Abdomen is soft, non-tender, non-distended.  Normal external female genitalia.  Negative CST.  Pelvic exam is remarkable for ***.    Cystoscopy Note: After informed consent was obtained patient was prepped and draped in the standard fashion.  The flexible cystoscope was inserted into a normal appearing urethral meatus.  The urothelium was carefully examined and there were *** tumors, masses, stones, foreign bodies, or other urothelial abnormalities noted.  Bilateral ureteral orifices were *** noted in the normal orthotopic position and both effluxed *** urine.  The cystoscope was retroflexed and the bladder neck was ***.  The urethra was carefully examined upon removing the cystoscope and was ***.  Patient tolerated the procedure without complications noted.        A/P: 74 year old F with (N39.0) Recurrent UTI  (primary encounter diagnosis)  Comment: ***  Plan: UA without Microscopic [[id number]]        ***        ***    *** minutes were spent today on the date of the encounter in reviewing the EMR, direct patient care, coordination of care and documentation in addition to the cystoscopy procedure    Genet Campos MD MPH  (she/her/hers)   of Urology  [facility]  Patient Care Team:  Julio Cardona MD as PCP - General (Internal Medicine)  Genet Dahl OD as MD (Ophthalmology)  Lex Veliz MD as MD (Gastroenterology)  Melissa Torres DO as MD (Cardiovascular Disease)  Latia Srivastava Chi, OD as MD (Optometry)  Antoine Ballesteros MD as MD (Cardiovascular Disease)  Antoine Ballesteros MD as Assigned Heart and Vascular Provider  Emeka Harding DPM as Assigned Musculoskeletal  Provider  Jaswinder Pal MD as Assigned PCP  Gricelda Davenport LMFT as Assigned Behavioral Health Provider  Genet Dahl OD as Assigned Surgical Provider

## 2025-02-25 NOTE — NURSING NOTE

## 2025-03-20 ENCOUNTER — MYC MEDICAL ADVICE (OUTPATIENT)
Dept: INTERNAL MEDICINE | Facility: CLINIC | Age: 75
End: 2025-03-20
Payer: COMMERCIAL

## 2025-03-20 NOTE — TELEPHONE ENCOUNTER
Ok to stop Fosamax. Keep calcium and vit D. Keep light weight bearing exercises.   We can recheck DEXA in a year.

## 2025-04-09 ENCOUNTER — OFFICE VISIT (OUTPATIENT)
Dept: INTERNAL MEDICINE | Facility: CLINIC | Age: 75
End: 2025-04-09
Payer: COMMERCIAL

## 2025-04-09 VITALS
TEMPERATURE: 96.9 F | HEIGHT: 67 IN | OXYGEN SATURATION: 98 % | DIASTOLIC BLOOD PRESSURE: 78 MMHG | SYSTOLIC BLOOD PRESSURE: 120 MMHG | RESPIRATION RATE: 14 BRPM | BODY MASS INDEX: 23.73 KG/M2 | WEIGHT: 151.2 LBS | HEART RATE: 64 BPM

## 2025-04-09 DIAGNOSIS — J30.0 VASOMOTOR RHINITIS: Primary | ICD-10-CM

## 2025-04-09 PROCEDURE — 3074F SYST BP LT 130 MM HG: CPT | Performed by: INTERNAL MEDICINE

## 2025-04-09 PROCEDURE — 1126F AMNT PAIN NOTED NONE PRSNT: CPT | Performed by: INTERNAL MEDICINE

## 2025-04-09 PROCEDURE — 99213 OFFICE O/P EST LOW 20 MIN: CPT | Performed by: INTERNAL MEDICINE

## 2025-04-09 PROCEDURE — 3078F DIAST BP <80 MM HG: CPT | Performed by: INTERNAL MEDICINE

## 2025-04-09 RX ORDER — IPRATROPIUM BROMIDE 42 UG/1
2 SPRAY, METERED NASAL 4 TIMES DAILY
Qty: 15 ML | Refills: 3 | Status: SHIPPED | OUTPATIENT
Start: 2025-04-09

## 2025-04-09 ASSESSMENT — PAIN SCALES - GENERAL: PAINLEVEL_OUTOF10: NO PAIN (0)

## 2025-04-09 NOTE — PROGRESS NOTES
"  Assessment & Plan     Vasomotor rhinitis  Start on atrovent nasal spray.   Reassess if not improved after a month of treatment   - ipratropium (ATROVENT) 0.06 % nasal spray; Spray 2 sprays into both nostrils 4 times daily.            See Patient Instructions    Subjective   Alexandra is a 74 year old, presenting for the following health issues:  Nasal Congestion (Pt c/o persistent runny nose all of the time)        4/9/2025     8:51 AM   Additional Questions   Roomed by Anna ALDRICH   Accompanied by n/a     History of Present Illness       Reason for visit:  Constant running nose  Symptom onset:  More than a month  Symptoms include:  Running nose 90% of waking hours  Symptom intensity:  Severe  Symptom progression:  Staying the same  Had these symptoms before:  No  What makes it worse:  No  What makes it better:  No   She is taking medications regularly.        Presents with nose dripping of clear liquid. For the past year. Has tried nasal spray for allergies and saline nasal spray, not helping with symptoms.   No mucus, bleeding, pain.   No relation to food intake, activities, being outdoors.           Review of Systems  Constitutional, HEENT, cardiovascular, pulmonary, gi and gu systems are negative, except as otherwise noted.      Objective    /78 (BP Location: Left arm, Cuff Size: Adult Regular)   Pulse 64   Temp 96.9  F (36.1  C) (Oral)   Resp 14   Ht 1.702 m (5' 7\")   Wt 68.6 kg (151 lb 3.2 oz)   SpO2 98%   BMI 23.68 kg/m    Body mass index is 23.68 kg/m .  Physical Exam   GENERAL: alert and no distress  EYES: Eyes grossly normal to inspection, PERRL and conjunctivae and sclerae normal  HENT: ear canals and TM's normal, nose and mouth without ulcers or lesions  NECK: no adenopathy, no asymmetry, masses, or scars  MS: no gross musculoskeletal defects noted, no edema    Office Visit on 02/25/2025   Component Date Value Ref Range Status    Color Urine 02/25/2025 Yellow  Colorless, Straw, Light Yellow, " Yellow Final    Appearance Urine 02/25/2025 Clear  Clear Final    Glucose Urine 02/25/2025 Negative  Negative mg/dL Final    Bilirubin Urine 02/25/2025 Negative  Negative Final    Ketones Urine 02/25/2025 Trace (A)  Negative mg/dL Final    Specific Gravity Urine 02/25/2025 1.020  1.003 - 1.035 Final    Blood Urine 02/25/2025 Negative  Negative Final    pH Urine 02/25/2025 6.5  5.0 - 7.0 Final    Protein Albumin Urine 02/25/2025 Negative  Negative mg/dL Final    Urobilinogen Urine 02/25/2025 0.2  0.2, 1.0 E.U./dL Final    Nitrite Urine 02/25/2025 Negative  Negative Final    Leukocyte Esterase Urine 02/25/2025 Small (A)  Negative Final           Signed Electronically by: Julio Cardona MD

## 2025-04-28 ENCOUNTER — MYC MEDICAL ADVICE (OUTPATIENT)
Dept: INTERNAL MEDICINE | Facility: CLINIC | Age: 75
End: 2025-04-28
Payer: COMMERCIAL

## 2025-04-28 DIAGNOSIS — J30.0 VASOMOTOR RHINITIS: ICD-10-CM

## 2025-04-29 RX ORDER — IPRATROPIUM BROMIDE 42 UG/1
2 SPRAY, METERED NASAL 4 TIMES DAILY
Qty: 15 ML | Refills: 3 | Status: SHIPPED | OUTPATIENT
Start: 2025-04-29

## 2025-04-30 DIAGNOSIS — K21.9 GASTROESOPHAGEAL REFLUX DISEASE WITHOUT ESOPHAGITIS: ICD-10-CM

## 2025-05-01 DIAGNOSIS — J30.0 VASOMOTOR RHINITIS: ICD-10-CM

## 2025-05-01 RX ORDER — IPRATROPIUM BROMIDE 42 UG/1
SPRAY, METERED NASAL
Qty: 60 ML | Refills: 7 | OUTPATIENT
Start: 2025-05-01

## 2025-05-01 RX ORDER — OMEPRAZOLE 40 MG/1
40 CAPSULE, DELAYED RELEASE ORAL DAILY
Qty: 100 CAPSULE | Refills: 2 | Status: SHIPPED | OUTPATIENT
Start: 2025-05-01

## 2025-05-09 ENCOUNTER — TRANSFERRED RECORDS (OUTPATIENT)
Dept: HEALTH INFORMATION MANAGEMENT | Facility: CLINIC | Age: 75
End: 2025-05-09
Payer: COMMERCIAL

## 2025-05-20 ENCOUNTER — MYC MEDICAL ADVICE (OUTPATIENT)
Dept: INTERNAL MEDICINE | Facility: CLINIC | Age: 75
End: 2025-05-20

## 2025-05-20 ENCOUNTER — ANCILLARY PROCEDURE (OUTPATIENT)
Dept: GENERAL RADIOLOGY | Facility: CLINIC | Age: 75
End: 2025-05-20
Attending: INTERNAL MEDICINE
Payer: COMMERCIAL

## 2025-05-20 ENCOUNTER — RESULTS FOLLOW-UP (OUTPATIENT)
Dept: INTERNAL MEDICINE | Facility: CLINIC | Age: 75
End: 2025-05-20

## 2025-05-20 ENCOUNTER — OFFICE VISIT (OUTPATIENT)
Dept: INTERNAL MEDICINE | Facility: CLINIC | Age: 75
End: 2025-05-20
Payer: COMMERCIAL

## 2025-05-20 VITALS
OXYGEN SATURATION: 99 % | HEART RATE: 61 BPM | BODY MASS INDEX: 23.69 KG/M2 | TEMPERATURE: 97.3 F | WEIGHT: 150.9 LBS | RESPIRATION RATE: 14 BRPM | HEIGHT: 67 IN | DIASTOLIC BLOOD PRESSURE: 75 MMHG | SYSTOLIC BLOOD PRESSURE: 121 MMHG

## 2025-05-20 DIAGNOSIS — M79.652 PAIN OF LEFT THIGH: ICD-10-CM

## 2025-05-20 DIAGNOSIS — I10 BENIGN ESSENTIAL HYPERTENSION: ICD-10-CM

## 2025-05-20 DIAGNOSIS — L82.0 SEBORRHEIC KERATOSES, INFLAMED: ICD-10-CM

## 2025-05-20 DIAGNOSIS — L82.0 SEBORRHEIC KERATOSES, INFLAMED: Primary | ICD-10-CM

## 2025-05-20 PROCEDURE — 3074F SYST BP LT 130 MM HG: CPT | Performed by: INTERNAL MEDICINE

## 2025-05-20 PROCEDURE — 99214 OFFICE O/P EST MOD 30 MIN: CPT | Performed by: INTERNAL MEDICINE

## 2025-05-20 PROCEDURE — 3078F DIAST BP <80 MM HG: CPT | Performed by: INTERNAL MEDICINE

## 2025-05-20 PROCEDURE — 73552 X-RAY EXAM OF FEMUR 2/>: CPT | Mod: TC | Performed by: RADIOLOGY

## 2025-05-20 PROCEDURE — 1125F AMNT PAIN NOTED PAIN PRSNT: CPT | Performed by: INTERNAL MEDICINE

## 2025-05-20 RX ORDER — AMMONIUM LACTATE 12 G/100G
CREAM TOPICAL 2 TIMES DAILY
Qty: 140 G | Refills: 1 | Status: SHIPPED | OUTPATIENT
Start: 2025-05-20

## 2025-05-20 RX ORDER — TRIAMCINOLONE ACETONIDE 1 MG/G
CREAM TOPICAL 2 TIMES DAILY
Qty: 30 G | Refills: 0 | Status: CANCELLED | OUTPATIENT
Start: 2025-05-20

## 2025-05-20 RX ORDER — TRIAMCINOLONE ACETONIDE 1 MG/G
CREAM TOPICAL 2 TIMES DAILY
Qty: 30 G | Refills: 1 | Status: SHIPPED | OUTPATIENT
Start: 2025-05-20

## 2025-05-20 RX ORDER — AMMONIUM LACTATE 12 G/100G
CREAM TOPICAL 2 TIMES DAILY
Qty: 140 G | Refills: 0 | Status: CANCELLED | OUTPATIENT
Start: 2025-05-20

## 2025-05-20 ASSESSMENT — ENCOUNTER SYMPTOMS: LEG PAIN: 1

## 2025-05-20 ASSESSMENT — PAIN SCALES - GENERAL: PAINLEVEL_OUTOF10: MODERATE PAIN (6)

## 2025-05-20 NOTE — TELEPHONE ENCOUNTER
My chart message sent by patient.    My chart message sent to patient.     Meds pended with pharmacy.  Will wait for patient response to resend meds to a local pharmacy.

## 2025-05-20 NOTE — PROGRESS NOTES
"  Assessment & Plan     Seborrheic keratoses, inflamed  Start topical steroid, skin moisturizer   Recommended yearly dermatology follow up   - triamcinolone (KENALOG) 0.1 % external cream; Apply topically 2 times daily.  - ammonium lactate (AMLACTIN) 12 % external cream; Apply topically 2 times daily.    Pain of left thigh  Assess X rays   - XR Femur Left 2 Views; Future    Benign essential hypertension  Controlled, on treatment             Subjective   Alexandra is a 74 year old, presenting for the following health issues:  Leg Pain (Pt states that her left leg became very painful yesterday) and Derm Problem (Pt states that right elbow area has a sensitive sensation)        5/20/2025     8:48 AM   Additional Questions   Roomed by Anna ALDRICH   Accompanied by n/a     Leg Pain    History of Present Illness       Reason for visit:  Sensitive area on skin r arm   She is taking medications regularly.      Upper left leg pain x1 day    Presents with concerns for right elbow skin irritation, sensitive, itching if pressed over.   Has some skin discoloration, darkening over the area. Has used OTC Hydrocortisone cream, not improved.   Also has developed pain in the left anterior mid to lower thigh with walking. For the past day.   No injury, no swelling, no skin rash. Pain is worse with putting weight on the leg. Feels better when sitting.   Has h/o left hip arthroplasty no pain in the groin area.   Has h/o HTN. on medical treatment. BP has been controlled. No side effects from medications. No CP, HA, dizziness. good compliance with medications and low salt diet.          Review of Systems  Constitutional, HEENT, cardiovascular, pulmonary, gi and gu systems are negative, except as otherwise noted.      Objective    /75 (BP Location: Left arm, Cuff Size: Adult Regular)   Pulse 61   Temp 97.3  F (36.3  C) (Tympanic)   Resp 14   Ht 1.702 m (5' 7\")   Wt 68.4 kg (150 lb 14.4 oz)   SpO2 99%   BMI 23.63 kg/m    Body mass " index is 23.63 kg/m .  Physical Exam   GENERAL: alert and no distress  CV: regular rate and rhythm, normal S1 S2, no S3 or S4, no murmur, click or rub, no peripheral edema  MS: no gross musculoskeletal defects noted, no edema, pain reported in the mid to lower 1/3 of the anterior thigh when walking , knee and hip with normal ROM   SKIN: no suspicious lesions or rashes, skin xerosis , SK lesions on the right lateral and posterior elbow     Office Visit on 02/25/2025   Component Date Value Ref Range Status    Color Urine 02/25/2025 Yellow  Colorless, Straw, Light Yellow, Yellow Final    Appearance Urine 02/25/2025 Clear  Clear Final    Glucose Urine 02/25/2025 Negative  Negative mg/dL Final    Bilirubin Urine 02/25/2025 Negative  Negative Final    Ketones Urine 02/25/2025 Trace (A)  Negative mg/dL Final    Specific Gravity Urine 02/25/2025 1.020  1.003 - 1.035 Final    Blood Urine 02/25/2025 Negative  Negative Final    pH Urine 02/25/2025 6.5  5.0 - 7.0 Final    Protein Albumin Urine 02/25/2025 Negative  Negative mg/dL Final    Urobilinogen Urine 02/25/2025 0.2  0.2, 1.0 E.U./dL Final    Nitrite Urine 02/25/2025 Negative  Negative Final    Leukocyte Esterase Urine 02/25/2025 Small (A)  Negative Final           Signed Electronically by: Julio Cardona MD

## 2025-05-20 NOTE — LETTER
May 21, 2025      Alexandra Thomas  [address] [facility] [address]        Dear ,    We are writing to inform you of your test results.    No fractures, total hip arthroplasty and knee arthroplasty present.     Resulted Orders   XR Femur Left 2 Views    Narrative    EXAM: XR FEMUR LEFT 2 VIEWS  LOCATION: [facility]  DATE: 5/20/2025    INDICATION:  Pain of left thigh  COMPARISON: None.      Impression    IMPRESSION: Postoperative changes left total hip arthroplasty. Postoperative changes left total knee arthroplasty and patellar resurfacing. Components appear well seated both joints. No evidence for fracture or dislocation.       If you have any questions or concerns, please call the clinic at the number listed above.       Sincerely,      Julio Cardona MD    Electronically signed

## 2025-05-28 ENCOUNTER — LAB (OUTPATIENT)
Dept: LAB | Facility: CLINIC | Age: 75
End: 2025-05-28
Payer: COMMERCIAL

## 2025-05-28 DIAGNOSIS — N39.0 RECURRENT UTI: ICD-10-CM

## 2025-05-28 PROCEDURE — 81003 URINALYSIS AUTO W/O SCOPE: CPT | Mod: QW

## 2025-05-28 PROCEDURE — 87086 URINE CULTURE/COLONY COUNT: CPT

## 2025-05-28 PROCEDURE — 87186 SC STD MICRODIL/AGAR DIL: CPT

## 2025-05-29 ENCOUNTER — RESULTS FOLLOW-UP (OUTPATIENT)
Dept: UROLOGY | Facility: CLINIC | Age: 75
End: 2025-05-29

## 2025-05-29 DIAGNOSIS — N39.0 RECURRENT UTI: Primary | ICD-10-CM

## 2025-05-29 LAB
ALBUMIN UR-MCNC: NEGATIVE MG/DL
APPEARANCE UR: ABNORMAL
BACTERIA UR CULT: ABNORMAL
BILIRUB UR QL STRIP: NEGATIVE
COLOR UR AUTO: YELLOW
GLUCOSE UR STRIP-MCNC: NEGATIVE MG/DL
HGB UR QL STRIP: ABNORMAL
KETONES UR STRIP-MCNC: NEGATIVE MG/DL
LEUKOCYTE ESTERASE UR QL STRIP: ABNORMAL
NITRATE UR QL: NEGATIVE
PH UR STRIP: 6.5 [PH] (ref 5–7)
SP GR UR STRIP: 1.01 (ref 1–1.03)
UROBILINOGEN UR STRIP-ACNC: 0.2 E.U./DL

## 2025-05-29 RX ORDER — CEFDINIR 300 MG/1
300 CAPSULE ORAL 2 TIMES DAILY
Qty: 14 CAPSULE | Refills: 0 | Status: SHIPPED | OUTPATIENT
Start: 2025-05-29 | End: 2025-06-05

## 2025-06-03 ENCOUNTER — TRANSFERRED RECORDS (OUTPATIENT)
Dept: MULTI SPECIALTY CLINIC | Facility: CLINIC | Age: 75
End: 2025-06-03
Payer: COMMERCIAL

## 2025-06-03 LAB
ALT SERPL-CCNC: 16 IU/L (ref 0–32)
AST SERPL-CCNC: 27 IU/L (ref 0–40)
CREATININE (EXTERNAL): 0.77 MG/DL (ref 0.57–1)
GFR ESTIMATED (EXTERNAL): 80 ML/MIN/1.73
GLUCOSE (EXTERNAL): 85 MG/DL (ref 70–99)
POTASSIUM (EXTERNAL): 4.8 MMOL/L (ref 3.5–5.2)

## 2025-06-05 ENCOUNTER — TRANSFERRED RECORDS (OUTPATIENT)
Dept: ADMINISTRATIVE | Facility: CLINIC | Age: 75
End: 2025-06-05
Payer: COMMERCIAL

## 2025-06-06 NOTE — PROCEDURES
"2025        Alexandra Thomas   [address] [place] [address]      Alexandra Lopesinique,  :  1950    I am writing to let you know the results of the tests that were done the other day.   Thank you for allowing [facility] the opportunity to take part in your healthcare.  At [facility] we strive to provide each patient with the finest gastroenterology care available.  We hope your experience was pleasant and informative.    I have received recent results of your abdominal Xray. There was no evidence for obstruction. There was a \"greater than average\" amount of stool and gas throughout the colon and rectum showing significant constipation. I recommend starting the Miralax and/or Magnesium daily and continuing with colonoscopy as scheduled 7/15/25!    I hope you are feeling well.        Thank you.    Electronically signed by:  Twyla FERNANDEZ 2025 10:28 AM  Document generated by:  Twyla FERNANDEZ  2025  If your provider ordered multiple tests; the results may not become available at the same time.  If multiple test results are received within 14 days of one another, you may receive a duplicate.  cc:  Julio Cardona MD    "

## 2025-06-06 NOTE — PROCEDURES
2025        Alexandra Thomas   [address] [place] [address]      Jose Robertokiritnicolas Thomas,  :  1950    I am writing to let you know the results of the tests that were done the other day.   Thank you for allowing [facility] the opportunity to take part in your healthcare.  At [facility] we strive to provide each patient with the finest gastroenterology care available.  We hope your experience was pleasant and informative.    I received your recent blood work results.  Your MCV and MCHC are slightly abnormal.  I recommend following up with your primary care provider about this to see if any additional evaluation is necessary.  The rest of your blood work is unremarkable which is reassuring.  Lipase 2025 11:41   Description Result Units Flags Range   Lipase 25 U/L  14-85   Comments   Performed At: [place] [facility], [id number]  Sarita Butt MD, Phone: [phone]   Hepatic Function Panel (7) 2025 11:41   Description Result Units Flags Range   Bilirubin, Total 0.4 mg/dL  0.0-1.2   Bilirubin, Direct 0.17 mg/dL  0.00-0.40   AST (SGOT) 27 IU/L  0-40   ALT (SGPT) 16 IU/L  0-32   Albumin 4.2 g/dL  3.8-4.8   Alkaline Phosphatase 80 IU/L     Protein, Total 6.2 g/dL  6.0-8.5   Comments   Performed At: [facility], [facility] [address] [place], [place], [id number]  Sarita Butt MD, Phone: [phone]   Basic Metabolic Panel (8) 2025 11:41   Description Result Units Flags Range   BUN 20 mg/dL  8-27   BUN/Creatinine Ratio 26   12-28   Carbon Dioxide, Total 25 mmol/L  20-29   Calcium 9.6 mg/dL  8.7-10.3   Chloride 101 mmol/L     Creatinine 0.77 mg/dL  0.57-1.00   eGFR 80 mL/min/1.73  >59   Glucose 85 mg/dL  70-99   Potassium 4.8 mmol/L  3.5-5.2   Sodium 141 mmol/L  134-144   Comments   Performed At: [facility], [facility]  [address] [facility], [place], [place], [id number]  Sarita Butt MD, Phone: [phone]   CBC With Differential/Platelet 2025 11:41    Description Result Units Flags Range   Hemoglobin 13.1 g/dL  11.1-15.9   Hematocrit 42.5 %  34.0-46.6    fL H 79-97   MCHC 30.8 g/dL L 31.5-35.7   MCH 32.3 pg  26.6-33.0   RDW 12.0 %  11.7-15.4   Platelets 201 x10E3/uL  150-450   Neutrophils 65 %  Not Estab.   Lymphs 25 %  Not Estab.   Monocytes 8 %  Not Estab.   Eos 1 %  Not Estab.   Basos 1 %  Not Estab.   Neutrophils (Absolute) 3.9 x10E3/uL  1.4-7.0   Lymphs (Absolute) 1.5 x10E3/uL  0.7-3.1   Monocytes(Absolute) 0.5 x10E3/uL  0.1-0.9   Eos (Absolute) 0.1 x10E3/uL  0.0-0.4   Baso (Absolute) 0.0 x10E3/uL  0.0-0.2   Immature Grans (Abs) 0.0 x10E3/uL  0.0-0.1   Immature Granulocytes 0 %  Not Estab.   RBC 4.05 x10E6/uL  3.77-5.28   WBC 5.9 x10E3/uL  3.4-10.8   Comments   Performed At: , [facility] [address], [place], [place], [id number]  Sarita Butt MD, Phone: [phone]         I hope you are feeling well.        Thank you.    Electronically signed by:  Twyla FERNANDEZ 06/05/2025 04:00 PM  Document generated by:  Twyla FERNANDEZ  06/05/2025  If your provider ordered multiple tests; the results may not become available at the same time.  If multiple test results are received within 14 days of one another, you may receive a duplicate.  cc:  Julio Cardona MD

## 2025-06-08 ENCOUNTER — MYC MEDICAL ADVICE (OUTPATIENT)
Dept: INTERNAL MEDICINE | Facility: CLINIC | Age: 75
End: 2025-06-08
Payer: COMMERCIAL

## 2025-06-10 ENCOUNTER — OFFICE VISIT (OUTPATIENT)
Dept: INTERNAL MEDICINE | Facility: CLINIC | Age: 75
End: 2025-06-10
Payer: COMMERCIAL

## 2025-06-10 VITALS
WEIGHT: 153 LBS | DIASTOLIC BLOOD PRESSURE: 74 MMHG | RESPIRATION RATE: 16 BRPM | HEIGHT: 67 IN | HEART RATE: 70 BPM | TEMPERATURE: 97.7 F | SYSTOLIC BLOOD PRESSURE: 128 MMHG | BODY MASS INDEX: 24.01 KG/M2 | OXYGEN SATURATION: 97 %

## 2025-06-10 DIAGNOSIS — E78.5 HYPERLIPIDEMIA, ACQUIRED: ICD-10-CM

## 2025-06-10 DIAGNOSIS — I10 BENIGN ESSENTIAL HYPERTENSION: ICD-10-CM

## 2025-06-10 DIAGNOSIS — E03.9 ACQUIRED HYPOTHYROIDISM: ICD-10-CM

## 2025-06-10 DIAGNOSIS — M62.81 MUSCLE WEAKNESS (GENERALIZED): Primary | ICD-10-CM

## 2025-06-10 LAB — CK SERPL-CCNC: 51 U/L (ref 26–192)

## 2025-06-10 PROCEDURE — 36415 COLL VENOUS BLD VENIPUNCTURE: CPT | Performed by: INTERNAL MEDICINE

## 2025-06-10 PROCEDURE — 82550 ASSAY OF CK (CPK): CPT | Performed by: INTERNAL MEDICINE

## 2025-06-10 PROCEDURE — 3074F SYST BP LT 130 MM HG: CPT | Performed by: INTERNAL MEDICINE

## 2025-06-10 PROCEDURE — 99214 OFFICE O/P EST MOD 30 MIN: CPT | Performed by: INTERNAL MEDICINE

## 2025-06-10 PROCEDURE — 3078F DIAST BP <80 MM HG: CPT | Performed by: INTERNAL MEDICINE

## 2025-06-10 ASSESSMENT — ENCOUNTER SYMPTOMS: FATIGUE: 1

## 2025-06-10 NOTE — PROGRESS NOTES
Assessment & Plan     Muscle weakness (generalized)  On statin, assess CK   - CK total    Acquired hypothyroidism  On Synthroid, controlled     Benign essential hypertension  Controlled   On medical treatment     Hyperlipidemia, acquired  On statin, monitor lab                   Subjective   Alexandra is a 74 year old, presenting for the following health issues:  Fatigue      6/10/2025     3:16 PM   Additional Questions   Roomed by Abby Sullivan CMA   Accompanied by Self         6/10/2025     3:16 PM   Patient Reported Additional Medications   Patient reports taking the following new medications no     Fatigue  Associated symptoms include fatigue.   History of Present Illness       Reason for visit:  Several things im concerned  lump fron of throat,unusual body odor,weakness in leg,f/u labs  Symptom onset:  More than a month  Symptoms include:  Weak in legs, smell like ammonia upon exersion  Symptom intensity:  Moderate  Symptom progression:  Staying the same  Had these symptoms before:  Yes  Has tried/received treatment for these symptoms:  No  What makes it worse:  Exersion  What makes it better:  No   She is taking medications regularly.        Patient is seen for a follow up visit.  Concerns for feeling weakness of the legs, muscles. Noted ammonia smell with exercise.   Concern for a lump in the neck, no pain or skin changes. No breathing or swallowing difficulty.   Has h/o HTN. on medical treatment. BP has been controlled. No side effects from medications. No CP, HA, dizziness. good compliance with medications and low salt diet.  Has H/O hyperlipidemia. On medical treatment and diet. No side effects. No muscle weakness, myalgias or upset stomach.   Has history of hypothyroidism. On replacement treatment with Synthroid. No heat /cold intolerance, heart palpitations, weight loss/ gain ,  change in bowel habits.              Review of Systems  Constitutional, HEENT, cardiovascular, pulmonary, gi and gu systems are  "negative, except as otherwise noted.      Objective    /74 (BP Location: Left arm, Patient Position: Sitting, Cuff Size: Adult Regular)   Pulse 70   Temp 97.7  F (36.5  C) (Oral)   Resp 16   Ht 1.702 m (5' 7\")   Wt 69.4 kg (153 lb)   LMP  (LMP Unknown)   SpO2 97%   Breastfeeding No   BMI 23.96 kg/m    Body mass index is 23.96 kg/m .  Physical Exam   GENERAL: alert and no distress  EYES: Eyes grossly normal to inspection, PERRL and conjunctivae and sclerae normal  HENT: ear canals and TM's normal, nose and mouth without ulcers or lesions  NECK: no adenopathy, no asymmetry, masses, or scars, normal thyroid cartilage in the area of question by patient   RESP: lungs clear to auscultation - no rales, rhonchi or wheezes  CV: regular rate and rhythm, normal S1 S2, no S3 or S4, no murmur, click or rub, no peripheral edema  ABDOMEN: soft, nontender, no hepatosplenomegaly, no masses and bowel sounds normal  MS: no gross musculoskeletal defects noted, no edema    Transferred Records on 06/03/2025   Component Date Value Ref Range Status    Potassium (External) 06/03/2025 4.8  3.5 - 5.2 mmol/L Final    Glucose (External) 06/03/2025 85  70 - 99 mg/dL Final    Creatinine (External) 06/03/2025 0.77  0.57 - 1.00 mg/dL Final    GFR Estimated (External) 06/03/2025 80  >59 mL/min/1.73 Final    ALT (External) 06/03/2025 16  0 - 32 IU/L Final    AST (External) 06/03/2025 27  0 - 40 IU/L Final           Signed Electronically by: Julio Cardona MD    "

## 2025-06-11 ENCOUNTER — RESULTS FOLLOW-UP (OUTPATIENT)
Dept: INTERNAL MEDICINE | Facility: CLINIC | Age: 75
End: 2025-06-11

## 2025-06-16 ENCOUNTER — TELEPHONE (OUTPATIENT)
Dept: DERMATOLOGY | Facility: CLINIC | Age: 75
End: 2025-06-16

## 2025-06-16 ENCOUNTER — OFFICE VISIT (OUTPATIENT)
Dept: DERMATOLOGY | Facility: CLINIC | Age: 75
End: 2025-06-16
Payer: COMMERCIAL

## 2025-06-16 DIAGNOSIS — G56.91 NEUROPATHY, ARM, RIGHT: Primary | ICD-10-CM

## 2025-06-16 RX ORDER — DOXEPIN HYDROCHLORIDE 50 MG/G
CREAM TOPICAL 3 TIMES DAILY
Qty: 45 G | Refills: 1 | Status: SHIPPED | OUTPATIENT
Start: 2025-06-16

## 2025-06-16 RX ORDER — CEFDINIR 300 MG/1
1 CAPSULE ORAL EVERY 12 HOURS
COMMUNITY
Start: 2025-06-03

## 2025-06-16 NOTE — PROGRESS NOTES
[facility] Dermatology Note  Encounter Date: Jun 16, 2025  Office Visit      Dermatology Problem List:    1. Actinic skin damage   2. Suspect neuropathy, R arm given extensive orthopedic picture  - Tx: Gabapentin 8% in vanicream     PMHx: cervical spondylosis and radiculopathy, DJD thoracic spine, OA of  both shoulders s/p replacement, R biceps tendon rupture  ____________________________________________    Assessment & Plan:  # Suspect Neuropathy, R arm in context of extensive upper extremity and spine orthopedic picture. No skin findings on exam. Only symptomatic with touch.   - Discussed the etiology of this condition as well as treatment and their side effects  - Start on gabapentin 8% cream mixed in vanicream, apply TID to AA  - follow up with ortho, possibly neuro, consider imaging - is seeing ortho next week due to upcoming L shoulder replacement surgery which is planned    Procedures Performed:   None     Follow-up: prn for new or changing lesions    Staff and scribe:     Scribe Disclosure:   I, EVANGELIST ESQUEDA, am serving as a scribe; to document services personally performed by Sheryl Ledezma PA-C -based on data collection and the provider's statements to me.     Provider Disclosure:  I agree with above History, Review of Systems, Physical exam and Plan.  I have reviewed the content of the documentation and have edited it as needed. I have personally performed the services documented here and the documentation accurately represents those services and the decisions I have made.      Electronically signed by    All risks, benefits and alternatives were discussed with patient.  Patient is in agreement and understands the assessment and plan.  All questions were answered.    Sheryl Ledezma PA-C, MPAS  [facility] [place]: Phone: [phone], Fax: [phone]  [facility]: Phone: [phone],  Fax:  [phone]  [facility]: Phone: [phone], Fax: [phone]  ____________________________________________    CC: Derm Problem (Burning sensation under upper right arm x 2-3 months. )      Reviewed patients past medical history and pertinent chart review prior to patient's visit today.     HPI:  Ms. Alexandra Thomas is a 74 year old female who presents today as a return patient for a derm problem.     Today patient reported a burning sensation under her R arm. Has been on going 2-3 months.     Has tried exfoliating in the area but did not receive relief. Sx only happen when the area is touched or when she rests her arm on her chair at home as an example.     Has tried triamcinolone, hydrocortisone and amlactin and were not helpful. Tried for about 2 weeks each. Has hx of several orthopedic conditions of UEs and spine.     Patient is otherwise feeling well, without additional concerns.    Labs:  N/A    Physical Exam:  Vitals: LMP  (LMP Unknown)   SKIN: Focused examination of areas noted below was performed.   - no rash present on exam, no skin abnormalities noted  - No other lesions of concern on areas examined.     Medications:  Current Outpatient Medications   Medication Sig Dispense Refill    acetaminophen (TYLENOL) 325 MG tablet Take 2 tablets (650 mg) by mouth every 4 hours as needed for other (mild pain) 100 tablet 0    carvedilol (COREG) 6.25 MG tablet Take 1 tablet (6.25 mg) by mouth 2 times daily (with meals). 180 tablet 3    cycloSPORINE (RESTASIS) 0.05 % ophthalmic emulsion Place 1 drop into both eyes 2 times daily. 90 each 3    ipratropium (ATROVENT) 0.06 % nasal spray Spray 2 sprays into both nostrils 4 times daily. 15 mL 3    losartan (COZAAR) 50 MG tablet Take 1 tablet (50 mg) by mouth 2 times daily. 180 tablet 3    MAGNESIUM CITRATE PO Take 250 mg by mouth 2 times daily.      Magnesium Oxide 250 MG TABS Take 1 tablet by mouth at bedtime      multivitamin w/minerals  (THERA-VIT-M) tablet Take 1 tablet by mouth daily      nitroGLYcerin (NITROSTAT) 0.4 MG sublingual tablet Place 1 tablet (0.4 mg) under the tongue every 5 minutes as needed for chest pain For chest pain place 1 tablet under the tongue every 5 minutes for 3 doses. If symptoms persist 5 minutes after 1st dose call 911. 10 tablet 3    omeprazole (PRILOSEC) 40 MG DR capsule TAKE 1 CAPSULE BY MOUTH DAILY 100 capsule 2    rosuvastatin (CRESTOR) 10 MG tablet Take 1 tablet (10 mg) by mouth daily. 90 tablet 3    Vitamin D3 (CHOLECALCIFEROL) 25 mcg (1000 units) tablet Take 1 tablet by mouth daily      ammonium lactate (AMLACTIN) 12 % external cream Apply topically 2 times daily. (Patient not taking: Reported on 6/16/2025) 140 g 1    cefdinir (OMNICEF) 300 MG capsule Take 1 capsule by mouth every 12 hours. (Patient not taking: Reported on 6/16/2025)      triamcinolone (KENALOG) 0.1 % external cream Apply topically 2 times daily. (Patient not taking: Reported on 6/16/2025) 30 g 1     No current facility-administered medications for this visit.      Past Medical/Surgical History:   Patient Active Problem List   Diagnosis    Benign essential hypertension    Gastroparesis    Thoracic aortic aneurysm without rupture    Gastroesophageal reflux disease without esophagitis    Pain syndrome, chronic    Sicca syndrome    S/P total hip arthroplasty    Lattice degeneration of right retina    Hyperlipidemia, acquired    Mixed incontinence    Pelvic floor dysfunction    Recurrent UTI    Acquired hypothyroidism    Chronic diarrhea    Cervical spondylosis with radiculopathy    Degeneration of lumbar or lumbosacral intervertebral disc    Depression    DJD (degenerative joint disease) of thoracic spine    Arthropathy of right knee    Enthesopathy    Ganglion of tendon sheath    Hearing loss    History of colonic polyps    Hx of cold sores    IBS (irritable bowel syndrome)    Migraine aura without headache    PAF (paroxysmal atrial fibrillation)  (H)    Primary localized osteoarthrosis, lower leg    Primary osteoarthritis, left shoulder    Pulmonary nodule    PVC's (premature ventricular contractions)    Rupture of right long head biceps tendon    Trigger ring finger of right hand    Tendonitis of right hip flexor    History of revision of total knee arthroplasty     Past Medical History:   Diagnosis Date    Arrhythmia 10/01/2019    Arthritis     hips    Ascending aortic aneurysm     4.2 cm   Ascending    Biceps tendon rupture     Depression 08/06/2019    Diverticulitis of colon     Gastroesophageal reflux disease     Esophageal spasms.    Gastroparesis     Heart murmur     High cholesterol     History of GI bleed     Hypertension     stable w/ meds    PONV (postoperative nausea and vomiting)     Sjogren's syndrome

## 2025-06-16 NOTE — LETTER
6/16/2025      Alexandra Thomas  [address] [address]      Dear Colleague,    Thank you for referring your patient, Alexandra Thomas, to the [facility]. Please see a copy of my visit note below.    [facility] Dermatology Note  Encounter Date: Jun 16, 2025  Office Visit      Dermatology Problem List:    1. Actinic skin damage   2. Suspect neuropathy, R arm given extensive orthopedic picture  - Tx: Gabapentin 8% in vanicream     PMHx: cervical spondylosis and radiculopathy, DJD thoracic spine, OA of  both shoulders s/p replacement, R biceps tendon rupture  ____________________________________________    Assessment & Plan:  # Suspect Neuropathy, R arm in context of extensive upper extremity and spine orthopedic picture. No skin findings on exam. Only symptomatic with touch.   - Discussed the etiology of this condition as well as treatment and their side effects  - Start on gabapentin 8% cream mixed in vanicream, apply TID to AA  - follow up with ortho, possibly neuro, consider imaging - is seeing ortho next week due to upcoming L shoulder replacement surgery which is planned    Procedures Performed:   None     Follow-up: prn for new or changing lesions    Staff and scribe:     Scribe Disclosure:   I, EVANGELIST ESQUEDA, am serving as a scribe; to document services personally performed by Sheryl Ledezma PA-C -based on data collection and the provider's statements to me.     Provider Disclosure:  I agree with above History, Review of Systems, Physical exam and Plan.  I have reviewed the content of the documentation and have edited it as needed. I have personally performed the services documented here and the documentation accurately represents those services and the decisions I have made.      Electronically signed by    All risks, benefits and alternatives were discussed with patient.  Patient is in agreement and understands the assessment and  plan.  All questions were answered.    Sheryl Ledezma PA-C, MPAS  [facility]: Phone: [phone], Fax: [phone]  [facility]: Phone: [phone],  Fax: [phone]  [facility]: Phone: [phone], Fax: [phone]  ____________________________________________    CC: Derm Problem (Burning sensation under upper right arm x 2-3 months. )      Reviewed patients past medical history and pertinent chart review prior to patient's visit today.     HPI:  Ms. Alexandra Thomas is a 74 year old female who presents today as a return patient for a derm problem.     Today patient reported a burning sensation under her R arm. Has been on going 2-3 months.     Has tried exfoliating in the area but did not receive relief. Sx only happen when the area is touched or when she rests her arm on her chair at home as an example.     Has tried triamcinolone, hydrocortisone and amlactin and were not helpful. Tried for about 2 weeks each. Has hx of several orthopedic conditions of UEs and spine.     Patient is otherwise feeling well, without additional concerns.    Labs:  N/A    Physical Exam:  Vitals: LMP  (LMP Unknown)   SKIN: Focused examination of areas noted below was performed.   - no rash present on exam, no skin abnormalities noted  - No other lesions of concern on areas examined.     Medications:  Current Outpatient Medications   Medication Sig Dispense Refill     acetaminophen (TYLENOL) 325 MG tablet Take 2 tablets (650 mg) by mouth every 4 hours as needed for other (mild pain) 100 tablet 0     carvedilol (COREG) 6.25 MG tablet Take 1 tablet (6.25 mg) by mouth 2 times daily (with meals). 180 tablet 3     cycloSPORINE (RESTASIS) 0.05 % ophthalmic emulsion Place 1 drop into both eyes 2 times daily. 90 each 3     ipratropium (ATROVENT) 0.06 % nasal spray Spray 2 sprays into both nostrils 4 times daily. 15 mL 3      losartan (COZAAR) 50 MG tablet Take 1 tablet (50 mg) by mouth 2 times daily. 180 tablet 3     MAGNESIUM CITRATE PO Take 250 mg by mouth 2 times daily.       Magnesium Oxide 250 MG TABS Take 1 tablet by mouth at bedtime       multivitamin w/minerals (THERA-VIT-M) tablet Take 1 tablet by mouth daily       nitroGLYcerin (NITROSTAT) 0.4 MG sublingual tablet Place 1 tablet (0.4 mg) under the tongue every 5 minutes as needed for chest pain For chest pain place 1 tablet under the tongue every 5 minutes for 3 doses. If symptoms persist 5 minutes after 1st dose call 911. 10 tablet 3     omeprazole (PRILOSEC) 40 MG DR capsule TAKE 1 CAPSULE BY MOUTH DAILY 100 capsule 2     rosuvastatin (CRESTOR) 10 MG tablet Take 1 tablet (10 mg) by mouth daily. 90 tablet 3     Vitamin D3 (CHOLECALCIFEROL) 25 mcg (1000 units) tablet Take 1 tablet by mouth daily       ammonium lactate (AMLACTIN) 12 % external cream Apply topically 2 times daily. (Patient not taking: Reported on 6/16/2025) 140 g 1     cefdinir (OMNICEF) 300 MG capsule Take 1 capsule by mouth every 12 hours. (Patient not taking: Reported on 6/16/2025)       triamcinolone (KENALOG) 0.1 % external cream Apply topically 2 times daily. (Patient not taking: Reported on 6/16/2025) 30 g 1     No current facility-administered medications for this visit.      Past Medical/Surgical History:   Patient Active Problem List   Diagnosis     Benign essential hypertension     Gastroparesis     Thoracic aortic aneurysm without rupture     Gastroesophageal reflux disease without esophagitis     Pain syndrome, chronic     Sicca syndrome     S/P total hip arthroplasty     Lattice degeneration of right retina     Hyperlipidemia, acquired     Mixed incontinence     Pelvic floor dysfunction     Recurrent UTI     Acquired hypothyroidism     Chronic diarrhea     Cervical spondylosis with radiculopathy     Degeneration of lumbar or lumbosacral intervertebral disc     Depression     DJD (degenerative  joint disease) of thoracic spine     Arthropathy of right knee     Enthesopathy     Ganglion of tendon sheath     Hearing loss     History of colonic polyps     Hx of cold sores     IBS (irritable bowel syndrome)     Migraine aura without headache     PAF (paroxysmal atrial fibrillation) (H)     Primary localized osteoarthrosis, lower leg     Primary osteoarthritis, left shoulder     Pulmonary nodule     PVC's (premature ventricular contractions)     Rupture of right long head biceps tendon     Trigger ring finger of right hand     Tendonitis of right hip flexor     History of revision of total knee arthroplasty     Past Medical History:   Diagnosis Date     Arrhythmia 10/01/2019     Arthritis     hips     Ascending aortic aneurysm     4.2 cm   Ascending     Biceps tendon rupture      Depression 08/06/2019     Diverticulitis of colon      Gastroesophageal reflux disease     Esophageal spasms.     Gastroparesis      Heart murmur      High cholesterol      History of GI bleed      Hypertension     stable w/ meds     PONV (postoperative nausea and vomiting)      Sjogren's syndrome                         Again, thank you for allowing me to participate in the care of your patient.        Sincerely,        Sheryl Ledezma PA-C    Electronically signed

## 2025-06-16 NOTE — TELEPHONE ENCOUNTER
PRIOR AUTHORIZATION DENIED    Medication: DOXEPIN HCL 5 % EX CREA  Insurance Company: [employer] - Phone [phone] Fax [phone]  Denial Date: 6/16/2025  Denial Reason(s): Diagnosis not covered      Appeal Information:       Patient Notified: No

## 2025-06-17 DIAGNOSIS — G56.91 NEUROPATHY, ARM, RIGHT: Primary | ICD-10-CM

## 2025-06-17 NOTE — TELEPHONE ENCOUNTER
S/w pt and advised doxepin cream was denied by insurance company.  Read pt Sheryl's message below:    Will you let this patient know I had to switch her cream to gabapentin cream instead of doxepin cream. I also need to send it to a specialty pharmacy ([facility]) and they should mail it to her or she can drive to pick it up.     Pt states understanding.    Jody AGRAWAL RN  [facility] Dermatology [place]  [phone]

## 2025-06-24 ENCOUNTER — TRANSFERRED RECORDS (OUTPATIENT)
Dept: HEALTH INFORMATION MANAGEMENT | Facility: CLINIC | Age: 75
End: 2025-06-24
Payer: COMMERCIAL

## 2025-06-25 ENCOUNTER — TELEPHONE (OUTPATIENT)
Dept: INTERNAL MEDICINE | Facility: CLINIC | Age: 75
End: 2025-06-25
Payer: COMMERCIAL

## 2025-06-25 NOTE — TELEPHONE ENCOUNTER
PT calls regarding burning pain she has.     She has been having Burning sensation down her right arm. She also saw Dermatologist for this and they gave her some Gabapentin cream.   History of Cervical Spondylosis.     Now starting today, she has some burning pain, down her Right leg, hip and back. She is concerned about Shingles.     She denies any blisters or rash anywhere.     She has message out to her spine doctor at [facility] regarding her Neck pain.  Pt reports also having history of low back surgery.     Pt scheduled appt for Friday, in case she gets blisters to indicate Shingles. She will cancel if can see her Ortho doctor.    Answered all questions for pt. She had no further concerns at this time. Advised to call back sooner if has any questions before appt on Friday.

## 2025-07-15 ENCOUNTER — TRANSFERRED RECORDS (OUTPATIENT)
Dept: ADMINISTRATIVE | Facility: CLINIC | Age: 75
End: 2025-07-15
Payer: COMMERCIAL

## 2025-07-17 ENCOUNTER — LAB (OUTPATIENT)
Dept: LAB | Facility: CLINIC | Age: 75
End: 2025-07-17
Payer: COMMERCIAL

## 2025-07-17 DIAGNOSIS — N39.0 RECURRENT UTI: ICD-10-CM

## 2025-07-17 DIAGNOSIS — N39.0 RECURRENT UTI: Primary | ICD-10-CM

## 2025-07-17 LAB
ALBUMIN UR-MCNC: 100 MG/DL
APPEARANCE UR: CLEAR
BILIRUB UR QL STRIP: NEGATIVE
COLOR UR AUTO: YELLOW
GLUCOSE UR STRIP-MCNC: NEGATIVE MG/DL
HGB UR QL STRIP: ABNORMAL
KETONES UR STRIP-MCNC: NEGATIVE MG/DL
LEUKOCYTE ESTERASE UR QL STRIP: ABNORMAL
NITRATE UR QL: NEGATIVE
PH UR STRIP: 6.5 [PH] (ref 5–7)
SP GR UR STRIP: 1.01 (ref 1–1.03)
UROBILINOGEN UR STRIP-ACNC: 0.2 E.U./DL

## 2025-07-17 NOTE — PROCEDURES
[facility]   [address] [place] [address], [address], [place] [address]     Patient Name: Alexandra Thomas  Gender:  Female  Exam Date: 07/15/2025 Visit Number:  [id number]  Age: 74 Years YOB: 1950  Attending MD: Dioni Curran MD Medical Record#:  [id number]    Procedure: Colonoscopy   Indications: Colorectal cancer screening (last colonoscopy in 2013)      Referring MD: Referral Self  Primary MD:      Julio Cardona MD  Medications: Admitting Medications:   0.9% Normal Saline at TKO   Intra Procedure Medications:   Patient received monitored anesthesia care.     Complications: No immediate complications  ______________________________________________________________________________  Procedure:   An examination of the heart and lungs was performed and found to be within acceptable limits.  .  The patient was therefore deemed a reasonable candidate for endoscopy and sedation.   The risks and benefits of the procedure were explained to the patient.After obtaining informed consent, the patient received monitored anesthesia care and I passed the scope   without difficulty via the rectum to the ileum.  The appendiceal orifice and ic valve were identified.  The scope was retroflexed during the examination  The quality of the prep was good  (Miralax/Gatorade/2 tablets Bisacodyl/Magnesium Citrate).    This was a complete examination throughout the entire colon.    Findings:    Normal finding.  Location - ileum.    Polyp location: descending colon.  Quantity: 2.  Size: 3-5 mm.  Polyp shape:  sessile.         Maneuver: polypectomy was performed with a cold snare.       Removal:  complete.  Retrieval: complete.  Bleeding: none.    Diverticulosis.  Location: - descending colon.      Size:  medium.    Quantity:  few.    Post surgical anatomy:     -colo-rectal anastomosis  Anal canal:  normal  Remainder of the exam is normal.    Impression:  Colorectal polyps  Diverticulosis of colon    Preliminary  Plan:  The patient and their physician will receive a copy of the pathology report as well as pathology-based recommendations for future screening or surveillance.  Recommendation Comments:    -Continue miralax for constipation.   -Anticipate no further colon cancer screening (depending on pathology results).  Pathology Results:  A: COLON, DESCENDING, POLYPS:           1. Tubular adenoma (1) and normal colonic mucosa (endoscopically 2 polyps)           2. Negative for high grade dysplasia           3. Per the colonoscopy report:               a. Polyp sizes: 3 mm - 5 mm               b. Resection: Complete               c. Retrieval: Complete      MICROSCOPIC  A: Performed     Electronically signed by: Jose Luis Toledo MD    Interpreted at [facility], [address] [place] [id number]    Final Plan:  Repeat colonoscopy not indicated at this time.    We will attempt to contact you at appropriate intervals via [place] mail.  We may not be able to find you or contact you at that time, therefore you should know that the responsibility for following our recommendation rests with you.  If you don't hear from us at the time your procedure is due, please contact our office to schedule an appointment.  If your contact information should change, please contact our office so that we can update your record.  Additional Comments:    -No further colon cancer screening recommended.      _Electronically signed by:___________________  Dioni Curran MD                 07/15/2025    cc: Julio Cardona MD

## 2025-07-29 ENCOUNTER — LAB (OUTPATIENT)
Dept: LAB | Facility: CLINIC | Age: 75
End: 2025-07-29
Payer: COMMERCIAL

## 2025-07-29 DIAGNOSIS — N39.0 RECURRENT UTI: Primary | ICD-10-CM

## 2025-07-29 DIAGNOSIS — N39.0 RECURRENT UTI: ICD-10-CM

## 2025-07-29 LAB
ALBUMIN UR-MCNC: NEGATIVE MG/DL
APPEARANCE UR: CLEAR
BILIRUB UR QL STRIP: NEGATIVE
COLOR UR AUTO: YELLOW
GLUCOSE UR STRIP-MCNC: NEGATIVE MG/DL
HGB UR QL STRIP: NEGATIVE
KETONES UR STRIP-MCNC: NEGATIVE MG/DL
LEUKOCYTE ESTERASE UR QL STRIP: ABNORMAL
NITRATE UR QL: NEGATIVE
PH UR STRIP: 7 [PH] (ref 5–7)
SP GR UR STRIP: 1.01 (ref 1–1.03)
UROBILINOGEN UR STRIP-ACNC: 0.2 E.U./DL

## 2025-07-29 PROCEDURE — 87086 URINE CULTURE/COLONY COUNT: CPT

## 2025-07-29 PROCEDURE — 81003 URINALYSIS AUTO W/O SCOPE: CPT | Mod: QW

## 2025-07-30 LAB — BACTERIA UR CULT: NORMAL

## 2025-08-05 ENCOUNTER — ALLIED HEALTH/NURSE VISIT (OUTPATIENT)
Dept: UROLOGY | Facility: CLINIC | Age: 75
End: 2025-08-05
Payer: COMMERCIAL

## 2025-08-05 DIAGNOSIS — N39.0 RECURRENT UTI: Primary | ICD-10-CM

## 2025-08-05 LAB
ALBUMIN UR-MCNC: NEGATIVE MG/DL
APPEARANCE UR: CLEAR
BILIRUB UR QL STRIP: NEGATIVE
COLOR UR AUTO: YELLOW
GLUCOSE UR STRIP-MCNC: NEGATIVE MG/DL
HGB UR QL STRIP: NEGATIVE
KETONES UR STRIP-MCNC: NEGATIVE MG/DL
LEUKOCYTE ESTERASE UR QL STRIP: NEGATIVE
NITRATE UR QL: NEGATIVE
PH UR STRIP: 6 [PH] (ref 5–7)
SP GR UR STRIP: 1.01 (ref 1–1.03)
UROBILINOGEN UR STRIP-ACNC: 0.2 E.U./DL

## 2025-08-05 PROCEDURE — 99207 PR NO CHARGE NURSE ONLY: CPT

## 2025-08-05 PROCEDURE — 81003 URINALYSIS AUTO W/O SCOPE: CPT | Mod: QW

## 2025-08-07 LAB — BACTERIA UR CULT: NORMAL

## 2025-08-11 ENCOUNTER — MYC MEDICAL ADVICE (OUTPATIENT)
Dept: CARDIOLOGY | Facility: CLINIC | Age: 75
End: 2025-08-11
Payer: COMMERCIAL

## 2025-08-11 ENCOUNTER — TELEPHONE (OUTPATIENT)
Dept: UROLOGY | Facility: CLINIC | Age: 75
End: 2025-08-11
Payer: COMMERCIAL

## 2025-08-12 ENCOUNTER — TELEPHONE (OUTPATIENT)
Dept: UROLOGY | Facility: CLINIC | Age: 75
End: 2025-08-12
Payer: COMMERCIAL

## 2025-08-12 DIAGNOSIS — N39.0 URINARY TRACT INFECTION: ICD-10-CM

## 2025-08-12 DIAGNOSIS — N39.0 RECURRENT UTI: Primary | ICD-10-CM

## 2025-08-12 RX ORDER — CIPROFLOXACIN 500 MG/1
500 TABLET, FILM COATED ORAL 2 TIMES DAILY
Qty: 14 TABLET | Refills: 0 | Status: SHIPPED | OUTPATIENT
Start: 2025-08-12 | End: 2025-08-19

## 2025-08-14 LAB — BACTERIA UR CULT: ABNORMAL

## 2025-08-19 DIAGNOSIS — N39.0 URINARY TRACT INFECTION: Primary | ICD-10-CM

## 2025-08-19 RX ORDER — CIPROFLOXACIN 500 MG/1
500 TABLET, FILM COATED ORAL 2 TIMES DAILY
Qty: 6 TABLET | Refills: 0 | Status: SHIPPED | OUTPATIENT
Start: 2025-08-19

## 2025-08-27 ENCOUNTER — OFFICE VISIT (OUTPATIENT)
Dept: UROLOGY | Facility: CLINIC | Age: 75
End: 2025-08-27
Payer: COMMERCIAL

## 2025-08-27 VITALS
DIASTOLIC BLOOD PRESSURE: 98 MMHG | WEIGHT: 150 LBS | HEART RATE: 67 BPM | HEIGHT: 67 IN | SYSTOLIC BLOOD PRESSURE: 159 MMHG | BODY MASS INDEX: 23.54 KG/M2

## 2025-08-27 DIAGNOSIS — N95.2 ATROPHIC VAGINITIS: ICD-10-CM

## 2025-08-27 DIAGNOSIS — N39.0 RECURRENT UTI: ICD-10-CM

## 2025-08-27 DIAGNOSIS — B37.31 YEAST INFECTION OF THE VAGINA: ICD-10-CM

## 2025-08-27 DIAGNOSIS — R10.2 SUPRAPUBIC ABDOMINAL PAIN: ICD-10-CM

## 2025-08-27 DIAGNOSIS — N39.0 RECURRENT UTI: Primary | ICD-10-CM

## 2025-08-27 LAB
ALBUMIN UR-MCNC: NEGATIVE MG/DL
APPEARANCE UR: CLEAR
BILIRUB UR QL STRIP: NEGATIVE
COLOR UR AUTO: YELLOW
GLUCOSE UR STRIP-MCNC: NEGATIVE MG/DL
HGB UR QL STRIP: ABNORMAL
KETONES UR STRIP-MCNC: NEGATIVE MG/DL
LEUKOCYTE ESTERASE UR QL STRIP: ABNORMAL
NITRATE UR QL: NEGATIVE
PH UR STRIP: 7 [PH] (ref 5–7)
SP GR UR STRIP: 1.02 (ref 1–1.03)
UROBILINOGEN UR STRIP-ACNC: 0.2 E.U./DL

## 2025-08-27 PROCEDURE — 3077F SYST BP >= 140 MM HG: CPT | Performed by: PHYSICIAN ASSISTANT

## 2025-08-27 PROCEDURE — 3080F DIAST BP >= 90 MM HG: CPT | Performed by: PHYSICIAN ASSISTANT

## 2025-08-27 PROCEDURE — 99214 OFFICE O/P EST MOD 30 MIN: CPT | Performed by: PHYSICIAN ASSISTANT

## 2025-08-27 PROCEDURE — 1125F AMNT PAIN NOTED PAIN PRSNT: CPT | Performed by: PHYSICIAN ASSISTANT

## 2025-08-27 PROCEDURE — 81003 URINALYSIS AUTO W/O SCOPE: CPT | Mod: QW | Performed by: PHYSICIAN ASSISTANT

## 2025-08-27 RX ORDER — METHENAMINE HIPPURATE 1000 MG/1
1 TABLET ORAL 2 TIMES DAILY
Qty: 180 TABLET | Refills: 0 | Status: SHIPPED | OUTPATIENT
Start: 2025-08-27 | End: 2025-08-27

## 2025-08-27 RX ORDER — METHENAMINE HIPPURATE 1000 MG/1
1 TABLET ORAL 2 TIMES DAILY
Qty: 180 TABLET | Refills: 0 | Status: SHIPPED | OUTPATIENT
Start: 2025-08-27 | End: 2025-08-28

## 2025-08-27 RX ORDER — FLUCONAZOLE 150 MG/1
150 TABLET ORAL ONCE
Qty: 2 TABLET | Refills: 0 | Status: SHIPPED | OUTPATIENT
Start: 2025-08-27 | End: 2025-08-27

## 2025-08-27 RX ORDER — LEVOFLOXACIN 500 MG/1
500 TABLET, FILM COATED ORAL DAILY
Qty: 7 TABLET | Refills: 0 | Status: SHIPPED | OUTPATIENT
Start: 2025-08-27

## 2025-08-27 RX ORDER — ESTRADIOL 0.1 MG/G
CREAM VAGINAL
Qty: 42.5 G | Refills: 3 | Status: SHIPPED | OUTPATIENT
Start: 2025-08-27

## 2025-08-27 RX ORDER — ESTRADIOL 0.1 MG/G
CREAM VAGINAL
Qty: 42.5 G | Refills: 3 | Status: SHIPPED | OUTPATIENT
Start: 2025-08-27 | End: 2025-08-27

## 2025-08-27 ASSESSMENT — ENCOUNTER SYMPTOMS
DYSURIA: 0
HEMATURIA: 0
ARTHRALGIAS: 1

## 2025-08-27 ASSESSMENT — PAIN SCALES - GENERAL: PAINLEVEL_OUTOF10: MODERATE PAIN (6)

## 2025-08-28 ENCOUNTER — MYC REFILL (OUTPATIENT)
Dept: CARDIOLOGY | Facility: CLINIC | Age: 75
End: 2025-08-28
Payer: COMMERCIAL

## 2025-08-28 DIAGNOSIS — N39.0 RECURRENT UTI: ICD-10-CM

## 2025-08-28 DIAGNOSIS — N95.2 ATROPHIC VAGINITIS: ICD-10-CM

## 2025-08-28 DIAGNOSIS — I10 BENIGN ESSENTIAL HYPERTENSION: ICD-10-CM

## 2025-08-28 RX ORDER — CARVEDILOL 6.25 MG/1
6.25 TABLET ORAL 2 TIMES DAILY WITH MEALS
Qty: 180 TABLET | Refills: 2 | Status: SHIPPED | OUTPATIENT
Start: 2025-08-28

## 2025-08-28 RX ORDER — METHENAMINE HIPPURATE 1000 MG/1
1 TABLET ORAL 2 TIMES DAILY
Qty: 60 TABLET | Refills: 0 | Status: SHIPPED | OUTPATIENT
Start: 2025-08-28

## (undated) DEVICE — SU STRATAFIX PDS PLUS 1 CT-1 18" SXPP1A404

## (undated) DEVICE — ELECTRODE PATIENT RETURN ADULT L10 FT 2 PLATE CORD 0855C

## (undated) DEVICE — NEEDLE SPINAL DISP 18GA X 3.5" QUINCKE 333350

## (undated) DEVICE — ENDO BITE BLOCK ADULT OLYMPUS LATEX FREE MAJ-1632

## (undated) DEVICE — SOL WATER IRRIG 1000ML BOTTLE 2F7114

## (undated) DEVICE — LINEN HALF SHEET 5512

## (undated) DEVICE — LINEN FULL SHEET 5511

## (undated) DEVICE — DRSG KERLIX FLUFFS X5

## (undated) DEVICE — SU VICRYL 2-0 CT-1 27" UND J259H

## (undated) DEVICE — GLOVE PROTEXIS POWDER FREE 8.5 ORTHOPEDIC 2D73ET85

## (undated) DEVICE — LINEN ORTHO ACL PACK 5447

## (undated) DEVICE — SOLUTION IRRIG 2B7127 .9NS 3000ML BAG

## (undated) DEVICE — SU VICRYL 1 MO-4 18" J702D

## (undated) DEVICE — BLADE SAW SAGITTAL STRK 25X90X1.27MM HD SYS 6 6125-127-090

## (undated) DEVICE — CUSTOM PACK TOTAL KNEE SOP5BTKHEC

## (undated) DEVICE — BLADE SAW SAGITTAL STRK 18X90X1.27MM HD SYS 6 6118-127-090

## (undated) DEVICE — GLOVE BIOGEL PI SZ 8.5 40885

## (undated) DEVICE — CUSTOM PACK TOTAL KNEE ACCESSORY SOP5BTAHEA

## (undated) DEVICE — SOL NACL 0.9% IRRIG 1000ML BOTTLE 2F7124

## (undated) DEVICE — GLOVE PROTEXIS BLUE W/NEU-THERA 8.5  2D73EB85

## (undated) DEVICE — GLOVE PROTEXIS BLUE W/NEU-THERA 6.5  2D73EB65

## (undated) DEVICE — GLOVE PROTEXIS POWDER FREE 6.5 ORTHOPEDIC 2D73ET65

## (undated) DEVICE — SUTURE MONOCRYL 3-0 18 PS2 UND MCP497G

## (undated) DEVICE — ESU ELEC BLADE 6" COATED E1450-6

## (undated) DEVICE — HOLDER LIMB VELCRO OR 0814-1533

## (undated) DEVICE — SU MONOCRYL 4-0 PS-2 27" UND Y426H

## (undated) DEVICE — SOL NACL 0.9% IRRIG 3000ML BAG 2B7477

## (undated) DEVICE — DRAPE CONVERTORS U-DRAPE 60X72" 8476

## (undated) DEVICE — DRSG STERI STRIP 1/2X4" R1547

## (undated) DEVICE — GLOVE UNDER INDICATOR PI SZ 8.5 LF 41685

## (undated) DEVICE — SUTURE VICRYL+ 2-0 27IN CT-1 UND VCP259H

## (undated) DEVICE — DRSG AQUACEL AG HYDROFIBER  3.5X10" 422605

## (undated) DEVICE — SU ETHIBOND 5 V-37 4X30" MB66G

## (undated) DEVICE — PREP CHLORAPREP 26ML TINTED HI-LITE ORANGE 930815

## (undated) DEVICE — SUCTION MANIFOLD NEPTUNE 2 SYS 4 PORT 0702-020-000

## (undated) DEVICE — GLOVE BIOGEL INDICATOR 7.5 LF 41675

## (undated) DEVICE — SU FIBERWIRE 2 38" T-8 NDL  AR-7206

## (undated) DEVICE — DECANTER VIAL 2006S

## (undated) DEVICE — LINEN DRAPE 54X72" 5467

## (undated) DEVICE — IMM PILLOW ABDUCT HIP MED M60-025-M

## (undated) DEVICE — KIT ENDO TURNOVER/PROCEDURE W/CLEAN A SCOPE LINERS 103888

## (undated) DEVICE — GLOVE BIOGEL PI ULTRATOUCH G SZ 7.5 42175

## (undated) DEVICE — DRESSING MEPILEX ADH 4INX10IN STRL LF 496455

## (undated) DEVICE — HOOD FLYTE W/PEELAWAY 408-800-100

## (undated) DEVICE — BAG CLEAR TRASH 1.3M 39X33" P4040C

## (undated) DEVICE — GOWN IMPERVIOUS BREATHABLE 2XL/XLONG

## (undated) DEVICE — PACK TOTAL HIP RIDGES LATEX PO15HIFSG

## (undated) DEVICE — A3 SUPPLIES- SEE NURSING INFO PAGE

## (undated) RX ORDER — ONDANSETRON 2 MG/ML
INJECTION INTRAMUSCULAR; INTRAVENOUS
Status: DISPENSED
Start: 2022-07-27

## (undated) RX ORDER — LIDOCAINE HYDROCHLORIDE 10 MG/ML
INJECTION, SOLUTION EPIDURAL; INFILTRATION; INTRACAUDAL; PERINEURAL
Status: DISPENSED
Start: 2022-07-27

## (undated) RX ORDER — ACETAMINOPHEN 325 MG/1
TABLET ORAL
Status: DISPENSED
Start: 2022-07-27

## (undated) RX ORDER — FENTANYL CITRATE 0.05 MG/ML
INJECTION, SOLUTION INTRAMUSCULAR; INTRAVENOUS
Status: DISPENSED
Start: 2022-09-13

## (undated) RX ORDER — PROPOFOL 10 MG/ML
INJECTION, EMULSION INTRAVENOUS
Status: DISPENSED
Start: 2022-07-27

## (undated) RX ORDER — DEXAMETHASONE SODIUM PHOSPHATE 4 MG/ML
INJECTION, SOLUTION INTRA-ARTICULAR; INTRALESIONAL; INTRAMUSCULAR; INTRAVENOUS; SOFT TISSUE
Status: DISPENSED
Start: 2022-07-27

## (undated) RX ORDER — NEOSTIGMINE METHYLSULFATE 1 MG/ML
VIAL (ML) INJECTION
Status: DISPENSED
Start: 2022-07-27

## (undated) RX ORDER — CEFAZOLIN SODIUM/WATER 2 G/20 ML
SYRINGE (ML) INTRAVENOUS
Status: DISPENSED
Start: 2022-07-27

## (undated) RX ORDER — GLYCOPYRROLATE 0.2 MG/ML
INJECTION INTRAMUSCULAR; INTRAVENOUS
Status: DISPENSED
Start: 2022-07-27

## (undated) RX ORDER — FENTANYL CITRATE 50 UG/ML
INJECTION, SOLUTION INTRAMUSCULAR; INTRAVENOUS
Status: DISPENSED
Start: 2022-07-27

## (undated) RX ORDER — OXYCODONE HYDROCHLORIDE 5 MG/1
TABLET ORAL
Status: DISPENSED
Start: 2022-07-27

## (undated) RX ORDER — FENTANYL CITRATE-0.9 % NACL/PF 10 MCG/ML
PLASTIC BAG, INJECTION (ML) INTRAVENOUS
Status: DISPENSED
Start: 2022-07-27

## (undated) RX ORDER — TRANEXAMIC ACID 650 MG/1
TABLET ORAL
Status: DISPENSED
Start: 2022-07-27